# Patient Record
Sex: FEMALE | Race: WHITE | HISPANIC OR LATINO | Employment: OTHER | URBAN - METROPOLITAN AREA
[De-identification: names, ages, dates, MRNs, and addresses within clinical notes are randomized per-mention and may not be internally consistent; named-entity substitution may affect disease eponyms.]

---

## 2017-01-02 ENCOUNTER — GENERIC CONVERSION - ENCOUNTER (OUTPATIENT)
Dept: OTHER | Facility: OTHER | Age: 57
End: 2017-01-02

## 2017-01-18 ENCOUNTER — LAB CONVERSION - ENCOUNTER (OUTPATIENT)
Dept: OTHER | Facility: OTHER | Age: 57
End: 2017-01-18

## 2017-01-18 LAB
CREATININE, RANDOM URINE (HISTORICAL): 65 MG/DL (ref 20–320)
HBA1C MFR BLD HPLC: 7.6 % OF TOTAL HGB
MAGNESIUM, UR (HISTORICAL): 0.2 MG/DL
MICROALBUMIN/CREATININE RATIO (HISTORICAL): 3 MCG/MG CREAT

## 2017-01-20 ENCOUNTER — ALLSCRIPTS OFFICE VISIT (OUTPATIENT)
Dept: OTHER | Facility: OTHER | Age: 57
End: 2017-01-20

## 2017-01-20 DIAGNOSIS — M19.019 PRIMARY OSTEOARTHRITIS OF SHOULDER: ICD-10-CM

## 2017-01-20 DIAGNOSIS — K52.9 NONINFECTIVE GASTROENTERITIS AND COLITIS: ICD-10-CM

## 2017-01-20 DIAGNOSIS — E11.9 TYPE 2 DIABETES MELLITUS WITHOUT COMPLICATIONS (HCC): ICD-10-CM

## 2017-01-20 DIAGNOSIS — M25.552 PAIN IN LEFT HIP: ICD-10-CM

## 2017-01-20 DIAGNOSIS — K21.9 GASTRO-ESOPHAGEAL REFLUX DISEASE WITHOUT ESOPHAGITIS: ICD-10-CM

## 2017-01-20 DIAGNOSIS — E78.5 HYPERLIPIDEMIA: ICD-10-CM

## 2017-04-25 ENCOUNTER — LAB CONVERSION - ENCOUNTER (OUTPATIENT)
Dept: OTHER | Facility: OTHER | Age: 57
End: 2017-04-25

## 2017-04-25 LAB
BUN SERPL-MCNC: 11 MG/DL (ref 7–25)
BUN/CREA RATIO (HISTORICAL): ABNORMAL (CALC) (ref 6–22)
CALCIUM SERPL-MCNC: 9 MG/DL (ref 8.6–10.4)
CHLORIDE SERPL-SCNC: 105 MMOL/L (ref 98–110)
CHOLEST SERPL-MCNC: 151 MG/DL (ref 125–200)
CHOLEST/HDLC SERPL: 3 (CALC)
CO2 SERPL-SCNC: 30 MMOL/L (ref 20–31)
CREAT SERPL-MCNC: 0.7 MG/DL (ref 0.5–1.05)
CREATININE, RANDOM URINE (HISTORICAL): 217 MG/DL (ref 20–320)
EGFR AFRICAN AMERICAN (HISTORICAL): 112 ML/MIN/1.73M2
EGFR-AMERICAN CALC (HISTORICAL): 97 ML/MIN/1.73M2
GLUCOSE (HISTORICAL): 107 MG/DL (ref 65–99)
HBA1C MFR BLD HPLC: 7.2 % OF TOTAL HGB
HDLC SERPL-MCNC: 51 MG/DL
LDL CHOLESTEROL (HISTORICAL): 63 MG/DL (CALC)
MAGNESIUM, UR (HISTORICAL): 0.9 MG/DL
MICROALBUMIN/CREATININE RATIO (HISTORICAL): 4 MCG/MG CREAT
NON-HDL-CHOL (CHOL-HDL) (HISTORICAL): 100 MG/DL (CALC)
POTASSIUM SERPL-SCNC: 4.2 MMOL/L (ref 3.5–5.3)
SODIUM SERPL-SCNC: 143 MMOL/L (ref 135–146)
TRIGL SERPL-MCNC: 185 MG/DL

## 2017-04-26 ENCOUNTER — GENERIC CONVERSION - ENCOUNTER (OUTPATIENT)
Dept: OTHER | Facility: OTHER | Age: 57
End: 2017-04-26

## 2017-06-13 ENCOUNTER — ALLSCRIPTS OFFICE VISIT (OUTPATIENT)
Dept: OTHER | Facility: OTHER | Age: 57
End: 2017-06-13

## 2017-06-13 DIAGNOSIS — E78.5 HYPERLIPIDEMIA: ICD-10-CM

## 2017-06-13 DIAGNOSIS — M25.552 PAIN IN LEFT HIP: ICD-10-CM

## 2017-06-13 DIAGNOSIS — M19.019 PRIMARY OSTEOARTHRITIS OF SHOULDER: ICD-10-CM

## 2017-06-13 DIAGNOSIS — R23.8 OTHER SKIN CHANGES: ICD-10-CM

## 2017-06-13 DIAGNOSIS — K21.9 GASTRO-ESOPHAGEAL REFLUX DISEASE WITHOUT ESOPHAGITIS: ICD-10-CM

## 2017-06-13 DIAGNOSIS — E11.9 TYPE 2 DIABETES MELLITUS WITHOUT COMPLICATIONS (HCC): ICD-10-CM

## 2017-06-26 ENCOUNTER — ALLSCRIPTS OFFICE VISIT (OUTPATIENT)
Dept: OTHER | Facility: OTHER | Age: 57
End: 2017-06-26

## 2017-06-26 PROCEDURE — 88305 TISSUE EXAM BY PATHOLOGIST: CPT | Performed by: FAMILY MEDICINE

## 2017-06-27 ENCOUNTER — LAB REQUISITION (OUTPATIENT)
Dept: LAB | Facility: HOSPITAL | Age: 57
End: 2017-06-27
Payer: COMMERCIAL

## 2017-06-27 DIAGNOSIS — R23.8 OTHER SKIN CHANGES: ICD-10-CM

## 2017-06-29 ENCOUNTER — ALLSCRIPTS OFFICE VISIT (OUTPATIENT)
Dept: OTHER | Facility: OTHER | Age: 57
End: 2017-06-29

## 2017-06-30 ENCOUNTER — ALLSCRIPTS OFFICE VISIT (OUTPATIENT)
Dept: OTHER | Facility: OTHER | Age: 57
End: 2017-06-30

## 2017-07-03 ENCOUNTER — ALLSCRIPTS OFFICE VISIT (OUTPATIENT)
Dept: OTHER | Facility: OTHER | Age: 57
End: 2017-07-03

## 2017-08-09 ENCOUNTER — ALLSCRIPTS OFFICE VISIT (OUTPATIENT)
Dept: OTHER | Facility: OTHER | Age: 57
End: 2017-08-09

## 2017-08-09 DIAGNOSIS — M70.62 TROCHANTERIC BURSITIS OF LEFT HIP: ICD-10-CM

## 2017-08-09 DIAGNOSIS — M25.511 PAIN IN RIGHT SHOULDER: ICD-10-CM

## 2017-08-09 DIAGNOSIS — M16.12 PRIMARY OSTEOARTHRITIS OF LEFT HIP: ICD-10-CM

## 2017-08-09 DIAGNOSIS — S43.439A SUPERIOR GLENOID LABRUM LESION OF SHOULDER: ICD-10-CM

## 2017-08-09 DIAGNOSIS — M75.101 RIGHT ROTATOR CUFF TEAR: ICD-10-CM

## 2017-08-10 ENCOUNTER — GENERIC CONVERSION - ENCOUNTER (OUTPATIENT)
Dept: OTHER | Facility: OTHER | Age: 57
End: 2017-08-10

## 2017-08-16 ENCOUNTER — ALLSCRIPTS OFFICE VISIT (OUTPATIENT)
Dept: OTHER | Facility: OTHER | Age: 57
End: 2017-08-16

## 2017-08-16 ENCOUNTER — HOSPITAL ENCOUNTER (OUTPATIENT)
Dept: RADIOLOGY | Facility: HOSPITAL | Age: 57
Discharge: HOME | End: 2017-08-16
Attending: ORTHOPAEDIC SURGERY
Payer: COMMERCIAL

## 2017-08-16 DIAGNOSIS — S43.439A SUPERIOR GLENOID LABRUM LESION OF SHOULDER: ICD-10-CM

## 2017-08-16 PROCEDURE — 73030 X-RAY EXAM OF SHOULDER: CPT

## 2017-08-17 ENCOUNTER — GENERIC CONVERSION - ENCOUNTER (OUTPATIENT)
Dept: OTHER | Facility: OTHER | Age: 57
End: 2017-08-17

## 2017-08-21 ENCOUNTER — GENERIC CONVERSION - ENCOUNTER (OUTPATIENT)
Dept: OTHER | Facility: OTHER | Age: 57
End: 2017-08-21

## 2017-09-12 ENCOUNTER — GENERIC CONVERSION - ENCOUNTER (OUTPATIENT)
Dept: OTHER | Facility: OTHER | Age: 57
End: 2017-09-12

## 2017-10-11 ENCOUNTER — ALLSCRIPTS OFFICE VISIT (OUTPATIENT)
Dept: OTHER | Facility: OTHER | Age: 57
End: 2017-10-11

## 2017-10-11 DIAGNOSIS — M70.62 TROCHANTERIC BURSITIS OF LEFT HIP: ICD-10-CM

## 2017-10-11 DIAGNOSIS — J30.9 ALLERGIC RHINITIS: ICD-10-CM

## 2017-10-11 DIAGNOSIS — J20.8 ACUTE BRONCHITIS DUE TO OTHER SPECIFIED ORGANISMS: ICD-10-CM

## 2017-10-11 DIAGNOSIS — R05.9 COUGH: ICD-10-CM

## 2017-10-18 ENCOUNTER — GENERIC CONVERSION - ENCOUNTER (OUTPATIENT)
Dept: OTHER | Facility: OTHER | Age: 57
End: 2017-10-18

## 2017-10-27 ENCOUNTER — ALLSCRIPTS OFFICE VISIT (OUTPATIENT)
Dept: OTHER | Facility: OTHER | Age: 57
End: 2017-10-27

## 2017-10-27 LAB — GLUCOSE SERPL-MCNC: 191 MG/DL

## 2017-10-28 NOTE — PROGRESS NOTES
Assessment  1  Flu-like symptoms (780 99) (W94 30)    Plan  Diabetes mellitus type 2, controlled    · Blood Glucose- POC; Status:Complete;   Done: 00QHT0259 11:08AM  Fasting (Y/N) : Yes - Y  Flu-like symptoms    · Bromfed DM 30-2-10 MG/5ML Oral Syrup; TAKE 1 TSP Every 8 hours PRN  cough/congestion   · Follow Up if Not Better Evaluation and Treatment  Follow-up  Status: Complete  Done:  27Oct2017   · Call [phone] if: The cough is getting worse ; Status:Complete;   Done: 18KHA4195   · Call [phone] if: The cough is not gone in 10 days ; Status:Complete;   Done:  93FOX0398   · Seek Immediate Medical Attention if: You are feeling short of breath ; Status:Complete;    Done: 27Oct2017    Discussion/Summary    Mild dehydration, otherwise nontoxiclikely has fluhas been a week since symptoms startedwith patient supportive treatmentsymptomatically with Bromfed DM take as directed  well push fluids  Start a liquid soft diet and advance as tolerated  me if any worsening symptoms or if no improvement despite recommendations into 4 more days  The patient was counseled regarding instructions for management,-- risk factor reductions,-- impressions  total time of encounter was Twenty minutes  Possible side effects of new medications were reviewed with the patient/guardian today  The treatment plan was reviewed with the patient/guardian  The patient/guardian understands and agrees with the treatment plan      Chief Complaint  Patient complain of flu symptoms for 1 week complain of having no appetite, fever, cough , body aches, dizziness, and congestion complain of nose bleed when blowing her nose sometimes level 5/10       History of Present Illness  HPI: for 1 week she has body aches, nausea with dry cough, dizziness  Lack of appetite, congestion  Subjective fevers  has been taking mucinex and tylenol  sick contactadmits not drinking that much water         Review of Systems    Constitutional: No fever, no chills, feels well, no tiredness, no recent weight gain or loss  Cardiovascular: no complaints of slow or fast heart rate, no chest pain, no palpitations, no leg claudication or lower extremity edema  Respiratory: no complaints of shortness of breath, no wheezing, no dyspnea on exertion, no orthopnea or PND  Gastrointestinal: no complaints of abdominal pain, no constipation, no nausea or diarrhea, no vomiting, no bloody stools  Genitourinary: no complaints of dysuria, no incontinence, no pelvic pain, no dysmenorrhea, no vaginal discharge or abnormal vaginal bleeding  Integumentary: no complaints of skin rash or lesion, no itching or dry skin, no skin wounds  Active Problems  1  Allergic rhinitis (477 9) (J30 9)   2  Cellulitis (682 9) (L03 90)   3  Dermatofibroma (216 9) (D23 9)   4  Diabetes mellitus type 2, controlled (250 00) (E11 9)   5  Eczema (692 9) (L30 9)   6  Encounter for screening colonoscopy (V76 51) (Z12 11)   7  Encounter for screening mammogram for malignant neoplasm of breast (V76 12)   (Z12 31)   8  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)   9  GERD (gastroesophageal reflux disease) (530 81) (K21 9)   10  Glenohumeral arthritis (715 91) (M19 019)   11  Greater trochanteric bursitis of left hip (726 5) (M70 62)   12  Hyperlipidemia (272 4) (E78 5)   13  Ingrown nail (703 0) (L60 0)   14  Labral tear of shoulder (840 8) (S43 439A)   15  Left hip pain (719 45) (M25 552)   16  History of Left knee pain (719 46) (M25 562)   17  Osteoarthritis of left hip (715 95) (M16 12)   18  Personal history of colonic polyps (V12 72) (Z86 010)   19  Plantar warts (078 12) (B07 0)   20  Postprocedural wound infection (998 59) (T81 4XXA)   21  Right shoulder pain (719 41) (M25 511)   22  Rotator cuff injury (959 2) (S46 009A)   23  Rotator cuff syndrome, right (726 10) (M75 101)   24  Subacromial bursitis (726 19) (M75 50)   25  Tinea corporis (110 5) (B35 4)    Past Medical History  1   History of gastroenteritis (V12 79) (Z87 19)   2  History of Left knee pain (719 46) (M25 562)   3  Tinea corporis (110 5) (B35 4)  Active Problems And Past Medical History Reviewed: The active problems and past medical history were reviewed and updated today  Family History  Mother    1  Family history of Hypertension (V17 49)  Father    2  Family history of Hypertension (V17 49)    Social History   · Never A Smoker   · Never Drank Alcohol   · Occupation:   · [employer]  The social history was reviewed and is unchanged  Surgical History  1  History of Cholecystectomy   2  History of Hernia Repair   3  History of Ovarian Surgery    Current Meds   1  Atorvastatin Calcium 20 MG Oral Tablet; take one tablet at bedtime; Therapy: 86ZOP3423 to (Evaluate:01Ylb6360)  Requested for: 99EYR8727; Last   Rx:29Jun2017 Ordered   2  MetFORMIN HCl - 1000 MG Oral Tablet; TAKE 1 TABLET TWICE DAILY; Therapy: 05QFU5106 to (Radha White)  Requested for: 03BLH9122; Last   Rx:06Oct2017 Ordered   3  Pantoprazole Sodium 40 MG Oral Tablet Delayed Release; TAKE 1 TABLET AT   BEDTIME; Therapy: 65NRK1873 to (Evaluate:07Jan2018)  Requested for: 09Oct2017; Last   Rx:09Oct2017 Ordered   4  Triamcinolone Acetonide 0 5 % External Ointment; APPLY SPARINGLY TO AFFECTED   AREA(S) TWICE DAILY; Therapy: 96WJU0605 to (Last Rx:05Oct2017)  Requested for: 05Oct2017 Ordered    The medication list was reviewed and updated today  Allergies  1  Aspirin TBCR    Vitals   Recorded: 90QLB3987 10:49AM   Temperature 98 7 F   Heart Rate 118   Respiration 18   Systolic 729   Diastolic 80   Height 5 ft 1 81 in   Weight 187 lb    BMI Calculated 34 41   BSA Calculated 1 85   O2 Saturation 96   Pain Scale 5     Physical Exam    Constitutional   General appearance: No acute distress, well appearing and well nourished  Eyes   Conjunctiva and lids: No swelling, erythema or discharge  Pupils and irises: Equal, round and reactive to light      Ears, Nose, Mouth, and Throat External inspection of ears and nose: Normal     Otoscopic examination: Tympanic membranes translucent with normal light reflex  Canals patent without erythema  Nasal mucosa, septum, and turbinates: Abnormal   There was clear rhinorrhea from both nares  The bilateral nasal mucosa was boggy  Oropharynx: Normal with no erythema, edema, exudate or lesions  Pulmonary   Respiratory effort: No increased work of breathing or signs of respiratory distress  Auscultation of lungs: Clear to auscultation  Cardiovascular   Auscultation of heart: Normal rate and rhythm, normal S1 and S2, without murmurs  -- Sinus tachy  Examination of extremities for edema and/or varicosities: Normal     Skin   Skin and subcutaneous tissue: Normal without rashes or lesions  Psychiatric   Mood and affect: Normal          Results/Data  Blood Glucose- POC 27Oct2017 11:08AM Dakota Don     Test Name Result Flag Reference   Glucose Finger Stick 191         Future Appointments    Date/Time Provider Specialty Site   12/14/2017 01:00 PM SHERRI Bingham 476   01/16/2018 11:00 AM SHERRI Petty   Orthopedic Surgery [facility]     Signatures   Electronically signed by : SHERRI Worrell ; Oct 27 2017  1:31PM EST                       (Author)

## 2017-11-06 ENCOUNTER — GENERIC CONVERSION - ENCOUNTER (OUTPATIENT)
Dept: OTHER | Facility: OTHER | Age: 57
End: 2017-11-06

## 2017-11-09 ENCOUNTER — HOSPITAL ENCOUNTER (OUTPATIENT)
Dept: RADIOLOGY | Facility: HOSPITAL | Age: 57
Discharge: HOME | End: 2017-11-09
Payer: COMMERCIAL

## 2017-11-09 ENCOUNTER — GENERIC CONVERSION - ENCOUNTER (OUTPATIENT)
Dept: OTHER | Facility: OTHER | Age: 57
End: 2017-11-09

## 2017-11-09 ENCOUNTER — TRANSCRIBE ORDERS (OUTPATIENT)
Dept: RADIOLOGY | Facility: HOSPITAL | Age: 57
End: 2017-11-09

## 2017-11-09 DIAGNOSIS — R05.9 COUGH: ICD-10-CM

## 2017-11-09 DIAGNOSIS — J30.9 ALLERGIC RHINITIS: ICD-10-CM

## 2017-11-09 DIAGNOSIS — J20.8 ACUTE BRONCHITIS DUE TO OTHER SPECIFIED ORGANISMS: ICD-10-CM

## 2017-11-09 PROCEDURE — 71020 HB CHEST X-RAY 2VW FRONTAL&LATL: CPT

## 2017-11-13 ENCOUNTER — GENERIC CONVERSION - ENCOUNTER (OUTPATIENT)
Dept: OTHER | Facility: OTHER | Age: 57
End: 2017-11-13

## 2017-12-05 ENCOUNTER — LAB CONVERSION - ENCOUNTER (OUTPATIENT)
Dept: OTHER | Facility: OTHER | Age: 57
End: 2017-12-05

## 2017-12-05 LAB
BUN SERPL-MCNC: 10 MG/DL (ref 7–25)
BUN/CREA RATIO (HISTORICAL): ABNORMAL (CALC) (ref 6–22)
CALCIUM SERPL-MCNC: 9.2 MG/DL (ref 8.6–10.4)
CHLORIDE SERPL-SCNC: 103 MMOL/L (ref 98–110)
CO2 SERPL-SCNC: 28 MMOL/L (ref 20–31)
CREAT SERPL-MCNC: 0.76 MG/DL (ref 0.5–1.05)
EGFR AFRICAN AMERICAN (HISTORICAL): 102 ML/MIN/1.73M2
EGFR-AMERICAN CALC (HISTORICAL): 88 ML/MIN/1.73M2
GLUCOSE (HISTORICAL): 239 MG/DL (ref 65–99)
HBA1C MFR BLD HPLC: 9 % OF TOTAL HGB
POTASSIUM SERPL-SCNC: 3.9 MMOL/L (ref 3.5–5.3)
SODIUM SERPL-SCNC: 140 MMOL/L (ref 135–146)

## 2017-12-14 ENCOUNTER — ALLSCRIPTS OFFICE VISIT (OUTPATIENT)
Dept: OTHER | Facility: OTHER | Age: 57
End: 2017-12-14

## 2017-12-14 DIAGNOSIS — Z12.31 ENCOUNTER FOR SCREENING MAMMOGRAM FOR MALIGNANT NEOPLASM OF BREAST: ICD-10-CM

## 2017-12-14 DIAGNOSIS — E78.5 HYPERLIPIDEMIA: ICD-10-CM

## 2017-12-14 DIAGNOSIS — E11.9 TYPE 2 DIABETES MELLITUS WITHOUT COMPLICATIONS (HCC): ICD-10-CM

## 2017-12-14 DIAGNOSIS — K21.9 GASTRO-ESOPHAGEAL REFLUX DISEASE WITHOUT ESOPHAGITIS: ICD-10-CM

## 2017-12-14 DIAGNOSIS — I10 ESSENTIAL (PRIMARY) HYPERTENSION: ICD-10-CM

## 2017-12-15 NOTE — PROGRESS NOTES
Assessment  1  Diabetes mellitus type 2, controlled (250 00) (E11 9)   2  GERD (gastroesophageal reflux disease) (530 81) (K21 9)   3  Hyperlipidemia (272 4) (E78 5)   4  Hypertension, benign (401 1) (I10)    Plan  Diabetes mellitus type 2, controlled    · GlipiZIDE 5 MG Oral Tablet; TAKE 1 TABLET TWICE DAILY  Diabetes mellitus type 2, controlled, GERD (gastroesophageal reflux disease),Hyperlipidemia, Hypertension, benign    · (1) BASIC METABOLIC PROFILE; Status:Active; Requested for:12Iew9538;    · (1) HEMOGLOBIN A1C; Status:Active; Requested for:57Yae3067;    · (1) MICROALBUMIN CREATININE RATIO, RANDOM URINE; Status:Active; Requestedfor:10Ixj8600;   Diabetes mellitus type 2, controlled, Hypertension, benign    · Lisinopril 10 MG Oral Tablet; TAKE 1 TABLET DAILY  Encounter for screening mammogram for malignant neoplasm of breast    · * MAMMO SCREENING BILATERAL W CAD; Status:Hold For - Scheduling; Requestedfor:63Lzt7907;     Discussion/Summary    Discussed with patient proper compliance with medicines, currently her diabetes is not controlled and her number has worsened  She is going to take metformin twice a day as directed, will add glipizide to her regimen  Discussed proper use of medicine and common side effects  She is also going to start lisinopril once a day  Call me if any issues with new changes  Follow-up 3 months with labs  The patient was counseled regarding diagnostic results,-- instructions for management,-- risk factor reductions,-- prognosis,-- impressions,-- risks and benefits of treatment options,-- importance of compliance with treatment  total time of encounter was 30 minutes  Possible side effects of new medications were reviewed with the patient/guardian today  The treatment plan was reviewed with the patient/guardian   The patient/guardian understands and agrees with the treatment plan      Chief Complaint  Patient here for 6 month follow up with labsPatient need papPatient need Foot Exam History of Present Illness  Patient presents for chronic condition follow-up including diabetes with labs  Diabetes is uncontrolled her A1c increased from 7 2 now 9  She admits not taking her morning dose of metformin because she forgets  She is on metformin 1000 mg twice a day  Weight unchanged from previous visit  She has tried to modify her diet but not exercising  She is on a statin but not on an Ace or an Arb  Today her blood pressure was borderline at 150 over 90  She denies any chest pain, shortness of breath, dyspnea on exertion, or palpitations  Review of Systems   Constitutional: No fever, no chills, feels well, no tiredness, no recent weight gain or weight loss  Cardiovascular: No complaints of slow heart rate, no fast heart rate, no chest pain, no palpitations, no leg claudication, no lower extremity edema  Respiratory: No complaints of shortness of breath, no wheezing, no cough, no SOB on exertion, no orthopnea, no PND  Gastrointestinal: No complaints of abdominal pain, no constipation, no nausea or vomiting, no diarrhea, no bloody stools  Genitourinary: No complaints of dysuria, no incontinence, no pelvic pain, no dysmenorrhea, no vaginal discharge or bleeding  Integumentary: No complaints of skin rash or lesions, no itching, no skin wounds, no breast pain or lump  Neurological: No complaints of headache, no confusion, no convulsions, no numbness, no dizziness or fainting, no tingling, no limb weakness, no difficulty walking  Active Problems  1  Allergic rhinitis (477 9) (J30 9)   2  Dermatofibroma (216 9) (D23 9)   3  Diabetes mellitus type 2, controlled (250 00) (E11 9)   4  Eczema (692 9) (L30 9)   5  Encounter for screening colonoscopy (V76 51) (Z12 11)   6  Encounter for screening mammogram for malignant neoplasm of breast (V76 12) (Z12 31)   7  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)   8  GERD (gastroesophageal reflux disease) (530 81) (K21 9)   9   Glenohumeral arthritis (715 91) (M19 019)   10  Greater trochanteric bursitis of left hip (726 5) (M70 62)   11  Hyperlipidemia (272 4) (E78 5)   12  Influenza vaccine needed (V04 81) (Z23)   13  Ingrown nail (703 0) (L60 0)   14  Labral tear of shoulder (840 8) (S43 439A)   15  Left hip pain (719 45) (M25 552)   16  History of Left knee pain (719 46) (M25 562)   17  Osteoarthritis of left hip (715 95) (M16 12)   18  Personal history of colonic polyps (V12 72) (Z86 010)   19  Plantar warts (078 12) (B07 0)   20  Right shoulder pain (719 41) (M25 511)   21  Rotator cuff syndrome, right (726 10) (M75 101)   22  Subacromial bursitis (726 19) (M75 50)   23  Tinea corporis (110 5) (B35 4)   24  Viral bronchitis (466 0) (J20 8)    Past Medical History  1  History of gastroenteritis (V12 79) (Z87 19)   2  History of Left knee pain (719 46) (M25 562)   3  Tinea corporis (110 5) (B35 4)    The active problems and past medical history were reviewed and updated today  Surgical History  1  History of Cholecystectomy   2  History of Hernia Repair   3  History of Ovarian Surgery    Family History  Mother    1  Family history of Hypertension (V17 49)  Father    2  Family history of Hypertension (V17 49)    Social History   · Never A Smoker   · Never Drank Alcohol   · Occupation:  The social history was reviewed and updated today  Current Meds   1  Atorvastatin Calcium 20 MG Oral Tablet; take one tablet at bedtime; Therapy: 76TYZ4812 to (Solange Mathur)  Requested for: 21STW3601; Last Rx:09Nov2017 Ordered   2  MetFORMIN HCl - 1000 MG Oral Tablet; TAKE 1 TABLET TWICE DAILY; Therapy: 90WKY3063 to (Isabel Lovell)  Requested for: 16DUJ5886; Last Rx:06Oct2017 Ordered   3  Pantoprazole Sodium 40 MG Oral Tablet Delayed Release; TAKE 1 TABLET AT BEDTIME; Therapy: 71MTD9587 to (Evaluate:07Jan2018)  Requested for: 09Oct2017; Last Rx:09Oct2017 Ordered   4   Triamcinolone Acetonide 0 5 % External Ointment; APPLY SPARINGLY TO AFFECTED AREA(S) TWICE DAILY; Therapy: 43RCW5000 to (Last Rx:88Dys9928)  Requested for: 54UZE9572 Ordered   5  Ventolin  (90 Base) MCG/ACT Inhalation Aerosol Solution; INHALE 2 PUFFS EVERY 4-6 HOURS AS NEEDED; Therapy: 82FZK3034 to (Evaluate:62Mzu4518)  Requested for: 86GBA1992; Last ZF:37OYR3111 Ordered    The medication list was reviewed and updated today  Allergies  1  Aspirin TBCR    Vitals  Vital Signs    Recorded: 14Dec2017 01:06PM   Temperature 98 5 F   Heart Rate 110   Respiration 18   Systolic 407   Diastolic 90   Height 5 ft 1 42 in   Weight 188 lb    BMI Calculated 35 04   BSA Calculated 1 85   O2 Saturation 96   Pain Scale 0     Physical Exam   Constitutional  General appearance: No acute distress, well appearing and well nourished  Pulmonary  Respiratory effort: No increased work of breathing or signs of respiratory distress  Auscultation of lungs: Clear to auscultation  Cardiovascular  Auscultation of heart: Normal rate and rhythm, normal S1 and S2, without murmurs  Examination of extremities for edema and/or varicosities: Normal    Musculoskeletal  Gait and station: Normal    Psychiatric  Mood and affect: Normal          Results/Data  (1) BASIC METABOLIC PROFILE 86GTL9531 10:22AM [facility]     Test Name Result Flag Reference   GLUCOSE 239 mg/dL H 65-99   Fasting reference interval   For someone without known diabetes, a glucose value >125 mg/dL indicates that they may have diabetes and this should be confirmed with a follow-up test    UREA NITROGEN (BUN) 10 mg/dL  7-25   CREATININE 0 76 mg/dL  0 50-1 05   For patients >52years of age, the reference limit for Creatinine is approximately 13% higher for people identified as -American  eGFR NON-AFR   AMERICAN 88 mL/min/1 73m2  > OR = 60   eGFR AFRICAN AMERICAN 102 mL/min/1 73m2  > OR = 60   BUN/CREATININE RATIO   6-33   NOT APPLICABLE (calc)   SODIUM 140 mmol/L  135-146   POTASSIUM 3 9 mmol/L  3 5-5 3   CHLORIDE 103 mmol/L     CARBON DIOXIDE 28 mmol/L  20-31   CALCIUM 9 2 mg/dL  8 6-10 4     (Q) HEMOGLOBIN A1c 83FPK3191 10:22AM Radha Ashley   REPORT COMMENT: FASTING:YES     Test Name Result Flag Reference   HEMOGLOBIN A1c 9 0 % of total Hgb H <5 7   For someone without known diabetes, a hemoglobin A1c value of 6 5% or greater indicates that they may have  diabetes and this should be confirmed with a follow-up  test    For someone with known diabetes, a value <7% indicates  that their diabetes is well controlled and a value  greater than or equal to 7% indicates suboptimal  control  A1c targets should be individualized based on  duration of diabetes, age, comorbid conditions, and  other considerations  Currently, no consensus exists regarding use of hemoglobin A1c for diagnosis of diabetes for children  Health Management  Encounter for screening colonoscopy   COLONOSCOPY (GI, SURG); every 10 years; Last 78Wah6091; Next Due: 91Kqn6009; Active  GERD (gastroesophageal reflux disease)   COLONOSCOPY (GI, SURG); every 10 years; Last 94Kmw7016; Next Due: 71Sik3006; Active    Future Appointments    Date/Time Provider Specialty Site   03/15/2018 01:00 PM SHERRI Quintana  [place] [id number]   01/16/2018 11:00 AM SHERRI Markham   Orthopedic Surgery [facility]     Signatures   Electronically signed by : SHERRI Martinez ; Dec 14 2017  2:42PM EST                       (Author)

## 2017-12-21 ENCOUNTER — GENERIC CONVERSION - ENCOUNTER (OUTPATIENT)
Dept: OTHER | Facility: OTHER | Age: 57
End: 2017-12-21

## 2017-12-21 ENCOUNTER — HOSPITAL ENCOUNTER (EMERGENCY)
Facility: HOSPITAL | Age: 57
Discharge: HOME | End: 2017-12-21
Attending: EMERGENCY MEDICINE | Admitting: EMERGENCY MEDICINE
Payer: COMMERCIAL

## 2017-12-21 VITALS
BODY MASS INDEX: 35.85 KG/M2 | TEMPERATURE: 98.2 F | RESPIRATION RATE: 18 BRPM | WEIGHT: 196 LBS | DIASTOLIC BLOOD PRESSURE: 78 MMHG | HEART RATE: 92 BPM | SYSTOLIC BLOOD PRESSURE: 126 MMHG | OXYGEN SATURATION: 95 %

## 2017-12-21 DIAGNOSIS — R11.2 NAUSEA AND VOMITING: ICD-10-CM

## 2017-12-21 DIAGNOSIS — K29.70 GASTRITIS: Primary | ICD-10-CM

## 2017-12-21 LAB
ALBUMIN SERPL BCP-MCNC: 3.7 G/DL (ref 3.5–5)
ALP SERPL-CCNC: 125 U/L (ref 46–116)
ALT SERPL W P-5'-P-CCNC: 34 U/L (ref 12–78)
ANION GAP SERPL CALCULATED.3IONS-SCNC: 7 MMOL/L (ref 4–13)
AST SERPL W P-5'-P-CCNC: 26 U/L (ref 5–45)
BACTERIA UR QL AUTO: ABNORMAL /HPF
BASOPHILS # BLD AUTO: 0.02 THOUSANDS/ΜL (ref 0–0.1)
BASOPHILS NFR BLD AUTO: 0 % (ref 0–1)
BILIRUB SERPL-MCNC: 0.35 MG/DL (ref 0.2–1)
BILIRUB UR QL STRIP: ABNORMAL
BUN SERPL-MCNC: 11 MG/DL (ref 5–25)
CALCIUM SERPL-MCNC: 8.9 MG/DL (ref 8.3–10.1)
CHLORIDE SERPL-SCNC: 105 MMOL/L (ref 100–108)
CLARITY UR: CLEAR
CO2 SERPL-SCNC: 28 MMOL/L (ref 21–32)
COLOR UR: YELLOW
COLOR, POC: YELLOW
CREAT SERPL-MCNC: 0.78 MG/DL (ref 0.6–1.3)
EOSINOPHIL # BLD AUTO: 0.05 THOUSAND/ΜL (ref 0–0.61)
EOSINOPHIL NFR BLD AUTO: 1 % (ref 0–6)
ERYTHROCYTE [DISTWIDTH] IN BLOOD BY AUTOMATED COUNT: 13.9 % (ref 11.6–15.1)
GFR SERPL CREATININE-BSD FRML MDRD: 85 ML/MIN/1.73SQ M
GLUCOSE SERPL-MCNC: 241 MG/DL (ref 65–140)
GLUCOSE UR STRIP-MCNC: ABNORMAL MG/DL
HCT VFR BLD AUTO: 40.5 % (ref 34.8–46.1)
HGB BLD-MCNC: 13.4 G/DL (ref 11.5–15.4)
HGB UR QL STRIP.AUTO: NEGATIVE
KETONES UR STRIP-MCNC: ABNORMAL MG/DL
LEUKOCYTE ESTERASE UR QL STRIP: NEGATIVE
LIPASE SERPL-CCNC: 101 U/L (ref 73–393)
LYMPHOCYTES # BLD AUTO: 1.32 THOUSANDS/ΜL (ref 0.6–4.47)
LYMPHOCYTES NFR BLD AUTO: 13 % (ref 14–44)
MCH RBC QN AUTO: 29.8 PG (ref 26.8–34.3)
MCHC RBC AUTO-ENTMCNC: 33.1 G/DL (ref 31.4–37.4)
MCV RBC AUTO: 90 FL (ref 82–98)
MONOCYTES # BLD AUTO: 0.5 THOUSAND/ΜL (ref 0.17–1.22)
MONOCYTES NFR BLD AUTO: 5 % (ref 4–12)
NEUTROPHILS # BLD AUTO: 8.18 THOUSANDS/ΜL (ref 1.85–7.62)
NEUTS SEG NFR BLD AUTO: 81 % (ref 43–75)
NITRITE UR QL STRIP: NEGATIVE
NON-SQ EPI CELLS URNS QL MICRO: ABNORMAL /HPF
NRBC BLD AUTO-RTO: 0 /100 WBCS
PH UR STRIP.AUTO: 5.5 [PH] (ref 4.5–8)
PLATELET # BLD AUTO: 233 THOUSANDS/UL (ref 149–390)
PMV BLD AUTO: 10.2 FL (ref 8.9–12.7)
POTASSIUM SERPL-SCNC: 3.8 MMOL/L (ref 3.5–5.3)
PROT SERPL-MCNC: 7.9 G/DL (ref 6.4–8.2)
PROT UR STRIP-MCNC: ABNORMAL MG/DL
RBC # BLD AUTO: 4.5 MILLION/UL (ref 3.81–5.12)
RBC #/AREA URNS AUTO: ABNORMAL /HPF
SODIUM SERPL-SCNC: 140 MMOL/L (ref 136–145)
SP GR UR STRIP.AUTO: >=1.03 (ref 1–1.03)
UROBILINOGEN UR QL STRIP.AUTO: 0.2 E.U./DL
WBC # BLD AUTO: 10.09 THOUSAND/UL (ref 4.31–10.16)
WBC #/AREA URNS AUTO: ABNORMAL /HPF

## 2017-12-21 PROCEDURE — 83690 ASSAY OF LIPASE: CPT | Performed by: EMERGENCY MEDICINE

## 2017-12-21 PROCEDURE — 81001 URINALYSIS AUTO W/SCOPE: CPT

## 2017-12-21 PROCEDURE — 80053 COMPREHEN METABOLIC PANEL: CPT | Performed by: EMERGENCY MEDICINE

## 2017-12-21 PROCEDURE — 96375 TX/PRO/DX INJ NEW DRUG ADDON: CPT

## 2017-12-21 PROCEDURE — 96374 THER/PROPH/DIAG INJ IV PUSH: CPT

## 2017-12-21 PROCEDURE — 85025 COMPLETE CBC W/AUTO DIFF WBC: CPT | Performed by: EMERGENCY MEDICINE

## 2017-12-21 PROCEDURE — 36415 COLL VENOUS BLD VENIPUNCTURE: CPT

## 2017-12-21 PROCEDURE — 96361 HYDRATE IV INFUSION ADD-ON: CPT

## 2017-12-21 PROCEDURE — 81002 URINALYSIS NONAUTO W/O SCOPE: CPT | Performed by: EMERGENCY MEDICINE

## 2017-12-21 PROCEDURE — C9113 INJ PANTOPRAZOLE SODIUM, VIA: HCPCS | Performed by: EMERGENCY MEDICINE

## 2017-12-21 PROCEDURE — 99284 EMERGENCY DEPT VISIT MOD MDM: CPT

## 2017-12-21 RX ORDER — ONDANSETRON 2 MG/ML
4 INJECTION INTRAMUSCULAR; INTRAVENOUS ONCE
Status: COMPLETED | OUTPATIENT
Start: 2017-12-21 | End: 2017-12-21

## 2017-12-21 RX ORDER — GLIPIZIDE 5 MG/1
5 TABLET ORAL
COMMUNITY
End: 2018-03-15 | Stop reason: SDUPTHER

## 2017-12-21 RX ORDER — MAGNESIUM HYDROXIDE/ALUMINUM HYDROXICE/SIMETHICONE 120; 1200; 1200 MG/30ML; MG/30ML; MG/30ML
30 SUSPENSION ORAL ONCE
Status: COMPLETED | OUTPATIENT
Start: 2017-12-21 | End: 2017-12-21

## 2017-12-21 RX ORDER — FAMOTIDINE 20 MG/1
20 TABLET, FILM COATED ORAL 2 TIMES DAILY
Qty: 28 TABLET | Refills: 0 | Status: SHIPPED | OUTPATIENT
Start: 2017-12-21 | End: 2018-03-15 | Stop reason: ALTCHOICE

## 2017-12-21 RX ORDER — ACETAMINOPHEN 325 MG/1
650 TABLET ORAL ONCE
Status: COMPLETED | OUTPATIENT
Start: 2017-12-21 | End: 2017-12-21

## 2017-12-21 RX ORDER — OXYCODONE HYDROCHLORIDE AND ACETAMINOPHEN 5; 325 MG/1; MG/1
1 TABLET ORAL ONCE
Status: COMPLETED | OUTPATIENT
Start: 2017-12-21 | End: 2017-12-21

## 2017-12-21 RX ORDER — PANTOPRAZOLE SODIUM 40 MG/1
40 INJECTION, POWDER, FOR SOLUTION INTRAVENOUS ONCE
Status: COMPLETED | OUTPATIENT
Start: 2017-12-21 | End: 2017-12-21

## 2017-12-21 RX ORDER — LISINOPRIL 10 MG/1
10 TABLET ORAL DAILY
COMMUNITY
End: 2018-03-15

## 2017-12-21 RX ADMIN — SODIUM CHLORIDE 1000 ML: 0.9 INJECTION, SOLUTION INTRAVENOUS at 09:25

## 2017-12-21 RX ADMIN — PANTOPRAZOLE SODIUM 40 MG: 40 INJECTION, POWDER, FOR SOLUTION INTRAVENOUS at 10:19

## 2017-12-21 RX ADMIN — ALUMINUM HYDROXIDE, MAGNESIUM HYDROXIDE, AND SIMETHICONE 30 ML: 200; 200; 20 SUSPENSION ORAL at 09:24

## 2017-12-21 RX ADMIN — ACETAMINOPHEN 650 MG: 325 TABLET, FILM COATED ORAL at 11:42

## 2017-12-21 RX ADMIN — ONDANSETRON 4 MG: 2 INJECTION INTRAMUSCULAR; INTRAVENOUS at 09:24

## 2017-12-21 RX ADMIN — LIDOCAINE HYDROCHLORIDE 15 ML: 20 SOLUTION ORAL; TOPICAL at 09:24

## 2017-12-21 RX ADMIN — OXYCODONE HYDROCHLORIDE AND ACETAMINOPHEN 1 TABLET: 5; 325 TABLET ORAL at 11:42

## 2017-12-21 NOTE — DISCHARGE INSTRUCTIONS
Náusea y vómito severo, cuidados ambulatorios   INFORMACIÓN GENERAL:   La náusea y vómito severo  empieza de repente, empeora rápidamente y dura un corto periodo de [place]  La náusea y el vómito pueden ser causados por el embarazo, el alcohol, patti infección o medicamentos  Síntomas relacionados comunes incluyen los siguientes:   · Fiebre    · Inflamación abdominal    · Dolor, sensibilidad o un bulto en el abdomen    · Sonidos salpicantes que se escuchan en garcia estómago cuando usted se mueve  Busque cuidados inmediatos para los siguientes síntomas:   · Adi en garcia vómito o heces    · Dolor repentino y severo en el pecho y parte superior del abdomen después de magnus vomitado muy tamiko    · Mareos, sequedad en la boca y sed    · Muy poca orina o ausencia completa de la misma    · Debilidad muscular, calambres musculares y dificultad para respirar    · Latidos cardíacos más rápidos de lo normal    · Vómito por más de 50 horas  El tratamiento para la náusea y el vómito severo  puede incluir medicamentos para calmar garcia estómago y parar el vómito  Es probable que usted necesite de líquidos intravenosos si está deshidratado  Maneje garcia náusea y vómito:   · [place] líquidos según indicaciones, para prevenir la deshidratación  Pregunte cuánto líquido celina [employer] raeann y cuáles líquidos son mejores para usted  Es probable que usted necesite celina un líquido de rehidratación oral  Arabella líquido contiene agua, sales y azúcares que son todos necesarios para reemplazar los líquidos que el cuerpo ha perdido  Pregunte qué tipo de líquidos de rehidratación oral celina, cuánto celina y dónde conseguirlos  · Consuma comidas pequeñas con más frecuencia  Consuma cantidades pequeñas de alimentos cada 2 o 3 horas aunque no sienta hambre  Los alimentos en garcia estómago pueden llegar a [employer]  · Evite el estrés  Busque formas de relajarse y [employer] garcia estrés   Los rubi de jack debidos al estrés pueden incluso causar náusea y vómito  Descanse y [employer]  Programe patti ignacio con garcia proveedor de taylor según indicaciones:  Anote carmen preguntas para que las recuerde delbert carmen citas de seguimiento  ACUERDOS SOBRE GARCIA CUIDADO:   Usted tiene el derecho de participar en la planificación de garcia cuidado  Aprenda todo lo que pueda sobre garcia condición y nia darle tratamiento  Discuta con carmen médicos carmen opciones de tratamiento para juntos decidir el cuidado que usted quiere recibir  Usted siempre tiene el derecho a rechazar garcia tratamiento  Esta información es sólo para uso en educación  Garcia intención no es darle un consejo médico sobre enfermedades o tratamientos  Colsulte con garcia Jose Hans farmacéutico antes de seguir cualquier régimen médico para saber si es seguro y efectivo para usted  © 2014 [address] is for End User's use only and may not be sold, redistributed or otherwise used for commercial purposes  All illustrations and images included in CareNotes® are the copyrighted property of A D A M , Inc  or Jorge Godoy  Enfermedad por reflujo gastroesofágico   LO QUE NECESITA SABER:   La enfermedad por reflujo gastroesofágico ([place]) ocurre cuando el ácido y los alimentos en el estómago regresan al esófago  La enfermedad por reflujo gastroesofágico es el reflujo que se produce más de [address] a la semana delbert varias semanas  Generalmente causa acidez y otros síntomas  La ERGE puede causar otros problemas de taylor con el tiempo si no es tratada  INSTRUCCIONES SOBRE EL DUNG HOSPITALARIA:   Regrese a la martinez de emergencias si:   · Usted siente [place] y no puede eructar o vomitar  · Usted siente dolor tamiko en el pecho y dificultad repentina para respirar  · Carmen evacuaciones intestinales son de color ramona, con [place], o de apariencia alquitranada  · Garcia vómito parece nia café molido o contiene eugene    Pregúntele a garcia Nasrin Paddy vitaminas y minerales son adecuados para usted    · Vomita grandes cantidades, o vomita con frecuencia  · Tiene dificultad para respirar después de vomitar  · Tiene dificultad para tragar o dolor al tragar  · Usted pierde peso sin proponérselo  · Jeff síntomas empeoran o no mejoran con el tratamiento  · Usted tiene preguntas o inquietudes acerca de garcia condición o cuidado  Medicamentos:   · Medicamentos,  se utilizan para disminuir el ácido [place] medicamentos también podrían usarse para ayudar a que garcia esfínter esofágico inferior y garcia estómago se contraigan (estrechen) más  · Leonidas jeff medicamentos nia se le haya indicado  Consulte con garcia médico si usted yee que garcia medicamento no le está ayudando o si presenta efectos secundarios  Infórmele si es alérgico a algún medicamento  Mantenga patti lista actualizada de los [place], las vitaminas y los productos herbales que royal  Incluya los siguientes datos de los medicamentos: cantidad, frecuencia y motivo de administración  Traiga con usted la lista o los envases de la píldoras a jeff citas de seguimiento  Lleve la lista de los medicamentos con usted en nel de patti emergencia  Control de la ERGE:   · No consuma alimentos o bebidas que puedan aumentar la [place]  Estos incluyen chocolate, menta, comidas fritas o grasosas, bebidas que contienen cafeína o bebidas gaseosas  Otros alimentos incluyen comidas picantes, cebollas, tomates y alimentos a base de tomate  No consuma alimentos y bebidas que puedan irritar garcia esófago, nia las frutas cítricas, los jugos y las bebidas alcohólicas  · No ingiera comidas abundantes  Cuando usted come [employer] a la vez, garcia estómago necesita más ácido para digerirla  Consuma 6 comidas pequeñas al día en vez de 3 comidas grandes y coma lentamente  No consuma alimentos entre 2 y 3 horas antes de [place]ED[place]UP  · Eleve la cabecera de garcia cama  Coloque bloques de 6 pulgadas debajo de la cabecera de la estructura de garcia cama   También podría usar más patti almohada para apoyar troy jack y hombros mientras duerme  · Mantenga un peso saludable  Si usted tiene sobrepeso, la pérdida de peso podría ayudar a aliviar los síntomas de la [place]  · No fume  Fumar debilita el esfínter esofágico inferior y [place] el riesgo de [place]  Pida información a troy médico si usted actualmente fuma y necesita ayuda para dejar de fumar  Los cigarrillos electrónicos o tabaco sin humo todavía contienen nicotina  Consulte con troy médico antes de [employer]  · No use ropa que [place] alrededor de la cintura  La ropa apretada puede ejercer presión [employer] y causar o empeorar los síntomas de la Igibqgreq-csèv-Jlszdh  Acuda a jeff consultas de control con troy médico según le indicaron  Anote jeff preguntas para que se acuerde de hacerlas delbert jeff visitas  © 2017 [address] Information is for End User's use only and may not be sold, redistributed or otherwise used for commercial purposes  All illustrations and images included in CareNotes® are the copyrighted property of A D A M , Inc  or Jorge Godoy  Esta información es sólo para uso en educación  Troy intención no es darle un consejo médico sobre enfermedades o tratamientos  Colsulte con troy [employer] farmacéutico antes de seguir cualquier régimen médico para saber si es seguro y efectivo para usted

## 2017-12-21 NOTE — ED PROVIDER NOTES
History  Chief Complaint   Patient presents with    Abdominal Pain     vomiting since 0300 and abdominal pain  52-year-old with history of diabetes, acid reflux presents for evaluation of right upper abdominal pain  Patient reports having pain since last night  Pain waxes and wanes, is worse with food  Reports that she has had multiple episodes of vomiting as well  Vomitus is nonbloody, nonbilious  Denies having any fevers or chills  Denies any diarrhea  She has had this pain before  History of cholecystectomy  Denies have any chest pain or shortness of breath  Prior to Admission Medications   Prescriptions Last Dose Informant Patient Reported? Taking? ATORVASTATIN CALCIUM PO   Yes Yes   Sig: Take 20 mg by mouth daily at bedtime   glipiZIDE (GLUCOTROL) 5 mg tablet   Yes Yes   Sig: Take 5 mg by mouth 2 (two) times a day before meals   lisinopril (ZESTRIL) 10 mg tablet   Yes Yes   Sig: Take 10 mg by mouth daily   metFORMIN (GLUCOPHAGE) 500 mg tablet   Yes Yes   Sig: Take 1,000 mg by mouth daily with breakfast        Facility-Administered Medications: None       Past Medical History:   Diagnosis Date    Diabetes mellitus ([facility] [address] )     GERD (gastroesophageal reflux disease)     Hyperlipidemia        Past Surgical History:   Procedure Laterality Date    CHOLECYSTECTOMY      HERNIA REPAIR      OVARY SURGERY      NH COLONOSCOPY FLX DX W/COLLJ SPEC WHEN PFRMD N/A 12/22/2016    Procedure: EGD AND COLONOSCOPY;  Surgeon: Vee Barahona MD;  Location: [facility] GI [facility]; Service: Gastroenterology       History reviewed  No pertinent family history  I have reviewed and agree with the history as documented  Social History   Substance Use Topics    Smoking status: Never Smoker    Smokeless tobacco: Not on file    Alcohol use No        Review of Systems   Constitutional: Negative for chills and fever  HENT: Negative for congestion and sore throat  Eyes: Negative for pain and redness  Respiratory: Negative for shortness of breath and wheezing  Cardiovascular: Negative for chest pain and palpitations  Gastrointestinal: Positive for abdominal pain, nausea and vomiting  Negative for diarrhea  Endocrine: Negative for polydipsia and polyphagia  Genitourinary: Negative for dysuria and flank pain  Musculoskeletal: Negative for arthralgias and back pain  Skin: Negative for rash and wound  Neurological: Negative for seizures and headaches  Psychiatric/Behavioral: Negative for agitation and behavioral problems  All other systems reviewed and are negative  Physical Exam  ED Triage Vitals   Temperature Pulse Respirations Blood Pressure SpO2   12/21/17 0856 12/21/17 0856 12/21/17 0856 12/21/17 0856 12/21/17 0856   98 2 °F (36 8 °C) (!) 110 20 155/87 96 %      Temp Source Heart Rate Source Patient Position - Orthostatic VS BP Location FiO2 (%)   12/21/17 0856 12/21/17 1033 12/21/17 1033 12/21/17 0856 --   Oral Monitor Lying Left arm       Pain Score       12/21/17 0856       5           Orthostatic Vital Signs  Vitals:    12/21/17 0856 12/21/17 0916 12/21/17 1033   BP: 155/87  126/78   Pulse: (!) 110 99 92   Patient Position - Orthostatic VS:   Lying       Physical Exam   Constitutional: She is oriented to person, place, and time  She appears well-developed and well-nourished  HENT:   Head: Normocephalic and atraumatic  Right Ear: External ear normal    Left Ear: External ear normal    Mouth/Throat: Oropharynx is clear and moist    Eyes: EOM are normal  Pupils are equal, round, and reactive to light  Neck: Normal range of motion  Cardiovascular: Normal rate, regular rhythm and normal heart sounds  Exam reveals no friction rub  No murmur heard  Pulmonary/Chest: Effort normal  No respiratory distress  She has no wheezes  Abdominal: Soft  Bowel sounds are normal  She exhibits no distension  There is no tenderness  There is no rebound and no guarding     Musculoskeletal: Normal range of motion  She exhibits no edema  Neurological: She is alert and oriented to person, place, and time  No cranial nerve deficit  Coordination normal    Skin: Skin is warm  No erythema  Psychiatric: She has a normal mood and affect  Her behavior is normal    Nursing note and vitals reviewed        ED Medications  Medications   sodium chloride 0 9 % bolus 1,000 mL (not administered)   sodium chloride 0 9 % bolus 1,000 mL (1,000 mL Intravenous New Bag 12/21/17 0925)   ondansetron (ZOFRAN) injection 4 mg (4 mg Intravenous Given 12/21/17 0924)   aluminum-magnesium hydroxide-simethicone (MYLANTA) 200-200-20 mg/5 mL oral suspension 30 mL (30 mL Oral Given 12/21/17 0924)   lidocaine viscous (XYLOCAINE) 2 % mucosal solution 15 mL (15 mL Swish & Swallow Given 12/21/17 0924)   pantoprazole (PROTONIX) injection 40 mg (40 mg Intravenous Given 12/21/17 1019)       Diagnostic Studies  Results Reviewed     Procedure Component Value Units Date/Time    Urine Microscopic [[id number]]  (Abnormal) Collected:  12/21/17 1031    Lab Status:  Final result Specimen:  Urine from Urine, Clean Catch Updated:  12/21/17 1117     RBC, UA 2-4 (A) /hpf      WBC, UA None Seen /hpf      Epithelial Cells Occasional /hpf      Bacteria, UA Occasional /hpf     POCT urinalysis dipstick [[id number]]  (Normal) Resulted:  12/21/17 1031    Lab Status:  Final result Specimen:  Urine from Urine, Other Updated:  12/21/17 1037     Color, UA yellow    ED Urine Macroscopic [[id number]]  (Abnormal) Collected:  12/21/17 1031    Lab Status:  Final result Specimen:  Urine Updated:  12/21/17 1031     Color, UA Yellow     Clarity, UA Clear     pH, UA 5 5     Leukocytes, UA Negative     Nitrite, UA Negative     Protein,  (2+) (A) mg/dl      Glucose,  (1/2%) (A) mg/dl      Ketones, UA 40 (2+) (A) mg/dl      Urobilinogen, UA 0 2 E U /dl      Bilirubin, UA Interference- unable to analyze (A)     Blood, UA Negative     Specific Gravity, UA >=1 030 Narrative:       CLINITEK RESULT    Comprehensive metabolic panel [[id number]]  (Abnormal) Collected:  12/21/17 0904    Lab Status:  Final result Specimen:  Blood from Arm, Left Updated:  12/21/17 0945     Sodium 140 mmol/L      Potassium 3 8 mmol/L      Chloride 105 mmol/L      CO2 28 mmol/L      Anion Gap 7 mmol/L      BUN 11 mg/dL      Creatinine 0 78 mg/dL      Glucose 241 (H) mg/dL      Calcium 8 9 mg/dL      AST 26 U/L      ALT 34 U/L      Alkaline Phosphatase 125 (H) U/L      Total Protein 7 9 g/dL      Albumin 3 7 g/dL      Total Bilirubin 0 35 mg/dL      eGFR 85 ml/min/1 73sq m     Narrative:         National Kidney Disease Education Program recommendations are as follows:  GFR calculation is accurate only with a steady state creatinine  Chronic Kidney disease less than 60 ml/min/1 73 sq  meters  Kidney failure less than 15 ml/min/1 73 sq  meters      Lipase [[id number]]  (Normal) Collected:  12/21/17 0904    Lab Status:  Final result Specimen:  Blood from Arm, Left Updated:  12/21/17 0945     Lipase 101 u/L     CBC and differential [[id number]]  (Abnormal) Collected:  12/21/17 0904    Lab Status:  Final result Specimen:  Blood from Arm, Left Updated:  12/21/17 0912     WBC 10 09 Thousand/uL      RBC 4 50 Million/uL      Hemoglobin 13 4 g/dL      Hematocrit 40 5 %      MCV 90 fL      MCH 29 8 pg      MCHC 33 1 g/dL      RDW 13 9 %      MPV 10 2 fL      Platelets 423 Thousands/uL      nRBC 0 /100 WBCs      Neutrophils Relative 81 (H) %      Lymphocytes Relative 13 (L) %      Monocytes Relative 5 %      Eosinophils Relative 1 %      Basophils Relative 0 %      Neutrophils Absolute 8 18 (H) Thousands/µL      Lymphocytes Absolute 1 32 Thousands/µL      Monocytes Absolute 0 50 Thousand/µL      Eosinophils Absolute 0 05 Thousand/µL      Basophils Absolute 0 02 Thousands/µL                  No orders to display         Procedures  Procedures      Phone Consults  ED Phone Contact    ED Course  ED Course as of Dec 21 1117 Thu Dec 21, 2017   0945 Patient reports that her pain is improved after the GI cocktail  MDM  Number of Diagnoses or Management Options  Diagnosis management comments: Impression:  Well-appearing patient presenting with recurrence right upper quadrant pain with history of gastritis  Likely gastritis/acid reflux  Plan:  abdominal labs, treat symptoms, reassess    CritCare Time    Disposition  Final diagnoses:   Gastritis   Nausea and vomiting     Time reflects when diagnosis was documented in both MDM as applicable and the Disposition within this note     Time User Action Codes Description Comment    12/21/2017 10:42 AM Katty Brittni Add [K29 70] Gastritis     12/21/2017 10:43 AM Katty Brittni Add [R11 2] Nausea and vomiting       ED Disposition     ED Disposition Condition Comment    Discharge  Cornel Reason discharge to home/self care  Condition at discharge: Good        Follow-up Information     Follow up With Specialties Details Why Contact Info Additional Information    Liborio De La Garza MD Family Medicine Schedule an appointment as soon as possible for a visit  [facility]  [address] [place] [address]  [phone]       [address] [place] [address] Emergency Department Emergency Medicine  As needed, If symptoms worsen [address]  [phone] [address] [place] [address] ED, [address] [place] [address], [phone]        Patient's Medications   Discharge Prescriptions    FAMOTIDINE (PEPCID) 20 MG TABLET    Take 1 tablet by mouth 2 (two) times a day for 14 days       Start Date: 12/21/2017End Date: 1/4/2018       Order Dose: 20 mg       Quantity: 28 tablet    Refills: 0     No discharge procedures on file  ED Provider  Attending physically available and evaluated Cornel Reason  I managed the patient along with the ED Attending      Electronically Signed by         Ragini Del Valle MD  Resident  12/21/17 3183

## 2017-12-21 NOTE — ED ATTENDING ATTESTATION
Hyacinth Rodriguez MD, saw and evaluated the patient  I have discussed the patient with the resident/non-physician practitioner and agree with the resident's/non-physician practitioner's findings, Plan of Care, and MDM as documented in the resident's/non-physician practitioner's note, except where noted  All available labs and Radiology studies were reviewed  At this point I agree with the current assessment done in the Emergency Department  I have conducted an independent evaluation of this patient a history and physical is as follows: This 16-year-old female presents today with epigastric abdominal pain and vomiting  Patient states the pain has been there for couple of days, vomiting just started acutely during the night  Patient is status post cholecystectomy and ovarian cystectomy  On exam patient is nontender soft abdomen, appears mildly uncomfortable  Patient states pain is waxing and waning, improved after receiving GI cocktail  Plan:  Patient will be discharged home, blood work done here was reassuring  Patient will need follow up with GI      Critical Care Time  CritCare Time    Procedures

## 2018-01-09 NOTE — RESULT NOTES
Verified Results  (1) TISSUE EXAM [id number] 08:20AM Porfirio Awan     Test Name Result Flag Reference   LAB AP CASE REPORT (Report)     Surgical Pathology Report             Case: [id number]                   Authorizing Provider: Karissa Anderson MD      Collected:      12/22/2016 0820        Ordering Location:   [address] [place] [address]    Received:      12/23/2016 [address]                            Pathologist:      Andrea Lee MD                              Specimens:  A) - Stomach, Antral bx ,gastritis                                   B) - Polyp, Colorectal, Sigmoid polyp   LAB AP FINAL DIAGNOSIS (Report)     A  Stomach, antrum, biopsy:        - Antral mucosa with chronic inactive gastritis  - No Helicobacter pylori organisms are identified on the   Warthin-Starry special stain, performed with an appropriate control         - No intestinal metaplasia, dysplasia or malignancy is   identified  B  Colon, sigmoid, polypectomy:        - Hyperplastic polyp         - No dysplasia or malignancy is identified  Interpretation performed at , [address]   Electronically signed by Andrea Lee MD on 12/28/2016 at 8:41 AM   LAB AP SURGICAL ADDITIONAL INFORMATION (Report)     These tests were developed and their performance characteristics   determined by [facility] or [email or web address]  They may not be cleared or approved by the [place]  Food and   Drug Administration  The FDA has determined that such clearance or   approval is not necessary  These tests are used for clinical purposes  They should not be regarded as investigational or for research  This   laboratory has been approved by CLIA 88, designated as a high-complexity   laboratory and is qualified to perform these tests  LAB AP GROSS DESCRIPTION (Report)     A   The specimen is received in formalin, labeled with the patient's name and hospital number, and is designated antral biopsy gastritis  The   specimen consists of 2 rubbery, tan-brown tissue fragments measuring from   0 1 up to 0 4 cm in greatest dimension  Entirely submitted  One cassette  B  The specimen is received in formalin, labeled with the patient's name   and hospital number, and is designated sigmoid polyp  The specimen   consists of a single, rubbery, tan-brown tissue fragment measuring up to   0 2 cm in greatest dimension  Entirely submitted  One cassette  Note: The estimated total formalin fixation time based upon information   provided by the submitting clinician and the standard processing schedule   is 29 0 hours      [facility] [place]

## 2018-01-11 NOTE — RESULT NOTES
Verified Results  (1) BASIC METABOLIC PROFILE [id number] 01:01PM Smith Apgar     Test Name Result Flag Reference   GLUCOSE 107 mg/dL H 65-99   Fasting reference interval     For someone without known diabetes, a glucose value  between 100 and 125 mg/dL is consistent with  prediabetes and should be confirmed with a  follow-up test    UREA NITROGEN (BUN) 11 mg/dL  7-25   CREATININE 0 70 mg/dL  0 50-1 05   For patients >52years of age, the reference limit  for Creatinine is approximately 13% higher for people  identified as -American  eGFR NON-AFR  AMERICAN 97 mL/min/1 73m2  > OR = 60   eGFR AFRICAN AMERICAN 112 mL/min/1 73m2  > OR = 60   BUN/CREATININE RATIO   3-57   NOT APPLICABLE (calc)   SODIUM 143 mmol/L  135-146   POTASSIUM 4 2 mmol/L  3 5-5 3   CHLORIDE 105 mmol/L     CARBON DIOXIDE 30 mmol/L  20-31   CALCIUM 9 0 mg/dL  8 6-10 4     (Q) LIPID PANEL WITH REFLEX TO DIRECT LDL 2017 01:01PM Radha Ashley     Test Name Result Flag Reference   CHOLESTEROL, TOTAL 151 mg/dL  125-200   HDL CHOLESTEROL 51 mg/dL  > OR = 46   TRIGLICERIDES 178 mg/dL H <150   LDL-CHOLESTEROL 63 mg/dL (calc)  <130   Desirable range <100 mg/dL for patients with CHD or  diabetes and <70 mg/dL for diabetic patients with  known heart disease  CHOL/HDLC RATIO 3 0 (calc)  < OR = 5 0   NON HDL CHOLESTEROL 100 mg/dL (calc)     Target for non-HDL cholesterol is 30 mg/dL higher than   LDL cholesterol target       (Q) MICROALBUMIN, RANDOM URINE (W/CREATININE) 2017 01:01PM Smith Apgar     Test Name Result Flag Reference   CREATININE, RANDOM URINE 217 mg/dL     MICROALBUMIN 0 9 mg/dL     Reference Range  Not established   MICROALBUMIN/CREATININE$RATIO, RANDOM URINE 4 mcg/mg creat  <30   The ADA defines abnormalities in albumin  excretion as follows:     Category         Result (mcg/mg creatinine)     Normal                    <30  Microalbuminuria            Clinical albuminuria   > OR = 300 The ADA recommends that at least two of three  specimens collected within a 3-6 month period be  abnormal before considering a patient to be  within a diagnostic category  (Q) HEMOGLOBIN A1c [id number] 01:01PM Radha Ashley   REPORT COMMENT:  FASTING:YES     Test Name Result Flag Reference   HEMOGLOBIN A1c 7 2 % of total Hgb H <5 7   For someone without known diabetes, a hemoglobin A1c  value of 6 5% or greater indicates that they may have   diabetes and this should be confirmed with a follow-up   test      For someone with known diabetes, a value <7% indicates   that their diabetes is well controlled and a value   greater than or equal to 7% indicates suboptimal   control  A1c targets should be individualized based on   duration of diabetes, age, comorbid conditions, and   other considerations  Currently, no consensus exists regarding use of  hemoglobin A1c for diagnosis of diabetes for children         Signatures   Electronically signed by : Lawson Cogan, M D ; Apr 26 2017 12:29PM EST                       (Author)

## 2018-01-12 VITALS
SYSTOLIC BLOOD PRESSURE: 138 MMHG | DIASTOLIC BLOOD PRESSURE: 85 MMHG | BODY MASS INDEX: 36.82 KG/M2 | HEART RATE: 94 BPM | WEIGHT: 195 LBS | HEIGHT: 61 IN

## 2018-01-12 NOTE — RESULT NOTES
Verified Results  * XR SHOULDER 2+ VIEW RIGHT [id number] 02:41PM Lynne Henry Order Number: [id number]   Performing Comments: room12     Test Name Result Flag Reference   XR SHOULDER 2+ VW RIGHT (Report)     RIGHT SHOULDER     INDICATION: Right shoulder pain  Labral lesion     COMPARISON: None     VIEWS: 3     IMAGES: 3     FINDINGS:     There is no acute fracture or dislocation  No degenerative changes  No lytic or blastic lesions are seen  Soft tissues are unremarkable  IMPRESSION:     No acute osseous abnormality         Workstation performed: [id number]     Signed by:   Gallo Dinero MD   8/19/17                               Plan  Right shoulder pain    · Kenalog 40 MG/ML Injection Suspension (Triamcinolone Acetonide)

## 2018-01-12 NOTE — RESULT NOTES
Verified Results  * XR CHEST PA & LATERAL [id number] 12:01PM Yoel Killian Order Number: [id number]     Test Name Result Flag Reference   XR CHEST PA & LATERAL (Report)     CHEST      INDICATION: Chest pressure     COMPARISON: Chest radiograph 4/20/2011     VIEWS: Frontal and lateral projections     IMAGES: 2     FINDINGS:        Cardiomediastinal silhouette appears unremarkable  The lungs are clear  No pneumothorax or pleural effusion  Visualized osseous structures appear within normal limits for the patient's age  IMPRESSION:     No active pulmonary disease         Workstation performed: [id number]     Signed by:   Usama Croft MD   11/12/17       Signatures   Electronically signed by : SHERRI Armas ; Nov 13 2017 10:35AM EST                       (Author)

## 2018-01-13 VITALS
TEMPERATURE: 99.2 F | WEIGHT: 195 LBS | SYSTOLIC BLOOD PRESSURE: 122 MMHG | OXYGEN SATURATION: 98 % | DIASTOLIC BLOOD PRESSURE: 80 MMHG | HEIGHT: 62 IN | BODY MASS INDEX: 35.88 KG/M2 | HEART RATE: 84 BPM

## 2018-01-13 VITALS
TEMPERATURE: 98.9 F | WEIGHT: 191.25 LBS | SYSTOLIC BLOOD PRESSURE: 122 MMHG | HEART RATE: 94 BPM | HEIGHT: 62 IN | DIASTOLIC BLOOD PRESSURE: 82 MMHG | OXYGEN SATURATION: 98 % | BODY MASS INDEX: 35.19 KG/M2

## 2018-01-13 VITALS
RESPIRATION RATE: 16 BRPM | BODY MASS INDEX: 35.88 KG/M2 | HEIGHT: 62 IN | SYSTOLIC BLOOD PRESSURE: 140 MMHG | DIASTOLIC BLOOD PRESSURE: 84 MMHG | OXYGEN SATURATION: 98 % | HEART RATE: 92 BPM | WEIGHT: 195 LBS | TEMPERATURE: 98.4 F

## 2018-01-13 VITALS
HEIGHT: 62 IN | SYSTOLIC BLOOD PRESSURE: 120 MMHG | WEIGHT: 187 LBS | RESPIRATION RATE: 18 BRPM | BODY MASS INDEX: 34.41 KG/M2 | DIASTOLIC BLOOD PRESSURE: 80 MMHG | HEART RATE: 118 BPM | OXYGEN SATURATION: 96 % | TEMPERATURE: 98.7 F

## 2018-01-13 VITALS
BODY MASS INDEX: 35.88 KG/M2 | OXYGEN SATURATION: 98 % | HEART RATE: 90 BPM | RESPIRATION RATE: 16 BRPM | SYSTOLIC BLOOD PRESSURE: 130 MMHG | TEMPERATURE: 98.4 F | HEIGHT: 62 IN | DIASTOLIC BLOOD PRESSURE: 84 MMHG | WEIGHT: 195 LBS

## 2018-01-14 VITALS
OXYGEN SATURATION: 97 % | SYSTOLIC BLOOD PRESSURE: 124 MMHG | DIASTOLIC BLOOD PRESSURE: 78 MMHG | HEIGHT: 62 IN | BODY MASS INDEX: 35.15 KG/M2 | WEIGHT: 191 LBS | HEART RATE: 91 BPM | TEMPERATURE: 98.3 F

## 2018-01-14 VITALS
DIASTOLIC BLOOD PRESSURE: 82 MMHG | HEART RATE: 99 BPM | SYSTOLIC BLOOD PRESSURE: 144 MMHG | HEIGHT: 61 IN | BODY MASS INDEX: 36.44 KG/M2 | WEIGHT: 193 LBS

## 2018-01-14 VITALS
SYSTOLIC BLOOD PRESSURE: 114 MMHG | HEIGHT: 62 IN | BODY MASS INDEX: 35.51 KG/M2 | DIASTOLIC BLOOD PRESSURE: 84 MMHG | HEART RATE: 82 BPM | TEMPERATURE: 98.6 F | OXYGEN SATURATION: 97 % | WEIGHT: 193 LBS

## 2018-01-14 VITALS
BODY MASS INDEX: 36.44 KG/M2 | WEIGHT: 193 LBS | HEIGHT: 61 IN | DIASTOLIC BLOOD PRESSURE: 92 MMHG | HEART RATE: 100 BPM | SYSTOLIC BLOOD PRESSURE: 158 MMHG

## 2018-01-16 ENCOUNTER — GENERIC CONVERSION - ENCOUNTER (OUTPATIENT)
Dept: OTHER | Facility: OTHER | Age: 58
End: 2018-01-16

## 2018-01-16 ENCOUNTER — ALLSCRIPTS OFFICE VISIT (OUTPATIENT)
Dept: OTHER | Facility: OTHER | Age: 58
End: 2018-01-16

## 2018-01-16 DIAGNOSIS — M70.62 TROCHANTERIC BURSITIS OF LEFT HIP: ICD-10-CM

## 2018-01-17 NOTE — PROGRESS NOTES
Assessment   1  Greater trochanteric bursitis of left hip (726 5) (M70 62)   2  Rotator cuff syndrome, right (726 10) (M83 369)    Plan   Greater trochanteric bursitis of left hip    · *1 - [facility]  *  Status: Active  Requested for: [id number]  Care Summary provided  : Yes    Chief Complaint   1  Shoulder Pain  Follow-up right shoulder pain      Discussion/Summary   Patient discussion: discussed with the patient, 25 minute visit, greater than half of the time was spent on counseling  Explained my current clinical findings to mild today  Her right shoulder pain, strength and range of motion has significantly improved following physical therapy rehabilitation  I've advised her to continue with home-based exercises and this regard  He also explained the treatment options for her left hip trochanteric bursitis  At this time, she would like to have a trial of physical therapy since this is helpful in the past  She may also do local ice application as needed  I will see her back in about 2 months time if she does continue with significant lateral hip pain which can consider doing a local cortisone injection  History of Present Illness   Lissette Cardozo is here today for a follow-up of her right shoulder pain  She has undergone a course of physical therapy and at this time does not complain of any shoulder pain or stiffness  She is able to perform all activities with both upper extremities without any difficulty  However, she does mention a recurrence of her left lateral hip pain  She does have a history of left hip trochanteric bursitis in the past which had improved with physical therapy but has now recurred over the last few weeks  Denies any associated significant lower back pain or lower extremity neurological symptoms like tingling or numbness  No history of any trauma to this area recently        Review of Systems        Constitutional: No fever, no chills, feels well, no tiredness, no recent weight gain or loss  Eyes: No complaints of eyesight problems, no red eyes  ENT: no loss of hearing, no nosebleeds, no sore throat  Cardiovascular: No complaints of chest pain, no palpitations, no leg claudication or lower extremity edema  Respiratory: no compliants of shortness of breath, no wheezing, no cough  Gastrointestinal: no complaints of abdominal pain, no constipation, no nausea or diarrhea, no vomiting, no bloody stools  Genitourinary: no complaints of dysuria, no incontinence  Musculoskeletal: as noted in HPI  Integumentary: no complaints of skin rash or lesion, no itching or dry skin, no skin wounds  Neurological: no complaints of headache, no confusion, no numbness or tingling, no dizziness  Endocrine: No complaints of muscle weakness, no feelings of weakness, no frequent urination, no excessive thirst       Psychiatric: No suicidal thoughts, no anxiety, no feelings of depression  Active Problems   1  Allergic rhinitis (477 9) (J30 9)   2  Dermatofibroma (216 9) (D23 9)   3  Diabetes mellitus type 2, controlled (250 00) (E11 9)   4  Eczema (692 9) (L30 9)   5  Encounter for screening colonoscopy (V76 51) (Z12 11)   6  Encounter for screening mammogram for malignant neoplasm of breast (V76 12)     (Z12 31)   7  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)   8  Gastritis, acute (535 00) (K29 00)   9  GERD (gastroesophageal reflux disease) (530 81) (K21 9)   10  Glenohumeral arthritis (716 91) (M19 019)   11  Greater trochanteric bursitis of left hip (726 5) (M70 62)   12  Hyperlipidemia (272 4) (E78 5)   13  Hypertension, benign (401 1) (I10)   14  Influenza vaccine needed (V04 81) (Z23)   15  Ingrown nail (703 0) (L60 0)   16  Labral tear of shoulder (840 8) (S43 439A)   17  Left hip pain (719 45) (M25 552)   18  History of Left knee pain (719 46) (M25 562)   19  Osteoarthritis of left hip (715 95) (M16 12)   20   Personal history of colonic polyps (V12 72) (Z86 010)   21  Plantar warts (078 12) (B07 0)   22  Right shoulder pain (719 41) (M25 511)   23  Rotator cuff syndrome, right (726 10) (M75 101)   24  Subacromial bursitis (726 19) (M75 50)   25  Tinea corporis (110 5) (B35 4)   26  Viral bronchitis (466 0) (J20 8)    Past Medical History    · History of gastroenteritis (V12 79) (Z87 19)   · History of Left knee pain (719 46) (M25 562)   · Tinea corporis (110 5) (B35 4)     The active problems and past medical history were reviewed and updated today  Surgical History    · History of Cholecystectomy   · History of Hernia Repair   · History of Ovarian Surgery     The surgical history was reviewed and updated today  Family History   Mother    · Family history of Hypertension (V17 49)  Father    · Family history of Hypertension (V17 49)     The family history was reviewed and updated today  Social History    · Never A Smoker   · Never Drank Alcohol   · Occupation:   · [employer]  The social history was reviewed and updated today  The social history was reviewed and is unchanged  Current Meds    1  Atorvastatin Calcium 20 MG Oral Tablet; take one tablet at bedtime; Therapy: [id number] to (Mattie Caraballo)  Requested for: 16BDD2179; Last     Rx:09Nov2017 Ordered   2  Famotidine 20 MG Oral Tablet; Therapy: 59Url9326 to Recorded   3  Famotidine 40 MG Oral Tablet; 1 po BID as needed; Therapy: 79Eyw0948 to Recorded   4  GlipiZIDE 5 MG Oral Tablet; TAKE 1 TABLET TWICE DAILY; Therapy: 01HJJ3243 to (Evaluate:14Mar2018)  Requested for: 12OCM8254; Last     Rx:80Yyo5491 Ordered   5  Lisinopril 10 MG Oral Tablet; TAKE 1 TABLET DAILY; Therapy: 44QRZ0840 to (Evaluate:14Mar2018)  Requested for: 20PWM1056; Last     Rx:14Dec2017 Ordered   6  MetFORMIN HCl - 1000 MG Oral Tablet; TAKE 1 TABLET TWICE DAILY; Therapy: 64FKD4784 to 459 9584)  Requested for: 13NEC2388; Last     Rx:11Jan2018 Ordered   7   Pantoprazole Sodium 40 MG Oral Tablet Delayed Release; TAKE 1 TABLET AT     BEDTIME; Therapy: 45DCC2911 to (Evaluate:21Mar2018)  Requested for: 51Cvy7114; Last     Rx:43Mer2641 Ordered   8  Triamcinolone Acetonide 0 5 % External Ointment; APPLY SPARINGLY TO AFFECTED     AREA(S) TWICE DAILY; Therapy: 04UIG0874 to (Last Rx:05Oct2017)  Requested for: 78NOK0415 Ordered   9  Ventolin  (90 Base) MCG/ACT Inhalation Aerosol Solution; INHALE 2 PUFFS     EVERY 4-6 HOURS AS NEEDED; Therapy: 23CUA6565 to (Evaluate:06Dec2017)  Requested for: 69XSV5869; Last     OI:42AQB7841 Ordered     The medication list was reviewed and updated today  Allergies   1  Aspirin TBCR    Vitals    Recorded: 71PUY7284 11:20AM   Heart Rate 98   Systolic 458   Diastolic 82   Height 5 ft 1 in   Weight 185 lb    BMI Calculated 34 96   BSA Calculated 1 83     Physical Exam      Right Shoulder: no deformity, erythema, ecchymosis, edema, or tenderness,-- ROM within normal limits in all planes-- and-- strength within normal limits in all planes  Left Shoulder: no deformity, erythema, ecchymosis, edema, or tenderness,-- ROM within normal limits in all planes,-- strength within normal limits in all planes-- and-- no evidence of glenohumeral instability  Left Hip: Appearance: Normal  Tenderness: greater trochanter and bursa  Abduction was 4/5  Special Tests: negative sacroiliac joint compression test-- and-- negative Straight Leg Raise        Constitutional - General appearance: Normal       Musculoskeletal - Gait and station: Normal       Cardiovascular - Pulses: Normal       Neurologic - Cranial nerves: Normal -- Sensation: Normal -- Lower extremity peripheral neuro exam: Normal       Psychiatric - Orientation to person, place, and time: Normal -- Mood and affect: Normal       Eyes      Conjunctiva and lids: Normal        Pupils and irises: Normal        Future Appointments      Date/Time Provider Specialty Site   03/15/2018 01:00 PM Gabi SHERRI Goddard  Family Medicine [address]     Signatures    Electronically signed by :  SHERRI Bernal ; Jan 16 2018 12:31PM EST                       (Author)

## 2018-01-22 VITALS
DIASTOLIC BLOOD PRESSURE: 82 MMHG | WEIGHT: 188.38 LBS | TEMPERATURE: 99 F | OXYGEN SATURATION: 98 % | HEIGHT: 61 IN | RESPIRATION RATE: 16 BRPM | SYSTOLIC BLOOD PRESSURE: 130 MMHG | HEART RATE: 90 BPM | BODY MASS INDEX: 35.57 KG/M2

## 2018-01-22 VITALS
RESPIRATION RATE: 20 BRPM | DIASTOLIC BLOOD PRESSURE: 86 MMHG | WEIGHT: 187 LBS | HEART RATE: 72 BPM | SYSTOLIC BLOOD PRESSURE: 150 MMHG | TEMPERATURE: 98.6 F | BODY MASS INDEX: 35.3 KG/M2 | OXYGEN SATURATION: 98 % | HEIGHT: 61 IN

## 2018-01-22 VITALS
SYSTOLIC BLOOD PRESSURE: 127 MMHG | WEIGHT: 193.5 LBS | DIASTOLIC BLOOD PRESSURE: 80 MMHG | HEART RATE: 94 BPM | BODY MASS INDEX: 36.56 KG/M2

## 2018-01-23 VITALS
BODY MASS INDEX: 34.93 KG/M2 | SYSTOLIC BLOOD PRESSURE: 119 MMHG | DIASTOLIC BLOOD PRESSURE: 82 MMHG | HEART RATE: 98 BPM | HEIGHT: 61 IN | WEIGHT: 185 LBS

## 2018-01-23 VITALS
DIASTOLIC BLOOD PRESSURE: 90 MMHG | RESPIRATION RATE: 18 BRPM | WEIGHT: 188 LBS | SYSTOLIC BLOOD PRESSURE: 150 MMHG | TEMPERATURE: 98.5 F | HEIGHT: 61 IN | HEART RATE: 110 BPM | BODY MASS INDEX: 35.5 KG/M2 | OXYGEN SATURATION: 96 %

## 2018-01-24 VITALS
TEMPERATURE: 98.1 F | HEART RATE: 96 BPM | WEIGHT: 187 LBS | RESPIRATION RATE: 16 BRPM | HEIGHT: 61 IN | SYSTOLIC BLOOD PRESSURE: 120 MMHG | OXYGEN SATURATION: 95 % | BODY MASS INDEX: 35.3 KG/M2 | DIASTOLIC BLOOD PRESSURE: 82 MMHG

## 2018-03-09 LAB
ALBUMIN/CREAT UR: 3 MCG/MG CREAT
BUN SERPL-MCNC: 12 MG/DL (ref 7–25)
BUN/CREAT SERPL: ABNORMAL (CALC) (ref 6–22)
CALCIUM SERPL-MCNC: 9.3 MG/DL (ref 8.6–10.4)
CHLORIDE SERPL-SCNC: 106 MMOL/L (ref 98–110)
CO2 SERPL-SCNC: 30 MMOL/L (ref 20–31)
CREAT SERPL-MCNC: 0.73 MG/DL (ref 0.5–1.05)
CREAT UR-MCNC: 74 MG/DL (ref 20–320)
GLUCOSE SERPL-MCNC: 123 MG/DL (ref 65–99)
HBA1C MFR BLD: 7.3 % OF TOTAL HGB
MICROALBUMIN UR-MCNC: 0.2 MG/DL
POTASSIUM SERPL-SCNC: 3.9 MMOL/L (ref 3.5–5.3)
SL AMB EGFR AFRICAN AMERICAN: 107 ML/MIN/1.73M2
SL AMB EGFR NON AFRICAN AMERICAN: 92 ML/MIN/1.73M2
SODIUM SERPL-SCNC: 144 MMOL/L (ref 135–146)

## 2018-03-14 NOTE — PROGRESS NOTES
Assessment/Plan:    No problem-specific Assessment & Plan notes found for this encounter  Diagnoses and all orders for this visit:    Ingrown nail  -     Ambulatory referral to Podiatry; Future  -     Lipid panel; Future  -     HEMOGLOBIN A1C W/ EAG ESTIMATION; Future  -     Basic metabolic panel; Future    Acquired hammer toe of right foot  -     Ambulatory referral to Podiatry; Future  -     Lipid panel; Future  -     HEMOGLOBIN A1C W/ EAG ESTIMATION; Future  -     Basic metabolic panel; Future    Controlled diabetes mellitus type 2 with complications, unspecified long term insulin use status (HCC)  -     valsartan (DIOVAN) 40 mg tablet; Take 1 tablet (40 mg total) by mouth daily  -     metFORMIN (GLUCOPHAGE) 1000 MG tablet; Take 1 tablet (1,000 mg total) by mouth 2 (two) times a day with meals  -     glipiZIDE (GLUCOTROL) 5 mg tablet; Take 1 tablet (5 mg total) by mouth 2 (two) times a day before meals  -     Lipid panel; Future  -     HEMOGLOBIN A1C W/ EAG ESTIMATION; Future  -     Basic metabolic panel; Future    Gastroesophageal reflux disease, esophagitis presence not specified  -     Lipid panel; Future  -     HEMOGLOBIN A1C W/ EAG ESTIMATION; Future  -     Basic metabolic panel; Future    Hypertension, benign  -     Lipid panel; Future  -     HEMOGLOBIN A1C W/ EAG ESTIMATION; Future  -     Basic metabolic panel; Future    Other orders  -     Discontinue: metFORMIN (GLUCOPHAGE) 1000 MG tablet; Follow-up 4 months with labs      Subjective:   Pt here for 3 month follow up visit  Labs done  Pt complaining of right foot bone sticking out, pain on/off  Pt continues to complain of ingrown nail        Patient ID: Ayaka Rutherford is a 65 y o  female  Diabetes   She presents for her follow-up diabetic visit  She has type 2 diabetes mellitus  Her disease course has been improving  There are no hypoglycemic associated symptoms  There are no diabetic associated symptoms   There are no hypoglycemic complications  Risk factors for coronary artery disease include diabetes mellitus and obesity  Current diabetic treatment includes oral agent (dual therapy) and diet  She is compliant with treatment most of the time  She is following a generally unhealthy diet  She never participates in exercise  An ACE inhibitor/angiotensin II receptor blocker is being taken  She sees a podiatrist      Lisinopril gives her cough          The following portions of the patient's history were reviewed and updated as appropriate: allergies, current medications, past family history, past medical history, past social history, past surgical history and problem list     Review of Systems   Constitutional: Negative for activity change and appetite change  Respiratory: Negative  Cardiovascular: Negative  Gastrointestinal: Negative  Genitourinary: Negative  Musculoskeletal: Positive for arthralgias  Skin: Negative for rash  Left ingrown nail   Psychiatric/Behavioral: Negative            Objective:      /76   Pulse (!) 110   Temp 98 3 °F (36 8 °C)   Ht 5' 1 5" (1 562 m)   Wt 83 9 kg (185 lb)   SpO2 98%   BMI 34 39 kg/m²          Physical Exam      Recent Results (from the past 672 hour(s))   Basic metabolic panel    Collection Time: 03/08/18  9:49 AM   Result Value Ref Range    [facility] GLUCOSE 123 (H) 65 - 99 mg/dL    BUN 12 7 - 25 mg/dL    Creatinine, Serum 0 73 0 50 - 1 05 mg/dL    eGFR Non  92 > OR = 60 mL/min/1 73m2    [facility] EGFR  107 > OR = 60 mL/min/1 73m2    [facility] BUN/CREATININE RATIO NOT APPLICABLE 6 - 22 (calc)    [facility] AM[facility] SODIUM 144 135 - 146 mmol/L    [facility] POTASSIUM 3 9 3 5 - 5 3 mmol/L    [facility] CHLORIDE 106 98 - 110 mmol/L    [facility] CARBON DIOXIDE 30 20 - 31 mmol/L    [facility] AM[facility] CALCIUM 9 3 8 6 - 10 4 mg/dL    Always Message     Microalbumin, Random Urine (W/Creatinine)    Collection Time: 03/08/18  9:49 AM   Result Value Ref Range    Creatinine, Urine 74 20 - 320 mg/dL    Microalbum  ,U,Random 0 2 See Note: mg/dL    Microalb/Creat Ratio 3 <30 mcg/mg creat   Hemoglobin A1c    Collection Time: 03/08/18  9:49 AM   Result Value Ref Range    Hemoglobin A1C 7 3 (H) <5 7 % of total Hgb

## 2018-03-15 ENCOUNTER — OFFICE VISIT (OUTPATIENT)
Dept: FAMILY MEDICINE CLINIC | Facility: CLINIC | Age: 58
End: 2018-03-15
Payer: COMMERCIAL

## 2018-03-15 VITALS
HEIGHT: 62 IN | DIASTOLIC BLOOD PRESSURE: 76 MMHG | OXYGEN SATURATION: 98 % | SYSTOLIC BLOOD PRESSURE: 118 MMHG | HEART RATE: 110 BPM | BODY MASS INDEX: 34.04 KG/M2 | TEMPERATURE: 98.3 F | WEIGHT: 185 LBS

## 2018-03-15 DIAGNOSIS — L60.0 INGROWN NAIL: Primary | ICD-10-CM

## 2018-03-15 DIAGNOSIS — M20.41 ACQUIRED HAMMER TOE OF RIGHT FOOT: ICD-10-CM

## 2018-03-15 DIAGNOSIS — E11.8 CONTROLLED DIABETES MELLITUS TYPE 2 WITH COMPLICATIONS, UNSPECIFIED LONG TERM INSULIN USE STATUS: ICD-10-CM

## 2018-03-15 DIAGNOSIS — I10 HYPERTENSION, BENIGN: ICD-10-CM

## 2018-03-15 DIAGNOSIS — K21.9 GASTROESOPHAGEAL REFLUX DISEASE, ESOPHAGITIS PRESENCE NOT SPECIFIED: ICD-10-CM

## 2018-03-15 PROBLEM — D23.9 DERMATOFIBROMA: Status: ACTIVE | Noted: 2017-06-26

## 2018-03-15 PROBLEM — M70.62 GREATER TROCHANTERIC BURSITIS OF LEFT HIP: Status: ACTIVE | Noted: 2017-08-09

## 2018-03-15 PROBLEM — M25.511 RIGHT SHOULDER PAIN: Status: ACTIVE | Noted: 2017-08-16

## 2018-03-15 PROBLEM — M75.101 ROTATOR CUFF SYNDROME, RIGHT: Status: ACTIVE | Noted: 2017-08-16

## 2018-03-15 PROBLEM — M16.12 OSTEOARTHRITIS OF LEFT HIP: Status: ACTIVE | Noted: 2017-08-09

## 2018-03-15 PROCEDURE — 4010F ACE/ARB THERAPY RXD/TAKEN: CPT | Performed by: FAMILY MEDICINE

## 2018-03-15 PROCEDURE — 99214 OFFICE O/P EST MOD 30 MIN: CPT | Performed by: FAMILY MEDICINE

## 2018-03-15 RX ORDER — VALSARTAN 40 MG/1
40 TABLET ORAL DAILY
Qty: 90 TABLET | Refills: 1 | Status: SHIPPED | OUTPATIENT
Start: 2018-03-15 | End: 2018-03-21

## 2018-03-15 RX ORDER — ALBUTEROL SULFATE 90 UG/1
2 AEROSOL, METERED RESPIRATORY (INHALATION) EVERY 6 HOURS PRN
COMMUNITY
Start: 2017-11-06 | End: 2019-08-28

## 2018-03-15 RX ORDER — BENZONATATE 200 MG/1
1 CAPSULE ORAL 3 TIMES DAILY PRN
COMMUNITY
Start: 2017-11-09 | End: 2018-03-15

## 2018-03-15 RX ORDER — TRIAMCINOLONE ACETONIDE 5 MG/G
OINTMENT TOPICAL 2 TIMES DAILY
COMMUNITY
Start: 2015-03-31 | End: 2018-11-14 | Stop reason: SDUPTHER

## 2018-03-15 RX ORDER — PANTOPRAZOLE SODIUM 40 MG/1
1 TABLET, DELAYED RELEASE ORAL
COMMUNITY
Start: 2016-11-21 | End: 2018-03-30 | Stop reason: SDUPTHER

## 2018-03-15 RX ORDER — GLIPIZIDE 5 MG/1
5 TABLET ORAL
Qty: 180 TABLET | Refills: 1 | Status: SHIPPED | OUTPATIENT
Start: 2018-03-15 | End: 2018-08-14 | Stop reason: SDUPTHER

## 2018-03-16 ENCOUNTER — OFFICE VISIT (OUTPATIENT)
Dept: OBGYN CLINIC | Facility: OTHER | Age: 58
End: 2018-03-16
Payer: COMMERCIAL

## 2018-03-16 VITALS
BODY MASS INDEX: 33.86 KG/M2 | SYSTOLIC BLOOD PRESSURE: 127 MMHG | DIASTOLIC BLOOD PRESSURE: 85 MMHG | HEIGHT: 62 IN | HEART RATE: 75 BPM | WEIGHT: 184 LBS

## 2018-03-16 DIAGNOSIS — M70.62 TROCHANTERIC BURSITIS OF LEFT HIP: Primary | ICD-10-CM

## 2018-03-16 PROCEDURE — 20610 DRAIN/INJ JOINT/BURSA W/O US: CPT | Performed by: ORTHOPAEDIC SURGERY

## 2018-03-16 PROCEDURE — 99213 OFFICE O/P EST LOW 20 MIN: CPT | Performed by: ORTHOPAEDIC SURGERY

## 2018-03-16 RX ORDER — LIDOCAINE HYDROCHLORIDE 10 MG/ML
3 INJECTION, SOLUTION INFILTRATION; PERINEURAL
Status: COMPLETED | OUTPATIENT
Start: 2018-03-16 | End: 2018-03-16

## 2018-03-16 RX ORDER — TRIAMCINOLONE ACETONIDE 40 MG/ML
40 INJECTION, SUSPENSION INTRA-ARTICULAR; INTRAMUSCULAR
Status: COMPLETED | OUTPATIENT
Start: 2018-03-16 | End: 2018-03-16

## 2018-03-16 RX ADMIN — LIDOCAINE HYDROCHLORIDE 3 ML: 10 INJECTION, SOLUTION INFILTRATION; PERINEURAL at 11:46

## 2018-03-16 RX ADMIN — TRIAMCINOLONE ACETONIDE 40 MG: 40 INJECTION, SUSPENSION INTRA-ARTICULAR; INTRAMUSCULAR at 11:46

## 2018-03-16 NOTE — PROGRESS NOTES
Assessment/Plan:  Assessment/Plan   Diagnoses and all orders for this visit:    Trochanteric bursitis of left hip  -     Large joint arthrocentesis    Other orders  -     albuterol (VENTOLIN HFA) 90 mcg/act inhaler; Inhale 2 puffs  -     triamcinolone (KENALOG) 0 5 % ointment; Apply topically 2 (two) times a day  -     pantoprazole (PROTONIX) 40 mg tablet; Take 1 tablet by mouth  -     Discontinue: benzonatate (TESSALON) 200 MG capsule; Take 1 capsule by mouth 3 (three) times a day as needed  -     Cancel: Large joint arthrocentesis      We explained our findings still being consistent with trochanteric bursitis  Due to continued symptomss, an injection was provided, which she tolerated well  She noticed some improvement prior to leaving the office following injection  She understands the importance of continued work with physical therapy as this will provide the greatest change of achieving longer relief from the injection  Follow up in 2 months  Subjective:     Luis Lopez is a 65 y o  [place]am[place]ian speaking female presenting today with her son who translates for her regarding her hip pain  She was seen last on 1/16/2018 where she was sent for physical therapy as this had helped greatly in the past with a cout of trochanteric bursitis  Unfortunately, she feels the pain is unchanged  It is still located over the left greater trochanter with minimal radiation  It is an intermittent, sharp pain - especially with direct pressure over the area such as if she sleeps on that side  It is worse with movement and better with rest   At the last visit, we had discussed injection for this pain, but it was deferred with the plan to do this today if the pain had not subsided enough            The following portions of the patient's history were reviewed and updated as appropriate: allergies, current medications, past family history, past medical history, past social history, past surgical history and problem list     Review of Systems   Constitutional: Negative for chills, fever and unexpected weight change  HENT: Negative for hearing loss, nosebleeds and sore throat  Eyes: Positive for visual disturbance  Negative for pain and redness  Respiratory: Positive for shortness of breath and wheezing  Negative for cough  Cardiovascular: Positive for chest pain, palpitations and leg swelling  Gastrointestinal: Negative for abdominal distention, nausea and vomiting  Endocrine: Positive for polyuria  Negative for polydipsia  Genitourinary: Negative for dysuria and hematuria  Musculoskeletal:        As noted in HPI   Skin: Positive for rash  Negative for wound  Neurological: Positive for headaches  Negative for dizziness and numbness  Psychiatric/Behavioral: Positive for decreased concentration  No depression, positive anxiety       Objective:  Right Hip Exam     Tenderness   The patient is experiencing tenderness in the greater trochanter  Muscle Strength   Abduction: 5/5   Adduction: 5/5   Flexion: 5/5     Tests   MACARIO: positive  Jose: negative    Other   Sensation: normal  Pulse: present            Physical Exam   Constitutional: She is oriented to person, place, and time  She appears well-developed and well-nourished  No distress  HENT:   Head: Normocephalic and atraumatic  Eyes: Conjunctivae and EOM are normal  Pupils are equal, round, and reactive to light  Right eye exhibits no discharge  Left eye exhibits no discharge  No scleral icterus  Cardiovascular: Normal rate, regular rhythm and intact distal pulses  Pulmonary/Chest: Effort normal  No respiratory distress  Neurological: She is alert and oriented to person, place, and time  Skin: Skin is warm and dry  She is not diaphoretic  Psychiatric: She has a normal mood and affect  Her behavior is normal  Judgment and thought content normal    Vitals reviewed  I have personally reviewed pertinent films in PACS      Large joint arthrocentesis  Date/Time: 3/16/2018 11:46 AM  Consent given by: patient (risks and benefits discussed with patient prior to injection including the risks of bleeding, infection, and raised blood sugars (recent HbA1c of 7 3% 8 days ago))  Site marked: site marked  Timeout: Immediately prior to procedure a time out was called to verify the correct patient, procedure, equipment, support staff and site/side marked as required   Supporting Documentation  Indications: pain   Procedure Details  Location: hip - L greater trochanteric bursa  Needle size: 22 G  Ultrasound guidance: no  Approach: lateral  Medications administered: 3 mL lidocaine 1 %; 40 mg triamcinolone acetonide 40 mg/mL    Patient tolerance: patient tolerated the procedure well with no immediate complications  Sterile dressing applied: bandage applied

## 2018-03-20 DIAGNOSIS — E11.8 CONTROLLED DIABETES MELLITUS TYPE 2 WITH COMPLICATIONS, UNSPECIFIED LONG TERM INSULIN USE STATUS: ICD-10-CM

## 2018-03-20 PROCEDURE — 4010F ACE/ARB THERAPY RXD/TAKEN: CPT | Performed by: FAMILY MEDICINE

## 2018-03-20 RX ORDER — LISINOPRIL 10 MG/1
TABLET ORAL
Qty: 30 TABLET | Refills: 2 | Status: SHIPPED | OUTPATIENT
Start: 2018-03-20 | End: 2018-03-21

## 2018-03-21 RX ORDER — LOSARTAN POTASSIUM 50 MG/1
50 TABLET ORAL DAILY
Qty: 90 TABLET | Refills: 0 | Status: SHIPPED | OUTPATIENT
Start: 2018-03-21 | End: 2018-06-13 | Stop reason: SDUPTHER

## 2018-03-30 DIAGNOSIS — K21.9 GASTROESOPHAGEAL REFLUX DISEASE, ESOPHAGITIS PRESENCE NOT SPECIFIED: Primary | ICD-10-CM

## 2018-03-30 RX ORDER — PANTOPRAZOLE SODIUM 40 MG/1
TABLET, DELAYED RELEASE ORAL
Qty: 30 TABLET | Refills: 2 | Status: SHIPPED | OUTPATIENT
Start: 2018-03-30 | End: 2018-06-23 | Stop reason: SDUPTHER

## 2018-05-01 ENCOUNTER — OFFICE VISIT (OUTPATIENT)
Dept: FAMILY MEDICINE CLINIC | Facility: CLINIC | Age: 58
End: 2018-05-01
Payer: COMMERCIAL

## 2018-05-01 VITALS
TEMPERATURE: 99.1 F | BODY MASS INDEX: 34.41 KG/M2 | DIASTOLIC BLOOD PRESSURE: 84 MMHG | WEIGHT: 187 LBS | HEIGHT: 62 IN | OXYGEN SATURATION: 98 % | HEART RATE: 110 BPM | SYSTOLIC BLOOD PRESSURE: 122 MMHG | RESPIRATION RATE: 16 BRPM

## 2018-05-01 DIAGNOSIS — F40.243 FEAR OF FLYING: ICD-10-CM

## 2018-05-01 DIAGNOSIS — J06.9 VIRAL UPPER RESPIRATORY TRACT INFECTION: Primary | ICD-10-CM

## 2018-05-01 PROCEDURE — 99213 OFFICE O/P EST LOW 20 MIN: CPT | Performed by: FAMILY MEDICINE

## 2018-05-01 RX ORDER — BROMPHENIRAMINE MALEATE, PSEUDOEPHEDRINE HYDROCHLORIDE, AND DEXTROMETHORPHAN HYDROBROMIDE 2; 30; 10 MG/5ML; MG/5ML; MG/5ML
5 SYRUP ORAL 3 TIMES DAILY PRN
Qty: 120 ML | Refills: 0 | Status: SHIPPED | OUTPATIENT
Start: 2018-05-01 | End: 2018-08-14 | Stop reason: ALTCHOICE

## 2018-05-01 RX ORDER — LORAZEPAM 0.5 MG/1
0.5 TABLET ORAL ONCE
Qty: 5 TABLET | Refills: 0 | Status: SHIPPED | OUTPATIENT
Start: 2018-05-01 | End: 2018-07-02 | Stop reason: SDUPTHER

## 2018-05-01 NOTE — PROGRESS NOTES
Assessment/Plan:    No problem-specific Assessment & Plan notes found for this encounter  Diagnoses and all orders for this visit:    Viral upper respiratory tract infection  -     brompheniramine-pseudoephedrine-DM 30-2-10 MG/5ML syrup; Take 5 mL by mouth 3 (three) times a day as needed for allergies    Fear of flying  -     LORazepam (ATIVAN) 0 5 mg tablet; Take 1 tablet (0 5 mg total) by mouth once for 1 dose Take 1 tab 30 min before flying  Hydrate well  Rest    Take med for symptomatic relieve  Call me if worsening sxs or no improvement in 3-4 days    Subjective:   Pt here for an acute visit  complaining since yesterday of cough, fever, nasal congestion, sore throat and lower back pain     Patient ID: Vazquez Walker is a 63 y o  female  HPI   Since 2 days ago she started with sore throat, runny nose, congestion, dry cough  No SOB, no wheezing  She tried afrin, and mucinex  Fever subjective  No sick contact  The following portions of the patient's history were reviewed and updated as appropriate: allergies, current medications, past family history, past medical history, past social history, past surgical history and problem list     Review of Systems   Constitutional: Negative for activity change and appetite change  HENT: Positive for congestion, rhinorrhea and sore throat  Respiratory: Positive for cough  Negative for shortness of breath and wheezing  Cardiovascular: Negative  Gastrointestinal: Negative  Genitourinary: Negative  Musculoskeletal: Negative for arthralgias  Skin: Negative for rash  Psychiatric/Behavioral: Negative  Objective:      /84   Pulse (!) 110   Temp 99 1 °F (37 3 °C)   Resp 16   Ht 5' 1 5" (1 562 m)   Wt 84 8 kg (187 lb)   SpO2 98%   BMI 34 76 kg/m²          Physical Exam   Constitutional: She is oriented to person, place, and time  She appears well-developed and well-nourished  HENT:   Head: Normocephalic  Eyes: Conjunctivae are normal    Cardiovascular: Normal rate, regular rhythm and normal heart sounds  Pulmonary/Chest: Effort normal and breath sounds normal  No respiratory distress  She has no wheezes  She has no rales  Neurological: She is alert and oriented to person, place, and time  Psychiatric: She has a normal mood and affect  Her behavior is normal    Vitals reviewed

## 2018-05-07 DIAGNOSIS — E78.5 HYPERLIPIDEMIA: ICD-10-CM

## 2018-05-07 DIAGNOSIS — E11.9 DIABETES MELLITUS TYPE 2, CONTROLLED (HCC): ICD-10-CM

## 2018-05-07 RX ORDER — ATORVASTATIN CALCIUM 20 MG/1
TABLET, FILM COATED ORAL
Qty: 30 TABLET | Refills: 5 | Status: SHIPPED | OUTPATIENT
Start: 2018-05-07 | End: 2018-10-23 | Stop reason: SDUPTHER

## 2018-05-18 LAB
LEFT EYE DIABETIC RETINOPATHY: NORMAL
RIGHT EYE DIABETIC RETINOPATHY: NORMAL

## 2018-06-06 ENCOUNTER — OFFICE VISIT (OUTPATIENT)
Dept: OBGYN CLINIC | Facility: HOSPITAL | Age: 58
End: 2018-06-06
Payer: COMMERCIAL

## 2018-06-06 VITALS
BODY MASS INDEX: 35.3 KG/M2 | WEIGHT: 187 LBS | HEIGHT: 61 IN | SYSTOLIC BLOOD PRESSURE: 132 MMHG | DIASTOLIC BLOOD PRESSURE: 85 MMHG | HEART RATE: 70 BPM

## 2018-06-06 DIAGNOSIS — M70.62 GREATER TROCHANTERIC BURSITIS OF LEFT HIP: ICD-10-CM

## 2018-06-06 DIAGNOSIS — M53.3 SI (SACROILIAC) JOINT DYSFUNCTION: Primary | ICD-10-CM

## 2018-06-06 PROCEDURE — 99214 OFFICE O/P EST MOD 30 MIN: CPT | Performed by: ORTHOPAEDIC SURGERY

## 2018-06-06 RX ORDER — LIDOCAINE 50 MG/G
1 PATCH TOPICAL DAILY
Qty: 30 PATCH | Refills: 1 | Status: SHIPPED | OUTPATIENT
Start: 2018-06-06 | End: 2018-12-18

## 2018-06-06 NOTE — PROGRESS NOTES
Assessment:       1  SI (sacroiliac) joint dysfunction    2  Greater trochanteric bursitis of left hip          Plan:        Explain my current clinical findings and radiological findings to Yesenia Bah today  I have advised her a course of physical therapy rehabilitation with regards to her left lower back pain and hip pain  She may also use topical Lidoderm patches since these do seem to help her  Additionally, she may try doing local TENS therapy  We will see her back in about 3 months time for clinical reassessment in this regard  Time spent was 25 minutes of which more than half was spent in counseling  Subjective:     Patient ID: Heidi Kingsley is a 63 y o  female  Chief Complaint:    HPI  Yesenia Bah is here today for a follow-up of her left hip pain  She was seen in this regard nearly 3 months ago and received a left contact bursa cortisone injection  Unfortunately, she has not had any significant improvement of her left lateral hip pain  Currently, she describes that most of her pain is in the left posterior gluteal area as well as some pain in the lateral hip area on the left side  She denies any radiation of the pain down to her lower extremities  Denies any associated lower extremity tingling numbness or weakness  Pain is made worse with twisting as well as sleeping on to the left side  Currently it is of moderate intensity  She does report having used some Lidoderm patches in the past which have helped her with her symptoms  She is also interested in trying a course of physical therapy in this regard  Social History     Occupational History          nursing home     Social History Main Topics    Smoking status: Never Smoker    Smokeless tobacco: Never Used    Alcohol use No    Drug use: No    Sexual activity: Not on file      Review of Systems   Constitutional: Negative  HENT: Negative  Eyes: Negative  Respiratory: Negative  Cardiovascular: Negative      Gastrointestinal: Negative  Endocrine: Negative  Genitourinary: Negative  Skin: Negative  Allergic/Immunologic: Negative  Neurological: Negative  Hematological: Negative  Psychiatric/Behavioral: Negative  Objective:     Ortho ExamPhysical Exam   Constitutional: She is oriented to person, place, and time  She appears well-developed and well-nourished  HENT:   Head: Normocephalic and atraumatic  Eyes: Conjunctivae are normal  Pupils are equal, round, and reactive to light  Cardiovascular: Normal rate and regular rhythm  Pulmonary/Chest: Effort normal  No respiratory distress  Neurological: She is alert and oriented to person, place, and time  No cranial nerve deficit  Skin: Skin is warm and dry  No erythema  Psychiatric: She has a normal mood and affect  Her behavior is normal  Judgment and thought content normal        Left hip examination:  Some tenderness to palpation over the trochanteric bursa  MACARIO positive with more pain in the posterior gluteal area and the lateral hip  Hip abduction weakness grade 4/5  Lumbar spine examination:  No midline tenderness  Some tenderness to palpation over the left SI joint  No muscle spasm  SLR negative  Normal lower extremity myotomal strength and normal bilateral lower extremity dermatomal sensation to light touch  I have personally reviewed pertinent films in PACS and my interpretation is Plain radiograph of the left hip reveals moderate degenerative changes

## 2018-06-13 DIAGNOSIS — E11.8 CONTROLLED DIABETES MELLITUS TYPE 2 WITH COMPLICATIONS (HCC): ICD-10-CM

## 2018-06-13 RX ORDER — LOSARTAN POTASSIUM 50 MG/1
50 TABLET ORAL DAILY
Qty: 90 TABLET | Refills: 0 | Status: SHIPPED | OUTPATIENT
Start: 2018-06-13 | End: 2018-09-07 | Stop reason: SDUPTHER

## 2018-06-23 DIAGNOSIS — K21.9 GASTROESOPHAGEAL REFLUX DISEASE, ESOPHAGITIS PRESENCE NOT SPECIFIED: ICD-10-CM

## 2018-06-25 RX ORDER — PANTOPRAZOLE SODIUM 40 MG/1
TABLET, DELAYED RELEASE ORAL
Qty: 30 TABLET | Refills: 2 | Status: SHIPPED | OUTPATIENT
Start: 2018-06-25 | End: 2018-09-24 | Stop reason: SDUPTHER

## 2018-07-02 DIAGNOSIS — F40.243 FEAR OF FLYING: ICD-10-CM

## 2018-07-03 RX ORDER — LORAZEPAM 0.5 MG/1
0.5 TABLET ORAL ONCE
Qty: 5 TABLET | Refills: 0 | Status: SHIPPED | OUTPATIENT
Start: 2018-07-03 | End: 2018-11-14 | Stop reason: SDUPTHER

## 2018-08-07 LAB
BUN SERPL-MCNC: 14 MG/DL (ref 7–25)
BUN/CREAT SERPL: ABNORMAL (CALC) (ref 6–22)
CALCIUM SERPL-MCNC: 9.3 MG/DL (ref 8.6–10.4)
CHLORIDE SERPL-SCNC: 104 MMOL/L (ref 98–110)
CHOLEST SERPL-MCNC: 213 MG/DL
CHOLEST/HDLC SERPL: 4.1 (CALC)
CO2 SERPL-SCNC: 30 MMOL/L (ref 20–32)
CREAT SERPL-MCNC: 0.8 MG/DL (ref 0.5–1.05)
EST. AVERAGE GLUCOSE BLD GHB EST-MCNC: 186 (CALC)
EST. AVERAGE GLUCOSE BLD GHB EST-SCNC: 10.3 (CALC)
GLUCOSE SERPL-MCNC: 145 MG/DL (ref 65–99)
HBA1C MFR BLD: 8.1 % OF TOTAL HGB
HDLC SERPL-MCNC: 52 MG/DL
LDLC SERPL CALC-MCNC: 127 MG/DL (CALC)
NONHDLC SERPL-MCNC: 161 MG/DL (CALC)
POTASSIUM SERPL-SCNC: 4.2 MMOL/L (ref 3.5–5.3)
SL AMB EGFR AFRICAN AMERICAN: 95 ML/MIN/1.73M2
SL AMB EGFR NON AFRICAN AMERICAN: 82 ML/MIN/1.73M2
SODIUM SERPL-SCNC: 141 MMOL/L (ref 135–146)
TRIGL SERPL-MCNC: 202 MG/DL

## 2018-08-13 NOTE — PROGRESS NOTES
Assessment/Plan:    No problem-specific Assessment & Plan notes found for this encounter  Diagnoses and all orders for this visit:    Screening for malignant neoplasm of breast  -     Mammo screening bilateral w cad; Future  -     HEMOGLOBIN A1C W/ EAG ESTIMATION; Future  -     Basic metabolic panel; Future  -     Microalbumin / creatinine urine ratio    Controlled diabetes mellitus type 2 with complications, unspecified whether long term insulin use (HCC)  -     glipiZIDE (GLUCOTROL) 5 mg tablet; Take 1 tablet (5 mg total) by mouth daily  -     HEMOGLOBIN A1C W/ EAG ESTIMATION; Future  -     Basic metabolic panel; Future  -     Microalbumin / creatinine urine ratio    Hypertension, benign  -     HEMOGLOBIN A1C W/ EAG ESTIMATION; Future  -     Basic metabolic panel; Future  -     Microalbumin / creatinine urine ratio    Gastroesophageal reflux disease, esophagitis presence not specified  -     HEMOGLOBIN A1C W/ EAG ESTIMATION; Future  -     Basic metabolic panel; Future  -     Microalbumin / creatinine urine ratio        a1c increasing but she has not been taking glipizide  Start glipizide once daily  She recently started nutritionist for DM, had 2 visit  Has lost 3 lbs in 1 month  Cont with nutritionist recommendations  Recheck 3 mo + labs      Subjective:   Pt here for 4 month follow up visit  Labs done 8/6/18  Pt has eye appt for September  Foot exam was done by her podiatrist   Patient ID: Cornel Dinero is a 63 y o  female  HPI     Patient presents for follow-up of diabetes with labs  Diabetes-her A1c has increased from 7 3 now 8 1  She is to take metformin 1000 mg twice a day and glipizide 5 mg BID, she admits only taking metformin 1,000mg and not glipizide  Has started seeing nutritionist, 2 visits so far  She is up-to-date with her foot exam by podiatrist     States complaint with other meds          The following portions of the patient's history were reviewed and updated as appropriate: allergies, current medications, past family history, past medical history, past social history, past surgical history and problem list     Review of Systems   Constitutional: Negative for activity change and appetite change  Respiratory: Negative  Cardiovascular: Negative  Gastrointestinal: Negative  Genitourinary: Negative  Musculoskeletal: Negative for arthralgias  Skin: Negative for rash  Psychiatric/Behavioral: Negative  Objective:      /80   Pulse 88   Temp 98 4 °F (36 9 °C)   Ht 5' 1 81" (1 57 m)   Wt 85 3 kg (188 lb)   SpO2 98%   BMI 34 60 kg/m²          Physical Exam   Constitutional: She is oriented to person, place, and time  She appears well-developed and well-nourished  HENT:   Head: Normocephalic  Eyes: Conjunctivae are normal    Cardiovascular: Normal rate, regular rhythm and normal heart sounds  Pulmonary/Chest: Effort normal and breath sounds normal  No respiratory distress  She has no wheezes  She has no rales  Musculoskeletal: She exhibits no edema  Neurological: She is alert and oriented to person, place, and time  No cranial nerve deficit  Psychiatric: She has a normal mood and affect   Her behavior is normal

## 2018-08-14 ENCOUNTER — OFFICE VISIT (OUTPATIENT)
Dept: FAMILY MEDICINE CLINIC | Facility: CLINIC | Age: 58
End: 2018-08-14
Payer: MEDICARE

## 2018-08-14 VITALS
WEIGHT: 188 LBS | OXYGEN SATURATION: 98 % | DIASTOLIC BLOOD PRESSURE: 80 MMHG | HEART RATE: 88 BPM | BODY MASS INDEX: 34.6 KG/M2 | TEMPERATURE: 98.4 F | SYSTOLIC BLOOD PRESSURE: 132 MMHG | HEIGHT: 62 IN

## 2018-08-14 DIAGNOSIS — K21.9 GASTROESOPHAGEAL REFLUX DISEASE, ESOPHAGITIS PRESENCE NOT SPECIFIED: ICD-10-CM

## 2018-08-14 DIAGNOSIS — I10 HYPERTENSION, BENIGN: ICD-10-CM

## 2018-08-14 DIAGNOSIS — E11.8 CONTROLLED DIABETES MELLITUS TYPE 2 WITH COMPLICATIONS, UNSPECIFIED WHETHER LONG TERM INSULIN USE (HCC): ICD-10-CM

## 2018-08-14 DIAGNOSIS — Z12.39 SCREENING FOR MALIGNANT NEOPLASM OF BREAST: Primary | ICD-10-CM

## 2018-08-14 PROCEDURE — 3075F SYST BP GE 130 - 139MM HG: CPT | Performed by: FAMILY MEDICINE

## 2018-08-14 PROCEDURE — 99214 OFFICE O/P EST MOD 30 MIN: CPT | Performed by: FAMILY MEDICINE

## 2018-08-14 PROCEDURE — 3008F BODY MASS INDEX DOCD: CPT | Performed by: FAMILY MEDICINE

## 2018-08-14 PROCEDURE — 3079F DIAST BP 80-89 MM HG: CPT | Performed by: FAMILY MEDICINE

## 2018-08-14 RX ORDER — GLIPIZIDE 5 MG/1
5 TABLET ORAL DAILY
Qty: 90 TABLET | Refills: 1 | Status: SHIPPED | OUTPATIENT
Start: 2018-08-14 | End: 2018-11-14 | Stop reason: SDUPTHER

## 2018-09-07 DIAGNOSIS — E11.8 CONTROLLED DIABETES MELLITUS TYPE 2 WITH COMPLICATIONS (HCC): ICD-10-CM

## 2018-09-07 PROCEDURE — 4010F ACE/ARB THERAPY RXD/TAKEN: CPT | Performed by: FAMILY MEDICINE

## 2018-09-07 RX ORDER — LOSARTAN POTASSIUM 50 MG/1
50 TABLET ORAL DAILY
Qty: 90 TABLET | Refills: 1 | Status: SHIPPED | OUTPATIENT
Start: 2018-09-07 | End: 2019-03-07 | Stop reason: SDUPTHER

## 2018-09-12 ENCOUNTER — OFFICE VISIT (OUTPATIENT)
Dept: OBGYN CLINIC | Facility: HOSPITAL | Age: 58
End: 2018-09-12
Payer: MEDICARE

## 2018-09-12 VITALS
SYSTOLIC BLOOD PRESSURE: 142 MMHG | HEART RATE: 94 BPM | BODY MASS INDEX: 34.82 KG/M2 | HEIGHT: 62 IN | DIASTOLIC BLOOD PRESSURE: 76 MMHG | WEIGHT: 189.2 LBS

## 2018-09-12 DIAGNOSIS — M70.62 GREATER TROCHANTERIC BURSITIS OF LEFT HIP: Primary | ICD-10-CM

## 2018-09-12 PROCEDURE — 20610 DRAIN/INJ JOINT/BURSA W/O US: CPT | Performed by: ORTHOPAEDIC SURGERY

## 2018-09-12 PROCEDURE — 99214 OFFICE O/P EST MOD 30 MIN: CPT | Performed by: ORTHOPAEDIC SURGERY

## 2018-09-12 RX ORDER — TRIAMCINOLONE ACETONIDE 40 MG/ML
40 INJECTION, SUSPENSION INTRA-ARTICULAR; INTRAMUSCULAR
Status: COMPLETED | OUTPATIENT
Start: 2018-09-12 | End: 2018-09-12

## 2018-09-12 RX ORDER — LIDOCAINE HYDROCHLORIDE 10 MG/ML
4 INJECTION, SOLUTION INFILTRATION; PERINEURAL
Status: COMPLETED | OUTPATIENT
Start: 2018-09-12 | End: 2018-09-12

## 2018-09-12 RX ADMIN — LIDOCAINE HYDROCHLORIDE 4 ML: 10 INJECTION, SOLUTION INFILTRATION; PERINEURAL at 13:29

## 2018-09-12 RX ADMIN — TRIAMCINOLONE ACETONIDE 40 MG: 40 INJECTION, SUSPENSION INTRA-ARTICULAR; INTRAMUSCULAR at 13:29

## 2018-09-12 NOTE — PROGRESS NOTES
Assessment:       1  Greater trochanteric bursitis of left hip          Plan:        Had a detailed discussion with the Ayala Casas with regards to her persistent back and lateral hip pain  This appears to be multifactorial with current symptoms more significant for a persistent greater trochanteric bursitis  She was interested in a repeat cortisone injection in the area which was performed today without any immediate complications  She is advised to continue with her home exercises at this time and I will see her back in about 3 months time  If she does persist with any significant significant back or lower extremity discomfort we will consider a lumbar spine MRI for further assessment  Subjective:     Patient ID: Rita Figueroa is a 63 y o  female  Chief Complaint:    ROMAINE Casas is here today for follow-up of her left-sided lower back pain and lateral hip pain  She has had a prolonged course of treatment in this regard including a greater trochanteric bursa injection over 6 months ago as well as a course of physical therapy rehabilitation  Unfortunately, she did not have any significant benefit of her pain  Currently her most concerning symptom is a lateral hip pain which is aching and sometimes sharp in nature and worse when sleeping on the affected side as well as prolonged ambulation  Additionally, she complains of left-sided lower back pain which occasionally radiates to the lateral aspect and sometimes to the anterior aspect of her left hip  In the last few weeks she has also noticed a mild tingling sensation on the posterior aspect of her left leg  She denies any focal weakness of her lower extremities any new onset focal neurological deficits  Currently her pain severity is moderate         Social History     Occupational History          nursing home     Social History Main Topics    Smoking status: Never Smoker    Smokeless tobacco: Never Used    Alcohol use No    Drug use: No    Sexual activity: Not on file      Review of Systems   Constitutional: Negative  HENT: Negative  Eyes: Negative  Respiratory: Negative  Cardiovascular: Negative  Gastrointestinal: Negative  Endocrine: Positive for polydipsia and polyuria  Genitourinary: Negative  Skin: Negative  Allergic/Immunologic: Negative  Neurological: Negative  Hematological: Negative  Psychiatric/Behavioral: The patient is nervous/anxious  Objective:     Ortho ExamPhysical Exam    Lumbar spine/hip examination:   Mild L4-L5 tenderness  Bilateral sacroiliac joint mild tenderness slightly worse on the left side  SLR negative on the right and equivocal on the left  MACARIO positive on the left causing lateral and posterior hip pain but no significant groin pain  Discomfort with Jose's test   Significantly tender to palpation over the greater trochanteric bursa  Clinically intact sensation to light touch over all dermatomes of bilateral lower extremities as well as grade 5/5 strength of all myotomes of bilateral lower extremities and normal bilateral lower extremity deep tendon reflexes in all areas    Large joint arthrocentesis  Date/Time: 9/12/2018 1:29 PM  Consent given by: patient  Site marked: site marked  Timeout: Immediately prior to procedure a time out was called to verify the correct patient, procedure, equipment, support staff and site/side marked as required   Supporting Documentation  Indications: pain and diagnostic evaluation   Procedure Details  Location: hip - L greater trochanteric bursa  Needle size: 22 G  Ultrasound guidance: no  Approach: lateral  Medications administered: 4 mL lidocaine 1 %; 40 mg triamcinolone acetonide 40 mg/mL    Patient tolerance: patient tolerated the procedure well with no immediate complications  Dressing:  Sterile dressing applied      I have personally reviewed pertinent films in PACS

## 2018-09-23 DIAGNOSIS — E11.8 CONTROLLED DIABETES MELLITUS TYPE 2 WITH COMPLICATIONS (HCC): ICD-10-CM

## 2018-09-24 DIAGNOSIS — K21.9 GASTROESOPHAGEAL REFLUX DISEASE, ESOPHAGITIS PRESENCE NOT SPECIFIED: ICD-10-CM

## 2018-09-24 RX ORDER — GLIPIZIDE 5 MG/1
5 TABLET ORAL
Qty: 180 TABLET | Refills: 1 | Status: SHIPPED | OUTPATIENT
Start: 2018-09-24 | End: 2019-09-09 | Stop reason: SDUPTHER

## 2018-09-24 RX ORDER — PANTOPRAZOLE SODIUM 40 MG/1
TABLET, DELAYED RELEASE ORAL
Qty: 30 TABLET | Refills: 2 | Status: SHIPPED | OUTPATIENT
Start: 2018-09-24 | End: 2018-11-02 | Stop reason: SDUPTHER

## 2018-10-23 DIAGNOSIS — E11.9 DIABETES MELLITUS TYPE 2, CONTROLLED (HCC): ICD-10-CM

## 2018-10-23 DIAGNOSIS — E78.5 HYPERLIPIDEMIA: ICD-10-CM

## 2018-10-23 RX ORDER — ATORVASTATIN CALCIUM 20 MG/1
TABLET, FILM COATED ORAL
Qty: 30 TABLET | Refills: 5 | Status: SHIPPED | OUTPATIENT
Start: 2018-10-23 | End: 2019-03-26 | Stop reason: SDUPTHER

## 2018-11-02 DIAGNOSIS — K21.9 GASTROESOPHAGEAL REFLUX DISEASE, ESOPHAGITIS PRESENCE NOT SPECIFIED: ICD-10-CM

## 2018-11-02 RX ORDER — PANTOPRAZOLE SODIUM 40 MG/1
40 TABLET, DELAYED RELEASE ORAL
Qty: 90 TABLET | Refills: 0 | Status: SHIPPED | OUTPATIENT
Start: 2018-11-02 | End: 2018-11-14 | Stop reason: SDUPTHER

## 2018-11-02 NOTE — TELEPHONE ENCOUNTER
pantoprazole (PROTONIX) 40 mg tablet    Sig: TAKE 1 TABLET AT BEDTIME    [id number] [facility] @[address]

## 2018-11-04 LAB
BUN SERPL-MCNC: 16 MG/DL (ref 7–25)
BUN/CREAT SERPL: ABNORMAL (CALC) (ref 6–22)
CALCIUM SERPL-MCNC: 9.1 MG/DL (ref 8.6–10.4)
CHLORIDE SERPL-SCNC: 105 MMOL/L (ref 98–110)
CO2 SERPL-SCNC: 27 MMOL/L (ref 20–32)
CREAT SERPL-MCNC: 0.83 MG/DL (ref 0.5–1.05)
EST. AVERAGE GLUCOSE BLD GHB EST-MCNC: 189 (CALC)
EST. AVERAGE GLUCOSE BLD GHB EST-SCNC: 10.4 (CALC)
GLUCOSE SERPL-MCNC: 151 MG/DL (ref 65–99)
HBA1C MFR BLD: 8.2 % OF TOTAL HGB
POTASSIUM SERPL-SCNC: 4.2 MMOL/L (ref 3.5–5.3)
SL AMB EGFR AFRICAN AMERICAN: 91 ML/MIN/1.73M2
SL AMB EGFR NON AFRICAN AMERICAN: 78 ML/MIN/1.73M2
SODIUM SERPL-SCNC: 140 MMOL/L (ref 135–146)

## 2018-11-13 NOTE — PROGRESS NOTES
Assessment/Plan:    No problem-specific Assessment & Plan notes found for this encounter  Diagnoses and all orders for this visit:    Controlled diabetes mellitus type 2 with complications, unspecified whether long term insulin use ([facility] )  -     HEMOGLOBIN A1C W/ EAG ESTIMATION; Future  -     Basic metabolic panel; Future  -     Lipid Panel with Direct LDL reflex; Future    Gastroesophageal reflux disease, esophagitis presence not specified  -     pantoprazole (PROTONIX) 40 mg tablet; Take 1 tablet (40 mg total) by mouth daily at bedtime  -     HEMOGLOBIN A1C W/ EAG ESTIMATION; Future  -     Basic metabolic panel; Future  -     Lipid Panel with Direct LDL reflex; Future    Hypertension, benign  -     HEMOGLOBIN A1C W/ EAG ESTIMATION; Future  -     Basic metabolic panel; Future  -     Lipid Panel with Direct LDL reflex; Future    hyperlipidemia  -     HEMOGLOBIN A1C W/ EAG ESTIMATION; Future  -     Basic metabolic panel; Future  -     Lipid Panel with Direct LDL reflex; Future    Eczema, unspecified type  -     triamcinolone (KENALOG) 0 5 % ointment; Apply topically 2 (two) times a day    Fear of flying  -     LORazepam (ATIVAN) 0 5 mg tablet; Take 1 tablet (0 5 mg total) by mouth once for 1 dose Take 1 tab 30 min before flying or studies  Needs flu shot  -     influenza vaccine, [id number], quadrivalent, recombinant, PF, 0 5 mL, for patients 18 yr+ (FLUBLOK)      Fu 3-4 mo + labs    Subjective:   Pt here for 3 month follow up visit  Pt states that she is now deemed disabled by the Ma[facility] Marie 19 done 11/3/18  Pt will receive flu vaccine today  Mammogram scheduled for tomorrow  Pt does see Dr Francois Search for foot exam       Patient ID: Lisa Schultz is a 63 y o  female  HPI     Pt presents for chronic conditions follow up + labs  DM2 - current regimen metformin 1,000mg bid, glipizide 5mg bid   She admits to non compliance with the glipizide as she sometimes get cough and thought it might be the glipizide  She usually takes it only qhs  She is going to nutritionist for 3 months and has modified her diet, She has lost wt  She plans to start the gym and exercise  Her a1c is not at goal at 8 2  She follow with podiatrist    Renny Xiomy is controlled  Only on rescue inhaler and has not used it lately  Pt requesting refill of Debborah [place] as she has mammogram schedule shauna  And gets anxiety prior to procedures  The following portions of the patient's history were reviewed and updated as appropriate: allergies, current medications, past family history, past medical history, past social history, past surgical history and problem list     Review of Systems   Constitutional: Negative for activity change and appetite change  Respiratory: Negative  Cardiovascular: Negative  Gastrointestinal: Negative  Genitourinary: Negative  Musculoskeletal: Positive for arthralgias (left hip pain- following with ortho)  Skin: Negative for rash  Psychiatric/Behavioral: The patient is nervous/anxious (with flying and tests like mammograms)  Objective:      /76   Pulse 92   Temp 99 7 °F (37 6 °C)   Ht 5' 4 5" (1 638 m)   Wt 83 5 kg (184 lb)   SpO2 98%   BMI 31 10 kg/m²          Physical Exam   Constitutional: She is oriented to person, place, and time  She appears well-developed and well-nourished  No distress  HENT:   Head: Normocephalic  Eyes: Conjunctivae are normal    Cardiovascular: Normal rate, regular rhythm and normal heart sounds  Pulmonary/Chest: Effort normal and breath sounds normal  No respiratory distress  She has no wheezes  She has no rales  Musculoskeletal: She exhibits no edema  Neurological: She is alert and oriented to person, place, and time  Psychiatric: She has a normal mood and affect   Her behavior is normal

## 2018-11-14 ENCOUNTER — OFFICE VISIT (OUTPATIENT)
Dept: FAMILY MEDICINE CLINIC | Facility: CLINIC | Age: 58
End: 2018-11-14
Payer: MEDICARE

## 2018-11-14 VITALS
TEMPERATURE: 99.7 F | WEIGHT: 184 LBS | HEIGHT: 65 IN | BODY MASS INDEX: 30.66 KG/M2 | HEART RATE: 92 BPM | DIASTOLIC BLOOD PRESSURE: 76 MMHG | OXYGEN SATURATION: 98 % | SYSTOLIC BLOOD PRESSURE: 126 MMHG

## 2018-11-14 DIAGNOSIS — F40.243 FEAR OF FLYING: ICD-10-CM

## 2018-11-14 DIAGNOSIS — L30.9 ECZEMA, UNSPECIFIED TYPE: ICD-10-CM

## 2018-11-14 DIAGNOSIS — E11.8 CONTROLLED DIABETES MELLITUS TYPE 2 WITH COMPLICATIONS, UNSPECIFIED WHETHER LONG TERM INSULIN USE (HCC): Primary | ICD-10-CM

## 2018-11-14 DIAGNOSIS — E78.49 OTHER HYPERLIPIDEMIA: ICD-10-CM

## 2018-11-14 DIAGNOSIS — I10 HYPERTENSION, BENIGN: ICD-10-CM

## 2018-11-14 DIAGNOSIS — K21.9 GASTROESOPHAGEAL REFLUX DISEASE, ESOPHAGITIS PRESENCE NOT SPECIFIED: ICD-10-CM

## 2018-11-14 DIAGNOSIS — Z23 NEEDS FLU SHOT: ICD-10-CM

## 2018-11-14 PROCEDURE — 90471 IMMUNIZATION ADMIN: CPT

## 2018-11-14 PROCEDURE — 3078F DIAST BP <80 MM HG: CPT | Performed by: FAMILY MEDICINE

## 2018-11-14 PROCEDURE — 99214 OFFICE O/P EST MOD 30 MIN: CPT | Performed by: FAMILY MEDICINE

## 2018-11-14 PROCEDURE — 3074F SYST BP LT 130 MM HG: CPT | Performed by: FAMILY MEDICINE

## 2018-11-14 PROCEDURE — 90682 RIV4 VACC RECOMBINANT DNA IM: CPT

## 2018-11-14 RX ORDER — LORAZEPAM 0.5 MG/1
0.5 TABLET ORAL ONCE
Qty: 5 TABLET | Refills: 0 | Status: CANCELLED | OUTPATIENT
Start: 2018-11-14 | End: 2018-11-14

## 2018-11-14 RX ORDER — PANTOPRAZOLE SODIUM 40 MG/1
40 TABLET, DELAYED RELEASE ORAL
Qty: 90 TABLET | Refills: 0 | Status: SHIPPED | OUTPATIENT
Start: 2018-11-14 | End: 2019-02-14

## 2018-11-14 RX ORDER — LORAZEPAM 0.5 MG/1
0.5 TABLET ORAL ONCE
Qty: 5 TABLET | Refills: 0 | Status: SHIPPED | OUTPATIENT
Start: 2018-11-14 | End: 2018-12-12

## 2018-11-14 RX ORDER — TRIAMCINOLONE ACETONIDE 5 MG/G
OINTMENT TOPICAL 2 TIMES DAILY
Qty: 30 G | Refills: 1 | Status: SHIPPED | OUTPATIENT
Start: 2018-11-14 | End: 2019-02-14 | Stop reason: SDUPTHER

## 2018-11-15 ENCOUNTER — HOSPITAL ENCOUNTER (OUTPATIENT)
Dept: MAMMOGRAPHY | Facility: CLINIC | Age: 58
Discharge: HOME | End: 2018-11-15
Payer: MEDICARE

## 2018-11-15 VITALS — WEIGHT: 182 LBS | HEIGHT: 60 IN | BODY MASS INDEX: 35.73 KG/M2

## 2018-11-15 DIAGNOSIS — Z12.39 SCREENING FOR MALIGNANT NEOPLASM OF BREAST: ICD-10-CM

## 2018-11-15 PROCEDURE — 77067 SCR MAMMO BI INCL CAD: CPT

## 2018-11-26 ENCOUNTER — HOSPITAL ENCOUNTER (OUTPATIENT)
Dept: RADIOLOGY | Facility: HOSPITAL | Age: 58
Discharge: HOME | End: 2018-11-26
Attending: PODIATRIST
Payer: MEDICARE

## 2018-11-26 ENCOUNTER — APPOINTMENT (OUTPATIENT)
Dept: LAB | Facility: HOSPITAL | Age: 58
End: 2018-11-26
Attending: PODIATRIST
Payer: MEDICARE

## 2018-11-26 ENCOUNTER — TRANSCRIBE ORDERS (OUTPATIENT)
Dept: LAB | Facility: HOSPITAL | Age: 58
End: 2018-11-26
Payer: MEDICARE

## 2018-11-26 DIAGNOSIS — M21.611 BUNION OF GREAT TOE OF RIGHT FOOT: ICD-10-CM

## 2018-11-26 DIAGNOSIS — M21.611 BUNION OF GREAT TOE OF RIGHT FOOT: Primary | ICD-10-CM

## 2018-11-26 LAB
ANION GAP SERPL CALCULATED.3IONS-SCNC: 3 MMOL/L (ref 4–13)
ATRIAL RATE: 69 BPM
BASOPHILS # BLD AUTO: 0.03 THOUSANDS/ΜL (ref 0–0.1)
BASOPHILS NFR BLD AUTO: 1 % (ref 0–1)
BUN SERPL-MCNC: 10 MG/DL (ref 5–25)
CALCIUM SERPL-MCNC: 9.7 MG/DL (ref 8.3–10.1)
CHLORIDE SERPL-SCNC: 106 MMOL/L (ref 100–108)
CO2 SERPL-SCNC: 30 MMOL/L (ref 21–32)
CREAT SERPL-MCNC: 0.69 MG/DL (ref 0.6–1.3)
EOSINOPHIL # BLD AUTO: 0.04 THOUSAND/ΜL (ref 0–0.61)
EOSINOPHIL NFR BLD AUTO: 1 % (ref 0–6)
ERYTHROCYTE [DISTWIDTH] IN BLOOD BY AUTOMATED COUNT: 14.5 % (ref 11.6–15.1)
EST. AVERAGE GLUCOSE BLD GHB EST-MCNC: 186 MG/DL
GFR SERPL CREATININE-BSD FRML MDRD: 97 ML/MIN/1.73SQ M
GLUCOSE P FAST SERPL-MCNC: 102 MG/DL (ref 65–99)
HBA1C MFR BLD: 8.1 % (ref 4.2–6.3)
HCT VFR BLD AUTO: 39.2 % (ref 34.8–46.1)
HGB BLD-MCNC: 12.4 G/DL (ref 11.5–15.4)
IMM GRANULOCYTES # BLD AUTO: 0.01 THOUSAND/UL (ref 0–0.2)
IMM GRANULOCYTES NFR BLD AUTO: 0 % (ref 0–2)
LYMPHOCYTES # BLD AUTO: 1.34 THOUSANDS/ΜL (ref 0.6–4.47)
LYMPHOCYTES NFR BLD AUTO: 22 % (ref 14–44)
MCH RBC QN AUTO: 30 PG (ref 26.8–34.3)
MCHC RBC AUTO-ENTMCNC: 31.6 G/DL (ref 31.4–37.4)
MCV RBC AUTO: 95 FL (ref 82–98)
MONOCYTES # BLD AUTO: 0.43 THOUSAND/ΜL (ref 0.17–1.22)
MONOCYTES NFR BLD AUTO: 7 % (ref 4–12)
NEUTROPHILS # BLD AUTO: 4.19 THOUSANDS/ΜL (ref 1.85–7.62)
NEUTS SEG NFR BLD AUTO: 69 % (ref 43–75)
NRBC BLD AUTO-RTO: 0 /100 WBCS
P AXIS: 37 DEGREES
PLATELET # BLD AUTO: 246 THOUSANDS/UL (ref 149–390)
PMV BLD AUTO: 10.5 FL (ref 8.9–12.7)
POTASSIUM SERPL-SCNC: 4 MMOL/L (ref 3.5–5.3)
PR INTERVAL: 156 MS
QRS AXIS: -19 DEGREES
QRSD INTERVAL: 72 MS
QT INTERVAL: 376 MS
QTC INTERVAL: 402 MS
RBC # BLD AUTO: 4.14 MILLION/UL (ref 3.81–5.12)
SODIUM SERPL-SCNC: 139 MMOL/L (ref 136–145)
T WAVE AXIS: 33 DEGREES
VENTRICULAR RATE: 69 BPM
WBC # BLD AUTO: 6.04 THOUSAND/UL (ref 4.31–10.16)

## 2018-11-26 PROCEDURE — 85025 COMPLETE CBC W/AUTO DIFF WBC: CPT

## 2018-11-26 PROCEDURE — 71046 X-RAY EXAM CHEST 2 VIEWS: CPT

## 2018-11-26 PROCEDURE — 93005 ELECTROCARDIOGRAM TRACING: CPT

## 2018-11-26 PROCEDURE — 80048 BASIC METABOLIC PNL TOTAL CA: CPT

## 2018-11-26 PROCEDURE — 93010 ELECTROCARDIOGRAM REPORT: CPT

## 2018-11-26 PROCEDURE — 36415 COLL VENOUS BLD VENIPUNCTURE: CPT

## 2018-11-26 PROCEDURE — 83036 HEMOGLOBIN GLYCOSYLATED A1C: CPT

## 2018-11-27 ENCOUNTER — OFFICE VISIT (OUTPATIENT)
Dept: FAMILY MEDICINE CLINIC | Facility: CLINIC | Age: 58
End: 2018-11-27
Payer: MEDICARE

## 2018-11-27 VITALS
WEIGHT: 183 LBS | SYSTOLIC BLOOD PRESSURE: 160 MMHG | DIASTOLIC BLOOD PRESSURE: 86 MMHG | BODY MASS INDEX: 34.55 KG/M2 | HEART RATE: 94 BPM | OXYGEN SATURATION: 98 % | TEMPERATURE: 98.7 F | HEIGHT: 61 IN

## 2018-11-27 DIAGNOSIS — Z01.818 PREOP EXAMINATION: Primary | ICD-10-CM

## 2018-11-27 PROCEDURE — 99212 OFFICE O/P EST SF 10 MIN: CPT | Performed by: FAMILY MEDICINE

## 2018-11-27 NOTE — PROGRESS NOTES
Subjective:     Briseida Hutchinson is a 63 y o  female who presents to the office today for a preoperative consultation at the request of surgeon Dr Good Varela who plans on performing right foot surgery on December 28  This consultation is requested for the specific conditions prompting preoperative evaluation (i e  because of potential affect on operative risk): HTN  Planned anesthesia: IV sedation/MAC  The patient has the following known anesthesia issues: past regional anesthesia with complications (none)  Patients bleeding risk: no recent abnormal bleeding and no remote history of abnormal bleeding  Denies any CP, SOB, palpitation, fevers, cough, edema, abnormal bleeding  The following portions of the patient's history were reviewed and updated as appropriate: allergies, current medications, past family history, past medical history, past social history, past surgical history and problem list     Review of Systems  Pertinent items are noted in HPI       Objective:     /86   Pulse 94   Temp 98 7 °F (37 1 °C)   Ht 5' 1 42" (1 56 m)   Wt 83 kg (183 lb)   SpO2 98%   BMI 34 11 kg/m²     General Appearance:    Alert, cooperative, no distress, appears stated age   Head:    Normocephalic, without obvious abnormality, atraumatic   Eyes:    PERRL, conjunctiva/corneas clear, EOM's intact, both eyes   Ears:    Normal TM's and external ear canals, both ears   Nose:   Nares normal, septum midline, mucosa normal, no drainage    or sinus tenderness   Throat:   Lips, mucosa, and tongue normal; teeth and gums normal   Neck:   Supple, symmetrical, trachea midline, no adenopathy;       Back:     Symmetric, no curvature, ROM normal   Lungs:     Clear to auscultation bilaterally, respirations unlabored   Chest Wall:    No tenderness or deformity    Heart:    Regular rate and rhythm, S1 and S2 normal, no murmur, rub   or gallop       Abdomen:     Soft, non-tender, bowel sounds active all four quadrants,     no masses, no organomegaly           Extremities:   Extremities normal, atraumatic, no cyanosis or edema   Pulses:   2+ and symmetric all extremities   Skin:   Skin color, texture, turgor normal, no rashes or lesions   Lymph nodes:   Cervical nodes normal   Neurologic:   CNII-XII intact     Cardiographics  ECG:Normal sinus rhythm with sinus arrhythmia  Moderate voltage criteria for LVH, may be normal variant  Poor R wave progression  Abnormal ECG  When compared with ECG of 05-NOV-2009 12:52,  No significant change was found      Lab Review   Transcribe Orders on 11/26/2018   Component Date Value    WBC 11/26/2018 6 04     RBC 11/26/2018 4 14     Hemoglobin 11/26/2018 12 4     Hematocrit 11/26/2018 39 2     MCV 11/26/2018 95     MCH 11/26/2018 30 0     MCHC 11/26/2018 31 6     RDW 11/26/2018 14 5     MPV 11/26/2018 10 5     Platelets 04/57/5712 246     nRBC 11/26/2018 0     Neutrophils Relative 11/26/2018 69     Immat GRANS % 11/26/2018 0     Lymphocytes Relative 11/26/2018 22     Monocytes Relative 11/26/2018 7     Eosinophils Relative 11/26/2018 1     Basophils Relative 11/26/2018 1     Neutrophils Absolute 11/26/2018 4 19     Immature Grans Absolute 11/26/2018 0 01     Lymphocytes Absolute 11/26/2018 1 34     Monocytes Absolute 11/26/2018 0 43     Eosinophils Absolute 11/26/2018 0 04     Basophils Absolute 11/26/2018 0 03     Sodium 11/26/2018 139     Potassium 11/26/2018 4 0     Chloride 11/26/2018 106     CO2 11/26/2018 30     ANION GAP 11/26/2018 3*    BUN 11/26/2018 10     Creatinine 11/26/2018 0 69     Glucose, Fasting 11/26/2018 102*    Calcium 11/26/2018 9 7     eGFR 11/26/2018 97     Hemoglobin A1C 11/26/2018 8 1*    EAG 11/26/2018 186     Ventricular Rate 11/26/2018 69     Atrial Rate 11/26/2018 69     LA Interval 11/26/2018 156     QRSD Interval 11/26/2018 72     QT Interval 11/26/2018 376     QTC Interval 11/26/2018 402     P Axis 11/26/2018 37     QRS Axis 11/26/2018 -19     T Wave Axis 11/26/2018 33    Orders Only on 11/03/2018   Component Date Value    Glucose, Random 11/03/2018 151*    BUN 11/03/2018 16     Creatinine 11/03/2018 0 83     eGFR Non African American 11/03/2018 78     SL AMB EGFR  AMER* 11/03/2018 91     SL AMB BUN/CREATININE RA* 52/71/9835 NOT APPLICABLE     Sodium 63/36/0043 140     Potassium 11/03/2018 4 2     Chloride 11/03/2018 105     CO2 11/03/2018 27     SL AMB CALCIUM 11/03/2018 9 1     Hemoglobin A1C 11/03/2018 8 2*    Estimated Average Glucose 11/03/2018 189     Estimated Average Glucos* 11/03/2018 10 4         Assessment:     63 y o  female with planned surgery as above  Known risk factors for perioperative complications: None        Cardiac Risk Estimation: Class 1 low risk for planned surgery

## 2018-12-06 ENCOUNTER — ANESTHESIA EVENT (OUTPATIENT)
Dept: PERIOP | Facility: HOSPITAL | Age: 58
End: 2018-12-06

## 2018-12-12 ENCOUNTER — OFFICE VISIT (OUTPATIENT)
Dept: OBGYN CLINIC | Facility: HOSPITAL | Age: 58
End: 2018-12-12
Payer: MEDICARE

## 2018-12-12 ENCOUNTER — HOSPITAL ENCOUNTER (OUTPATIENT)
Dept: RADIOLOGY | Facility: HOSPITAL | Age: 58
Discharge: HOME | End: 2018-12-12
Attending: ORTHOPAEDIC SURGERY
Payer: MEDICARE

## 2018-12-12 VITALS
HEIGHT: 62 IN | HEART RATE: 91 BPM | DIASTOLIC BLOOD PRESSURE: 80 MMHG | BODY MASS INDEX: 34.41 KG/M2 | SYSTOLIC BLOOD PRESSURE: 140 MMHG | WEIGHT: 187 LBS

## 2018-12-12 DIAGNOSIS — F41.9 ANXIETY: ICD-10-CM

## 2018-12-12 DIAGNOSIS — M53.3 SI (SACROILIAC) JOINT DYSFUNCTION: ICD-10-CM

## 2018-12-12 DIAGNOSIS — M54.42 CHRONIC LEFT-SIDED LOW BACK PAIN WITH LEFT-SIDED SCIATICA: ICD-10-CM

## 2018-12-12 DIAGNOSIS — G89.29 CHRONIC LEFT-SIDED LOW BACK PAIN WITH LEFT-SIDED SCIATICA: ICD-10-CM

## 2018-12-12 DIAGNOSIS — M54.50 NONSPECIFIC PAIN IN THE LUMBAR REGION: ICD-10-CM

## 2018-12-12 DIAGNOSIS — M70.62 TROCHANTERIC BURSITIS OF LEFT HIP: ICD-10-CM

## 2018-12-12 DIAGNOSIS — M54.50 NONSPECIFIC PAIN IN THE LUMBAR REGION: Primary | ICD-10-CM

## 2018-12-12 PROCEDURE — 72100 X-RAY EXAM L-S SPINE 2/3 VWS: CPT

## 2018-12-12 PROCEDURE — 99214 OFFICE O/P EST MOD 30 MIN: CPT | Performed by: ORTHOPAEDIC SURGERY

## 2018-12-12 RX ORDER — LORAZEPAM 0.5 MG/1
0.5 TABLET ORAL
Qty: 2 TABLET | Refills: 0 | Status: SHIPPED | OUTPATIENT
Start: 2018-12-12 | End: 2019-06-11 | Stop reason: SDUPTHER

## 2018-12-12 NOTE — PROGRESS NOTES
Assessment:       1  Nonspecific pain in the lumbar region    2  SI (sacroiliac) joint dysfunction    3  Chronic left-sided low back pain with left-sided sciatica    4  Trochanteric bursitis of left hip          Plan:        I explained my current clinical findings and reviewed radiological findings with Chantelle Person today  The differential diagnosis of her current symptoms include left-sided lumbar radiculopathy, left SI dysfunction verses left trochanteric bursitis/insertional gluteus medius tendinopathy or a combination of the above  So far, she has not had much improvement with cortisone injections of the left hip trochanteric bursa as well as physical therapy for her low back pain and SI dysfunction  He hence, due to prolonged course of her symptoms I will request further radiological imaging in the form of a lumbar spine MRI  I will see her back in the office following her lumbar spine MRI  Subjective:     Patient ID: Bertram Cowden is a 63 y o  female  Chief Complaint:  Follow-up left lateral hip, gluteal and back pain    HPI  Chantelle Person is here today for a follow-up of her left sided lower back, gluteal and hip pain  She has now had these symptoms for a very prolonged period of over 1 year and has been treated conservatively  She has had 2 previous left hip trochanteric bursa injection as well as a course of physical therapy for hip and back pain  Unfortunately, none of these measures have given her any significant relief  She has also tried local TENS therapy and topical Lidoderm patches without much relief  Continues to experience pain which is primarily localized to the left buttock area and radiates proximally to her lower back and distally down her posterior thigh up to the knee  Denies any new onset or worsening tingling, numbness or weakness of the lower extremities  No recent injuries in this regard  Symptoms are made worse with climbing stairs as well as lying on the affected side  Social History     Occupational History          nursing home     Social History Main Topics    Smoking status: Never Smoker    Smokeless tobacco: Never Used    Alcohol use No    Drug use: No    Sexual activity: Not on file      Review of Systems   Constitutional: Negative  HENT: Negative  Eyes: Negative  Respiratory: Negative  Cardiovascular: Negative  Gastrointestinal: Negative  Endocrine: Negative  Genitourinary: Negative  Skin: Negative  Allergic/Immunologic: Negative  Neurological: Negative  Hematological: Negative  Psychiatric/Behavioral: Negative  Objective:     Ortho ExamPhysical Exam   Constitutional: She is oriented to person, place, and time  She appears well-developed and well-nourished  HENT:   Head: Normocephalic and atraumatic  Eyes: Pupils are equal, round, and reactive to light  Conjunctivae are normal    Cardiovascular: Normal rate and regular rhythm  Pulmonary/Chest: Effort normal  No respiratory distress  Neurological: She is alert and oriented to person, place, and time  No cranial nerve deficit  Skin: Skin is warm and dry  No erythema  Psychiatric: She has a normal mood and affect  Her behavior is normal  Judgment and thought content normal        Lumbar spine exam:  No midline lumbar spine tenderness noted  There is some left-sided sacroiliac joint tenderness  Forward flexion is up to the level of mid leg  SLR negative on the right and equivocal on the left at about 70° with mild increased discomfort on sciatic nerve stretch  MACARIO positive on the left causing lateral hip and gluteal pain  Normal sensation to light touch in bilateral lower extremity in all dermatomes  Grossly normal strength of bilateral lower extremity all myotomes  There is tenderness to palpation over the left trochanteric bursa as well      I have personally reviewed pertinent films in PACS and my interpretation is Plain radiograph of the lumbar spine reveals some anterior osteophytic change at T11-T12 level as well as decreased disc space at L5-S1 level with grade 1 anterolisthesis of L5 over S1 and decreased neural foraminal size

## 2018-12-18 ENCOUNTER — OFFICE VISIT (OUTPATIENT)
Dept: URGENT CARE | Facility: CLINIC | Age: 58
End: 2018-12-18
Payer: MEDICARE

## 2018-12-18 VITALS
HEART RATE: 88 BPM | TEMPERATURE: 99.6 F | DIASTOLIC BLOOD PRESSURE: 80 MMHG | RESPIRATION RATE: 18 BRPM | WEIGHT: 182 LBS | OXYGEN SATURATION: 98 % | BODY MASS INDEX: 33.29 KG/M2 | SYSTOLIC BLOOD PRESSURE: 124 MMHG

## 2018-12-18 DIAGNOSIS — M54.9 OTHER ACUTE BACK PAIN: Primary | ICD-10-CM

## 2018-12-18 PROCEDURE — 99213 OFFICE O/P EST LOW 20 MIN: CPT | Performed by: NURSE PRACTITIONER

## 2018-12-18 PROCEDURE — G0463 HOSPITAL OUTPT CLINIC VISIT: HCPCS | Performed by: NURSE PRACTITIONER

## 2018-12-18 RX ORDER — TRAMADOL HYDROCHLORIDE 50 MG/1
50 TABLET ORAL EVERY 6 HOURS PRN
Qty: 10 TABLET | Refills: 0 | Status: SHIPPED | OUTPATIENT
Start: 2018-12-18 | End: 2018-12-18

## 2018-12-18 NOTE — PATIENT INSTRUCTIONS
Dolor boby de espalda inferior   LO QUE NECESITA SABER:   El dolor boby de la región lumbar de la espalda es patti molestia repentina en la parte inferior de garcia espalda que dura hasta por 6 semanas  La molestia hace que sea dificil que usted tolere la [place]  INSTRUCCIONES SOBRE [facility]:   Regrese a la martinez de emergencias si:   · Usted tiene dolor intenso  · Usted repentinamente tiene rigidez o siente pesadez en ambos glúteos hacia abajo de ambas piernas  · Usted tiene entumecimiento o debilidad en patti pierna o dolor en ambas piernas  · Usted tiene entumecimiento en el área genital o en la región lumbar  · Usted no puede controlar garcia orina ni jeff deposiciones intestinales  Pregúntele a garcia [place] vitaminas y minerales son adecuados para usted  · Usted tiene fiebre  · Usted tiene un dolor por la noche o cuando descansa  · Garcia dolor no mejora con el tratamiento  · Usted tiene dolor que empeora cuando tose o estornuda  · Usted siente un estallido o chasquido repentino en garcia espalda  · Usted tiene preguntas o inquietudes acerca de garcia condición o cuidado  Medicamentos:  Los siguientes medicamentos pueden  ser recetados por garcia médico:  · El acetaminofén  gregg el dolor  Está disponible sin receta médica  Pregunte la cantidad y la frecuencia con que debe tomarlos  [address]  El acetaminofén puede causar daño en el hígado cuando no se royal de forma correcta  · AINEs (Analgésicos antiinflamatorios no esteroides)  ayudan a disminuir la inflamación y el dolor  Guicho medicamento esta disponible con o sin patti receta médica  Los AINEs pueden causar sangrado estomacal o problemas renales en ciertas personas  Si usted royal un medicamento anticoagulante, siempre pregúntele a garcia médico si los CRISTIAN son seguros para usted  Siempre luis m la etiqueta de guicho medicamento y [employer] instrucciones  · Un medicamento con receta para el dolor  podrían ser Vicky Cortes  Pregunte al médico cómo debe celina guicho medicamento de forma mccain  · Relajantes musculares  disminuyen el dolor y [employer] músculos de la parte inferior de la columna  · [place] jeff medicamentos nia se le haya indicado  Consulte con garcia médico si usted yee que garcia medicamento no le está ayudando o si presenta efectos secundarios  Infórmele si es alérgico a algún medicamento  Mantenga patti lista actualizada de los [employer], las vitaminas y los productos herbales que royal  Incluya los siguientes datos de los medicamentos: cantidad, frecuencia y motivo de administración  Traiga con usted la lista o los envases de la píldoras a jeff citas de seguimiento  Lleve la lista de los medicamentos con usted en nel de patti emergencia  Cuidados personales:   · Manténgase activo  lo más que pueda sin causar más dolor  El reposo en cama puede empeorar garcia dolor de espalda  Comience con ejercicios ligeros nia caminar  Evite levantar objetos hasta que ya no tenga dolor  Solicite más información acerca de las actividades físicas o plan de ejercicios que son los adecuados para usted  · El hielo  ayuda a disminuir la inflamación, el dolor y los espasmos musculares  Ponga hielo lianne en patti bolsa plástica  Cúbrala con patti toalla  Aplíquela en garcia katherine lumbar por 20 a 30 minutos cada 2 horas  Shayy esto por 2 a 3 días después que el dolor empiece, o según lo indicado  · El calor  ayuda a disminuir dolor y espasmos musculares  Empiece a utilizar calor después de magnus terminado el tratamiento con el hielo  Utilice patti toalla pequeña empapada con [place], patti almohada térmica o tome un baño de bruce con agua tibia  Aplíquese calor en el área lesionada delbert 20 a 30 minutos cada 2 horas delbert la cantidad de [employer] indiquen  [place] entre el calor y el hielo  Prevenir el dolor boby de la parte inferior de la espalda:   · Use la mecánica corporal adecuada        ¨ Flexione la cadera y las rodillas cuando [employer] a levantar un objeto  No doble la cintura  Utilice los [employer] de las piernas mientras levanta troy carga  No use troy espalda  Mantenga el objeto cerca de troy pecho mientras lo levanta  No se tuerza, ni levante cualquier cosa por encima de troy cintura  ¨ Cambie troy posición frecuentemente cuando pase mucho tiempo de pie  Descanse un pie sobre patti Margart Dov o un reposapiés e intercambie con el otro pie frecuentemente  ¨ No permanezca sentado por lapsos de tiempo prolongados  Cuando sea necesario hacerlo, siéntese en patti silla de respaldo recto con los pies apoyados en el suelo  Nunca alcance, jale ni empuje mientras se encuentra sentando  · Shayy ejercicios que fortalezcan jeff músculos de la espalda  Entre en calor antes de hacer ejercicio  Consulte con troy médico sobre [employer] plan de ejercicios para usted  · Mantenga un peso saludable  Consulte con troy médico cuánto debería pesar  Pida que le ayude a crear un plan para bajar de peso si usted tiene sobrepeso  Acuda a jeff consultas de control con troy médico según le indicaron  Regrese a patti ignacio de seguimiento si usted aun tiene [employer] de 1 a 3 semanas de [place]  Puede que usted necesite acudir con un ortopedista si troy dolor de espalda dura más de 12 semanas  Anote jeff preguntas para que se acuerde de hacerlas delbert jeff visitas  © 2017 [address] Information is for End User's use only and may not be sold, redistributed or otherwise used for commercial purposes  All illustrations and images included in CareNotes® are the copyrighted property of A D A M , Inc  or Jorge Godoy  Esta información es sólo para uso en educación  Troy intención no es darle un consejo médico sobre enfermedades o tratamientos  Colsulte con troy Iman Charlton farmacéutico antes de seguir cualquier régimen médico para saber si es seguro y efectivo para usted

## 2018-12-19 ENCOUNTER — TELEPHONE (OUTPATIENT)
Dept: OBGYN CLINIC | Facility: HOSPITAL | Age: 58
End: 2018-12-19

## 2018-12-19 DIAGNOSIS — G89.29 CHRONIC LEFT-SIDED LOW BACK PAIN WITH LEFT-SIDED SCIATICA: Primary | ICD-10-CM

## 2018-12-19 DIAGNOSIS — M54.42 CHRONIC LEFT-SIDED LOW BACK PAIN WITH LEFT-SIDED SCIATICA: Primary | ICD-10-CM

## 2018-12-19 NOTE — TELEPHONE ENCOUNTER
Caller:patient  #:[phone]  Dr Lopes        Patient called in requesting med for the pain  Patient has been in severe right hip pain since her last visit  Patient Is allergic to aspirin  Please send to pharmacy on file, thank you

## 2018-12-19 NOTE — TELEPHONE ENCOUNTER
Trish Morales, patients daughter in law  Ph- [phone]  Patients daughter in law is calling again for the medication status  She says the patient is in bad pain and she is expecting this medication to be prescribed no later than today

## 2018-12-19 NOTE — PROGRESS NOTES
Assessment/Plan    Other acute back pain [M54 9]  1  Other acute back pain  DISCONTINUED: traMADol (ULTRAM) 50 mg tablet     - patient offered tramadol, but she refused requesting Tylenol/oxycodone  - requested the night and she is advised to follow up with PCP or orthopedic if she needs such pain medications  Otherwise she can go to pain management  - advised to get her MRI done as ordered by Orthopedics specialist  - her records show allergy to NSAIDs hence no naproxen or ibuprofen prescribed  - she is advised to use Tylenol p r n  Until she consults with her PCP or orthopedic specialist   - advised his symptoms was not go to the emergency department        Subjective:  Presents to the clinic with back pain     Patient ID: Yokasta Casiano is a 63 y o  female  Reason For Visit / Chief Complaint  Chief Complaint   Patient presents with    Back Pain     Actively being tx'd by PCP for back pain; have not scheduled MRI at present; states back pain now worse on right (vs left) since yesterday am         HPI  She reports she has been having mid back pain for about 3 days  1st noticed back pain when she woke up from sleep 3 days ago  Did not take any medication except Tylenol OTC  States the pain has been persistent  Rates as moderate to severe  Worse with certain movements  No radiation of the pain  Denies trauma to the affected area  Records show ongoing low back and hip pain  She states she goes to Orthopedic for the low back and hip pain  Has been ordered to get MRI but she has not done so  No specific reasons      Past Medical History:   Diagnosis Date    Arthritis     Asthma     "one time with a cold, and used an inhaler"    Balance problems     due to hip and foot pain    Chronic pain disorder     hip pain left, and back    Diabetes mellitus (HCC)     Eczema     GERD (gastroesophageal reflux disease)     History of panic attacks     Hyperlipidemia     Hypertension     Right foot pain  Risk for falls     Uses Tristanian as primary spoken language     Wears glasses        Past Surgical History:   Procedure Laterality Date    CHOLECYSTECTOMY      "had a panic attack"    HERNIA REPAIR      OVARY SURGERY      NV COLONOSCOPY FLX DX W/COLLJ SPEC WHEN PFRMD N/A 12/22/2016    Procedure: EGD AND COLONOSCOPY;  Surgeon: Estefanía Wellington MD;  [facility]; Service: Gastroenterology       Family History   Problem Relation Age of Onset    Hypertension Mother     Hypertension Father     Bone cancer Maternal Grandfather     Breast cancer Maternal Aunt     Stomach cancer Brother        Review of Systems   Respiratory: Negative for cough, chest tightness and wheezing  Cardiovascular: Negative for chest pain and palpitations  Musculoskeletal: Positive for arthralgias ( hips) and back pain (Mid back, as above)  Negative for gait problem, neck pain and neck stiffness  Objective:    /80   Pulse 88   Temp 99 6 °F (37 6 °C)   Resp 18   Wt 82 6 kg (182 lb)   SpO2 98%   BMI 33 29 kg/m²     Physical Exam   Cardiovascular: Normal rate, regular rhythm and normal heart sounds  Pulmonary/Chest: Effort normal and breath sounds normal  No respiratory distress  She has no wheezes  Musculoskeletal:   Palpation of the right side of the base of the thoracic Back elicits moderate pain  No bruising or swelling  No redness  Limited range of motion with flexion secondary to back pain

## 2018-12-20 DIAGNOSIS — M54.42 CHRONIC LEFT-SIDED LOW BACK PAIN WITH LEFT-SIDED SCIATICA: Primary | ICD-10-CM

## 2018-12-20 DIAGNOSIS — G89.29 CHRONIC LEFT-SIDED LOW BACK PAIN WITH LEFT-SIDED SCIATICA: Primary | ICD-10-CM

## 2018-12-20 RX ORDER — OXYCODONE AND ACETAMINOPHEN 2.5; 325 MG/1; MG/1
1 TABLET ORAL EVERY 8 HOURS PRN
Qty: 15 TABLET | Refills: 0 | Status: SHIPPED | OUTPATIENT
Start: 2018-12-20 | End: 2019-03-01 | Stop reason: HOSPADM

## 2018-12-20 RX ORDER — OXYCODONE AND ACETAMINOPHEN 2.5; 325 MG/1; MG/1
1 TABLET ORAL EVERY 8 HOURS PRN
Qty: 15 TABLET | Refills: 0 | Status: SHIPPED | OUTPATIENT
Start: 2018-12-20 | End: 2018-12-20 | Stop reason: CLARIF

## 2018-12-20 NOTE — TELEPHONE ENCOUNTER
I had a detailed discussion with the patient with her son as the  over the phone today at 11:40 a m  the patient reported that she has previously taken ibuprofen with no side effects and her known allergy is to aspirin  However, patient does have a history of GERD  Hence the [address] [place] [address] site was reviewed and found to be appropriate  A 1 time course of oral Percocet spur scribed for her current severe symptoms  Patient was also advised follow-up after her lumbar spine MRI

## 2018-12-27 RX ORDER — CEFAZOLIN SODIUM 2 G/50ML
2000 SOLUTION INTRAVENOUS
Status: CANCELLED | OUTPATIENT
Start: 2018-12-27

## 2018-12-27 RX ORDER — SODIUM CHLORIDE 9 MG/ML
125 INJECTION, SOLUTION INTRAVENOUS CONTINUOUS
Status: CANCELLED | OUTPATIENT
Start: 2018-12-27

## 2018-12-28 ENCOUNTER — ANESTHESIA (OUTPATIENT)
Dept: PERIOP | Facility: HOSPITAL | Age: 58
End: 2018-12-28

## 2019-02-04 ENCOUNTER — OFFICE VISIT (OUTPATIENT)
Dept: FAMILY MEDICINE CLINIC | Facility: CLINIC | Age: 59
End: 2019-02-04
Payer: MEDICARE

## 2019-02-04 VITALS
DIASTOLIC BLOOD PRESSURE: 90 MMHG | WEIGHT: 182 LBS | OXYGEN SATURATION: 96 % | HEART RATE: 94 BPM | RESPIRATION RATE: 16 BRPM | TEMPERATURE: 98.6 F | SYSTOLIC BLOOD PRESSURE: 140 MMHG | HEIGHT: 62 IN | BODY MASS INDEX: 33.49 KG/M2

## 2019-02-04 DIAGNOSIS — M21.611 BUNION, RIGHT: ICD-10-CM

## 2019-02-04 DIAGNOSIS — E11.9 TYPE 2 DIABETES MELLITUS WITHOUT COMPLICATION, WITHOUT LONG-TERM CURRENT USE OF INSULIN (HCC): ICD-10-CM

## 2019-02-04 DIAGNOSIS — Z01.818 PRE-OP EXAM: Primary | ICD-10-CM

## 2019-02-04 LAB — SL AMB POCT HEMOGLOBIN AIC: 7.6 (ref ?–6.5)

## 2019-02-04 PROCEDURE — 99213 OFFICE O/P EST LOW 20 MIN: CPT | Performed by: NURSE PRACTITIONER

## 2019-02-04 PROCEDURE — 83036 HEMOGLOBIN GLYCOSYLATED A1C: CPT | Performed by: NURSE PRACTITIONER

## 2019-02-04 NOTE — PROGRESS NOTES
Subjective:     Karlos Box is a 63 y o  female who presents to the office today for a preoperative consultation at the request of surgeon Dr Kaylin Martin who plans on performing RT bunionectomy  on February 28  This consultation is requested for the specific conditions prompting preoperative evaluation (i e  because of potential affect on operative risk): DM-2/BMI/HTN Planned anesthesia: local and IV sedation  The patient has the following known anesthesia issues: none  Patients bleeding risk: no recent abnormal bleeding  Patient does not have objections to receiving blood products if needed  The following portions of the patient's history were reviewed and updated as appropriate: allergies, past social history, past surgical history and problem list     Review of Systems  Pertinent items are noted in HPI       Objective:     /90 (BP Location: Left arm, Patient Position: Sitting, Cuff Size: Adult)   Pulse 94   Temp 98 6 °F (37 °C) (Oral)   Resp 16   Ht 5' 2" (1 575 m)   Wt 82 6 kg (182 lb)   SpO2 96%   BMI 33 29 kg/m²     General Appearance:    Alert, cooperative, no distress, appears stated age   Head:    Normocephalic, without obvious abnormality, atraumatic   Eyes:    PERRL, conjunctiva/corneas clear, EOM's intact, fundi     benign, both eyes   Ears:    Normal TM's and external ear canals, both ears   Nose:   Nares normal, septum midline, mucosa normal, no drainage    or sinus tenderness   Throat:   Lips, mucosa, and tongue normal; teeth and gums normal   Neck:   Supple, symmetrical, trachea midline, no adenopathy;     thyroid:  no enlargement/tenderness/nodules; no carotid    bruit or JVD   Back:     Symmetric, no curvature, ROM normal, no CVA tenderness   Lungs:     Clear to auscultation bilaterally, respirations unlabored   Chest Wall:    No tenderness or deformity    Heart:    Regular rate and rhythm, S1 and S2 normal, no murmur, rub   or gallop   Breast Exam:    No tenderness, masses, or nipple abnormality   Abdomen:     Soft, non-tender, bowel sounds active all four quadrants,     no masses, no organomegaly           Extremities:   Extremities normal, atraumatic, no cyanosis or edema   Pulses:   2+ and symmetric all extremities   Skin:   Skin color, texture, turgor normal, no rashes or lesions   Lymph nodes:   Cervical, supraclavicular, and axillary nodes normal   Neurologic:   CNII-XII intact, normal strength, sensation and reflexes     throughout       Predictors of intubation difficulty:   Morbid obesity? no   Anatomically abnormal facies? no   Prominent incisors? no   Receding mandible? no   Short, thick neck? no   Neck range of motion: normal   Mallampati score: II (hard and soft palate, upper portion of tonsils anduvula visible)   Thyromental distance: defer   Mouth opening: defer   Dentition: No chipped, loose, or missing teeth  Cardiographics  ECG: normal sinus rhythm, no blocks or conduction defects, no ischemic changes  Echocardiogram: not done    Imaging  Chest x-ray:not done    Lab Review   POC AIC 7 6 - which is better  No visits with results within 2 Month(s) from this visit     Latest known visit with results is:   Transcribe Orders on 11/26/2018   Component Date Value    WBC 11/26/2018 6 04     RBC 11/26/2018 4 14     Hemoglobin 11/26/2018 12 4     Hematocrit 11/26/2018 39 2     MCV 11/26/2018 95     MCH 11/26/2018 30 0     MCHC 11/26/2018 31 6     RDW 11/26/2018 14 5     MPV 11/26/2018 10 5     Platelets 17/90/6856 246     nRBC 11/26/2018 0     Neutrophils Relative 11/26/2018 69     Immat GRANS % 11/26/2018 0     Lymphocytes Relative 11/26/2018 22     Monocytes Relative 11/26/2018 7     Eosinophils Relative 11/26/2018 1     Basophils Relative 11/26/2018 1     Neutrophils Absolute 11/26/2018 4 19     Immature Grans Absolute 11/26/2018 0 01     Lymphocytes Absolute 11/26/2018 1 34     Monocytes Absolute 11/26/2018 0 43     Eosinophils Absolute 11/26/2018 0 04     Basophils Absolute 11/26/2018 0 03     Sodium 11/26/2018 139     Potassium 11/26/2018 4 0     Chloride 11/26/2018 106     CO2 11/26/2018 30     ANION GAP 11/26/2018 3*    BUN 11/26/2018 10     Creatinine 11/26/2018 0 69     Glucose, Fasting 11/26/2018 102*    Calcium 11/26/2018 9 7     eGFR 11/26/2018 97     Hemoglobin A1C 11/26/2018 8 1*    EAG 11/26/2018 186     Ventricular Rate 11/26/2018 69     Atrial Rate 11/26/2018 69     NJ Interval 11/26/2018 156     QRSD Interval 11/26/2018 72     QT Interval 11/26/2018 376     QTC Interval 11/26/2018 402     P Axis 11/26/2018 37     QRS [place] 11/26/2018 -23     T Wave Axis 11/26/2018 33         Assessment:     63 y o  female with planned surgery as above  Known risk factors for perioperative complications: Diabetes mellitus    Difficulty with intubation is not anticipated  Cardiac Risk Estimation: low risk for low risk surg  Current medications which may produce withdrawal symptoms if withheld perioperatively: hold DM meds day of surg  Plan:     1  Preoperative workup as follows none  2  Change in medication regimen before surgery: hold metformin/glipizide morning of surgery  3  Prophylaxis for cardiac events with perioperative beta-blockers: should be considered, specific regimen per anesthesia    4  Invasive hemodynamic monitoring perioperatively: at the discretion of anesthesiologist   5  Deep vein thrombosis prophylaxis postoperatively:regimen to be chosen by surgical team   6  Surveillance for postoperative MI with ECG immediately postoperatively and on postoperative days 1 and 2 AND troponin levels 24 hours postoperatively and on day 4 or hospital discharge (whichever comes first): at the discretion of anesthesiologist   7  Other measures: none

## 2019-02-11 ENCOUNTER — TRANSCRIBE ORDERS (OUTPATIENT)
Dept: RADIOLOGY | Facility: HOSPITAL | Age: 59
End: 2019-02-11

## 2019-02-11 ENCOUNTER — APPOINTMENT (OUTPATIENT)
Dept: LAB | Facility: HOSPITAL | Age: 59
End: 2019-02-11
Attending: PODIATRIST
Payer: MEDICARE

## 2019-02-11 ENCOUNTER — HOSPITAL ENCOUNTER (OUTPATIENT)
Dept: RADIOLOGY | Facility: HOSPITAL | Age: 59
Discharge: HOME | End: 2019-02-11
Attending: PODIATRIST
Payer: MEDICARE

## 2019-02-11 ENCOUNTER — TRANSCRIBE ORDERS (OUTPATIENT)
Dept: LAB | Facility: HOSPITAL | Age: 59
End: 2019-02-11

## 2019-02-11 ENCOUNTER — OFFICE VISIT (OUTPATIENT)
Dept: LAB | Facility: HOSPITAL | Age: 59
End: 2019-02-11
Attending: PODIATRIST
Payer: MEDICARE

## 2019-02-11 DIAGNOSIS — M21.611 BUNION OF GREAT TOE OF RIGHT FOOT: ICD-10-CM

## 2019-02-11 DIAGNOSIS — M21.611 BUNION OF GREAT TOE OF RIGHT FOOT: Primary | ICD-10-CM

## 2019-02-11 LAB
ANION GAP SERPL CALCULATED.3IONS-SCNC: 6 MMOL/L (ref 4–13)
ATRIAL RATE: 67 BPM
BASOPHILS # BLD AUTO: 0.02 THOUSANDS/ΜL (ref 0–0.1)
BASOPHILS NFR BLD AUTO: 0 % (ref 0–1)
BUN SERPL-MCNC: 7 MG/DL (ref 5–25)
CALCIUM SERPL-MCNC: 8.6 MG/DL (ref 8.3–10.1)
CHLORIDE SERPL-SCNC: 105 MMOL/L (ref 100–108)
CO2 SERPL-SCNC: 29 MMOL/L (ref 21–32)
CREAT SERPL-MCNC: 0.66 MG/DL (ref 0.6–1.3)
EOSINOPHIL # BLD AUTO: 0.08 THOUSAND/ΜL (ref 0–0.61)
EOSINOPHIL NFR BLD AUTO: 2 % (ref 0–6)
ERYTHROCYTE [DISTWIDTH] IN BLOOD BY AUTOMATED COUNT: 13.1 % (ref 11.6–15.1)
GFR SERPL CREATININE-BSD FRML MDRD: 98 ML/MIN/1.73SQ M
GLUCOSE P FAST SERPL-MCNC: 105 MG/DL (ref 65–99)
HCT VFR BLD AUTO: 36.5 % (ref 34.8–46.1)
HGB BLD-MCNC: 11.2 G/DL (ref 11.5–15.4)
IMM GRANULOCYTES # BLD AUTO: 0.01 THOUSAND/UL (ref 0–0.2)
IMM GRANULOCYTES NFR BLD AUTO: 0 % (ref 0–2)
LYMPHOCYTES # BLD AUTO: 1.23 THOUSANDS/ΜL (ref 0.6–4.47)
LYMPHOCYTES NFR BLD AUTO: 26 % (ref 14–44)
MCH RBC QN AUTO: 28.8 PG (ref 26.8–34.3)
MCHC RBC AUTO-ENTMCNC: 30.7 G/DL (ref 31.4–37.4)
MCV RBC AUTO: 94 FL (ref 82–98)
MONOCYTES # BLD AUTO: 0.44 THOUSAND/ΜL (ref 0.17–1.22)
MONOCYTES NFR BLD AUTO: 9 % (ref 4–12)
NEUTROPHILS # BLD AUTO: 2.97 THOUSANDS/ΜL (ref 1.85–7.62)
NEUTS SEG NFR BLD AUTO: 63 % (ref 43–75)
NRBC BLD AUTO-RTO: 0 /100 WBCS
P AXIS: 45 DEGREES
PLATELET # BLD AUTO: 216 THOUSANDS/UL (ref 149–390)
PMV BLD AUTO: 10.5 FL (ref 8.9–12.7)
POTASSIUM SERPL-SCNC: 3.8 MMOL/L (ref 3.5–5.3)
PR INTERVAL: 164 MS
QRS AXIS: -11 DEGREES
QRSD INTERVAL: 64 MS
QT INTERVAL: 380 MS
QTC INTERVAL: 401 MS
RBC # BLD AUTO: 3.89 MILLION/UL (ref 3.81–5.12)
SODIUM SERPL-SCNC: 140 MMOL/L (ref 136–145)
T WAVE AXIS: 36 DEGREES
VENTRICULAR RATE: 67 BPM
WBC # BLD AUTO: 4.75 THOUSAND/UL (ref 4.31–10.16)

## 2019-02-11 PROCEDURE — 80048 BASIC METABOLIC PNL TOTAL CA: CPT

## 2019-02-11 PROCEDURE — 93010 ELECTROCARDIOGRAM REPORT: CPT | Performed by: INTERNAL MEDICINE

## 2019-02-11 PROCEDURE — 93005 ELECTROCARDIOGRAM TRACING: CPT

## 2019-02-11 PROCEDURE — 85025 COMPLETE CBC W/AUTO DIFF WBC: CPT

## 2019-02-11 PROCEDURE — 71046 X-RAY EXAM CHEST 2 VIEWS: CPT

## 2019-02-11 PROCEDURE — 36415 COLL VENOUS BLD VENIPUNCTURE: CPT

## 2019-02-13 NOTE — H&P (VIEW-ONLY)
Assessment/Plan:  Chronic disease stable  Asked patient to decrease pantoprazole to every other day and till script runs out - she is using for reflux symptoms/patient reports normal EGD/colonoscopy  in the past   Discussed low acid diet/discussed side effects of this medication if used chronically on a daily basis  For seasonal allergies asked patient to discontinue Flonase intake Claritin daily and to add humidifier to her bedroom  Return to office after routine labs in 6 mos  No problem-specific Assessment & Plan notes found for this encounter  Diagnoses and all orders for this visit:    Controlled diabetes mellitus type 2 with complications, unspecified whether long term insulin use ([facility] 75 )  -     Comprehensive metabolic panel; Future  -     Lipid panel; Future  -     Hemoglobin A1C; Future    Gastroesophageal reflux disease, esophagitis presence not specified  -     pantoprazole (PROTONIX) 40 mg tablet; Take 1 tablet (40 mg total) by mouth daily at bedtime Take every other day    Allergic rhinitis, unspecified seasonality, unspecified trigger  -     loratadine (CLARITIN) 10 mg tablet; Take 1 tablet (10 mg total) by mouth daily    Hypertension, benign  -     Comprehensive metabolic panel; Future  -     Lipid panel; Future  -     Hemoglobin A1C; Future    Need for pneumococcal vaccination  -     PNEUMOCOCCAL POLYSACCHARIDE VACCINE 23-VALENT =>3YO SQ IM    Eczema, unspecified type  -     triamcinolone (KENALOG) 0 5 % ointment; Apply topically 2 (two) times a day    Other hyperlipidemia  -     Comprehensive metabolic panel; Future  -     Lipid panel; Future  -     Hemoglobin A1C; Future    Encounter for hepatitis C screening test for low risk patient  -     Hepatitis C antibody; Future          Subjective: 3 month follow up with bloodwork     Also stuffy nose x 2 months       Health Maintenance Due   Topic Date Due    Hepatitis C Screening  1960   Dominga Sinclair Medicare Annual Wellness Visit (AWV)  1960  DM Eye Exam  03/30/1971    BMI: Followup Plan  03/30/1979    DTaP,Tdap,and Td Vaccines (1 - Tdap) 03/30/1982    PAP SMEAR  03/30/1982        Patient ID: Ana Luisa Segovia is a 63 y o  female  HPI  Chronic disease follow-up  Lab review done-A1c 7 6, CBC within normal limits; BMP normal kidney function with fasting blood sugar 105  No recent lipid profile  Only complaint is continued congestion not responding to Flonase - states have been using it 2-3 weeks  Reoccurring eczema patches on legs - responds well to as needed triamcinolone    The following portions of the patient's history were reviewed and updated as appropriate: allergies, past social history, past surgical history and problem list     Review of Systems   Constitutional: Negative for activity change and appetite change  HENT: Positive for congestion, postnasal drip and rhinorrhea  Negative for sore throat  Eyes: Negative  Respiratory: Negative  Cardiovascular: Negative  Negative for chest pain  Gastrointestinal: Negative  Endocrine: Negative  Negative for cold intolerance  Genitourinary: Negative  Musculoskeletal: Negative  Skin: Negative  Allergic/Immunologic: Negative  Neurological: Negative  Hematological: Negative  Psychiatric/Behavioral: Negative  All other systems reviewed and are negative  Objective:      /90 (BP Location: Left arm, Patient Position: Sitting, Cuff Size: Adult)   Pulse 88   Temp 98 6 °F (37 °C) (Tympanic)   Resp 16   Ht 5' 1" (1 549 m)   Wt 83 6 kg (184 lb 3 2 oz)   SpO2 98%   BMI 34 80 kg/m²          Physical Exam   Constitutional: She is oriented to person, place, and time  She appears well-developed and well-nourished  No distress  HENT:   Head: Normocephalic  Right Ear: Tympanic membrane and external ear normal  No decreased hearing is noted  Left Ear: Tympanic membrane and external ear normal  No decreased hearing is noted     Nose: Mucosal edema and rhinorrhea present  Right sinus exhibits no maxillary sinus tenderness and no frontal sinus tenderness  Left sinus exhibits no maxillary sinus tenderness and no frontal sinus tenderness  Mouth/Throat: Oropharynx is clear and moist    Eyes: Pupils are equal, round, and reactive to light  Conjunctivae are normal    Neck: Normal range of motion  Neck supple  No thyromegaly present  Cardiovascular: Normal rate, regular rhythm, normal heart sounds and intact distal pulses  No murmur heard  Pulmonary/Chest: Effort normal and breath sounds normal  No respiratory distress  Abdominal: Soft  Bowel sounds are normal    Musculoskeletal: Normal range of motion  Lymphadenopathy:     She has no cervical adenopathy  Neurological: She is alert and oriented to person, place, and time  She has normal reflexes  No cranial nerve deficit  Coordination normal    Skin: Skin is warm and dry  Psychiatric: She has a normal mood and affect  Her behavior is normal  Judgment and thought content normal      BMI Counseling: Body mass index is 34 8 kg/m²  Discussed the patient's BMI with her  The BMI is above average  No BMI follow-up plan is appropriate  Patient is in an urgent or emergent medical situation   foot pain

## 2019-02-14 ENCOUNTER — OFFICE VISIT (OUTPATIENT)
Dept: FAMILY MEDICINE CLINIC | Facility: CLINIC | Age: 59
End: 2019-02-14
Payer: MEDICARE

## 2019-02-14 VITALS
HEIGHT: 61 IN | HEART RATE: 88 BPM | TEMPERATURE: 98.6 F | BODY MASS INDEX: 34.78 KG/M2 | SYSTOLIC BLOOD PRESSURE: 140 MMHG | DIASTOLIC BLOOD PRESSURE: 90 MMHG | OXYGEN SATURATION: 98 % | WEIGHT: 184.2 LBS | RESPIRATION RATE: 16 BRPM

## 2019-02-14 DIAGNOSIS — K21.9 GASTROESOPHAGEAL REFLUX DISEASE, ESOPHAGITIS PRESENCE NOT SPECIFIED: ICD-10-CM

## 2019-02-14 DIAGNOSIS — Z11.59 ENCOUNTER FOR HEPATITIS C SCREENING TEST FOR LOW RISK PATIENT: ICD-10-CM

## 2019-02-14 DIAGNOSIS — E11.8 CONTROLLED DIABETES MELLITUS TYPE 2 WITH COMPLICATIONS, UNSPECIFIED WHETHER LONG TERM INSULIN USE (HCC): Primary | ICD-10-CM

## 2019-02-14 DIAGNOSIS — L30.9 ECZEMA, UNSPECIFIED TYPE: ICD-10-CM

## 2019-02-14 DIAGNOSIS — Z23 NEED FOR PNEUMOCOCCAL VACCINATION: ICD-10-CM

## 2019-02-14 DIAGNOSIS — I10 HYPERTENSION, BENIGN: ICD-10-CM

## 2019-02-14 DIAGNOSIS — E78.49 OTHER HYPERLIPIDEMIA: ICD-10-CM

## 2019-02-14 DIAGNOSIS — J30.9 ALLERGIC RHINITIS, UNSPECIFIED SEASONALITY, UNSPECIFIED TRIGGER: ICD-10-CM

## 2019-02-14 PROBLEM — M53.3 SI (SACROILIAC) JOINT DYSFUNCTION: Status: RESOLVED | Noted: 2018-06-06 | Resolved: 2019-02-14

## 2019-02-14 PROBLEM — M70.62 TROCHANTERIC BURSITIS OF LEFT HIP: Status: RESOLVED | Noted: 2018-03-16 | Resolved: 2019-02-14

## 2019-02-14 PROBLEM — M70.62 GREATER TROCHANTERIC BURSITIS OF LEFT HIP: Status: RESOLVED | Noted: 2017-08-09 | Resolved: 2019-02-14

## 2019-02-14 PROCEDURE — G0009 ADMIN PNEUMOCOCCAL VACCINE: HCPCS | Performed by: NURSE PRACTITIONER

## 2019-02-14 PROCEDURE — 99214 OFFICE O/P EST MOD 30 MIN: CPT | Performed by: NURSE PRACTITIONER

## 2019-02-14 PROCEDURE — 90732 PPSV23 VACC 2 YRS+ SUBQ/IM: CPT | Performed by: NURSE PRACTITIONER

## 2019-02-14 RX ORDER — TRIAMCINOLONE ACETONIDE 5 MG/G
OINTMENT TOPICAL 2 TIMES DAILY
Qty: 30 G | Refills: 1 | Status: SHIPPED | OUTPATIENT
Start: 2019-02-14 | End: 2020-01-15

## 2019-02-14 RX ORDER — LORATADINE 10 MG/1
10 TABLET ORAL DAILY
Qty: 90 TABLET | Refills: 0 | Status: SHIPPED | OUTPATIENT
Start: 2019-02-14 | End: 2020-07-14

## 2019-02-14 RX ORDER — PANTOPRAZOLE SODIUM 40 MG/1
40 TABLET, DELAYED RELEASE ORAL
Qty: 90 TABLET | Refills: 0
Start: 2019-02-14 | End: 2019-06-11 | Stop reason: SDUPTHER

## 2019-02-14 NOTE — PATIENT INSTRUCTIONS
Obesity   AMBULATORY CARE:   Obesity  is when your body mass index (BMI) is greater than 30  Your healthcare provider will use your height and weight to measure your BMI  The risks of obesity include  many health problems, such as injuries or physical disability  You may need tests to check for the following:  · Diabetes     · High blood pressure or high cholesterol     · Heart disease     · Gallbladder or liver disease     · Cancer of the colon, breast, prostate, liver, or kidney     · Sleep apnea     · Arthritis or gout  Seek care immediately if:   · You have a severe headache, confusion, or difficulty speaking  · You have weakness on one side of your body  · You have chest pain, sweating, or shortness of breath  Contact your healthcare provider if:   · You have symptoms of gallbladder or liver disease, such as pain in your upper abdomen  · You have knee or hip pain and discomfort while walking  · You have symptoms of diabetes, such as intense hunger and thirst, and frequent urination  · You have symptoms of sleep apnea, such as snoring or daytime sleepiness  · You have questions or concerns about your condition or care  Treatment for obesity  focuses on helping you lose weight to improve your health  Even a small decrease in BMI can reduce the risk for many health problems  Your healthcare provider will help you set a weight-loss goal   · Lifestyle changes  are the first step in treating obesity  These include making healthy food choices and getting regular physical activity  Your healthcare provider may suggest a weight-loss program that involves coaching, education, and therapy  · Medicine  may help you lose weight when it is used with a healthy diet and physical activity  · Surgery  can help you lose weight if you are very obese and have other health problems  There are several types of weight-loss surgery  Ask your healthcare provider for more information    Be successful losing weight:   · Set small, realistic goals  An example of a small goal is to walk for 20 minutes 5 days a week  Anther goal is to lose 5% of your body weight  · Tell friends, family members, and coworkers about your goals  and ask for their support  Ask a friend to lose weight with you, or join a weight-loss support group  · Identify foods or triggers that may cause you to overeat , and find ways to avoid them  Remove tempting high-calorie foods from your home and workplace  Place a bowl of fresh fruit on your kitchen counter  If stress causes you to eat, then find other ways to cope with stress  · Keep a diary to track what you eat and drink  Also write down how many minutes of physical activity you do each day  Weigh yourself once a week and record it in your diary  Eating changes: You will need to eat 500 to 1,000 fewer calories each day than you currently eat to lose 1 to 2 pounds a week  The following changes will help you cut calories:  · Eat smaller portions  Use small plates, no larger than 9 inches in diameter  Fill your plate half full of fruits and vegetables  Measure your food using measuring cups until you know what a serving size looks like  · Eat 3 meals and 1 or 2 snacks each day  Plan your meals in advance  Christy Valentin and eat at home most of the time  Eat slowly  · Eat fruits and vegetables at every meal   They are low in calories and high in fiber, which makes you feel full  Do not add butter, margarine, or cream sauce to vegetables  Use herbs to season steamed vegetables  · Eat less fat and fewer fried foods  Eat more baked or grilled chicken and fish  These protein sources are lower in calories and fat than red meat  Limit fast food  Dress your salads with olive oil and vinegar instead of bottled dressing  · Limit the amount of sugar you eat  Do not drink sugary beverages  Limit alcohol  Activity changes:  Physical activity is good for your body in many ways   It helps you burn calories and build strong muscles  It decreases stress and depression, and improves your mood  It can also help you sleep better  Talk to your healthcare provider before you begin an exercise program   · Exercise for at least 30 minutes 5 days a week  Start slowly  Set aside time each day for physical activity that you enjoy and that is convenient for you  It is best to do both weight training and an activity that increases your heart rate, such as walking, bicycling, or swimming  · Find ways to be more active  Do yard work and housecleaning  Walk up the stairs instead of using elevators  Spend your leisure time going to events that require walking, such as outdoor festivals or fairs  This extra physical activity can help you lose weight and keep it off  Follow up with your healthcare provider as directed: You may need to meet with a dietitian  Write down your questions so you remember to ask them during your visits  © 2017 [employer] Information is for End User's use only and may not be sold, redistributed or otherwise used for commercial purposes  All illustrations and images included in CareNotes® are the copyrighted property of [employer]  or Jorge Godoy  The above information is an  only  It is not intended as medical advice for individual conditions or treatments  Talk to your doctor, nurse or pharmacist before following any medical regimen to see if it is safe and effective for you  Weight Management   AMBULATORY CARE:   Why it is important to manage your weight:  Being overweight increases your risk of health conditions such as heart disease, high blood pressure, type 2 diabetes, and certain types of cancer  It can also increase your risk for osteoarthritis, sleep apnea, and other respiratory problems  Aim for a slow, steady weight loss  Even a small amount of weight loss can lower your risk of health problems    How to lose weight safely:  A safe and healthy way to lose weight is to eat fewer calories and get regular exercise  You can lose up about 1 pound a week by decreasing the number of calories you eat by 500 calories each day  You can decrease calories by eating smaller portion sizes or by cutting out high-calorie foods  Read labels to find out how many calories are in the foods you eat  You can also burn calories with exercise such as walking, swimming, or biking  You will be more likely to keep weight off if you make these changes part of your lifestyle  Healthy meal plan for weight management:  A healthy meal plan includes a variety of foods, contains fewer calories, and helps you stay healthy  A healthy meal plan includes the following:  · Eat whole-grain foods more often  A healthy meal plan should contain fiber  Fiber is the part of grains, fruits, and vegetables that is not broken down by your body  Whole-grain foods are healthy and provide extra fiber in your diet  Some examples of whole-grain foods are whole-wheat breads and pastas, oatmeal, brown rice, and bulgur  · Eat a variety of vegetables every day  Include dark, leafy greens such as spinach, kale, ellie greens, and mustard greens  Eat yellow and orange vegetables such as carrots, sweet potatoes, and winter squash  · Eat a variety of fruits every day  Choose fresh or canned fruit (canned in its own juice or light syrup) instead of juice  Fruit juice has very little or no fiber  · Eat low-fat dairy foods  Drink fat-free (skim) milk or 1% milk  Eat fat-free yogurt and low-fat cottage cheese  Try low-fat cheeses such as mozzarella and other reduced-fat cheeses  · Choose meat and other protein foods that are low in fat  Choose beans or other legumes such as split peas or lentils  Choose fish, skinless poultry (chicken or turkey), or lean cuts of red meat (beef or pork)  Before you cook meat or poultry, cut off any visible fat  · Use less fat and oil  Try baking foods instead of frying them  Add less fat, such as margarine, sour cream, regular salad dressing and mayonnaise to foods  Eat fewer high-fat foods  Some examples of high-fat foods include french fries, doughnuts, ice cream, and cakes  · Eat fewer sweets  Limit foods and drinks that are high in sugar  This includes candy, cookies, regular soda, and sweetened drinks  Ways to decrease calories:   · Eat smaller portions  ¨ Use a small plate with smaller servings  ¨ Do not eat second helpings  ¨ When you eat at a restaurant, ask for a box and place half of your meal in the box before you eat  ¨ Share an entrée with someone else  · Replace high-calorie snacks with healthy, low-calorie snacks  ¨ Choose fresh fruit, vegetables, fat-free rice cakes, or air-popped popcorn instead of potato chips, nuts, or chocolate  ¨ Choose water or calorie-free drinks instead of soda or sweetened drinks  · Eat regular meals  Skipping meals can lead to overeating later in the day  Eat a healthy snack in place of a meal if you do not have time to eat a regular meal      · Do not shop for groceries when you are hungry  You may be more likely to make unhealthy food choices  Take a grocery list of healthy foods and shop after you have eaten  Exercise:  Exercise at least 30 minutes per day on most days of the week  Some examples of exercise include walking, biking, dancing, and swimming  You can also fit in more physical activity by taking the stairs instead of the elevator or parking farther away from stores  Ask your healthcare provider about the best exercise plan for you  Other things to consider as you try to lose weight:   · Be aware of situations that may give you the urge to overeat, such as eating while watching television  Find ways to avoid these situations  For example, read a book, go for a walk, or do crafts      · Meet with a weight loss support group or friends who are also trying to lose weight  This may help you stay motivated to continue working on your weight loss goals  © 2017 [employer] Information is for End User's use only and may not be sold, redistributed or otherwise used for commercial purposes  All illustrations and images included in CareNotes® are the copyrighted property of A D [employer] M [employer]  or Jorge Godoy  The above information is an  only  It is not intended as medical advice for individual conditions or treatments  Talk to your doctor, nurse or pharmacist before following any medical regimen to see if it is safe and effective for you  Heart Healthy Diet   AMBULATORY CARE:   A heart healthy diet  is an eating plan low in total fat, unhealthy fats, and sodium (salt)  A heart healthy diet helps decrease your risk for heart disease and stroke  Limit the amount of fat you eat to 25% to 35% of your total daily calories  Limit sodium to less than 2,300 mg each day  Healthy fats:  Healthy fats can help improve cholesterol levels  The risk for heart disease is decreased when cholesterol levels are normal  Choose healthy fats, such as the following:  · Unsaturated fat  is found in foods such as soybean, canola, olive, corn, and safflower oils  It is also found in soft tub margarine that is made with liquid vegetable oil  · Omega-3 fat  is found in certain fish, such as salmon, tuna, and trout, and in walnuts and flaxseed  Unhealthy fats:  Unhealthy fats can cause unhealthy cholesterol levels in your blood and increase your risk of heart disease  Limit unhealthy fats, such as the following:  · Cholesterol  is found in animal foods, such as eggs and lobster, and in dairy products made from whole milk  Limit cholesterol to less than 300 milligrams (mg) each day  You may need to limit cholesterol to 200 mg each day if you have heart disease  · Saturated fat  is found in meats, such as harvey and hamburger   It is also found in chicken or turkey skin, whole milk, and butter  Limit saturated fat to less than 7% of your total daily calories  Limit saturated fat to less than 6% if you have heart disease or are at increased risk for it  · Trans fat  is found in packaged foods, such as potato chips and cookies  It is also in hard margarine, some fried foods, and shortening  Avoid trans fats as much as possible    Heart healthy foods and drinks to include:  Ask your dietitian or healthcare provider how many servings to have from each of the following food groups:  · Grains:      ¨ Whole-wheat breads, cereals, and pastas, and brown rice    ¨ Low-fat, low-sodium crackers and chips    · Vegetables:      ¨ Broccoli, green beans, green peas, and spinach    ¨ Collards, kale, and lima beans    ¨ Carrots, sweet potatoes, tomatoes, and peppers    ¨ Canned vegetables with no salt added    · Fruits:      ¨ Bananas, peaches, pears, and pineapple    ¨ Grapes, raisins, and dates    ¨ Oranges, tangerines, grapefruit, orange juice, and grapefruit juice    ¨ Apricots, mangoes, melons, and papaya    ¨ Raspberries and strawberries    ¨ Canned fruit with no added sugar    · Low-fat dairy products:      ¨ Nonfat (skim) milk, 1% milk, and low-fat almond, cashew, or soy milks fortified with calcium    ¨ Low-fat cheese, regular or frozen yogurt, and cottage cheese    · Meats and proteins , such as lean cuts of beef and pork (loin, leg, round), skinless chicken and turkey, legumes, soy products, egg whites, and nuts  Foods and drinks to limit or avoid:  Ask your dietitian or healthcare provider about these and other foods that are high in unhealthy fat, sodium, and sugar:  · Snack or packaged foods , such as frozen dinners, cookies, macaroni and cheese, and cereals with more than 300 mg of sodium per serving    · Canned or dry mixes  for cakes, soups, sauces, or gravies    · Vegetables with added sodium , such as instant potatoes, vegetables with added sauces, or regular canned vegetables    · Other foods high in sodium , such as ketchup, barbecue sauce, salad dressing, pickles, olives, soy sauce, and miso    · High-fat dairy foods  such as whole or 2% milk, cream cheese, or sour cream, and cheeses     · High-fat protein foods  such as high-fat cuts of beef (T-bone steaks, ribs), chicken or turkey with skin, and organ meats, such as liver    · Cured or smoked meats , such as hot dogs, harvey, and sausage    · Unhealthy fats and oils , such as butter, stick margarine, shortening, and cooking oils such as coconut or palm oil    · Food and drinks high in sugar , such as soft drinks (soda), sports drinks, sweetened tea, candy, cake, cookies, pies, and doughnuts  Other diet guidelines to follow:   · Eat more foods containing omega-3 fats  Eat fish high in omega-3 fats at least 2 times a week  · Limit alcohol  Too much alcohol can damage your heart and raise your blood pressure  Women should limit alcohol to 1 drink a day  Men should limit alcohol to 2 drinks a day  A drink of alcohol is 12 ounces of beer, 5 ounces of wine, or 1½ ounces of liquor  · Choose low-sodium foods  High-sodium foods can lead to high blood pressure  Add little or no salt to food you prepare  Use herbs and spices in place of salt  · Eat more fiber  to help lower cholesterol levels  Eat at least 5 servings of fruits and vegetables each day  Eat 3 ounces of whole-grain foods each day  Legumes (beans) are also a good source of fiber  Lifestyle guidelines:   · Do not smoke  Nicotine and other chemicals in cigarettes and cigars can cause lung and heart damage  Ask your healthcare provider for information if you currently smoke and need help to quit  E-cigarettes or smokeless tobacco still contain nicotine  Talk to your healthcare provider before you use these products  · Exercise regularly  to help you maintain a healthy weight and improve your blood pressure and cholesterol levels   Ask your healthcare provider about the best exercise plan for you  Do not start an exercise program without asking your healthcare provider  Follow up with your healthcare provider as directed:  Write down your questions so you remember to ask them during your visits  © 2017 [employer] Information is for End User's use only and may not be sold, redistributed or otherwise used for commercial purposes  All illustrations and images included in CareNotes® are the copyrighted property of A D A M [employer]  or Jorge Godoy  The above information is an  only  It is not intended as medical advice for individual conditions or treatments  Talk to your doctor, nurse or pharmacist before following any medical regimen to see if it is safe and effective for you

## 2019-02-18 ENCOUNTER — ANESTHESIA EVENT (OUTPATIENT)
Dept: PERIOP | Facility: AMBULARY SURGERY CENTER | Age: 59
End: 2019-02-18
Payer: MEDICARE

## 2019-02-21 ENCOUNTER — TELEPHONE (OUTPATIENT)
Dept: FAMILY MEDICINE CLINIC | Facility: CLINIC | Age: 59
End: 2019-02-21

## 2019-02-21 NOTE — PRE-PROCEDURE INSTRUCTIONS
Pre-Surgery Instructions:   Medication Instructions    Acetaminophen (TYLENOL ARTHRITIS PAIN PO) Instructed patient per Anesthesia Guidelines   albuterol (VENTOLIN HFA) 90 mcg/act inhaler Instructed patient per Anesthesia Guidelines   atorvastatin (LIPITOR) 20 mg tablet Instructed patient per Anesthesia Guidelines   glipiZIDE (GLUCOTROL) 5 mg tablet Instructed patient per Anesthesia Guidelines   LORazepam (ATIVAN) 0 5 mg tablet Instructed patient per Anesthesia Guidelines   losartan (COZAAR) 50 mg tablet Instructed patient per Anesthesia Guidelines   metFORMIN (GLUCOPHAGE) 500 mg tablet Instructed patient per Anesthesia Guidelines   oxyCODONE-acetaminophen (PERCOCET) 2 5-325 MG per tablet Instructed patient per Anesthesia Guidelines   pantoprazole (PROTONIX) 40 mg tablet Instructed patient per Anesthesia Guidelines   triamcinolone (KENALOG) 0 5 % ointment Instructed patient per Anesthesia Guidelines  Pre-op and bathing instructions given through interpretor  Patient advised to purchase hibiclens

## 2019-02-21 NOTE — TELEPHONE ENCOUNTER
Patient received a call from podiatrist Surgeon and asked her to have her A1c repeated before surgery on 03/01 and we have to write the order for it    The one in EPIc is dated for August   Can  You write one out

## 2019-02-21 NOTE — TELEPHONE ENCOUNTER
She had an AIC this month - level was 7 6  which is acceptable level for DM management- a repeat aic  will not be helpful as there will be no change in the number ( it is glycocated HGB - it takes 3-4 mos to change)  FBS from current labs are stable 102- 105  If the surgeon wants to speak directly to me - have him call me on Monday

## 2019-02-28 ENCOUNTER — ANESTHESIA (OUTPATIENT)
Dept: PERIOP | Facility: AMBULARY SURGERY CENTER | Age: 59
End: 2019-02-28
Payer: MEDICARE

## 2019-03-01 ENCOUNTER — APPOINTMENT (OUTPATIENT)
Dept: RADIOLOGY | Facility: HOSPITAL | Age: 59
End: 2019-03-01
Payer: MEDICARE

## 2019-03-01 ENCOUNTER — HOSPITAL ENCOUNTER (OUTPATIENT)
Facility: HOSPITAL | Age: 59
Setting detail: OUTPATIENT SURGERY
Discharge: HOME | End: 2019-03-01
Attending: PODIATRIST | Admitting: PODIATRIST
Payer: MEDICARE

## 2019-03-01 VITALS
TEMPERATURE: 97.5 F | DIASTOLIC BLOOD PRESSURE: 67 MMHG | HEART RATE: 81 BPM | RESPIRATION RATE: 16 BRPM | HEIGHT: 61 IN | OXYGEN SATURATION: 99 % | WEIGHT: 182 LBS | BODY MASS INDEX: 34.36 KG/M2 | SYSTOLIC BLOOD PRESSURE: 135 MMHG

## 2019-03-01 DIAGNOSIS — Z98.890 S/P FOOT SURGERY, RIGHT: Primary | ICD-10-CM

## 2019-03-01 DIAGNOSIS — M21.611 BUNION OF GREAT TOE OF RIGHT FOOT: ICD-10-CM

## 2019-03-01 LAB
GLUCOSE SERPL-MCNC: 124 MG/DL (ref 65–140)
GLUCOSE SERPL-MCNC: 145 MG/DL (ref 65–140)

## 2019-03-01 PROCEDURE — C1713 ANCHOR/SCREW BN/BN,TIS/BN: HCPCS | Performed by: PODIATRIST

## 2019-03-01 PROCEDURE — 73630 X-RAY EXAM OF FOOT: CPT

## 2019-03-01 PROCEDURE — 82948 REAGENT STRIP/BLOOD GLUCOSE: CPT

## 2019-03-01 DEVICE — IMPLANTABLE DEVICE: Type: IMPLANTABLE DEVICE | Site: TOE | Status: FUNCTIONAL

## 2019-03-01 DEVICE — SCREW CORT 2.7 X 16MM LOPRRL: Type: IMPLANTABLE DEVICE | Site: TOE | Status: FUNCTIONAL

## 2019-03-01 RX ORDER — ONDANSETRON 2 MG/ML
4 INJECTION INTRAMUSCULAR; INTRAVENOUS ONCE AS NEEDED
Status: DISCONTINUED | OUTPATIENT
Start: 2019-03-01 | End: 2019-03-01 | Stop reason: HOSPADM

## 2019-03-01 RX ORDER — PROPOFOL 10 MG/ML
INJECTION, EMULSION INTRAVENOUS CONTINUOUS PRN
Status: DISCONTINUED | OUTPATIENT
Start: 2019-03-01 | End: 2019-03-01 | Stop reason: SURG

## 2019-03-01 RX ORDER — BUPIVACAINE HYDROCHLORIDE 5 MG/ML
INJECTION, SOLUTION PERINEURAL AS NEEDED
Status: DISCONTINUED | OUTPATIENT
Start: 2019-03-01 | End: 2019-03-01 | Stop reason: HOSPADM

## 2019-03-01 RX ORDER — MIDAZOLAM HYDROCHLORIDE 1 MG/ML
INJECTION INTRAMUSCULAR; INTRAVENOUS AS NEEDED
Status: DISCONTINUED | OUTPATIENT
Start: 2019-03-01 | End: 2019-03-01 | Stop reason: SURG

## 2019-03-01 RX ORDER — OXYCODONE HYDROCHLORIDE AND ACETAMINOPHEN 5; 325 MG/1; MG/1
1 TABLET ORAL EVERY 4 HOURS PRN
Qty: 40 TABLET | Refills: 0 | Status: SHIPPED | OUTPATIENT
Start: 2019-03-01 | End: 2019-03-01 | Stop reason: HOSPADM

## 2019-03-01 RX ORDER — FENTANYL CITRATE/PF 50 MCG/ML
25 SYRINGE (ML) INJECTION
Status: DISCONTINUED | OUTPATIENT
Start: 2019-03-01 | End: 2019-03-01 | Stop reason: HOSPADM

## 2019-03-01 RX ORDER — SODIUM CHLORIDE 9 MG/ML
125 INJECTION, SOLUTION INTRAVENOUS CONTINUOUS
Status: DISCONTINUED | OUTPATIENT
Start: 2019-03-01 | End: 2019-03-01 | Stop reason: HOSPADM

## 2019-03-01 RX ORDER — MAGNESIUM HYDROXIDE 1200 MG/15ML
LIQUID ORAL AS NEEDED
Status: DISCONTINUED | OUTPATIENT
Start: 2019-03-01 | End: 2019-03-01 | Stop reason: HOSPADM

## 2019-03-01 RX ORDER — FENTANYL CITRATE 50 UG/ML
INJECTION, SOLUTION INTRAMUSCULAR; INTRAVENOUS AS NEEDED
Status: DISCONTINUED | OUTPATIENT
Start: 2019-03-01 | End: 2019-03-01 | Stop reason: SURG

## 2019-03-01 RX ORDER — PROPOFOL 10 MG/ML
INJECTION, EMULSION INTRAVENOUS AS NEEDED
Status: DISCONTINUED | OUTPATIENT
Start: 2019-03-01 | End: 2019-03-01 | Stop reason: SURG

## 2019-03-01 RX ORDER — ONDANSETRON 2 MG/ML
INJECTION INTRAMUSCULAR; INTRAVENOUS AS NEEDED
Status: DISCONTINUED | OUTPATIENT
Start: 2019-03-01 | End: 2019-03-01 | Stop reason: SURG

## 2019-03-01 RX ORDER — OXYCODONE HYDROCHLORIDE AND ACETAMINOPHEN 5; 325 MG/1; MG/1
1 TABLET ORAL EVERY 4 HOURS PRN
Qty: 25 TABLET | Refills: 0 | Status: SHIPPED | OUTPATIENT
Start: 2019-03-01 | End: 2019-03-11

## 2019-03-01 RX ADMIN — SODIUM CHLORIDE 125 ML/HR: 0.9 INJECTION, SOLUTION INTRAVENOUS at 06:26

## 2019-03-01 RX ADMIN — PROPOFOL 100 MCG/KG/MIN: 10 INJECTION, EMULSION INTRAVENOUS at 07:29

## 2019-03-01 RX ADMIN — Medication 2000 MG: at 07:24

## 2019-03-01 RX ADMIN — ONDANSETRON 4 MG: 2 INJECTION INTRAMUSCULAR; INTRAVENOUS at 08:35

## 2019-03-01 RX ADMIN — MIDAZOLAM HYDROCHLORIDE 2 MG: 1 INJECTION, SOLUTION INTRAMUSCULAR; INTRAVENOUS at 07:24

## 2019-03-01 RX ADMIN — PROPOFOL 100 MCG/KG/MIN: 10 INJECTION, EMULSION INTRAVENOUS at 07:30

## 2019-03-01 RX ADMIN — SODIUM CHLORIDE: 0.9 INJECTION, SOLUTION INTRAVENOUS at 08:23

## 2019-03-01 RX ADMIN — FENTANYL CITRATE 100 MCG: 50 INJECTION, SOLUTION INTRAMUSCULAR; INTRAVENOUS at 07:28

## 2019-03-01 RX ADMIN — PROPOFOL 70 MG: 10 INJECTION, EMULSION INTRAVENOUS at 07:28

## 2019-03-01 NOTE — DISCHARGE INSTRUCTIONS
[facility]  Dr Pauline Ritchie  Post-Operative Instructions    1  Take your prescribed medication as directed  2  Upon arrival at home, lie down and elevate your surgical foot on 2 pillows  3  Remain quiet, off your feet as much as possible, for the first 24-48 hours  This is when your feet first swell and may become painful  After 48 hours you may begin limited walking following these restrictions:   Weightbear as tolerated to surgical foot with surgical shoe   4  Drink large quantities of water and citrus fruit juice  Consume no alcohol  Continue a well-balanced diet  5  Report any unusual discomfort or fever to this office  6  A limited amount of discomfort and swelling is to be expected  In some cases the skin may take on a bruised appearance  The surgical solution that was applied to your foot prior to the operation is dark in color and the operation site may appear to be oozing when it actually is not  7  A slight amount of blood is to be expected, and is no cause for alarm  Do not remove the dressings  If there is active bleeding and if the bleeding persists, add additional gauze to the bandage, apply direct pressure, elevate your feet and call this office  8  Do not get the dressings wet  As regular bathing may be inconvenient, sponge baths are recommended  9  When anesthesia wears off and if any discomfort should be present, apply an ice pack directly over the operated area for 15 minute intervals for several hours or until the pain leaves  (USE IN EXCESS OF 15 MINUTES COULD CAUSE FROSTBITE)  Do not use hot water bags or electric pads  A convenient icepack can be made by placing ice cubes in a plastic bag and covering this with a towel  10  If necessary, take a mild laxative before retiring  11  Wear your special open shoes anytime you put weight on your foot, even if it is just to walk to the bathroom and back   It will probably be 2 or 3 weeks before you will be permitted to try regular shoes  12  Having performed the operation, we are interested in a prompt recovery  Please cooperate by following the above instructions  13  Please call to confirm your post-op appointment or call with any other questions

## 2019-03-01 NOTE — ANESTHESIA PREPROCEDURE EVALUATION
Review of Systems/Medical History  Patient summary reviewed  Chart reviewed  No history of anesthetic complications     Cardiovascular  Hyperlipidemia, Hypertension controlled,    Pulmonary  Asthma , well controlled/ stable Last rescue: < 1 month ago ,        GI/Hepatic    GERD well controlled,        Negative  ROS        Endo/Other  Diabetes well controlled type 2 Oral agent,      GYN  Negative gynecology ROS          Hematology  Negative hematology ROS      Musculoskeletal    Arthritis     Neurology  Negative neurology ROS      Psychology   Anxiety,              Physical Exam    Airway    Mallampati score: II  TM Distance: >3 FB  Neck ROM: full     Dental   No notable dental hx     Cardiovascular  Rhythm: regular, Rate: normal, Cardiovascular exam normal    Pulmonary  Pulmonary exam normal Breath sounds clear to auscultation,     Other Findings        Anesthesia Plan  ASA Score- 2     Anesthesia Type- IV sedation with anesthesia and general with ASA Monitors  Additional Monitors:   Airway Plan:         Plan Factors-Patient not instructed to abstain from smoking on day of procedure  Patient did not smoke on day of surgery  Induction- intravenous  Postoperative Plan- Plan for postoperative opioid use  Informed Consent- Anesthetic plan and risks discussed with patient and spouse

## 2019-03-01 NOTE — ANESTHESIA POSTPROCEDURE EVALUATION
Post-Op Assessment Note    CV Status:  Stable  Pain Score: 2    Pain management: adequate     Mental Status:  Alert and awake   Hydration Status:  Euvolemic and stable   PONV Controlled:  Controlled   Airway Patency:  Patent   Post Op Vitals Reviewed: Yes      Staff: Anesthesiologist           BP      Temp      Pulse     Resp      SpO2

## 2019-03-01 NOTE — OP NOTE
OPERATIVE REPORT  PATIENT NAME: Douglas Almazan    :  1960  MRN: [id number]  Pt Location: [facility] OR ROOM 02    SURGERY DATE: 3/1/2019    Surgeon(s) and Role:     * [place] DPM - Primary     * Ricardo Leyden, DPM - Assisting    Preop Diagnosis:  Bunion of great toe of right foot [M21 611]    Post-Op Diagnosis Codes:     * Bunion of great toe of right foot [M21 611]    Procedure(s) (LRB):  DOUBLE OSTEOTOMY, ZINA/LOW BUNIONECTOMY (Right)    Specimen(s):  * No specimens in log *    Estimated Blood Loss:   Minimal    Drains:  * No LDAs found *    Anesthesia Type:   Choice, 20cc of 1:1 mix of 1% lidocaine and 0 5% marcaine plain     Hemostasis:   PAT at 250mmHg for 60 minutes     Injectables:   10cc of 0 5% marcaine     Materials:   Arthrex screws 2 7 x16mm x2   Arthrex staple 11x10   3-0 vicryl, 4-0 vicryl, 4-0 nylon     Operative Indications:  Bunion of great toe of right foot [M21 611]    Operative Findings:  As expected with diagnosis  Complications:   None    Procedure and Technique:  Under mild sedation the patient was brought into the operating room and placed on the operating room table in the supine positition  A PNAT was then placed around the patients  right ankle with ample webril padding  A time out was performed to confirm the correct patient, procedure and site with all parties in agreement  Following IV sedation a mattson block was performed consisting of 20 ml of 1% Lidocaine and 0 5% marcaine in a 1:1 mixture  The foot was then scrubbed, prepped and draped in the usual aseptic manner  An esmarch bandage was utilized to exsangunate the patients foot and the pneumatic ankle tourniquet was then inflated  The esmarch bandage was removed and the foot was placed on the Operating room table  Attention was then directed to the dorsal aspect of the first metatarsal where an approximately 8 cm linear incision was made   The incision was deepened through the subcutaneous tissues using sharp and blunt dissection  Care was taken to identify and retract all vital neural and vascular structures  All bleeders were cauterized and ligated as necessary  A capsulotomy was performed over the dorsal aspect of the MPJ  The periosteal and capsular structures were then carefully dissected free of their osseous attachments and reflected medially and laterally, thus exposing the head of the first metatarsal at the operative site  Attention was then directed to the 1st interspace via the original skin incision where the dissection was continued deep using sharp dissection down to the level of the fibular sesamoid which was free from its soft tissue attachments proximally, laterally and distally  The conjoined tendon of the adductor halluces was then identified and transected at its attachment  At this time the lateral contraction presents on the hallux was noted to be reduced and the sesamoid apparatus was noted to float into a more corrected medial position  Attention was then directed to the first met head where the medial prominence was resected by the sagittal bone saw  A k-wire was used as a guidewire at the medial aspect of the 1st met head  A through and through V type osteotomy was made with longer dorsal arm  This cut was created proximal to the metataphyseal region of the bone utilizing a sagittal bone saw and the apices of this osteotomy pointing proximal plantarly and proximal dorsally  Upon completion of this osteotomy, the capital fragment was distracted and shifted laterally into a more corrected position and impacted onto the shaft of the first met  K wires were used as temp fixation across the osteotomy site  With proper AO technique arthrex screws (see implant section for detail) served as fixation across the osteotomy site  Attention was directed to the remaining medial bone shelf proximal to the osteotomy site which was resected using a sagittal saw and passed from operative field  There is still a noted deformity in the hallux so at this time attention was directed to the hallux  The skin incision was extended distally onto the proximal phalanx  Incision was carried down through the subcutaneous tissue taking care to retract all vital neurovascular structures  Upon reaching the capsule a linear capsulotomy was performed and the capsule was reflected away from the proximal phalanx  At this time a guidewire was inserted into the lateral cortex of the proximal phalanx and an Akin osteotomy was performed  The lateral hinge remained intact and utilizing proper technique an Arthrex staple (see implants for detail) was placed across the osteotomy site  Good compression and stability noted  The wound was then flushed with copious amounts of sterile saline  The periosteal and capsular structures were reapproximated using 3-0 Vicryl  The subQ tissues were reapproximated using 4-0 Vicryl and the skin was reapproximated using 4-0 Nylon in a horizontal mattress suture technique  Post operative injection was performed with 10cc of 0 5% marcaine plain  The incision was then dressed with adaptic, 4x4 gauze, delfino and ACE  The pneumatic ankle tourniquet was then deflated and a prompt hyperemic response was noted to all digits of the foot  The patient tolerated the procedure and anesthesia well and was transferred to the PACU with vital signs stable       Patient Disposition:  PACU  and hemodynamically stable    SIGNATURE: Alexandra Dove DPM  DATE: March 1, 2019  TIME: 9:01 AM

## 2019-03-07 DIAGNOSIS — E11.8 CONTROLLED DIABETES MELLITUS TYPE 2 WITH COMPLICATIONS (HCC): ICD-10-CM

## 2019-03-07 RX ORDER — LOSARTAN POTASSIUM 50 MG/1
50 TABLET ORAL DAILY
Qty: 90 TABLET | Refills: 0 | Status: SHIPPED | OUTPATIENT
Start: 2019-03-07 | End: 2019-05-29 | Stop reason: SDUPTHER

## 2019-03-25 DIAGNOSIS — E11.9 DIABETES MELLITUS TYPE 2, CONTROLLED (HCC): ICD-10-CM

## 2019-03-25 DIAGNOSIS — E78.5 HYPERLIPIDEMIA: ICD-10-CM

## 2019-03-27 RX ORDER — ATORVASTATIN CALCIUM 20 MG/1
20 TABLET, FILM COATED ORAL
Qty: 90 TABLET | Refills: 1 | Status: SHIPPED | OUTPATIENT
Start: 2019-03-27 | End: 2019-09-09 | Stop reason: SDUPTHER

## 2019-05-02 ENCOUNTER — OFFICE VISIT (OUTPATIENT)
Dept: OBGYN CLINIC | Facility: OTHER | Age: 59
End: 2019-05-02
Payer: MEDICARE

## 2019-05-02 VITALS — DIASTOLIC BLOOD PRESSURE: 83 MMHG | SYSTOLIC BLOOD PRESSURE: 128 MMHG | HEART RATE: 87 BPM

## 2019-05-02 DIAGNOSIS — M16.12 PRIMARY OSTEOARTHRITIS OF LEFT HIP: Primary | ICD-10-CM

## 2019-05-02 DIAGNOSIS — M54.42 CHRONIC LEFT-SIDED LOW BACK PAIN WITH LEFT-SIDED SCIATICA: ICD-10-CM

## 2019-05-02 DIAGNOSIS — G89.29 CHRONIC LEFT-SIDED LOW BACK PAIN WITH LEFT-SIDED SCIATICA: ICD-10-CM

## 2019-05-02 PROCEDURE — 99213 OFFICE O/P EST LOW 20 MIN: CPT | Performed by: ORTHOPAEDIC SURGERY

## 2019-05-10 ENCOUNTER — OFFICE VISIT (OUTPATIENT)
Dept: OBGYN CLINIC | Facility: OTHER | Age: 59
End: 2019-05-10
Payer: MEDICARE

## 2019-05-10 VITALS — HEART RATE: 90 BPM | SYSTOLIC BLOOD PRESSURE: 122 MMHG | DIASTOLIC BLOOD PRESSURE: 82 MMHG

## 2019-05-10 DIAGNOSIS — M16.12 PRIMARY OSTEOARTHRITIS OF LEFT HIP: Primary | ICD-10-CM

## 2019-05-10 PROCEDURE — 20611 DRAIN/INJ JOINT/BURSA W/US: CPT | Performed by: ORTHOPAEDIC SURGERY

## 2019-05-10 RX ORDER — METHYLPREDNISOLONE ACETATE 40 MG/ML
2 INJECTION, SUSPENSION INTRA-ARTICULAR; INTRALESIONAL; INTRAMUSCULAR; SOFT TISSUE
Status: COMPLETED | OUTPATIENT
Start: 2019-05-10 | End: 2019-05-10

## 2019-05-10 RX ORDER — LIDOCAINE HYDROCHLORIDE 10 MG/ML
10 INJECTION, SOLUTION EPIDURAL; INFILTRATION; INTRACAUDAL; PERINEURAL
Status: COMPLETED | OUTPATIENT
Start: 2019-05-10 | End: 2019-05-10

## 2019-05-10 RX ADMIN — METHYLPREDNISOLONE ACETATE 2 ML: 40 INJECTION, SUSPENSION INTRA-ARTICULAR; INTRALESIONAL; INTRAMUSCULAR; SOFT TISSUE at 12:27

## 2019-05-10 RX ADMIN — LIDOCAINE HYDROCHLORIDE 10 ML: 10 INJECTION, SOLUTION EPIDURAL; INFILTRATION; INTRACAUDAL; PERINEURAL at 12:27

## 2019-05-29 DIAGNOSIS — E11.8 CONTROLLED DIABETES MELLITUS TYPE 2 WITH COMPLICATIONS (HCC): ICD-10-CM

## 2019-05-29 RX ORDER — LOSARTAN POTASSIUM 50 MG/1
TABLET ORAL
Qty: 30 TABLET | Refills: 0 | Status: SHIPPED | OUTPATIENT
Start: 2019-05-29 | End: 2019-09-09 | Stop reason: SDUPTHER

## 2019-06-05 ENCOUNTER — OFFICE VISIT (OUTPATIENT)
Dept: PODIATRY | Facility: CLINIC | Age: 59
End: 2019-06-05
Payer: MEDICARE

## 2019-06-05 VITALS
DIASTOLIC BLOOD PRESSURE: 80 MMHG | SYSTOLIC BLOOD PRESSURE: 125 MMHG | HEIGHT: 61 IN | WEIGHT: 180 LBS | BODY MASS INDEX: 33.99 KG/M2

## 2019-06-05 DIAGNOSIS — M20.11 HALLUX VALGUS OF RIGHT FOOT: Primary | ICD-10-CM

## 2019-06-05 PROCEDURE — 99212 OFFICE O/P EST SF 10 MIN: CPT | Performed by: PODIATRIST

## 2019-06-11 ENCOUNTER — ANNUAL EXAM (OUTPATIENT)
Dept: FAMILY MEDICINE CLINIC | Facility: CLINIC | Age: 59
End: 2019-06-11
Payer: MEDICARE

## 2019-06-11 ENCOUNTER — TELEPHONE (OUTPATIENT)
Dept: FAMILY MEDICINE CLINIC | Facility: CLINIC | Age: 59
End: 2019-06-11

## 2019-06-11 VITALS
RESPIRATION RATE: 17 BRPM | HEIGHT: 61 IN | BODY MASS INDEX: 33.04 KG/M2 | DIASTOLIC BLOOD PRESSURE: 64 MMHG | SYSTOLIC BLOOD PRESSURE: 110 MMHG | OXYGEN SATURATION: 99 % | WEIGHT: 175 LBS | HEART RATE: 89 BPM | TEMPERATURE: 98.6 F

## 2019-06-11 DIAGNOSIS — K21.9 GASTROESOPHAGEAL REFLUX DISEASE, ESOPHAGITIS PRESENCE NOT SPECIFIED: ICD-10-CM

## 2019-06-11 DIAGNOSIS — F41.9 ANXIETY: ICD-10-CM

## 2019-06-11 DIAGNOSIS — G89.29 CHRONIC LEFT-SIDED LOW BACK PAIN WITH LEFT-SIDED SCIATICA: ICD-10-CM

## 2019-06-11 DIAGNOSIS — M54.42 CHRONIC LEFT-SIDED LOW BACK PAIN WITH LEFT-SIDED SCIATICA: ICD-10-CM

## 2019-06-11 DIAGNOSIS — Z01.419 VISIT FOR GYNECOLOGIC EXAMINATION: Primary | ICD-10-CM

## 2019-06-11 PROCEDURE — 87624 HPV HI-RISK TYP POOLED RSLT: CPT | Performed by: NURSE PRACTITIONER

## 2019-06-11 PROCEDURE — 88175 CYTOPATH C/V AUTO FLUID REDO: CPT | Performed by: NURSE PRACTITIONER

## 2019-06-11 PROCEDURE — G0402 INITIAL PREVENTIVE EXAM: HCPCS | Performed by: NURSE PRACTITIONER

## 2019-06-11 RX ORDER — PANTOPRAZOLE SODIUM 40 MG/1
40 TABLET, DELAYED RELEASE ORAL
Qty: 90 TABLET | Refills: 0 | Status: SHIPPED | OUTPATIENT
Start: 2019-06-11 | End: 2019-09-09

## 2019-06-11 RX ORDER — LORAZEPAM 0.5 MG/1
TABLET ORAL
Qty: 5 TABLET | Refills: 0 | Status: SHIPPED | OUTPATIENT
Start: 2019-06-11 | End: 2019-08-28

## 2019-06-13 LAB
HPV HR 12 DNA CVX QL NAA+PROBE: NEGATIVE
HPV16 DNA CVX QL NAA+PROBE: NEGATIVE
HPV18 DNA CVX QL NAA+PROBE: NEGATIVE

## 2019-06-18 LAB
LAB AP GYN PRIMARY INTERPRETATION: NORMAL
Lab: NORMAL

## 2019-06-21 ENCOUNTER — TELEPHONE (OUTPATIENT)
Dept: OBGYN CLINIC | Facility: HOSPITAL | Age: 59
End: 2019-06-21

## 2019-07-10 ENCOUNTER — OFFICE VISIT (OUTPATIENT)
Dept: OBGYN CLINIC | Facility: HOSPITAL | Age: 59
End: 2019-07-10
Payer: MEDICARE

## 2019-07-10 VITALS
SYSTOLIC BLOOD PRESSURE: 136 MMHG | BODY MASS INDEX: 32.76 KG/M2 | HEART RATE: 83 BPM | DIASTOLIC BLOOD PRESSURE: 86 MMHG | HEIGHT: 62 IN | WEIGHT: 178 LBS

## 2019-07-10 DIAGNOSIS — M16.12 PRIMARY OSTEOARTHRITIS OF LEFT HIP: ICD-10-CM

## 2019-07-10 DIAGNOSIS — G89.29 CHRONIC LEFT-SIDED LOW BACK PAIN WITH LEFT-SIDED SCIATICA: Primary | ICD-10-CM

## 2019-07-10 DIAGNOSIS — M54.42 CHRONIC LEFT-SIDED LOW BACK PAIN WITH LEFT-SIDED SCIATICA: Primary | ICD-10-CM

## 2019-07-10 PROCEDURE — 99214 OFFICE O/P EST MOD 30 MIN: CPT | Performed by: ORTHOPAEDIC SURGERY

## 2019-07-10 RX ORDER — GABAPENTIN 300 MG/1
300 CAPSULE ORAL
Qty: 30 CAPSULE | Refills: 1 | Status: SHIPPED | OUTPATIENT
Start: 2019-07-10 | End: 2020-07-14

## 2019-07-10 NOTE — PROGRESS NOTES
Assessment:       1  Chronic left-sided low back pain with left-sided sciatica    2  Primary osteoarthritis of left hip          Plan:        I explained my current clinical findings and reviewed radiological findings with Ayala Casas today  She has not had any significant improvement of her left lower extremity/hip/back pain with intra-articular cortisone injection  I do suspect that her current symptoms are multifactorial and would suggest lumbar epidural injection for further diagnostic/therapeutic management  In this regard, I will make a referral to pain management  In the interim, I have prescribed her a nighttime dose of gabapentin after checking PMED which was found to be appropriate  Subjective:     Patient ID: Rita Figueroa is a 63 y o  female  Chief Complaint:  Left hip pain/low back pain    HPI    Ayala Casas is here today for a follow-up of her left hip/low back pain  She has a history of chronic pain disorder with a long-term history of left hip/low back pain  Most recently, she received a diagnostic/therapeutic left hip intra-articular ultrasound-guided cortisone injection on 5/10/2019  However, this did not provide her any significant pain relief  She continues to experience pain of her left lower back that radiates to her left hip both anteriorly as well as to the lateral and posterior lateral aspect  She denies any new onset or worsening weakness but has a sense of generalized weakness of the left lower extremity and attributes this secondary to her pain  There is no history of any bowel or bladder control problems  She has had a lumbar spine MRI done in December 2018 which revealed some lumbar spondylitic changes of L3-L4 and L5-S1 level and image review raises suspicion for a left-sided L5-S1 foraminal stenosis  Additionally, her left hip radiograph in the past is also revealed some moderate degenerative changes        Social History     Occupational History     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 Tobacco Use    Smoking status: Never Smoker    Smokeless tobacco: Never Used   Substance and Sexual Activity    Alcohol use: Never     Frequency: Never    Drug use: Never    Sexual activity: Not Currently      Review of Systems   Constitutional: Negative  HENT: Negative  Eyes: Negative  Respiratory: Negative  Cardiovascular: Negative  Gastrointestinal: Negative  Endocrine: Negative  Genitourinary: Negative  Skin: Negative  Allergic/Immunologic: Negative  Neurological: Negative  Hematological: Negative  Psychiatric/Behavioral: Negative  Objective:     Ortho ExamPhysical Exam   Constitutional: She is oriented to person, place, and time  She appears well-developed and well-nourished  HENT:   Head: Normocephalic and atraumatic  Eyes: Pupils are equal, round, and reactive to light  Conjunctivae are normal    Cardiovascular: Normal rate and regular rhythm  Pulmonary/Chest: Effort normal  No respiratory distress  Neurological: She is alert and oriented to person, place, and time  No cranial nerve deficit  Skin: Skin is warm and dry  No erythema  Psychiatric: She has a normal mood and affect  Her behavior is normal  Judgment and thought content normal    Nursing note and vitals reviewed  Lumbar spine exam:  Tender to palpation over the L5-S1 level as well as the left L5-S1 paraspinal level  SLR positive on the left along with a positive tripod sign  Normal sensation to light touch bilaterally in all dermatomal distribution of lower extremities  Grade 5/5 strength of bilateral lower extremity all myotomes except grade 4/5 left hip flexion strength  Normal bilateral lower extremity deep tendon reflexes  Left hip exam:  This some diffuse anterior and lateral joint tenderness  Patient does have discomfort with left hip flexion, external and internal rotation as well  I have personally reviewed pertinent films in PACS

## 2019-08-01 ENCOUNTER — TELEPHONE (OUTPATIENT)
Dept: PODIATRY | Facility: CLINIC | Age: 59
End: 2019-08-01

## 2019-08-28 ENCOUNTER — HOSPITAL ENCOUNTER (OUTPATIENT)
Dept: RADIOLOGY | Facility: HOSPITAL | Age: 59
Discharge: HOME | End: 2019-08-28
Attending: ANESTHESIOLOGY
Payer: COMMERCIAL

## 2019-08-28 ENCOUNTER — CONSULT (OUTPATIENT)
Dept: PAIN MEDICINE | Facility: CLINIC | Age: 59
End: 2019-08-28
Payer: COMMERCIAL

## 2019-08-28 VITALS
TEMPERATURE: 98.4 F | BODY MASS INDEX: 32.56 KG/M2 | WEIGHT: 178 LBS | DIASTOLIC BLOOD PRESSURE: 85 MMHG | SYSTOLIC BLOOD PRESSURE: 168 MMHG | HEART RATE: 76 BPM

## 2019-08-28 DIAGNOSIS — M46.1 SACROILIITIS (HCC): ICD-10-CM

## 2019-08-28 DIAGNOSIS — Z88.6 HISTORY OF ALLERGY TO NSAID: ICD-10-CM

## 2019-08-28 DIAGNOSIS — M51.17 INTERVERTEBRAL DISC DISORDER WITH RADICULOPATHY OF LUMBOSACRAL REGION: ICD-10-CM

## 2019-08-28 DIAGNOSIS — M16.12 PRIMARY OSTEOARTHRITIS OF LEFT HIP: Primary | ICD-10-CM

## 2019-08-28 DIAGNOSIS — M16.12 PRIMARY OSTEOARTHRITIS OF LEFT HIP: ICD-10-CM

## 2019-08-28 PROCEDURE — 99204 OFFICE O/P NEW MOD 45 MIN: CPT | Performed by: ANESTHESIOLOGY

## 2019-08-28 PROCEDURE — 73502 X-RAY EXAM HIP UNI 2-3 VIEWS: CPT

## 2019-08-28 NOTE — PROGRESS NOTES
Assessment  1  Primary osteoarthritis of left hip    2  Sacroiliitis ([address] )    3  Intervertebral disc disorder with radiculopathy of lumbosacral region    4  History of allergy to NSAID        Plan  The patient's symptoms, history/physical are consistent with pain that is multifactorial in origin but predominantly the result of her underlying left hip osteoarthritis and to a lesser extent her L5-S1 disc protrusion  At this time, I will order updated x-rays of the left hip and pelvis to evaluate the progression of the arthritis  I also discussed repeating the left hip intra-articular steroid injection to be done under fluoroscopic guidance which she was amenable to  She was apprised of the most common risks and would like to proceed  She will be scheduled for an upcoming Tuesday or Thursday under fluoroscopic guidance  In addition she reports having an NSAID allergy so is unable to take NSAIDs but she has only had an allergic reaction to aspirin  I will refer her to Dr Desai [place] the allergist for definitive NSAID testing  Complete risks and benefits including bleeding, infection, tissue reaction, nerve injury and allergic reaction were discussed  The approach was demonstrated using models and literature was provided  Verbal and written consent was obtained  My impressions and treatment recommendations were discussed in detail with the patient who verbalized understanding and had no further questions  Discharge instructions were provided  I personally saw and examined the patient and I agree with the above discussed plan of care  Orders Placed This Encounter   Procedures    XR hip/pelv 2-3 vws left if performed     Standing Status:   Future     Standing Expiration Date:   8/28/2023     Scheduling Instructions:      Bring along any outside films relating to this procedure  Order Specific Question:   Is the patient pregnant?      Answer:   No    FL spine and pain procedure     Standing Status: Future     Standing Expiration Date:   8/28/2023     Order Specific Question:   Reason for Exam:     Answer:   Left Hip Intra-articular Steroid Injection     Order Specific Question:   Is the patient pregnant? Answer:   No     Order Specific Question:   Anticoagulant hold needed? Answer:   No    Ambulatory referral to Allergy     Standing Status:   Future     Standing Expiration Date:   2/28/2020     Referral Priority:   Routine     Referral Type: Allergy, Asthma, and Immunology     Referral Reason:   Specialty Services Required     Referred to Provider:   Bart King MD     Requested Specialty:   Allergy     Number of Visits Requested:   1     Expiration Date:   8/28/2020     No orders of the defined types were placed in this encounter  History of Present Illness    Feng Raymond is a 63 y o  female who presents for consultation in regards to left-sided hip and buttock and back pain  Symptoms have been present for 5 years without any precipitating injury or trauma  Pain is moderate to severe rated 10/10 on numeric rating scale and felt constantly  Symptoms are described to be cramping, shooting, pressure-like, throbbing, sharp, stabbing in the left hip with weakness of the left leg  Symptoms are aggravated with standing, bending, sitting, walking, exercise and decreased with relaxation  There is no change with coughing, sneezing or bowel movements  Treatment history has included physical therapy which has provided moderate relief  She takes Tylenol as needed which provides minimal relief  She also uses icy Hot which provides mild relief  She had a left hip injection which provided no relief  She saw Dr Priscilla Baer for consultation who is referring her here for further treatment including epidural steroid injection      I have personally reviewed and/or updated the patient's past medical history, past surgical history, family history, social history, current medications, allergies, and vital signs today  Review of Systems   Constitutional: Negative for fever and unexpected weight change  HENT: Negative for trouble swallowing  Eyes: Negative for visual disturbance  Respiratory: Negative for shortness of breath and wheezing  Cardiovascular: Negative for chest pain and palpitations  Gastrointestinal: Negative for constipation, diarrhea, nausea and vomiting  Endocrine: Negative for cold intolerance, heat intolerance and polydipsia  Genitourinary: Positive for frequency  Negative for difficulty urinating  Musculoskeletal: Positive for joint swelling  Negative for arthralgias, gait problem and myalgias  Skin: Positive for rash  Neurological: Negative for dizziness, seizures, syncope, weakness and headaches  Hematological: Does not bruise/bleed easily  Psychiatric/Behavioral: Negative for dysphoric mood  The patient is nervous/anxious  All other systems reviewed and are negative        Patient Active Problem List   Diagnosis    Allergic rhinitis    Dermatofibroma    Diabetes mellitus type 2, controlled ([address] )    Eczema    Fear of flying    GERD (gastroesophageal reflux disease)    Glenohumeral arthritis    Hyperlipidemia    Hypertension, benign    Labral tear of shoulder    Osteoarthritis of left hip    Rotator cuff syndrome, right    Acquired hammer toe of right foot    Chronic left-sided low back pain       Past Medical History:   Diagnosis Date    Arthritis     Asthma     "one time with a cold, and used an inhaler"    Chronic pain disorder     hip pain left, and back    Diabetes mellitus ([address] )     Eczema     GERD (gastroesophageal reflux disease)     History of panic attacks     Hyperlipidemia     Hypertension     Right foot pain     bunionectomy today 3/1/2019    Uses Yi as primary spoken language     Wears glasses        Past Surgical History:   Procedure Laterality Date    CHOLECYSTECTOMY      "had a panic attack"    FOOT SURGERY Right  HERNIA REPAIR      OVARY SURGERY      IA COLONOSCOPY FLX DX W/COLLJ SPEC WHEN PFRMD N/A 12/22/2016    Procedure: EGD AND COLONOSCOPY;  Surgeon: Ramírez Gruber MD;  Location: 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   Tobacco Use    Smoking status: Never Smoker    Smokeless tobacco: Never Used   Substance and Sexual Activity    Alcohol use: Never     Frequency: Never    Drug use: Never    Sexual activity: Not Currently       Current Outpatient Medications on File Prior to Visit   Medication Sig    atorvastatin (LIPITOR) 20 mg tablet Take 1 tablet (20 mg total) by mouth daily at bedtime    gabapentin (NEURONTIN) 300 mg capsule Take 1 capsule (300 mg total) by mouth daily at bedtime    glipiZIDE (GLUCOTROL) 5 mg tablet TAKE 1 TABLET (5 MG TOTAL) BY MOUTH 2 (TWO) TIMES A DAY BEFORE MEALS    loratadine (CLARITIN) 10 mg tablet Take 1 tablet (10 mg total) by mouth daily    losartan (COZAAR) 50 mg tablet TAKE 1 TABLET BY MOUTH EVERY DAY    metFORMIN (GLUCOPHAGE) 500 mg tablet Take 500 mg by mouth 2 (two) times a day with meals    pantoprazole (PROTONIX) 40 mg tablet Take 1 tablet (40 mg total) by mouth daily at bedtime Take every other day    triamcinolone (KENALOG) 0 5 % ointment Apply topically 2 (two) times a day    [DISCONTINUED] albuterol (VENTOLIN HFA) 90 mcg/act inhaler Inhale 2 puffs every 6 (six) hours as needed      [DISCONTINUED] LORazepam (ATIVAN) 0 5 mg tablet 15 mins before boarding plane     No current facility-administered medications on file prior to visit  Allergies   Allergen Reactions    Aspirin Shortness Of Breath and Itching     swelling    Nsaids      Patient unsure if allergic to nsaids but it is listedf in her history     Latex Itching and Rash       Physical Exam    /85   Pulse 76   Temp 98 4 °F (36 9 °C) (Oral)   Wt 80 7 kg (178 lb)   BMI 32 56 kg/m²     Constitutional: normal, well developed, well nourished, alert, in no distress and non-toxic and no overt pain behavior   and obese  Eyes: anicteric  HEENT: grossly intact  Neck: supple, symmetric, trachea midline and no masses   Pulmonary:even and unlabored  Cardiovascular:No edema or pitting edema present  Skin:Normal without rashes or lesions and well hydrated  Psychiatric:Mood and affect appropriate  Neurologic:Cranial Nerves II-XII grossly intact  Musculoskeletal:normal     Lumbar Spine Exam  Appearance:  Normal lordosis  Palpation/Tenderness:  left sacroiliac joint tenderness  Sensory:  no sensory deficits noted  Range of Motion:  Flexion:  Minimally limited  with pain  Extension:  Minimally limited  with pain  Lateral Flexion - Left:  No limitation  without pain  Lateral Flexion - Right:  No limitation  without pain  Rotation - Left:  No limitation  without pain  Rotation - Right:  No limitation  without pain  Motor Strength:  Left hip flexion:  5/5  Left hip extension:  5/5  Right hip flexion:  5/5  Right hip extension:  5/5  Left knee flexion:  5/5  Left knee extension:  5/5  Right knee flexion:  5/5  Right knee extension:  5/5  Left foot dorsiflexion:  5/5  Left foot plantar flexion:  5/5  Right foot dorsiflexion:  5/5  Right foot plantar flexion:  5/5  Reflexes:  Left Patellar:  2+   Right Patellar:  2+   Left Achilles:  2+   Right Achilles:  2+   Special Tests:  Left Straight Leg Test:  negative  Right Straight Leg Test:  negative   Left MACARIO: positive  Right MACARIO: positive      Imaging

## 2019-08-28 NOTE — PROGRESS NOTES
Assessment  1  Chronic left-sided low back pain with left-sided sciatica        Plan  ***    {Oral Swab Statement:52066}    {Opioid Statement:00213}    {UDS Statement:27782}    {PDMP Statement:65226}    {Pain Management Procedure Statement:75082}    My impressions and treatment recommendations were discussed in detail with the patient who verbalized understanding and had no further questions  Discharge instructions were provided  I personally saw and examined the patient and I agree with the above discussed plan of care  No orders of the defined types were placed in this encounter  No orders of the defined types were placed in this encounter  History of Present Illness    Helen Navarro is a 63 y o  female ***    I have personally reviewed and/or updated the patient's past medical history, past surgical history, family history, social history, current medications, allergies, and vital signs today  Review of Systems   Constitutional: Negative for fever and unexpected weight change  HENT: Negative for trouble swallowing  Eyes: Negative for visual disturbance  Respiratory: Negative for shortness of breath and wheezing  Cardiovascular: Negative for chest pain and palpitations  Gastrointestinal: Negative for constipation, diarrhea, nausea and vomiting  Endocrine: Negative for cold intolerance, heat intolerance and polydipsia  Genitourinary: Positive for frequency  Negative for difficulty urinating  Musculoskeletal: Positive for joint swelling  Negative for arthralgias, gait problem and myalgias  Skin: Positive for rash  Neurological: Negative for dizziness, seizures, syncope, weakness and headaches  Hematological: Does not bruise/bleed easily  Psychiatric/Behavioral: Negative for dysphoric mood  The patient is nervous/anxious  All other systems reviewed and are negative        Patient Active Problem List   Diagnosis    Allergic rhinitis    Dermatofibroma    [facility]    Eczema    Fear of flying    GERD (gastroesophageal reflux disease)    Glenohumeral arthritis    Hyperlipidemia    Hypertension, benign    Labral tear of shoulder    Osteoarthritis of left hip    Rotator cuff syndrome, right    Acquired hammer toe of right foot    Chronic left-sided low back pain       Past Medical History:   Diagnosis Date    Arthritis     Asthma     "one time with a cold, and used an inhaler"    Chronic pain disorder     hip pain left, and back    Diabetes mellitus [facility] )     Eczema     GERD (gastroesophageal reflux disease)     History of panic attacks     Hyperlipidemia     Hypertension     Right foot pain     bunionectomy today 3/1/2019    Uses Greenlandic as primary spoken language     Wears glasses        Past Surgical History:   Procedure Laterality Date    CHOLECYSTECTOMY      "had a panic attack"    FOOT SURGERY Right     HERNIA REPAIR      OVARY SURGERY      ID COLONOSCOPY FLX DX W/COLLJ SPEC WHEN PFRMD N/A 12/22/2016    Procedure: EGD AND COLONOSCOPY;  Surgeon: Lu Valverde MD;  Location: [facility] GI LAB;   Service: Gastroenterology    ID CORRJ HALLUX VALGUS W/SESMDC W/2 OSTEOT Right 3/1/2019    Procedure: DOUBLE OSTEOTOMY, ZINA/AKIN BUNIONECTOMY;  Surgeon: Kin Harper DPM;  Location: [facility] OR;  Service: Podiatry       Family History   Problem Relation Age of Onset    Hypertension Mother     Hypertension Father     Bone cancer Maternal Grandfather     Breast cancer Maternal Aunt     Stomach cancer Brother        Social History     Occupational History     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   Tobacco Use    Smoking status: Never Smoker    Smokeless tobacco: Never Used   Substance and Sexual Activity    Alcohol use: Never     Frequency: Never    Drug use: Never    Sexual activity: Not Currently       Current Outpatient Medications on File Prior to Visit   Medication Sig    albuterol (VENTOLIN HFA) 90 mcg/act inhaler Inhale 2 puffs every 6 (six) hours as needed      atorvastatin (LIPITOR) 20 mg tablet Take 1 tablet (20 mg total) by mouth daily at bedtime    gabapentin (NEURONTIN) 300 mg capsule Take 1 capsule (300 mg total) by mouth daily at bedtime    glipiZIDE (GLUCOTROL) 5 mg tablet TAKE 1 TABLET (5 MG TOTAL) BY MOUTH 2 (TWO) TIMES A DAY BEFORE MEALS    loratadine (CLARITIN) 10 mg tablet Take 1 tablet (10 mg total) by mouth daily    LORazepam (ATIVAN) 0 5 mg tablet 15 mins before boarding plane    losartan (COZAAR) 50 mg tablet TAKE 1 TABLET BY MOUTH EVERY DAY    metFORMIN (GLUCOPHAGE) 500 mg tablet Take 500 mg by mouth 2 (two) times a day with meals    pantoprazole (PROTONIX) 40 mg tablet Take 1 tablet (40 mg total) by mouth daily at bedtime Take every other day    triamcinolone (KENALOG) 0 5 % ointment Apply topically 2 (two) times a day     No current facility-administered medications on file prior to visit  Allergies   Allergen Reactions    Aspirin Shortness Of Breath and Itching     swelling    Nsaids      Patient unsure if allergic to nsaids but it is listedf in her history     Latex Itching and Rash       Physical Exam    There were no vitals taken for this visit  Constitutional: {General Appearance:14886::"normal, well developed, well nourished, alert, in no distress and non-toxic and no overt pain behavior  "}  Eyes: {Sclera:21743::"anicteric"}  HEENT: {Hearin::"grossly intact"}  Neck: {Neck:06335::"supple, symmetric, trachea midline and no masses "}  Pulmonary:{Respiratory effort:65750::"even and unlabored"}  Cardiovascular:{Examination of Extremities:89724::"No edema or pitting edema present"}  Skin:{Skin and Subcutaneous tissues:88325::"Normal without rashes or lesions and well hydrated"}  Psychiatric:{Mood and Affect:14079::"Mood and affect appropriate"}  Neurologic:{Cranial Nerves:60641::"Cranial Nerves II-XII grossly intact"}  Musculoskeletal:{Gait and Station:99261::"normal"}    Imaging

## 2019-08-29 ENCOUNTER — APPOINTMENT (OUTPATIENT)
Dept: LAB | Facility: HOSPITAL | Age: 59
End: 2019-08-29
Payer: COMMERCIAL

## 2019-08-29 DIAGNOSIS — I10 HYPERTENSION, BENIGN: ICD-10-CM

## 2019-08-29 DIAGNOSIS — E78.49 OTHER HYPERLIPIDEMIA: ICD-10-CM

## 2019-08-29 DIAGNOSIS — E11.8 CONTROLLED DIABETES MELLITUS TYPE 2 WITH COMPLICATIONS, UNSPECIFIED WHETHER LONG TERM INSULIN USE (HCC): ICD-10-CM

## 2019-08-29 DIAGNOSIS — Z11.59 ENCOUNTER FOR HEPATITIS C SCREENING TEST FOR LOW RISK PATIENT: ICD-10-CM

## 2019-08-29 LAB
ALBUMIN SERPL BCP-MCNC: 3.6 G/DL (ref 3.5–5)
ALP SERPL-CCNC: 141 U/L (ref 46–116)
ALT SERPL W P-5'-P-CCNC: 29 U/L (ref 12–78)
ANION GAP SERPL CALCULATED.3IONS-SCNC: 5 MMOL/L (ref 4–13)
AST SERPL W P-5'-P-CCNC: 21 U/L (ref 5–45)
BILIRUB SERPL-MCNC: 0.31 MG/DL (ref 0.2–1)
BUN SERPL-MCNC: 10 MG/DL (ref 5–25)
CALCIUM SERPL-MCNC: 8.8 MG/DL (ref 8.3–10.1)
CHLORIDE SERPL-SCNC: 106 MMOL/L (ref 100–108)
CHOLEST SERPL-MCNC: 168 MG/DL (ref 50–200)
CO2 SERPL-SCNC: 29 MMOL/L (ref 21–32)
CREAT SERPL-MCNC: 0.7 MG/DL (ref 0.6–1.3)
EST. AVERAGE GLUCOSE BLD GHB EST-MCNC: 166 MG/DL
GFR SERPL CREATININE-BSD FRML MDRD: 96 ML/MIN/1.73SQ M
GLUCOSE P FAST SERPL-MCNC: 127 MG/DL (ref 65–99)
HBA1C MFR BLD: 7.4 % (ref 4.2–6.3)
HCV AB SER QL: NORMAL
HDLC SERPL-MCNC: 55 MG/DL (ref 40–60)
LDLC SERPL CALC-MCNC: 71 MG/DL (ref 0–100)
NONHDLC SERPL-MCNC: 113 MG/DL
POTASSIUM SERPL-SCNC: 3.8 MMOL/L (ref 3.5–5.3)
PROT SERPL-MCNC: 7.9 G/DL (ref 6.4–8.2)
SODIUM SERPL-SCNC: 140 MMOL/L (ref 136–145)
TRIGL SERPL-MCNC: 210 MG/DL

## 2019-08-29 PROCEDURE — 83036 HEMOGLOBIN GLYCOSYLATED A1C: CPT

## 2019-08-29 PROCEDURE — 80061 LIPID PANEL: CPT

## 2019-08-29 PROCEDURE — 86803 HEPATITIS C AB TEST: CPT

## 2019-08-29 PROCEDURE — 80053 COMPREHEN METABOLIC PANEL: CPT

## 2019-08-29 PROCEDURE — 36415 COLL VENOUS BLD VENIPUNCTURE: CPT

## 2019-09-09 ENCOUNTER — OFFICE VISIT (OUTPATIENT)
Dept: FAMILY MEDICINE CLINIC | Facility: CLINIC | Age: 59
End: 2019-09-09
Payer: COMMERCIAL

## 2019-09-09 VITALS
BODY MASS INDEX: 32.54 KG/M2 | RESPIRATION RATE: 16 BRPM | WEIGHT: 176.8 LBS | SYSTOLIC BLOOD PRESSURE: 160 MMHG | HEIGHT: 62 IN | OXYGEN SATURATION: 98 % | TEMPERATURE: 98.8 F | HEART RATE: 99 BPM | DIASTOLIC BLOOD PRESSURE: 80 MMHG

## 2019-09-09 DIAGNOSIS — I10 HYPERTENSION, BENIGN: ICD-10-CM

## 2019-09-09 DIAGNOSIS — Z23 NEED FOR INFLUENZA VACCINATION: ICD-10-CM

## 2019-09-09 DIAGNOSIS — E78.5 HYPERLIPIDEMIA: ICD-10-CM

## 2019-09-09 DIAGNOSIS — E11.9 DIABETES MELLITUS TYPE 2, CONTROLLED (HCC): Primary | ICD-10-CM

## 2019-09-09 DIAGNOSIS — K21.9 GASTROESOPHAGEAL REFLUX DISEASE, ESOPHAGITIS PRESENCE NOT SPECIFIED: ICD-10-CM

## 2019-09-09 PROBLEM — M75.101 ROTATOR CUFF SYNDROME, RIGHT: Status: RESOLVED | Noted: 2017-08-16 | Resolved: 2019-09-09

## 2019-09-09 PROBLEM — M20.41 ACQUIRED HAMMER TOE OF RIGHT FOOT: Status: RESOLVED | Noted: 2018-03-15 | Resolved: 2019-09-09

## 2019-09-09 PROBLEM — E11.8 CONTROLLED DIABETES MELLITUS TYPE 2 WITH COMPLICATIONS (HCC): Status: ACTIVE | Noted: 2019-09-09

## 2019-09-09 PROCEDURE — 90471 IMMUNIZATION ADMIN: CPT

## 2019-09-09 PROCEDURE — 90682 RIV4 VACC RECOMBINANT DNA IM: CPT

## 2019-09-09 PROCEDURE — 99214 OFFICE O/P EST MOD 30 MIN: CPT | Performed by: NURSE PRACTITIONER

## 2019-09-09 RX ORDER — GLIPIZIDE 5 MG/1
5 TABLET ORAL
Qty: 180 TABLET | Refills: 1 | Status: SHIPPED | OUTPATIENT
Start: 2019-09-09 | End: 2020-02-28

## 2019-09-09 RX ORDER — RANITIDINE 150 MG/1
150 TABLET ORAL 2 TIMES DAILY PRN
Qty: 180 TABLET | Refills: 0 | Status: SHIPPED | OUTPATIENT
Start: 2019-09-09 | End: 2019-12-03

## 2019-09-09 RX ORDER — ATORVASTATIN CALCIUM 20 MG/1
20 TABLET, FILM COATED ORAL
Qty: 90 TABLET | Refills: 1 | Status: SHIPPED | OUTPATIENT
Start: 2019-09-09 | End: 2020-02-28

## 2019-09-09 RX ORDER — LOSARTAN POTASSIUM 50 MG/1
50 TABLET ORAL DAILY
Qty: 90 TABLET | Refills: 1 | Status: SHIPPED | OUTPATIENT
Start: 2019-09-09 | End: 2020-02-28

## 2019-09-09 NOTE — ASSESSMENT & PLAN NOTE
Lab Results   Component Value Date    HGBA1C 7 4 (H) 08/29/2019       No results for input(s): POCGLU in the last 72 hours  Blood Sugar Average: Last 72 hrs:  stable on current regimen - glipizide 5 mg and metformin 1000 mg twice daily  Needs updated eye - has appointment  Unable to leave sample for urine

## 2019-09-09 NOTE — PROGRESS NOTES
Assessment/Plan:  Needs updated urine alb  rto in 6 mos after labs  Diabetes mellitus type 2, controlled ([place] [address] )  Lab Results   Component Value Date    HGBA1C 7 4 (H) 08/29/2019       No results for input(s): POCGLU in the last 72 hours  Blood Sugar Average: Last 72 hrs:  stable on current regimen - glipizide 5 mg and metformin 1000 mg twice daily  Needs updated eye - has appointment  Unable to leave sample for urine  Hypertension, benign  Controlled on current regimen  Hyperlipidemia  On statin and LDL controlled - continue lifestyle management of do w/ diet and exercise  Diagnoses and all orders for this visit:    Diabetes mellitus type 2, controlled ([place] [address] )  -     influenza vaccine, 9866-8987, quadrivalent, recombinant, PF, 0 5 mL, for patients 50-64 yr (FLUBLOK)  -     glipiZIDE (GLUCOTROL) 5 mg tablet; Take 1 tablet (5 mg total) by mouth 2 (two) times a day before meals  -     metFORMIN (GLUCOPHAGE) 500 mg tablet; Take 1 tablet (500 mg total) by mouth 2 (two) times a day with meals  -     ranitidine (ZANTAC) 150 mg tablet; Take 1 tablet (150 mg total) by mouth 2 (two) times a day as needed for heartburn or indigestion  -     atorvastatin (LIPITOR) 20 mg tablet; Take 1 tablet (20 mg total) by mouth daily at bedtime  -     losartan (COZAAR) 50 mg tablet; Take 1 tablet (50 mg total) by mouth daily  -     Comprehensive metabolic panel; Future  -     Lipid panel; Future  -     Hemoglobin A1C; Future  -     Microalbumin / creatinine urine ratio    Gastroesophageal reflux disease, esophagitis presence not specified  -     ranitidine (ZANTAC) 150 mg tablet; Take 1 tablet (150 mg total) by mouth 2 (two) times a day as needed for heartburn or indigestion  -     Comprehensive metabolic panel; Future  -     Lipid panel; Future  -     Hemoglobin A1C; Future    Hypertension, benign  -     losartan (COZAAR) 50 mg tablet; Take 1 tablet (50 mg total) by mouth daily  -     Comprehensive metabolic panel;  Future  - Lipid panel; Future  -     Hemoglobin A1C; Future    Need for influenza vaccination  -     influenza vaccine, [id number], quadrivalent, recombinant, PF, 0 5 mL, for patients 50-64 yr (FLUBLOK)    Hyperlipidemia  -     atorvastatin (LIPITOR) 20 mg tablet; Take 1 tablet (20 mg total) by mouth daily at bedtime  -     Comprehensive metabolic panel; Future  -     Lipid panel; Future  -     Hemoglobin A1C; Future          Subjective:      Patient ID: Mihir Turcios is a 63 y o  female  HPI   Here unaccompanied  Chronic dz follow up w/ lab review 8/29/19 - improved ( lipid) or stable  Med list verified  No complaints  The following portions of the patient's history were reviewed and updated as appropriate: allergies, past social history, past surgical history and problem list     Review of Systems   Constitutional: Negative for activity change and appetite change  HENT: Negative  Eyes: Negative  Respiratory: Negative  Cardiovascular: Negative  Negative for chest pain  Gastrointestinal: Negative  Endocrine: Negative  Negative for cold intolerance  Genitourinary: Negative  Musculoskeletal: Negative  Skin: Negative  Allergic/Immunologic: Negative  Neurological: Negative  Hematological: Negative  Psychiatric/Behavioral: Negative  All other systems reviewed and are negative  Objective:      /80 (BP Location: Left arm, Patient Position: Sitting, Cuff Size: Large)   Pulse 99   Temp 98 8 °F (37 1 °C) (Oral)   Resp 16   Ht 5' 2" (1 575 m)   Wt 80 2 kg (176 lb 12 8 oz)   SpO2 98%   BMI 32 34 kg/m²          Physical Exam   Constitutional: She is oriented to person, place, and time  She appears well-developed and well-nourished  No distress  HENT:   Head: Normocephalic  Right Ear: Tympanic membrane and external ear normal  No decreased hearing is noted  Left Ear: Tympanic membrane and external ear normal  No decreased hearing is noted     Mouth/Throat: Oropharynx is clear and moist    Eyes: Pupils are equal, round, and reactive to light  Conjunctivae are normal    Neck: Normal range of motion  Neck supple  No thyromegaly present  Cardiovascular: Normal rate, regular rhythm, normal heart sounds and intact distal pulses  No murmur heard  Pulmonary/Chest: Effort normal and breath sounds normal  No respiratory distress  Abdominal: Soft  Bowel sounds are normal    Musculoskeletal: Normal range of motion  Lymphadenopathy:     She has no cervical adenopathy  Neurological: She is alert and oriented to person, place, and time  She has normal reflexes  No cranial nerve deficit  Coordination normal    Skin: Skin is warm and dry  Psychiatric: She has a normal mood and affect   Her behavior is normal  Judgment and thought content normal

## 2019-09-27 ENCOUNTER — HOSPITAL ENCOUNTER (OUTPATIENT)
Dept: RADIOLOGY | Facility: CLINIC | Age: 59
Discharge: HOME | End: 2019-09-27
Attending: ANESTHESIOLOGY | Admitting: ANESTHESIOLOGY
Payer: COMMERCIAL

## 2019-09-27 VITALS
RESPIRATION RATE: 20 BRPM | DIASTOLIC BLOOD PRESSURE: 88 MMHG | TEMPERATURE: 98.3 F | HEART RATE: 83 BPM | SYSTOLIC BLOOD PRESSURE: 146 MMHG | OXYGEN SATURATION: 97 %

## 2019-09-27 DIAGNOSIS — M16.12 PRIMARY OSTEOARTHRITIS OF LEFT HIP: ICD-10-CM

## 2019-09-27 PROCEDURE — 77002 NEEDLE LOCALIZATION BY XRAY: CPT | Performed by: ANESTHESIOLOGY

## 2019-09-27 PROCEDURE — NC001 PR NO CHARGE: Performed by: ANESTHESIOLOGY

## 2019-09-27 PROCEDURE — 77002 NEEDLE LOCALIZATION BY XRAY: CPT

## 2019-09-27 PROCEDURE — 20610 DRAIN/INJ JOINT/BURSA W/O US: CPT | Performed by: ANESTHESIOLOGY

## 2019-09-27 RX ORDER — BUPIVACAINE HCL/PF 2.5 MG/ML
10 VIAL (ML) INJECTION ONCE
Status: COMPLETED | OUTPATIENT
Start: 2019-09-27 | End: 2019-09-27

## 2019-09-27 RX ORDER — METHYLPREDNISOLONE ACETATE 80 MG/ML
80 INJECTION, SUSPENSION INTRA-ARTICULAR; INTRALESIONAL; INTRAMUSCULAR; PARENTERAL; SOFT TISSUE ONCE
Status: COMPLETED | OUTPATIENT
Start: 2019-09-27 | End: 2019-09-27

## 2019-09-27 RX ORDER — 0.9 % SODIUM CHLORIDE 0.9 %
10 VIAL (ML) INJECTION ONCE
Status: COMPLETED | OUTPATIENT
Start: 2019-09-27 | End: 2019-09-27

## 2019-09-27 RX ADMIN — Medication 3 ML: at 10:38

## 2019-09-27 RX ADMIN — METHYLPREDNISOLONE ACETATE 80 MG: 80 INJECTION, SUSPENSION INTRA-ARTICULAR; INTRALESIONAL; INTRAMUSCULAR; PARENTERAL; SOFT TISSUE at 10:40

## 2019-09-27 RX ADMIN — SODIUM CHLORIDE 3 ML: 9 INJECTION, SOLUTION INTRAMUSCULAR; INTRAVENOUS; SUBCUTANEOUS at 10:38

## 2019-09-27 RX ADMIN — BUPIVACAINE HYDROCHLORIDE 4 ML: 2.5 INJECTION, SOLUTION EPIDURAL; INFILTRATION; INTRACAUDAL at 10:40

## 2019-09-27 RX ADMIN — IOHEXOL 1 ML: 300 INJECTION, SOLUTION INTRAVENOUS at 10:40

## 2019-09-27 NOTE — H&P
History of Present Illness: The patient is a 63 y o  female who presents with complaints of left hip pain secondary to osteoarthritis and is here today for left hip intra-articular steroid injection  Patient Active Problem List   Diagnosis    Allergic rhinitis    Dermatofibroma    Diabetes mellitus type [facility], [facility] )    Eczema    Fear of flying    GERD (gastroesophageal reflux disease)    Glenohumeral arthritis    Hyperlipidemia    Hypertension, benign    Osteoarthritis of left hip    Chronic left-sided low back pain       Past Medical History:   Diagnosis Date    Acquired hammer toe of right foot 3/15/2018    S/p surg    Arthritis     Asthma     "one time with a cold, and used an inhaler"    Chronic pain disorder     hip pain left, and back    Diabetes mellitus ([address] )     Eczema     GERD (gastroesophageal reflux disease)     History of panic attacks     Hyperlipidemia     Hypertension     Labral tear of shoulder 9/24/2014    Right foot pain     bunionectomy today 3/1/2019    Rotator cuff syndrome, right 8/16/2017    Uses Serbian as primary spoken language     Wears glasses        Past Surgical History:   Procedure Laterality Date    CHOLECYSTECTOMY      "had a panic attack"    FOOT SURGERY Right     HERNIA REPAIR      OVARY SURGERY      AL COLONOSCOPY FLX DX W/COLLJ SPEC WHEN PFRMD N/A 12/22/2016    Procedure: EGD AND COLONOSCOPY;  Surgeon: Jesús Lewis MD;  Location: [facility] GI LAB;   Service: Gastroenterology    AL CORRJ HALLUX VALGUS W/SESMDC W/2 OSTEOT Right 3/1/2019    Procedure: DOUBLE OSTEOTOMY, ZINA/AKIN BUNIONECTOMY;  Surgeon: Vinny Hernandez DPM;  Location: [facility] OR;  Service: Podiatry         Current Outpatient Medications:     atorvastatin (LIPITOR) 20 mg tablet, Take 1 tablet (20 mg total) by mouth daily at bedtime, Disp: 90 tablet, Rfl: 1    gabapentin (NEURONTIN) 300 mg capsule, Take 1 capsule (300 mg total) by mouth daily at bedtime, Disp: 30 capsule, Rfl: 1    glipiZIDE (GLUCOTROL) 5 mg tablet, Take 1 tablet (5 mg total) by mouth 2 (two) times a day before meals, Disp: 180 tablet, Rfl: 1    loratadine (CLARITIN) 10 mg tablet, Take 1 tablet (10 mg total) by mouth daily, Disp: 90 tablet, Rfl: 0    losartan (COZAAR) 50 mg tablet, Take 1 tablet (50 mg total) by mouth daily, Disp: 90 tablet, Rfl: 1    metFORMIN (GLUCOPHAGE) 500 mg tablet, Take 1 tablet (500 mg total) by mouth 2 (two) times a day with meals, Disp: 180 tablet, Rfl: 1    ranitidine (ZANTAC) 150 mg tablet, Take 1 tablet (150 mg total) by mouth 2 (two) times a day as needed for heartburn or indigestion, Disp: 180 tablet, Rfl: 0    triamcinolone (KENALOG) 0 5 % ointment, Apply topically 2 (two) times a day, Disp: 30 g, Rfl: 1    Current Facility-Administered Medications:     bupivacaine (PF) (MARCAINE) 0 25 % injection 10 mL, 10 mL, Intra-articular, Once, Clark Hannah MD    iohexol (OMNIPAQUE) 300 mg/mL injection 50 mL, 50 mL, Intra-articular, Once, Clark Hannah MD    lidocaine (PF) (XYLOCAINE-MPF) 2 % injection 5 mL, 5 mL, Infiltration, Once, Clark Hannah MD    methylPREDNISolone acetate (DEPO-MEDROL) injection 80 mg, 80 mg, Intra-articular, Once, Clark Hannah MD    sodium chloride (PF) 0 9 % injection 10 mL, 10 mL, Infiltration, Once, Clark Hannah MD    Allergies   Allergen Reactions    Aspirin Shortness Of Breath and Itching     swelling    Nsaids      Patient unsure if allergic to nsaids but it is listedf in her history     Latex Itching and Rash       Physical Exam:   Vitals:    09/27/19 1024   BP: 149/89   Pulse: 84   Resp: 20   Temp: 98 3 °F (36 8 °C)   SpO2: 97%     General: Awake, Alert, Oriented x 3, Mood and affect appropriate  Respiratory: Respirations even and unlabored  Cardiovascular: Peripheral pulses intact; no edema  Musculoskeletal Exam:   Left hip pain    ASA Score: 2    Patient/Chart Verification  Patient ID Verified: Verbal  ID Band Applied: No  Consents Confirmed: Procedural  H&P( within 30 days) Verified: To be obtained in the Pre-Procedure area  Interval H&P(within 24 hr) Complete (required for Outpatients and Surgery Admit only): To be obtained in the Pre-Procedure area  Allergies Reviewed: Yes  Anticoag/NSAID held?: No  Currently on antibiotics?: No    Assessment:   1   Primary osteoarthritis of left hip        Plan: Left Hip Intra-articular Steroid Injection

## 2019-09-27 NOTE — DISCHARGE INSTR - LAB
INSTRUCCIONES PARA EL DUNG POSTERIOR AL PROCEDIMIENTO DE INYECCIÓN EN ELIUD ARTICULACIÓN    1  No aplique calor en ninguna área que esté entumecida  Si siente molestias o dolor en el lugar de la inyección, por hoy puede colocar hielo delbert 20 minutos y retirarlo delbert otros 20 minutos  A partir de [place], podrá utilizar hielo o calor húmedo  No aplique hielo o calor directo sobre la piel  2  Puede experimentar un aumento o cambio en el malestar delbert las 36-48 horas posteriores al tratamiento  Aplique hielo y continúe tomando cualquier analgésico que le hayan recetado  3  No realice ninguna actividad extenuante, por hoy  Puede ducharse, indio no se bañe en tinas ni en jacuzzis por hoy  Puede retomar jeff actividades normales mañana, indio "no se exceda"  Retome jeff actividades normales gradualmente a medida que se sienta mejor  4  Si nota enrojecimiento, secreción o inflamación en el sitio donde lo inyectaron, o si presenta fiebre superior a 100 grados, llame a [facility] al [phone] o acuda a la martinez de Nick  5  Continúe tomando todos los medicamentos de rutina que le haya recetado garcia médico de cabecera, a menos que nuestro personal le indique lo contrario  [employer] a celina la mayoría de los anticoagulantes de acuerdo con la dosis que tiene programada regularmente  Si tiene [employer], llame a [employer]  Si tiene algún problema relacionado específicamente con el procedimiento, llame a nuestro consultorio al [phone]  Si tiene problemas que no estén relacionados con garcia procedimiento, debe comunicarse con garcia médico de cabecera

## 2019-10-04 ENCOUNTER — TELEPHONE (OUTPATIENT)
Dept: PAIN MEDICINE | Facility: CLINIC | Age: 59
End: 2019-10-04

## 2019-10-04 NOTE — TELEPHONE ENCOUNTER
MD Aware  Patient should call back if symtoms worsen    She should start thinking about hip replacement

## 2019-12-03 ENCOUNTER — OFFICE VISIT (OUTPATIENT)
Dept: FAMILY MEDICINE CLINIC | Facility: CLINIC | Age: 59
End: 2019-12-03
Payer: COMMERCIAL

## 2019-12-03 ENCOUNTER — OFFICE VISIT (OUTPATIENT)
Dept: URGENT CARE | Facility: MEDICAL CENTER | Age: 59
End: 2019-12-03
Payer: COMMERCIAL

## 2019-12-03 VITALS
OXYGEN SATURATION: 96 % | WEIGHT: 177 LBS | HEIGHT: 62 IN | DIASTOLIC BLOOD PRESSURE: 105 MMHG | SYSTOLIC BLOOD PRESSURE: 180 MMHG | BODY MASS INDEX: 32.57 KG/M2 | RESPIRATION RATE: 20 BRPM | HEART RATE: 127 BPM | TEMPERATURE: 99.3 F

## 2019-12-03 VITALS
DIASTOLIC BLOOD PRESSURE: 80 MMHG | TEMPERATURE: 98.3 F | WEIGHT: 177 LBS | BODY MASS INDEX: 32.57 KG/M2 | HEART RATE: 82 BPM | RESPIRATION RATE: 17 BRPM | OXYGEN SATURATION: 96 % | HEIGHT: 62 IN | SYSTOLIC BLOOD PRESSURE: 135 MMHG

## 2019-12-03 DIAGNOSIS — E11.8 CONTROLLED DIABETES MELLITUS TYPE 2 WITH COMPLICATIONS, UNSPECIFIED WHETHER LONG TERM INSULIN USE (HCC): ICD-10-CM

## 2019-12-03 DIAGNOSIS — J45.901 ASTHMA EXACERBATION, MILD: Primary | ICD-10-CM

## 2019-12-03 DIAGNOSIS — J06.9 UPPER RESPIRATORY TRACT INFECTION, UNSPECIFIED TYPE: ICD-10-CM

## 2019-12-03 DIAGNOSIS — J06.9 VIRAL UPPER RESPIRATORY TRACT INFECTION: Primary | ICD-10-CM

## 2019-12-03 DIAGNOSIS — K21.9 GASTROESOPHAGEAL REFLUX DISEASE, ESOPHAGITIS PRESENCE NOT SPECIFIED: ICD-10-CM

## 2019-12-03 PROCEDURE — 3008F BODY MASS INDEX DOCD: CPT | Performed by: FAMILY MEDICINE

## 2019-12-03 PROCEDURE — 99213 OFFICE O/P EST LOW 20 MIN: CPT | Performed by: FAMILY MEDICINE

## 2019-12-03 PROCEDURE — 99214 OFFICE O/P EST MOD 30 MIN: CPT | Performed by: FAMILY MEDICINE

## 2019-12-03 PROCEDURE — 82570 ASSAY OF URINE CREATININE: CPT | Performed by: FAMILY MEDICINE

## 2019-12-03 PROCEDURE — 82043 UR ALBUMIN QUANTITATIVE: CPT | Performed by: FAMILY MEDICINE

## 2019-12-03 RX ORDER — ALBUTEROL SULFATE 90 UG/1
2 AEROSOL, METERED RESPIRATORY (INHALATION) EVERY 6 HOURS PRN
Qty: 1 INHALER | Refills: 5 | Status: SHIPPED | OUTPATIENT
Start: 2019-12-03 | End: 2020-12-07 | Stop reason: SDUPTHER

## 2019-12-03 RX ORDER — FAMOTIDINE 20 MG/1
20 TABLET, FILM COATED ORAL
Qty: 30 TABLET | Refills: 2 | Status: SHIPPED | OUTPATIENT
Start: 2019-12-03 | End: 2020-03-02 | Stop reason: SDUPTHER

## 2019-12-03 RX ORDER — PREDNISONE 20 MG/1
40 TABLET ORAL DAILY
Qty: 10 TABLET | Refills: 0 | Status: SHIPPED | OUTPATIENT
Start: 2019-12-03 | End: 2019-12-03 | Stop reason: SDUPTHER

## 2019-12-03 RX ORDER — PREDNISONE 20 MG/1
40 TABLET ORAL DAILY
Qty: 6 TABLET | Refills: 0 | Status: SHIPPED | OUTPATIENT
Start: 2019-12-03 | End: 2019-12-06

## 2019-12-03 NOTE — PATIENT INSTRUCTIONS
Compre salina liquida para naris  Sacar prednisone un ves al parris para 3 alford      Infección respiratoria superior   LO QUE NECESITA SABER:   Ayla infección respiratoria superior también se conoce nia el resfriado común  Arabella es ayla infección que puede afectar garcia nariz, garganta, oídos y los senos frontales  Para personas saludables, el resfriado común generalmente no es grave y no requiere tratamiento especial  Los síntomas del resfriado generalmente son Charlott Starkey graves delbert los primeros 3 a 5 días  [address] en 7 a 14 días  Puede que usted siga tosiendo por 2 a 3 semanas  Los resfriados son provocados por virus y no mejoran con antibióticos  INSTRUCCIONES SOBRE EL DUNG HOSPITALARIA:   Regrese a la martinez de emergencias si:   · Usted tiene dolor torácico y dificultad para respirar  Pregúntele a garcia Margorie Longest vitaminas y minerales son adecuados para usted  · Usted tiene fiebre de más de 102ºF [facility])  · Garcia garganta irritada empeora o usted ve manchas milan o tanmay en garcia garganta  · Carmen síntomas empeoran después de 3 a 5 días o garcia resfriado no mejora en 14 días  · Usted tiene sarpullido en alguna parte de garcia piel  · Usted tiene bultos grandes y sensible en garcia ar    · Usted tiene secreción espesa de color john o amarillo proveniente de garcia nariz  · Usted expectora mucosidad de color amarillo, john o con [place]  · Usted vomita por más de 24 horas y no puede retener líquidos en el estómago  · Usted tiene un grave dolor de oído  · Usted tiene preguntas o inquietudes acerca de garcia condición o cuidado  Medicamentos:  Es posible que usted necesite alguno de los siguientes:  · Los descongestionantes  ayudan a reducir la congestión nasal y Devonda Conquest a respirar más fácilmente  Si royal pastillas descongestionantes, pueden causarle agitación o problemas para dormir  No use aerosol descongestionante por más de unos cuantos días      · Los jarabes para la tos  ayudan a reducir la tos  Pregúntele a garcia médico cuál tipo de medicamento para la tos es mejor para usted  · AINEs (Analgésicos antiinflamatorios no esteroides) nia el ibuprofeno, ayudan a disminuir la inflamación, el dolor y la [place]  Los AINEs pueden causar sangrado estomacal o problemas renales en ciertas personas  Si usted royal un medicamento anticoagulante, siempre pregúntele a garcia médico si los CRISTIAN son seguros para usted  Siempre nona la etiqueta de guicho medicamento y [place] instrucciones  · Acetaminofeno:  gregg el dolor y baja la fiebre  Está disponible sin receta médica  Pregunte la cantidad y la frecuencia con que debe tomarlos  [address]  Nona las etiquetas de todos los demás medicamentos que esté usando para saber si también contienen acetaminofén, o pregunte a garcia médico o farmacéutico  El acetaminofén puede causar daño en el hígado cuando no se royal de forma correcta  No use más de 4 gramos (4000 miligramos) en total de acetaminofeno en un día  · [place] jeff medicamentos nia se le haya indicado  Consulte con garcia médico si usted yee que garcia medicamento no le está ayudando o si presenta efectos secundarios  Infórmele si es alérgico a cualquier medicamento  Mantenga patti lista actualizada de los [employer], las vitaminas y los productos herbales que royal  Incluya los siguientes datos de los medicamentos: cantidad, frecuencia y motivo de administración  Traiga con usted la lista o los envases de la píldoras a jeff citas de seguimiento  Lleve la lista de los medicamentos con usted en nel de patti emergencia  Acuda a jeff consultas de control con garcia médico según le indicaron  Anote jeff preguntas para que se acuerde de hacerlas delbert jeff visitas  Cuidados personales:   · Descanse el mayor tiempo posible  Empiece a hacer un poco más día a día  · [employer] líquidos nia se le indique  Los líquidos le ayudarán a aflojar y disolver la mucosidad para que la pueda expectorar   Los líquidos ayudarán a evitar la deshidratación  Los líquidos que ayudan a prevenir la deshidratación pueden ser Aida Shorten y caldo  No tome líquidos que contienen cafeína  La cafeína puede aumentar el riesgo de deshidratación  Pregúntele a garcia médico cuál es la cantidad de líquido que necesita ingerir a diario  · Alivie el dolor de garganta  Shayy gárgaras de agua tibia con sal  [place] ayuda a aliviar el dolor de garganta  Prepare agua salina disolviendo ¼ de cucharada de sal a 1 taza de agua tibia  Usted puede también chupar dulces duros o pastillas para la garganta  Usted puede usar aerosol para el dolor de garganta  · Use un humidificador o vaporizador  Use un humidificador de nickolas frío o un vaporizador para elevar la humedad en garcia casa  [place] podría ayudarle a respirar más fácilmente y al mismo tiempo disminuir la tos  · Use gotas nasales de solución salina nia se le haya indicado  Estas ayudan a [place]  · Aplique vaselina en la parte externa alrededor de las fosas nasales  Esta puede disminuir la irritación de sonarse la nariz  · No fume  La nicotina y otros químicos en los cigarrillos y cigarros pueden empeorar jeff síntomas  También pueden causar infecciones nia la bronquitis o la neumonía  Pida información a garcia médico si usted actualmente fuma y necesita ayuda para dejar de fumar  Los cigarrillos electrónicos o tabaco sin humo todavía contienen nicotina  Consulte con garcia médico antes de [employer]  Evite transmitir garcia resfriado a los demás:   · Trate de mantenerse alejado de otras personas delbert los primeros 2 a 3 días de garcia resfriado cuando éste es más fácil de transmitir  · No comparta alimentos o bebidas  · No comparta toallas de mano con otros miembros de maxwell en el hogar  · [employer] frecuentemente, especialmente después de sonarse la nariz   Siddhartha la espalda a otras personas y [place] la boca y la Johnnie Borer con un pañuelo desechable cuando estornuda o tose  © 2017 [address] Information is for End User's use only and may not be sold, redistributed or otherwise used for commercial purposes  All illustrations and images included in CareNotes® are the copyrighted property of A D A M , Inc  or Jorge Godoy  Esta información es sólo para uso en educación  Troy intención no es darle un consejo médico sobre enfermedades o tratamientos  Colsulte con troy Joane Gal farmacéutico antes de seguir cualquier régimen médico para saber si es seguro y efectivo para usted

## 2019-12-03 NOTE — PROGRESS NOTES
Assessment/Plan:    No problem-specific Assessment & Plan notes found for this encounter  Diagnoses and all orders for this visit:    Asthma exacerbation, mild  Comments:  Viral illness exacerbating mild intermittent asthma  Take prednisone for 3 days  Use albuterol as needed  Orders:  -     Discontinue: predniSONE 20 mg tablet; Take 2 tablets (40 mg total) by mouth daily for 3 days  -     albuterol (PROVENTIL HFA,VENTOLIN HFA) 90 mcg/act inhaler; Inhale 2 puffs every 6 (six) hours as needed for wheezing or shortness of breath  -     predniSONE 20 mg tablet; Take 2 tablets (40 mg total) by mouth daily for 3 days    Controlled diabetes mellitus type 2 with complications, unspecified whether long term insulin use (HCC)  Comments:  Controlled  Has an appt in March  Taking metformin, glipizide, statin and ACE I  Microalb collected today  Diabetic foot exam completed today and was normal    Orders:  -     Microalbumin / creatinine urine ratio    Upper respiratory tract infection, unspecified type  Comments:  Continue mucinex and tylenol prn  Use a humidifier, tea with honey and stay hydrated  Gastroesophageal reflux disease, esophagitis presence not specified  -     famotidine (PEPCID) 20 mg tablet; Take 1 tablet (20 mg total) by mouth daily at bedtime as needed for heartburn          Subjective:      Patient ID: Ellie Mcgowan is a 63 y o  female  Here for a sick visit  Symptoms started about 9 days ago  The patient reports SOB, non productive cough, sinus pain, congestion, body aches, wheezing at night  In the past she used an inhaler which alleviated wheezing but she never was diagnosed with asthma  She had a fever for a couple days but that has since resolved  She did have the flu shot this year  She has been taking mucinex and tylenol which has helped  Has been taking other medication as prescribed but wants to switch from ranitidine to something else because of the pharmacy recall  Translations phone services were used during this visit  [id number]      Cough   Associated symptoms include myalgias, shortness of breath and wheezing  Pertinent negatives include no chest pain, ear pain, fever, headaches, rash or sore throat  The following portions of the patient's history were reviewed and updated as appropriate:   She   Patient Active Problem List    Diagnosis Date Noted    Chronic left-sided low back pain 12/12/2018    Hypertension, benign 12/14/2017    Osteoarthritis of left hip 08/09/2017    Dermatofibroma 06/26/2017    Diabetes mellitus type 2, controlled ([facility] [address] ) 09/20/2016    Hyperlipidemia 09/20/2016    Fear of flying 04/30/2015    Allergic rhinitis 03/31/2015    Eczema 03/31/2015    Glenohumeral arthritis 09/24/2014    GERD (gastroesophageal reflux disease) 05/18/2012     She  has a past surgical history that includes Hernia repair; Ovary surgery; pr colonoscopy flx dx w/collj spec when pfrmd (N/A, 12/22/2016); Cholecystectomy; pr corrj hallux valgus w/sesmdc w/2 osteot (Right, 3/1/2019); and Foot surgery (Right)  Her family history includes Bone cancer in her maternal grandfather; Breast cancer in her maternal aunt; Hypertension in her father and mother; Stomach cancer in her brother  She  reports that she has never smoked  She has never used smokeless tobacco  She reports that she does not drink alcohol or use drugs    Current Outpatient Medications   Medication Sig Dispense Refill    atorvastatin (LIPITOR) 20 mg tablet Take 1 tablet (20 mg total) by mouth daily at bedtime 90 tablet 1    gabapentin (NEURONTIN) 300 mg capsule Take 1 capsule (300 mg total) by mouth daily at bedtime 30 capsule 1    glipiZIDE (GLUCOTROL) 5 mg tablet Take 1 tablet (5 mg total) by mouth 2 (two) times a day before meals 180 tablet 1    loratadine (CLARITIN) 10 mg tablet Take 1 tablet (10 mg total) by mouth daily 90 tablet 0    losartan (COZAAR) 50 mg tablet Take 1 tablet (50 mg total) by mouth daily 90 tablet 1    metFORMIN (GLUCOPHAGE) 500 mg tablet Take 1 tablet (500 mg total) by mouth 2 (two) times a day with meals 180 tablet 1    triamcinolone (KENALOG) 0 5 % ointment Apply topically 2 (two) times a day 30 g 1    albuterol (PROVENTIL HFA,VENTOLIN HFA) 90 mcg/act inhaler Inhale 2 puffs every 6 (six) hours as needed for wheezing or shortness of breath 1 Inhaler 5    famotidine (PEPCID) 20 mg tablet Take 1 tablet (20 mg total) by mouth daily at bedtime as needed for heartburn 30 tablet 2    predniSONE 20 mg tablet Take 2 tablets (40 mg total) by mouth daily for 3 days 6 tablet 0     No current facility-administered medications for this visit       Review of Systems   Constitutional: Negative for fever and unexpected weight change  HENT: Positive for congestion and sinus pain  Negative for ear pain, sore throat and trouble swallowing  Eyes: Negative for pain and visual disturbance  Respiratory: Positive for cough, shortness of breath and wheezing  Negative for chest tightness  Cardiovascular: Negative for chest pain  Gastrointestinal: Negative for abdominal distention, abdominal pain, blood in stool, constipation, diarrhea, nausea and vomiting  Endocrine: Negative for polydipsia and polyuria  Genitourinary: Negative for dysuria and hematuria  Musculoskeletal: Positive for myalgias  Negative for back pain  Skin: Negative for rash  Neurological: Negative for syncope and headaches  Psychiatric/Behavioral: Negative for suicidal ideas  Objective:      /80   Pulse 82   Temp 98 3 °F (36 8 °C) (Oral)   Resp 17   Ht 5' 2" (1 575 m)   Wt 80 3 kg (177 lb)   SpO2 96%   BMI 32 37 kg/m²          Physical Exam   Constitutional: She is oriented to person, place, and time  She appears well-developed and well-nourished  HENT:   Head: Normocephalic and atraumatic     Right Ear: External ear normal    Left Ear: External ear normal    Mouth/Throat: No oropharyngeal exudate  Eyes: Pupils are equal, round, and reactive to light  Conjunctivae and EOM are normal  No scleral icterus  Neck: Normal range of motion  Neck supple  Cardiovascular: Normal rate, regular rhythm and intact distal pulses  Exam reveals no gallop and no friction rub  Pulses are no weak pulses  No murmur heard  Pulses:       Dorsalis pedis pulses are 2+ on the right side, and 2+ on the left side  Posterior tibial pulses are 2+ on the right side, and 2+ on the left side  Pulmonary/Chest: Effort normal  No respiratory distress  She has wheezes  She has no rales  Abdominal: Soft  Bowel sounds are normal  She exhibits no distension and no mass  There is no tenderness  There is no rebound  Musculoskeletal: Normal range of motion  She exhibits no edema  Feet:   Right Foot:   Skin Integrity: Negative for ulcer, skin breakdown, erythema, warmth, callus or dry skin  Left Foot:   Skin Integrity: Negative for ulcer, skin breakdown, erythema, warmth, callus or dry skin  Neurological: She is alert and oriented to person, place, and time  Skin: Skin is warm and dry  Psychiatric: She has a normal mood and affect  Patient's shoes and socks removed  Right Foot/Ankle   Right Foot Inspection  Skin Exam: skin normal and skin intact no dry skin, no warmth, no callus, no erythema, no maceration, no abnormal color, no pre-ulcer, no ulcer and no callus                          Toe Exam: ROM and strength within normal limits  Sensory   Vibration: intact  Proprioception: intact   Monofilament testing: intact  Vascular  Capillary refills: < 3 seconds  The right DP pulse is 2+  The right PT pulse is 2+       Left Foot/Ankle  Left Foot Inspection  Skin Exam: skin normal and skin intactno dry skin, no warmth, no erythema, no maceration, normal color, no pre-ulcer, no ulcer and no callus                         Toe Exam: ROM and strength within normal limits                   Sensory   Vibration: intact  Proprioception: intact  Monofilament: intact  Vascular  Capillary refills: < 3 seconds  The left DP pulse is 2+  The left PT pulse is 2+  Assign Risk Category:  No deformity present; No loss of protective sensation;  No weak pulses       Risk: 0

## 2019-12-04 LAB
CREAT UR-MCNC: 99.3 MG/DL
MICROALBUMIN UR-MCNC: 8.2 MG/L (ref 0–20)
MICROALBUMIN/CREAT 24H UR: 8 MG/G CREATININE (ref 0–30)

## 2019-12-04 PROCEDURE — 3061F NEG MICROALBUMINURIA REV: CPT | Performed by: FAMILY MEDICINE

## 2019-12-04 NOTE — PATIENT INSTRUCTIONS
I do not see any strong evidence for bacterial infection that would require antibiotics at this point  I would recommend following with the course of action as set forth by the family doctor earlier today and if no improvement in the next 3-5 days, follow up for re-evaluation with the PCP  Follow up with PCP in 3-5 days  Proceed to  ER if symptoms worsen  Upper Respiratory Infection   AMBULATORY CARE:   An upper respiratory infection  is also called a common cold  It can affect your nose, throat, ears, and sinuses  Common signs and symptoms include the following:  Cold symptoms are usually worst for the first 3 to 5 days  You may have any of the following:  · Runny or stuffy nose    · Sneezing and coughing    · Sore throat or hoarseness    · Red, watery, and sore eyes    · Fatigue     · Chills and fever    · Headache, body aches, or sore muscles  Seek care immediately if:   · You have chest pain or trouble breathing  Contact your healthcare provider if:   · You have a fever over 102ºF (39°C)  · Your sore throat gets worse or you see white or yellow spots in your throat  · Your symptoms get worse after 3 to 5 days or your cold is not better in 14 days  · You have a rash anywhere on your skin  · You have large, tender lumps in your neck  · You have thick, green or yellow drainage from your nose  · You cough up thick yellow, green, or bloody mucus  · You have vomiting for more than 24 hours and cannot keep fluids down  · You have a bad earache  · You have questions or concerns about your condition or care  Treatment for a cold: There is no cure for the common cold  Colds are caused by viruses and do not get better with antibiotics  Most people get better in 7 to 14 days  You may continue to cough for 2 to 3 weeks  The following may help decrease your symptoms:  · Decongestants  help reduce nasal congestion and help you breathe more easily   If you take decongestant pills, they may make you feel restless or not able to sleep  Do not use decongestant sprays for more than a few days  · Cough suppressants  help reduce coughing  Ask your healthcare provider which type of cough medicine is best for you  · NSAIDs , such as ibuprofen, help decrease swelling, pain, and fever  NSAIDs can cause stomach bleeding or kidney problems in certain people  If you take blood thinner medicine, always ask your healthcare provider if NSAIDs are safe for you  Always read the medicine label and follow directions  · Acetaminophen  decreases pain and fever  It is available without a doctor's order  Ask how much to take and how often to take it  Follow directions  Read the labels of all other medicines you are using to see if they also contain acetaminophen, or ask your doctor or pharmacist  Acetaminophen can cause liver damage if not taken correctly  Do not use more than 4 grams (4,000 milligrams) total of acetaminophen in one day  Manage your cold:   · Rest as much as possible  Slowly start to do more each day  · Drink more liquids as directed  Liquids will help thin and loosen mucus so you can cough it up  Liquids will also help prevent dehydration  Liquids that help prevent dehydration include water, fruit juice, and broth  Do not drink liquids that contain caffeine  Caffeine can increase your risk for dehydration  Ask your healthcare provider how much liquid to drink each day  · Soothe a sore throat  Gargle with warm salt water  This helps your sore throat feel better  Make salt water by dissolving ¼ teaspoon salt in 1 cup warm water  You may also suck on hard candy or throat lozenges  You may use a sore throat spray  · Use a humidifier or vaporizer  Use a cool mist humidifier or a vaporizer to increase air moisture in your home  This may make it easier for you to breathe and help decrease your cough  · Use saline nasal drops as directed  These help relieve congestion       · Apply petroleum-based jelly around the outside of your nostrils  This can decrease irritation from blowing your nose  · Do not smoke  Nicotine and other chemicals in cigarettes and cigars can make your symptoms worse  They can also cause infections such as bronchitis or pneumonia  Ask your healthcare provider for information if you currently smoke and need help to quit  E-cigarettes or smokeless tobacco still contain nicotine  Talk to your healthcare provider before you use these products  Prevent spreading your cold to others:   · Try to stay away from other people during the first 2 to 3 days of your cold when it is more easily spread  · Do not share food or drinks  · Do not share hand towels with household members  · Wash your hands often, especially after you blow your nose  Turn away from other people and cover your mouth and nose with a tissue when you sneeze or cough  Follow up with your healthcare provider as directed:  Write down your questions so you remember to ask them during your visits  © 2017 [employer]  Information is for End User's use only and may not be sold, redistributed or otherwise used for commercial purposes  All illustrations and images included in CareNotes® are the copyrighted property of [employer] , [employer]  or [employer]  The above information is an  only  It is not intended as medical advice for individual conditions or treatments  Talk to your doctor, nurse or pharmacist before following any medical regimen to see if it is safe and effective for you

## 2019-12-04 NOTE — PROGRESS NOTES
Patient's daughter very argumentative and insisting that her mother be prescribed an antibiotic  She stood in the doorway of the room and argued with the physician    Patient's daughter escorted her out with out her discharge instructions

## 2019-12-04 NOTE — PROGRESS NOTES
[facility]        NAME: Lu Guerra is a 63 y o  female  : 1960    MRN: [id number]  DATE: December 3, 2019  TIME: 8:03 PM    Assessment and Plan   Viral upper respiratory tract infection [J06 9]  1  Viral upper respiratory tract infection           Patient Instructions     I do not see any strong evidence for bacterial infection that would require antibiotics at this point  I would recommend following with the course of action as set forth by the family doctor earlier today and if no improvement in the next 3-5 days, follow up for re-evaluation with the PCP  Follow up with PCP in 3-5 days  Proceed to  ER if symptoms worsen  Chief Complaint     Chief Complaint   Patient presents with    Sinusitis     Daughter states she has had sinus pressure, fever, and cough for a week  Patient did see her PCP today and thye prescribed prednisne, albuterol, and pepcid  Daughter sttste they didn't make her better         History of Present Illness       Sinusitis (Daughter states she has had sinus pressure, fever, and cough for a week  Patient did see her PCP today and thye prescribed prednisne, albuterol, and pepcid  Daughter sttste they didn't make her better)  The patient is Citizen of [place]-speaking, and the entire dialogue is given by the English-speaking daughter  The daughter brought her mother here because she wants her mother to have an antibiotic  She was seen by her family doctor today and diagnosed with a viral URI and the daughter insists that the mother will not get better unless she has an antibiotic tonight  Her mother did not start any the medications prescribed earlier today by the PCP  She tried Mucinex for 2 days with minimal relief  I explained to her that most sinus infections and upper respiratory infections are due to viruses  Antibiotics do not help viruses    The prednisone that was prescribed by the family doctor earlier today would help decrease some of the swelling in the nasal passages in the sinuses and allow for better drainage of any mucus  She needs to give the prednisone a couple days to decrease inflammation and assist with drainage of the sinuses  Sinusitis   This is a new problem  The current episode started in the past 7 days  The problem is unchanged  There has been no fever  The pain is mild  Associated symptoms include chills, congestion, coughing and sinus pressure  Review of Systems   Review of Systems   Constitutional: Positive for chills  HENT: Positive for congestion and sinus pressure  Respiratory: Positive for cough  Cardiovascular: Negative            Current Medications       Current Outpatient Medications:     albuterol (PROVENTIL HFA,VENTOLIN HFA) 90 mcg/act inhaler, Inhale 2 puffs every 6 (six) hours as needed for wheezing or shortness of breath, Disp: 1 Inhaler, Rfl: 5    atorvastatin (LIPITOR) 20 mg tablet, Take 1 tablet (20 mg total) by mouth daily at bedtime, Disp: 90 tablet, Rfl: 1    famotidine (PEPCID) 20 mg tablet, Take 1 tablet (20 mg total) by mouth daily at bedtime as needed for heartburn, Disp: 30 tablet, Rfl: 2    glipiZIDE (GLUCOTROL) 5 mg tablet, Take 1 tablet (5 mg total) by mouth 2 (two) times a day before meals, Disp: 180 tablet, Rfl: 1    losartan (COZAAR) 50 mg tablet, Take 1 tablet (50 mg total) by mouth daily, Disp: 90 tablet, Rfl: 1    metFORMIN (GLUCOPHAGE) 500 mg tablet, Take 1 tablet (500 mg total) by mouth 2 (two) times a day with meals, Disp: 180 tablet, Rfl: 1    predniSONE 20 mg tablet, Take 2 tablets (40 mg total) by mouth daily for 3 days, Disp: 6 tablet, Rfl: 0    gabapentin (NEURONTIN) 300 mg capsule, Take 1 capsule (300 mg total) by mouth daily at bedtime (Patient not taking: Reported on 12/3/2019), Disp: 30 capsule, Rfl: 1    loratadine (CLARITIN) 10 mg tablet, Take 1 tablet (10 mg total) by mouth daily (Patient not taking: Reported on 12/3/2019), Disp: 90 tablet, Rfl: 0    triamcinolone (KENALOG) 0 5 % ointment, Apply topically 2 (two) times a day (Patient not taking: Reported on 12/3/2019), Disp: 30 g, Rfl: 1    Current Allergies     Allergies as of 12/03/2019 - Reviewed 12/03/2019   Allergen Reaction Noted    Aspirin Shortness Of Breath and Itching 12/20/2016    Nsaids  12/18/2018    Latex Itching and Rash 12/18/2018            The following portions of the patient's history were reviewed and updated as appropriate: allergies, current medications, past family history, past medical history, past social history, past surgical history and problem list      Past Medical History:   Diagnosis Date    Acquired hammer toe of right foot 3/15/2018    S/p surg    Arthritis     Asthma     "one time with a cold, and used an inhaler"    Chronic pain disorder     hip pain left, and back    Diabetes mellitus ([address] )     Eczema     GERD (gastroesophageal reflux disease)     History of panic attacks     Hyperlipidemia     Hypertension     Labral tear of shoulder 9/24/2014    Right foot pain     bunionectomy today 3/1/2019    Rotator cuff syndrome, right 8/16/2017    Uses Slovak as primary spoken language     Wears glasses        Past Surgical History:   Procedure Laterality Date    CHOLECYSTECTOMY      "had a panic attack"    FOOT SURGERY Right     HERNIA REPAIR      OVARY SURGERY      TX COLONOSCOPY FLX DX W/COLLJ SPEC WHEN PFRMD N/A 12/22/2016    Procedure: EGD AND COLONOSCOPY;  Surgeon: Smiley Akers MD;  Location: [facility] GI LAB;   Service: Gastroenterology    TX CORRJ HALLUX VALGUS W/SESMDC W/2 OSTEOT Right 3/1/2019    Procedure: DOUBLE OSTEOTOMY, ZINA/AKIN BUNIONECTOMY;  Surgeon: Deneen Palacios DPM;  Location[facility] OR;  Service: Podiatry       Family History   Problem Relation Age of Onset    Hypertension Mother     Hypertension Father     Bone cancer Maternal Grandfather     Breast cancer Maternal Aunt     Stomach cancer Brother          Medications have been verified  Objective   BP (!) 180/105   Pulse (!) 127   Temp 99 3 °F (37 4 °C)   Resp 20   Ht 5' 2" (1 575 m)   Wt 80 3 kg (177 lb)   SpO2 96%   BMI 32 37 kg/m²        Physical Exam     Physical Exam   Constitutional: She is oriented to person, place, and time  She appears well-developed and well-nourished  HENT:   Right Ear: External ear normal    Left Ear: External ear normal    Nose: Nose normal  Right sinus exhibits no maxillary sinus tenderness and no frontal sinus tenderness  Left sinus exhibits no maxillary sinus tenderness and no frontal sinus tenderness  Mouth/Throat: Oropharynx is clear and moist  No oropharyngeal exudate  Eyes: Conjunctivae are normal    Neck: Normal range of motion  Neck supple  Cardiovascular: Normal rate, regular rhythm and normal heart sounds  No murmur heard  Pulmonary/Chest: Effort normal and breath sounds normal  No respiratory distress  She has no wheezes  She has no rales  She exhibits no tenderness  Abdominal: Soft  Musculoskeletal: Normal range of motion  Lymphadenopathy:     She has no cervical adenopathy  Neurological: She is alert and oriented to person, place, and time  Skin: Skin is warm  No rash noted  No erythema

## 2019-12-07 ENCOUNTER — APPOINTMENT (EMERGENCY)
Dept: RADIOLOGY | Facility: HOSPITAL | Age: 59
End: 2019-12-07
Payer: COMMERCIAL

## 2019-12-07 ENCOUNTER — HOSPITAL ENCOUNTER (EMERGENCY)
Facility: HOSPITAL | Age: 59
Discharge: HOME | End: 2019-12-07
Attending: EMERGENCY MEDICINE | Admitting: EMERGENCY MEDICINE
Payer: COMMERCIAL

## 2019-12-07 VITALS
DIASTOLIC BLOOD PRESSURE: 78 MMHG | SYSTOLIC BLOOD PRESSURE: 136 MMHG | OXYGEN SATURATION: 99 % | HEART RATE: 82 BPM | WEIGHT: 170 LBS | RESPIRATION RATE: 18 BRPM | TEMPERATURE: 99 F | BODY MASS INDEX: 31.09 KG/M2

## 2019-12-07 DIAGNOSIS — R05.9 COUGH: ICD-10-CM

## 2019-12-07 DIAGNOSIS — J32.9 SINUSITIS: Primary | ICD-10-CM

## 2019-12-07 DIAGNOSIS — R50.9 FEVER: ICD-10-CM

## 2019-12-07 PROCEDURE — 99283 EMERGENCY DEPT VISIT LOW MDM: CPT

## 2019-12-07 PROCEDURE — 71046 X-RAY EXAM CHEST 2 VIEWS: CPT

## 2019-12-07 PROCEDURE — 99284 EMERGENCY DEPT VISIT MOD MDM: CPT | Performed by: EMERGENCY MEDICINE

## 2019-12-07 RX ORDER — AMOXICILLIN AND CLAVULANATE POTASSIUM 875; 125 MG/1; MG/1
1 TABLET, FILM COATED ORAL ONCE
Status: COMPLETED | OUTPATIENT
Start: 2019-12-07 | End: 2019-12-07

## 2019-12-07 RX ORDER — AMOXICILLIN AND CLAVULANATE POTASSIUM 875; 125 MG/1; MG/1
1 TABLET, FILM COATED ORAL EVERY 12 HOURS
Qty: 14 TABLET | Refills: 0 | Status: SHIPPED | OUTPATIENT
Start: 2019-12-07 | End: 2019-12-14

## 2019-12-07 RX ORDER — OXYMETAZOLINE HYDROCHLORIDE 0.05 G/100ML
2 SPRAY NASAL ONCE
Status: COMPLETED | OUTPATIENT
Start: 2019-12-07 | End: 2019-12-07

## 2019-12-07 RX ADMIN — OXYMETAZOLINE HYDROCHLORIDE 2 SPRAY: 0.05 SPRAY NASAL at 20:53

## 2019-12-07 RX ADMIN — AMOXICILLIN AND CLAVULANATE POTASSIUM 1 TABLET: 875; 125 TABLET, FILM COATED ORAL at 21:34

## 2019-12-08 NOTE — ED PROVIDER NOTES
History  Chief Complaint   Patient presents with    Fever - 9 weeks to 74 years     Fever, HA, body aches, for the past 2 weeks  Reports that she has been seen and was given medications and it does not seem to be working  HPI    Prior to Admission Medications   Prescriptions Last Dose Informant Patient Reported? Taking?    albuterol (PROVENTIL HFA,VENTOLIN HFA) 90 mcg/act inhaler   No No   Sig: Inhale 2 puffs every 6 (six) hours as needed for wheezing or shortness of breath   atorvastatin (LIPITOR) 20 mg tablet   No No   Sig: Take 1 tablet (20 mg total) by mouth daily at bedtime   famotidine (PEPCID) 20 mg tablet   No No   Sig: Take 1 tablet (20 mg total) by mouth daily at bedtime as needed for heartburn   gabapentin (NEURONTIN) 300 mg capsule   No No   Sig: Take 1 capsule (300 mg total) by mouth daily at bedtime   Patient not taking: Reported on 12/3/2019   glipiZIDE (GLUCOTROL) 5 mg tablet   No No   Sig: Take 1 tablet (5 mg total) by mouth 2 (two) times a day before meals   loratadine (CLARITIN) 10 mg tablet  Self No No   Sig: Take 1 tablet (10 mg total) by mouth daily   Patient not taking: Reported on 12/3/2019   losartan (COZAAR) 50 mg tablet   No No   Sig: Take 1 tablet (50 mg total) by mouth daily   metFORMIN (GLUCOPHAGE) 500 mg tablet   No No   Sig: Take 1 tablet (500 mg total) by mouth 2 (two) times a day with meals   predniSONE 20 mg tablet   No No   Sig: Take 2 tablets (40 mg total) by mouth daily for 3 days   triamcinolone (KENALOG) 0 5 % ointment  Self No No   Sig: Apply topically 2 (two) times a day   Patient not taking: Reported on 12/3/2019      Facility-Administered Medications: None       Past Medical History:   Diagnosis Date    Acquired hammer toe of right foot 3/15/2018    S/p surg    Arthritis     Asthma     "one time with a cold, and used an inhaler"    Chronic pain disorder     hip pain left, and back    Diabetes mellitus (HCC)     Eczema     GERD (gastroesophageal reflux disease)  History of panic attacks     Hyperlipidemia     Hypertension     Labral tear of shoulder 9/24/2014    Right foot pain     bunionectomy today 3/1/2019    Rotator cuff syndrome, right 8/16/2017    Uses Belarusian as primary spoken language     Wears glasses        Past Surgical History:   Procedure Laterality Date    CHOLECYSTECTOMY      "had a panic attack"    FOOT SURGERY Right     HERNIA REPAIR      OVARY SURGERY      [place] COLONOSCOPY FLX DX W/COLLJ SPEC WHEN PFRMD N/A 12/22/2016    Procedure: EGD AND COLONOSCOPY;  Surgeon: Kay Lennox, MD;  Location: [facility] GI LAB; Service: Gastroenterology    ND CORRJ HALLUX VALGUS W/SESMDC W/2 OSTEOT Right 3/1/2019    Procedure: DOUBLE OSTEOTOMY, ZINA/AKIN BUNIONECTOMY;  Surgeon: Rama Jane DPM;  Location: [facility] OR;  Service: Podiatry       Family History   Problem Relation Age of Onset    Hypertension Mother     Hypertension Father     Bone cancer Maternal Grandfather     Breast cancer Maternal Aunt     Stomach cancer Brother      I have reviewed and agree with the history as documented      Social History     Tobacco Use    Smoking status: Never Smoker    Smokeless tobacco: Never Used   Substance Use Topics    Alcohol use: Never     Frequency: Never    Drug use: Never        Review of Systems    Physical Exam  Physical Exam    Vital Signs  ED Triage Vitals [12/07/19 1852]   Temperature Pulse Respirations Blood Pressure SpO2   99 °F (37 2 °C) 105 18 165/94 98 %      Temp Source Heart Rate Source Patient Position - Orthostatic VS BP Location FiO2 (%)   Oral Monitor Sitting Right arm --      Pain Score       7           Vitals:    12/07/19 1852 12/07/19 2124   BP: 165/94 136/78   Pulse: 105 82   Patient Position - Orthostatic VS: Sitting Sitting         Visual Acuity      ED Medications  Medications   oxymetazoline (AFRIN) 0 05 % nasal spray 2 spray (2 sprays Each Nare Given 12/7/19 2053)   amoxicillin-clavulanate (AUGMENTIN) 875-125 mg per tablet 1 tablet (1 tablet Oral Given 12/7/19 2134)       Diagnostic Studies  Results Reviewed     None                 XR chest 2 views   ED Interpretation by Greta Zhou MD (12/07 2103)   No acute cardiopulmonary process  Procedures  Procedures         ED Course                         Wells' Criteria for PE      Most Recent Value   Wells' Criteria for PE   Clinical signs and symptoms of DVT  0 Filed at: 12/07/2019 2016   PE is primary diagnosis or equally likely  0 Filed at: 12/07/2019 2016   HR >100  1 5 Filed at: 12/07/2019 2016   Immobilization at least 3 days or Surgery in the previous 4 weeks  0 Filed at: 12/07/2019 2016   Previous, objectively diagnosed PE or DVT  0 Filed at: 12/07/2019 2016   Hemoptysis  0 Filed at: 12/07/2019 2016   Malignancy with treatment within 6 months or palliative  0 Filed at: 12/07/2019 2016   Wells' Criteria Total  1 5 Filed at: 12/07/2019 2016            MDM      Disposition  Final diagnoses:   Sinusitis   Fever   Cough     Time reflects when diagnosis was documented in both MDM as applicable and the Disposition within this note     Time User Action Codes Description Comment    12/7/2019  9:18 PM Bridget Mejia [J32 9] Sinusitis     12/7/2019  9:18 PM Adolfo Terrazas Add [R50 9] Fever     12/7/2019  9:18 PM Adolfo Terrazas Add [R05] Cough       ED Disposition     ED Disposition Condition Date/Time Comment    Discharge Stable Sat Dec 7, 2019 [facility] discharge to home/self care              Follow-up Information     Follow up With Specialties Details Why Contact Info Additional Information    Angel Pickering MD Family Medicine Schedule an appointment as soon as possible for a visit   [facility] [address]  [phone]       [address] [place] [address] Emergency Department Emergency Medicine Go to  If symptoms worsen [address] [facility] ED, [address] [facility], [place], [place], [address]    [address] ENT Otolaryngology Schedule an appointment as soon as possible for a visit   [address] [facility] [address] [phone] [facility] ENT, [address] [place], [place], [id number] [phone]          Patient's Medications   Discharge Prescriptions    AMOXICILLIN-CLAVULANATE (AUGMENTIN) 875-125 MG PER TABLET    Take 1 tablet by mouth every 12 (twelve) hours for 7 days       Start Date: 12/7/2019 End Date: 12/14/2019       Order Dose: 1 tablet       Quantity: 14 tablet    Refills: 0     No discharge procedures on file      ED Provider  Electronically Signed by           Baljit Marrero MD  12/08/19 2975

## 2019-12-08 NOTE — DISCHARGE INSTRUCTIONS
Take 2 sprays of Afrin in each nostril twice a day for no more than 3 days  Take full course of antibiotics as prescribed  Follow up as directed

## 2019-12-08 NOTE — ED PROVIDER NOTES
History  Chief Complaint   Patient presents with    Fever - 9 weeks to 74 years     Fever, HA, body aches, for the past 2 weeks  Reports that she has been seen and was given medications and it does not seem to be working  56-year-old female with history of diabetes and hypertension presents to the emergency department complaining of fever, headache and sinus pressure  The patient reports for the past 2 and half weeks she has had fevers and URI symptoms including congestion, runny nose and cough  The patient says she went to her PCP and to urgent care and was provided with treatment for an asthma exacerbation and told to take over-the-counter medication for a viral infection  The patient says she has been taking all the medicine that was prescribed as well as treating herself with Tylenol and Mucinex with only minimal relief of symptoms  The patient denies chest pain, shortness of breath, nausea, vomiting, abdominal pain, diarrhea, recent travel and sick contacts  Prior to Admission Medications   Prescriptions Last Dose Informant Patient Reported? Taking?    albuterol (PROVENTIL HFA,VENTOLIN HFA) 90 mcg/act inhaler   No No   Sig: Inhale 2 puffs every 6 (six) hours as needed for wheezing or shortness of breath   atorvastatin (LIPITOR) 20 mg tablet   No No   Sig: Take 1 tablet (20 mg total) by mouth daily at bedtime   famotidine (PEPCID) 20 mg tablet   No No   Sig: Take 1 tablet (20 mg total) by mouth daily at bedtime as needed for heartburn   gabapentin (NEURONTIN) 300 mg capsule   No No   Sig: Take 1 capsule (300 mg total) by mouth daily at bedtime   Patient not taking: Reported on 12/3/2019   glipiZIDE (GLUCOTROL) 5 mg tablet   No No   Sig: Take 1 tablet (5 mg total) by mouth 2 (two) times a day before meals   loratadine (CLARITIN) 10 mg tablet  Self No No   Sig: Take 1 tablet (10 mg total) by mouth daily   Patient not taking: Reported on 12/3/2019   losartan (COZAAR) 50 mg tablet   No No   Sig: Take 1 tablet (50 mg total) by mouth daily   metFORMIN (GLUCOPHAGE) 500 mg tablet   No No   Sig: Take 1 tablet (500 mg total) by mouth 2 (two) times a day with meals   predniSONE 20 mg tablet   No No   Sig: Take 2 tablets (40 mg total) by mouth daily for 3 days   triamcinolone (KENALOG) 0 5 % ointment  Self No No   Sig: Apply topically 2 (two) times a day   Patient not taking: Reported on 12/3/2019      Facility-Administered Medications: None       Past Medical History:   Diagnosis Date    Acquired hammer toe of right foot 3/15/2018    S/p surg    Arthritis     Asthma     "one time with a cold, and used an inhaler"    Chronic pain disorder     hip pain left, and back    Diabetes mellitus (HCC)     Eczema     GERD (gastroesophageal reflux disease)     History of panic attacks     Hyperlipidemia     Hypertension     Labral tear of shoulder 9/24/2014    Right foot pain     bunionectomy today 3/1/2019    Rotator cuff syndrome, right 8/16/2017    Uses Greenlandic as primary spoken language     Wears glasses        Past Surgical History:   Procedure Laterality Date    CHOLECYSTECTOMY      "had a panic attack"    FOOT SURGERY Right     HERNIA REPAIR      OVARY SURGERY      [place] COLONOSCOPY FLX DX W/COLLJ SPEC WHEN PFRMD N/A 12/22/2016    Procedure: EGD AND COLONOSCOPY;  Surgeon: Raegan So MD;  Location: [facility] GI LAB; Service: Gastroenterology    [place] CORRJ HALLUX VALGUS W/SESMDC W/2 OSTEOT Right 3/1/2019    Procedure: DOUBLE OSTEOTOMY, ZINA/AKIN BUNIONECTOMY;  Surgeon: Ovidio Manjarrez DPM;  Location: [facility] OR;  Service: Podiatry       Family History   Problem Relation Age of Onset    Hypertension Mother     Hypertension Father     Bone cancer Maternal Grandfather     Breast cancer Maternal Aunt     Stomach cancer Brother      I have reviewed and agree with the history as documented      Social History     Tobacco Use    Smoking status: Never Smoker    Smokeless tobacco: Never Used Substance Use Topics    Alcohol use: Never     Frequency: Never    Drug use: Never        Review of Systems   Constitutional: Positive for chills and fever  HENT: Positive for rhinorrhea, sinus pressure and sinus pain  Negative for congestion, sore throat and voice change  Eyes: Negative for photophobia and visual disturbance  Respiratory: Positive for cough  Negative for chest tightness and shortness of breath  Cardiovascular: Negative for chest pain, palpitations and leg swelling  Gastrointestinal: Negative for abdominal pain, constipation, diarrhea, nausea and vomiting  Endocrine: Negative for polydipsia, polyphagia and polyuria  Genitourinary: Negative for difficulty urinating, dysuria, flank pain and urgency  Musculoskeletal: Negative for back pain, gait problem and neck pain  Skin: Negative for pallor and rash  Neurological: Positive for headaches  Negative for dizziness, syncope, weakness, light-headedness and numbness  Psychiatric/Behavioral: Negative for agitation and confusion  All other systems reviewed and are negative  Physical Exam  ED Triage Vitals [12/07/19 1852]   Temperature Pulse Respirations Blood Pressure SpO2   99 °F (37 2 °C) 105 18 165/94 98 %      Temp Source Heart Rate Source Patient Position - Orthostatic VS BP Location FiO2 (%)   Oral Monitor Sitting Right arm --      Pain Score       7             Orthostatic Vital Signs  Vitals:    12/07/19 1852 12/07/19 2124   BP: 165/94 136/78   Pulse: 105 82   Patient Position - Orthostatic VS: Sitting Sitting       Physical Exam   Constitutional: She is oriented to person, place, and time  She appears well-developed and well-nourished  HENT:   Head: Normocephalic and atraumatic  Right Ear: Tympanic membrane normal    Left Ear: Tympanic membrane normal    Nose: No rhinorrhea  Right sinus exhibits maxillary sinus tenderness and frontal sinus tenderness   Left sinus exhibits maxillary sinus tenderness and frontal sinus tenderness  Mouth/Throat: Oropharynx is clear and moist and mucous membranes are normal    Eyes: Pupils are equal, round, and reactive to light  EOM are normal    Neck: Normal range of motion  Neck supple  Cardiovascular: Normal rate, regular rhythm, normal heart sounds and intact distal pulses  Pulmonary/Chest: Effort normal and breath sounds normal    Abdominal: Soft  Bowel sounds are normal  She exhibits no distension  There is no tenderness  There is no rebound and no guarding  Neurological: She is alert and oriented to person, place, and time  Skin: Skin is warm and dry  Capillary refill takes less than 2 seconds  Nursing note and vitals reviewed  ED Medications  Medications   oxymetazoline (AFRIN) 0 05 % nasal spray 2 spray (2 sprays Each Nare Given 12/7/19 2053)   amoxicillin-clavulanate (AUGMENTIN) 875-125 mg per tablet 1 tablet (1 tablet Oral Given 12/7/19 2134)       Diagnostic Studies  Results Reviewed     None                 XR chest 2 views   ED Interpretation by Milton Cortez MD (12/07 2103)   No acute cardiopulmonary process              Procedures  Procedures      ED Course                         Wells' Criteria for PE      Most Recent Value   Wells' Criteria for PE   Clinical signs and symptoms of DVT  0 Filed at: 12/07/2019 2016   PE is primary diagnosis or equally likely  0 Filed at: 12/07/2019 2016   HR >100  1 5 Filed at: 12/07/2019 2016   Immobilization at least 3 days or Surgery in the previous 4 weeks  0 Filed at: 12/07/2019 2016   Previous, objectively diagnosed PE or DVT  0 Filed at: 12/07/2019 2016   Hemoptysis  0 Filed at: 12/07/2019 2016   Malignancy with treatment within 6 months or palliative  0 Filed at: 12/07/2019 2016   Wells' Criteria Total  1 5 Filed at: 12/07/2019 2016            MDM  Number of Diagnoses or Management Options  Cough:   Fever:   Sinusitis:   Diagnosis management comments: 71-year-old female presented to the emergency department for evaluation of fever, headache and sinus pressure  On arrival the patient was alert, awake, oriented and in no acute distress  The patient has been experiencing congestion, fevers and sinus pressure going on 3 weeks now  Symptomatic treatment has been unsuccessful to this point in controlling the patient's symptoms  Antibiotics are appropriate at this time to treat the patient's sinusitis  Chest x-ray showed no acute cardiopulmonary process  The patient was treated in the emergency department with Afrin nasal spray and given a dose of Augmentin  The patient will be discharged at this time with a course of antibiotics and with recommendation to use her Afrin spray every 12 hours for no more than 3 days  She was provided with contact information for ENT with recommendation to follow up for continued symptoms  Patient agrees with the plan for discharge and feels comfortable to go home with proper f/u  Advised to return for worsening or additional problems  Diagnostic tests were reviewed and questions answered  Diagnosis, care plan and treatment options were discussed  The patient understands instructions and will follow up as directed  Disposition  Final diagnoses:   Sinusitis   Fever   Cough     Time reflects when diagnosis was documented in both MDM as applicable and the Disposition within this note     Time User Action Codes Description Comment    12/7/2019  9:18 PM Brooklynn Granado [J32 9] Sinusitis     12/7/2019  9:18 PM Soundra Lax Add [R50 9] Fever     12/7/2019  9:18 PM Soundra Lax Add [R05] Cough       ED Disposition     ED Disposition Condition Date/Time Comment    Discharge Stable Sat Dec 7, 2019  [address] discharge to home/self care              Follow-up Information     Follow up With Specialties Details Why Contact Info Additional Information    Jordan Guidry MD Family Medicine Schedule an appointment as soon as possible for a visit   [facility] [address] [address] Emergency Department Emergency Medicine Go to  If symptoms worsen [address] [address]  [phone] [facility] ED, [address], [address] [place] [address] [address] ENT Otolaryngology Schedule an appointment as soon as possible for a visit   [address] [facility] [address]  [address] ENT, [address] [place] [address], [address] [phone]          Discharge Medication List as of 12/7/2019  9:24 PM      START taking these medications    Details   amoxicillin-clavulanate (AUGMENTIN) 875-125 mg per tablet Take 1 tablet by mouth every 12 (twelve) hours for 7 days, Starting Sat 12/7/2019, Until Sat 12/14/2019, Normal         CONTINUE these medications which have NOT CHANGED    Details   albuterol (PROVENTIL HFA,VENTOLIN HFA) 90 mcg/act inhaler Inhale 2 puffs every 6 (six) hours as needed for wheezing or shortness of breath, Starting Tue 12/3/2019, Normal      atorvastatin (LIPITOR) 20 mg tablet Take 1 tablet (20 mg total) by mouth daily at bedtime, Starting Mon 9/9/2019, Normal      famotidine (PEPCID) 20 mg tablet Take 1 tablet (20 mg total) by mouth daily at bedtime as needed for heartburn, Starting Tue 12/3/2019, Normal      gabapentin (NEURONTIN) 300 mg capsule Take 1 capsule (300 mg total) by mouth daily at bedtime, Starting Wed 7/10/2019, Normal      glipiZIDE (GLUCOTROL) 5 mg tablet Take 1 tablet (5 mg total) by mouth 2 (two) times a day before meals, Starting Mon 9/9/2019, Normal      loratadine (CLARITIN) 10 mg tablet Take 1 tablet (10 mg total) by mouth daily, Starting Thu 2/14/2019, Normal      losartan (COZAAR) 50 mg tablet Take 1 tablet (50 mg total) by mouth daily, Starting Mon 9/9/2019, Normal      metFORMIN (GLUCOPHAGE) 500 mg tablet Take 1 tablet (500 mg total) by mouth 2 (two) times a day with meals, Starting Mon 9/9/2019, Normal      triamcinolone (KENALOG) 0 5 % ointment Apply topically 2 (two) times a day, Starting Thu 2/14/2019, Normal         STOP taking these medications       predniSONE 20 mg tablet Comments:   Reason for Stopping:             No discharge procedures on file  ED Provider  Attending physically available and evaluated Michela Do I managed the patient along with the ED Attending      Electronically Signed by         Reg Prieto MD  12/08/19 1218

## 2019-12-08 NOTE — ED NOTES
Patient transported to [address] [place] [address], [address] [place] [address]  12/07/19 6344

## 2019-12-08 NOTE — ED ATTENDING ATTESTATION
12/7/2019  I, Letitia Sacks, MD, saw and evaluated the patient  I have discussed the patient with the resident/non-physician practitioner and agree with the resident's/non-physician practitioner's findings, Plan of Care, and MDM as documented in the resident's/non-physician practitioner's note, except where noted  All available labs and Radiology studies were reviewed  I was present for key portions of any procedure(s) performed by the resident/non-physician practitioner and I was immediately available to provide assistance  At this point I agree with the current assessment done in the Emergency Department  I have conducted an independent evaluation of this patient a history and physical is as follows:    Patient presents with fever intermittently for past 3 days facial pain congestion and cough  Patient is awake and alert nontoxic no acute distress  Patient does have findings consistent with maxillary sinusitis will be given decongestants antibiotics follow with primary care provider as outpatient  Strict return precautions discussed with patient and family at bedside       ED Course         Critical Care Time  Procedures

## 2020-01-15 DIAGNOSIS — L30.9 ECZEMA, UNSPECIFIED TYPE: ICD-10-CM

## 2020-01-15 RX ORDER — TRIAMCINOLONE ACETONIDE 5 MG/G
OINTMENT TOPICAL
Qty: 30 G | Refills: 0 | Status: SHIPPED | OUTPATIENT
Start: 2020-01-15 | End: 2020-11-05 | Stop reason: SDUPTHER

## 2020-01-16 LAB
LEFT EYE DIABETIC RETINOPATHY: NORMAL
RIGHT EYE DIABETIC RETINOPATHY: NORMAL

## 2020-02-28 DIAGNOSIS — I10 HYPERTENSION, BENIGN: ICD-10-CM

## 2020-02-28 DIAGNOSIS — E11.9 DIABETES MELLITUS TYPE 2, CONTROLLED (HCC): ICD-10-CM

## 2020-02-28 DIAGNOSIS — E78.5 HYPERLIPIDEMIA: ICD-10-CM

## 2020-02-28 RX ORDER — LOSARTAN POTASSIUM 50 MG/1
TABLET ORAL
Qty: 90 TABLET | Refills: 1 | Status: SHIPPED | OUTPATIENT
Start: 2020-02-28 | End: 2020-08-18 | Stop reason: SDUPTHER

## 2020-02-28 RX ORDER — GLIPIZIDE 5 MG/1
5 TABLET ORAL
Qty: 180 TABLET | Refills: 1 | Status: SHIPPED | OUTPATIENT
Start: 2020-02-28 | End: 2020-08-05

## 2020-02-28 RX ORDER — ATORVASTATIN CALCIUM 20 MG/1
TABLET, FILM COATED ORAL
Qty: 90 TABLET | Refills: 1 | Status: SHIPPED | OUTPATIENT
Start: 2020-02-28 | End: 2020-08-18 | Stop reason: SDUPTHER

## 2020-03-02 DIAGNOSIS — K21.9 GASTROESOPHAGEAL REFLUX DISEASE, ESOPHAGITIS PRESENCE NOT SPECIFIED: ICD-10-CM

## 2020-03-02 RX ORDER — FAMOTIDINE 20 MG/1
20 TABLET, FILM COATED ORAL
Qty: 30 TABLET | Refills: 2 | Status: SHIPPED | OUTPATIENT
Start: 2020-03-02 | End: 2020-05-30 | Stop reason: SDUPTHER

## 2020-05-26 ENCOUNTER — TELEPHONE (OUTPATIENT)
Dept: OBGYN CLINIC | Facility: CLINIC | Age: 60
End: 2020-05-26

## 2020-05-27 ENCOUNTER — OFFICE VISIT (OUTPATIENT)
Dept: OBGYN CLINIC | Facility: CLINIC | Age: 60
End: 2020-05-27

## 2020-05-27 VITALS
TEMPERATURE: 97.1 F | HEART RATE: 87 BPM | BODY MASS INDEX: 32.89 KG/M2 | SYSTOLIC BLOOD PRESSURE: 122 MMHG | WEIGHT: 179.8 LBS | DIASTOLIC BLOOD PRESSURE: 81 MMHG

## 2020-05-27 DIAGNOSIS — N89.8 VAGINA ITCHING: ICD-10-CM

## 2020-05-27 DIAGNOSIS — B37.3 VAGINA, CANDIDIASIS: Primary | ICD-10-CM

## 2020-05-27 LAB
BV WHIFF TEST VAG QL: NEGATIVE
CLUE CELLS SPEC QL WET PREP: NEGATIVE
SL AMB POCT WET MOUNT: ABNORMAL
T VAGINALIS VAG QL WET PREP: NEGATIVE
YEAST VAG QL WET PREP: POSITIVE

## 2020-05-27 PROCEDURE — 3074F SYST BP LT 130 MM HG: CPT | Performed by: NURSE PRACTITIONER

## 2020-05-27 PROCEDURE — 3079F DIAST BP 80-89 MM HG: CPT | Performed by: NURSE PRACTITIONER

## 2020-05-27 PROCEDURE — 87210 SMEAR WET MOUNT SALINE/INK: CPT | Performed by: NURSE PRACTITIONER

## 2020-05-27 PROCEDURE — 1036F TOBACCO NON-USER: CPT | Performed by: NURSE PRACTITIONER

## 2020-05-27 PROCEDURE — 2022F DILAT RTA XM EVC RTNOPTHY: CPT | Performed by: NURSE PRACTITIONER

## 2020-05-27 PROCEDURE — 99203 OFFICE O/P NEW LOW 30 MIN: CPT | Performed by: NURSE PRACTITIONER

## 2020-05-27 RX ORDER — FLUCONAZOLE 150 MG/1
150 TABLET ORAL
Qty: 2 TABLET | Refills: 0 | Status: SHIPPED | OUTPATIENT
Start: 2020-05-27 | End: 2020-05-31

## 2020-05-30 DIAGNOSIS — K21.9 GASTROESOPHAGEAL REFLUX DISEASE, ESOPHAGITIS PRESENCE NOT SPECIFIED: ICD-10-CM

## 2020-05-30 RX ORDER — FAMOTIDINE 20 MG/1
20 TABLET, FILM COATED ORAL
Qty: 90 TABLET | Refills: 0 | Status: SHIPPED | OUTPATIENT
Start: 2020-05-30 | End: 2020-08-03

## 2020-07-13 ENCOUNTER — TELEPHONE (OUTPATIENT)
Dept: OBGYN CLINIC | Facility: CLINIC | Age: 60
End: 2020-07-13

## 2020-07-14 ENCOUNTER — ANNUAL EXAM (OUTPATIENT)
Dept: OBGYN CLINIC | Facility: CLINIC | Age: 60
End: 2020-07-14

## 2020-07-14 VITALS
TEMPERATURE: 94.4 F | SYSTOLIC BLOOD PRESSURE: 149 MMHG | DIASTOLIC BLOOD PRESSURE: 84 MMHG | WEIGHT: 180 LBS | HEART RATE: 94 BPM | BODY MASS INDEX: 32.92 KG/M2

## 2020-07-14 DIAGNOSIS — Z01.419 ENCOUNTER FOR GYNECOLOGICAL EXAMINATION WITHOUT ABNORMAL FINDING: Primary | ICD-10-CM

## 2020-07-14 DIAGNOSIS — Z11.3 SCREEN FOR STD (SEXUALLY TRANSMITTED DISEASE): ICD-10-CM

## 2020-07-14 DIAGNOSIS — Z12.39 SCREENING FOR BREAST CANCER: ICD-10-CM

## 2020-07-14 DIAGNOSIS — Z12.4 SCREENING FOR CERVICAL CANCER: ICD-10-CM

## 2020-07-14 PROCEDURE — 87491 CHLMYD TRACH DNA AMP PROBE: CPT | Performed by: NURSE PRACTITIONER

## 2020-07-14 PROCEDURE — 87591 N.GONORRHOEAE DNA AMP PROB: CPT | Performed by: NURSE PRACTITIONER

## 2020-07-14 PROCEDURE — G0101 CA SCREEN;PELVIC/BREAST EXAM: HCPCS | Performed by: NURSE PRACTITIONER

## 2020-07-14 NOTE — PATIENT INSTRUCTIONS
OBESIDAD     Obesidad es cuando garcia índice de masa corporal [facility]) es superior a 30  Garcia Body mass index is 32 92 kg/m²  Fortino Florentino Los riesgos de la obesidad incluyen  muchos problemas de [place], incluidas las lesiones y la discapacidad física  Es posible que deba realizarse exámenes para detectar lo siguiente:  · Diabetes     · Hipertensión o colesterol altoEnfermedades del corazón     · Enfermedad cardíaca     · Enfermedades del hígado o de la vesícula biliar     · Cáncer de colon, de pecho, de próstata, de hígado o de riñón     · El apnea del sueño     · Artritis o gota    Busque atención médica de inmediato si:   · Usted tiene un intenso dolor de jack, confusión o dificultad para hablar  · Usted tiene debilidad en un lado del cuerpo  · Usted tiene [place] [employer], sudoración o falta de aire  Pregúntele a garcia Russel Banker vitaminas y minerales son adecuados para usted  · Usted tiene síntomas de enfermedad de la vesícula biliar o el hígado, nia dolor en la parte superior del abdomen  · Usted tiene dolor e incomodidad de rodillas o caderas al caminar  · Usted presenta síntomas de diabetes, nia exceso de apetito y sed y micción frecuente  · Usted presenta síntomas de apnea de sueño, nia roncar o tener sueño delbert el día  · Usted tiene preguntas o inquietudes acerca de garcia condición o cuidado  El tratamiento para la obesidad  se enfoca en ayudarle a bajar de peso para mejorar garcia taylor  Incluso patti reducción mínima del índice de [employer] corporal puede reducir el riesgo de muchos problemas de [place]  Garcia médico lo ayudará a fijarse patti meta para bajar de [place]  · Cambios en el estilo de paige  son los primeros pasos para tratar la obesidad  Estos cambios incluyen celina decisiones para consumir alimentos saludables y realizar patti actividad física con regularidad  El médico puede recomendarle un programa para bajar de peso que consta de capacitación, educación y [place]       · Medicamento  le pueden ayudar a bajar de [employer] en conjunto con Maninder Jeronimo Díaz y Nikos Tobar  · Cirugía  le puede ayudar a bajar de peso si usted es muy hussein y presenta otros problemas de taylor  Existen varios tipos de [facility] para adelgazar  [place] a garcia médico más información  Cómo perder peso de forma exitosa:   · Propóngase metas accesibles y realistas  Un ejemplo de patti meta accesible es caminar 20 minutos los 5 días de la [place]  Otro objetivo puede ser perder 5% de garcia peso corporal     · Coméntele a jeff amigos, familiares y compañeros de trabajo sobre jeff metas  y solicite que lo apoyen  Convide a un amigo para hacer ejercicio juntos o acuda a un deisy de motivación para bajar de [place]  · Identifique los alimentos o situaciones que le pueden provocar que coma en exceso y busque formas para evitarlos  Deshágase de alimentos altos en calorías en garcia hogar o en el trabajo  En la cocina tenga patti canasta con frutas frescas  Si el estrés es la causa para que usted coma más encuentre formas para sobrellevar el estrés  · Lleve un diario en el que registre lo que usted come y shital  También escriba la cantidad de tiempo que pasa realizando patti actividad física delbert el día  Pésese patti vez a la semana y anótelo en garcia diario  Cambios en la alimentación:  Usted necesitará consumir menos de 500 a 1 000 calorías al día de las que usted actualmente consume para perder entre 1 a 2 libras a la semana  Los siguientes cambios le ayudarán a disminuir la cantidad de las calorías que normalmente consume:  · [facility] porciones  Utilice platos pequeños que no midan más de 9 pulgadas de diámetro  Llene la mitad del plato con frutas y verduras  Utilice las tazas medidoras para racionar los alimentos hasta que usted sepa nia se ve el tamaño de patti porción  · Consuma 3 comidas y 1 o 2 meriendas al día  Planee jeff comidas con anterioridad  Cocine y coma en la casa todo el tiempo que le sea posible  Coma lentamente  · Consuma frutas y verduras con todas las comidas  Sofie Bucy y verduras son bajas en calorías y altas en fibra lo cual lo llena  No adicione mantequilla, ni margarina, ni salsas a base de crema a las verduras  Utilice las hierbas para sazonar las verduras al vapor  · Consuma menos grasas y alimentos fritos  Consuma yosi o pescado al horno o la haylie  Estas proteínas son más bajas en calorías y grasas que la carne [place]  Limite las comidas rápidas  Es mejor que utilice aderezos para la ensalada a base de aceite de [place] y vinagre en vez de aderezos en botella  · Limite la cantidad de azúcar que consume  No consuma bebidas azucaradas  Limite el consumo de alcohol  Cambios de actividad:  La actividad física es buena para garcia cuerpo por muchas razones  Le ayuda a quemar calorías y fortalecer jeff músculos  Lindsey Maid y la depresión y mejora garcia estado de ánimo  Además puede ayudarle a dormir mejor  Consulte con garcia médico antes de empezar un régimen de ejercicios  · Realice patti actividad física por lo menos por 30 minutos 5 días a la semana  Empiece despacio  Aparte tiempo cada día para patti actividad física que usted [facility] y [facility] sea [facility]  Es mejor realizar tanto un programa de pesas nia [place] para aumentar garcia ritmo cardíaco, nia caminar, montar en bicicleta o nadar  · Busque formas para ser [employer]  Realice patti actividad de jardinería y limpiar la casa  [address] escaleras en vez de utilizar el elevador  En garcia tiempo lilian concurra a eventos que le exijan caminar, nia festivales y ferias al [employer]  Adicionar esta actividad física puede ayudarle a bajar y [address]  Acuda a jeff consultas de control con garcia médico según le indicaron  Puede que necesite consultar con un dietista  Anote jeff preguntas para que se acuerde de hacerlas delbert jeff visitas

## 2020-07-14 NOTE — LETTER
2020    To Francie SNYDER: 1960      This letter is to advise you that your recent CULTURE results were reviewed by me and are NORMAL  Please contact the office for an appointment if you have any additional concerns      JOSLYN Black

## 2020-07-14 NOTE — PROGRESS NOTES
Fidelia Adams is a 62 y o  female who presents today for annual GYN exam   Her last pap smear was performed 2019 and result was NILM with negative HPV  She reports no history of abnormal pap smears in her past  Her last mammogram was performed 11/15/2018 and result was WNL  She had colonoscopy done 2016  She reports menses as absent since   Her general medical history has been reviewed and she reports it as follows:    Past Medical History:   Diagnosis Date    Acquired hammer toe of right foot 3/15/2018    S/p surg    Arthritis     Asthma     "one time with a cold, and used an inhaler"    Chronic pain disorder     hip pain left, and back    Diabetes mellitus ([address] )     Eczema     GERD (gastroesophageal reflux disease)     History of panic attacks     Hyperlipidemia     Hypertension     Labral tear of shoulder 2014    Right foot pain     bunionectomy today 3/1/2019    Rotator cuff syndrome, right 2017    Wears glasses      Past Surgical History:   Procedure Laterality Date    CHOLECYSTECTOMY      "had a panic attack"    FOOT SURGERY Right     HERNIA REPAIR      OVARY SURGERY      VA COLONOSCOPY FLX DX W/COLLJ SPEC WHEN PFRMD N/A 2016    Procedure: EGD AND COLONOSCOPY;  Surgeon: Warren Wu MD;  Location: [facility] GI LAB;   Service: Gastroenterology    VA CORRJ HALLUX VALGUS W/SESMDC W/2 OSTEOT Right 3/1/2019    Procedure: DOUBLE OSTEOTOMY, ZINA/AKIN BUNIONECTOMY;  Surgeon: Geno Bell DPM;  Location: [facility] OR;  Service: Podiatry     OB History        2    Para   2    Term   2       0    AB   0    Living   2       SAB   0    TAB   0    Ectopic   0    Multiple   0    Live Births   2               Social History     Tobacco Use    Smoking status: Never Smoker    Smokeless tobacco: Never Used   Substance Use Topics    Alcohol use: Never     Frequency: Never    Drug use: Never     Cancer-related family history includes Bone cancer in her maternal grandfather; Breast cancer in her maternal aunt; Cancer in her brother and maternal grandfather; Stomach cancer in her brother  Current Outpatient Medications:     albuterol (PROVENTIL HFA,VENTOLIN HFA) 90 mcg/act inhaler, Inhale 2 puffs every 6 (six) hours as needed for wheezing or shortness of breath, Disp: 1 Inhaler, Rfl: 5    atorvastatin (LIPITOR) 20 mg tablet, TAKE 1 TABLET BY MOUTH DAILY AT BEDTIME, Disp: 90 tablet, Rfl: 1    famotidine (PEPCID) 20 mg tablet, TAKE 1 TABLET (20 MG TOTAL) BY MOUTH DAILY AT BEDTIME AS NEEDED FOR HEARTBURN, Disp: 90 tablet, Rfl: 0    glipiZIDE (GLUCOTROL) 5 mg tablet, TAKE 1 TABLET (5 MG TOTAL) BY MOUTH 2 (TWO) TIMES A DAY BEFORE MEALS, Disp: 180 tablet, Rfl: 1    losartan (COZAAR) 50 mg tablet, TAKE 1 TABLET BY MOUTH EVERY DAY, Disp: 90 tablet, Rfl: 1    metFORMIN (GLUCOPHAGE) 500 mg tablet, TAKE 1 TABLET BY MOUTH TWICE A DAY WITH MEALS, Disp: 180 tablet, Rfl: 1    triamcinolone (KENALOG) 0 5 % ointment, APPLY TO AFFECTED AREA TWICE A DAY, Disp: 30 g, Rfl: 0    Review of Systems:  Review of Systems   Constitutional: Negative  Gastrointestinal: Negative  Genitourinary: Negative for difficulty urinating, menstrual problem, pelvic pain and vaginal discharge  Skin: Negative  Physical Exam:  /84   Pulse 94   Temp (!) 94 4 °F (34 7 °C)   Wt 81 6 kg (180 lb)   BMI 32 92 kg/m²   Physical Exam   Constitutional: She is oriented to person, place, and time  She appears well-developed and well-nourished  Genitourinary: Vagina normal and uterus normal  There is no lesion on the right labia  There is no lesion on the left labia  Vagina exhibits no lesion and no rugosity  No vaginal discharge found  Right adnexum does not display mass and does not display tenderness  Left adnexum does not display mass and does not display tenderness  Cervix does not exhibit motion tenderness, lesion or pinkness   Uterus is not tender  Neck: Neck supple  No thyromegaly present  Cardiovascular: Normal rate and regular rhythm  Pulmonary/Chest: Effort normal and breath sounds normal  Right breast exhibits no mass, no nipple discharge, no skin change and no tenderness  Left breast exhibits no mass, no nipple discharge, no skin change and no tenderness  Abdominal: Soft  Bowel sounds are normal    Neurological: She is alert and oriented to person, place, and time  Skin: Skin is warm and dry  Assessment/Plan:   1  Normal well-woman GYN exam   2  Cervical cancer screening:  Normal pelvic exam   Pap smear not indicated at this time  3  STD screening:  Orders placed for vaginal GC/CT cultures  Orders placed for serum anti-HIV, anti-HCV, HbsAg, RPR    4  Breast cancer screening:  Normal breast exam   Order placed for bilateral screening mammogram   Reviewed breast self-awareness  5  Colon cancer screening:  Up to date  6  Depression Screening: Patient's depression screening was assessed with a PHQ-2 score of 0  Clinically patient does not have depression  No treatment is required  7  BMI Counseling: Body mass index is 32 92 kg/m²  Discussed the patient's BMI with her  The BMI is above normal  Patient referred to PCP due to patient being obese     8  Return to office in 1 year for annual GYN exam

## 2020-07-14 NOTE — LETTER
8/3/2020    To Israel Galan  : 1960      This letter is to advise you that your recent BLOODWORK results were reviewed by me and are NORMAL  Please contact our office for an appointment if you have any additional concerns      JOSLYN Radford

## 2020-07-15 LAB
C TRACH DNA SPEC QL NAA+PROBE: NEGATIVE
N GONORRHOEA DNA SPEC QL NAA+PROBE: NEGATIVE

## 2020-07-27 ENCOUNTER — APPOINTMENT (OUTPATIENT)
Dept: LAB | Facility: HOSPITAL | Age: 60
End: 2020-07-27
Payer: COMMERCIAL

## 2020-07-27 DIAGNOSIS — K21.9 GASTROESOPHAGEAL REFLUX DISEASE, ESOPHAGITIS PRESENCE NOT SPECIFIED: ICD-10-CM

## 2020-07-27 DIAGNOSIS — Z11.3 SCREEN FOR STD (SEXUALLY TRANSMITTED DISEASE): ICD-10-CM

## 2020-07-27 DIAGNOSIS — I10 HYPERTENSION, BENIGN: ICD-10-CM

## 2020-07-27 DIAGNOSIS — E11.9 DIABETES MELLITUS TYPE 2, CONTROLLED (HCC): ICD-10-CM

## 2020-07-27 DIAGNOSIS — E78.5 HYPERLIPIDEMIA: ICD-10-CM

## 2020-07-27 LAB
ALBUMIN SERPL BCP-MCNC: 3.8 G/DL (ref 3.5–5)
ALP SERPL-CCNC: 150 U/L (ref 46–116)
ALT SERPL W P-5'-P-CCNC: 29 U/L (ref 12–78)
ANION GAP SERPL CALCULATED.3IONS-SCNC: 3 MMOL/L (ref 4–13)
AST SERPL W P-5'-P-CCNC: 22 U/L (ref 5–45)
BILIRUB SERPL-MCNC: 0.36 MG/DL (ref 0.2–1)
BUN SERPL-MCNC: 9 MG/DL (ref 5–25)
CALCIUM SERPL-MCNC: 9.9 MG/DL (ref 8.3–10.1)
CHLORIDE SERPL-SCNC: 106 MMOL/L (ref 100–108)
CHOLEST SERPL-MCNC: 174 MG/DL (ref 50–200)
CO2 SERPL-SCNC: 30 MMOL/L (ref 21–32)
CREAT SERPL-MCNC: 0.68 MG/DL (ref 0.6–1.3)
CREAT UR-MCNC: 160 MG/DL
EST. AVERAGE GLUCOSE BLD GHB EST-MCNC: 212 MG/DL
GFR SERPL CREATININE-BSD FRML MDRD: 111 ML/MIN/1.73SQ M
GLUCOSE P FAST SERPL-MCNC: 167 MG/DL (ref 65–99)
HBA1C MFR BLD: 9 %
HBV SURFACE AG SER QL: NORMAL
HCV AB SER QL: NORMAL
HDLC SERPL-MCNC: 51 MG/DL
LDLC SERPL CALC-MCNC: 85 MG/DL (ref 0–100)
MICROALBUMIN UR-MCNC: 16.8 MG/L (ref 0–20)
MICROALBUMIN/CREAT 24H UR: 11 MG/G CREATININE (ref 0–30)
NONHDLC SERPL-MCNC: 123 MG/DL
POTASSIUM SERPL-SCNC: 4.2 MMOL/L (ref 3.5–5.3)
PROT SERPL-MCNC: 7.7 G/DL (ref 6.4–8.2)
SODIUM SERPL-SCNC: 139 MMOL/L (ref 136–145)
TRIGL SERPL-MCNC: 192 MG/DL

## 2020-07-27 PROCEDURE — 87340 HEPATITIS B SURFACE AG IA: CPT

## 2020-07-27 PROCEDURE — 36415 COLL VENOUS BLD VENIPUNCTURE: CPT

## 2020-07-27 PROCEDURE — 82043 UR ALBUMIN QUANTITATIVE: CPT | Performed by: NURSE PRACTITIONER

## 2020-07-27 PROCEDURE — 3061F NEG MICROALBUMINURIA REV: CPT | Performed by: PHYSICIAN ASSISTANT

## 2020-07-27 PROCEDURE — 86592 SYPHILIS TEST NON-TREP QUAL: CPT

## 2020-07-27 PROCEDURE — 83036 HEMOGLOBIN GLYCOSYLATED A1C: CPT

## 2020-07-27 PROCEDURE — 3052F HG A1C>EQUAL 8.0%<EQUAL 9.0%: CPT | Performed by: PHYSICIAN ASSISTANT

## 2020-07-27 PROCEDURE — 87389 HIV-1 AG W/HIV-1&-2 AB AG IA: CPT

## 2020-07-27 PROCEDURE — 86803 HEPATITIS C AB TEST: CPT

## 2020-07-27 PROCEDURE — 82570 ASSAY OF URINE CREATININE: CPT | Performed by: NURSE PRACTITIONER

## 2020-07-27 PROCEDURE — 80061 LIPID PANEL: CPT

## 2020-07-27 PROCEDURE — 80053 COMPREHEN METABOLIC PANEL: CPT

## 2020-07-28 LAB
HIV 1+2 AB+HIV1 P24 AG SERPL QL IA: NORMAL
RPR SER QL: NORMAL

## 2020-08-02 DIAGNOSIS — K21.9 GASTROESOPHAGEAL REFLUX DISEASE, ESOPHAGITIS PRESENCE NOT SPECIFIED: ICD-10-CM

## 2020-08-03 RX ORDER — FAMOTIDINE 20 MG/1
20 TABLET, FILM COATED ORAL
Qty: 30 TABLET | Refills: 2 | Status: SHIPPED | OUTPATIENT
Start: 2020-08-03 | End: 2020-09-28

## 2020-08-05 ENCOUNTER — OFFICE VISIT (OUTPATIENT)
Dept: FAMILY MEDICINE CLINIC | Facility: CLINIC | Age: 60
End: 2020-08-05

## 2020-08-05 VITALS
TEMPERATURE: 96.4 F | WEIGHT: 180.4 LBS | OXYGEN SATURATION: 97 % | RESPIRATION RATE: 20 BRPM | HEART RATE: 85 BPM | DIASTOLIC BLOOD PRESSURE: 82 MMHG | BODY MASS INDEX: 33.2 KG/M2 | HEIGHT: 62 IN | SYSTOLIC BLOOD PRESSURE: 132 MMHG

## 2020-08-05 DIAGNOSIS — D22.30 CHANGE IN NEVUS OF FACE: ICD-10-CM

## 2020-08-05 DIAGNOSIS — Z00.00 MEDICARE ANNUAL WELLNESS VISIT, SUBSEQUENT: ICD-10-CM

## 2020-08-05 DIAGNOSIS — Z12.31 BREAST CANCER SCREENING BY MAMMOGRAM: ICD-10-CM

## 2020-08-05 DIAGNOSIS — E11.8 CONTROLLED DIABETES MELLITUS TYPE 2 WITH COMPLICATIONS, UNSPECIFIED WHETHER LONG TERM INSULIN USE (HCC): Primary | ICD-10-CM

## 2020-08-05 DIAGNOSIS — E66.09 CLASS 1 OBESITY DUE TO EXCESS CALORIES WITH SERIOUS COMORBIDITY AND BODY MASS INDEX (BMI) OF 33.0 TO 33.9 IN ADULT: ICD-10-CM

## 2020-08-05 DIAGNOSIS — M46.1 SACROILIITIS (HCC): ICD-10-CM

## 2020-08-05 PROBLEM — E66.811 CLASS 1 OBESITY DUE TO EXCESS CALORIES WITH SERIOUS COMORBIDITY AND BODY MASS INDEX (BMI) OF 33.0 TO 33.9 IN ADULT: Status: ACTIVE | Noted: 2020-08-05

## 2020-08-05 PROCEDURE — 2022F DILAT RTA XM EVC RTNOPTHY: CPT | Performed by: PHYSICIAN ASSISTANT

## 2020-08-05 PROCEDURE — 3725F SCREEN DEPRESSION PERFORMED: CPT | Performed by: PHYSICIAN ASSISTANT

## 2020-08-05 PROCEDURE — 3079F DIAST BP 80-89 MM HG: CPT | Performed by: PHYSICIAN ASSISTANT

## 2020-08-05 PROCEDURE — 3008F BODY MASS INDEX DOCD: CPT | Performed by: PHYSICIAN ASSISTANT

## 2020-08-05 PROCEDURE — 3075F SYST BP GE 130 - 139MM HG: CPT | Performed by: PHYSICIAN ASSISTANT

## 2020-08-05 PROCEDURE — 3046F HEMOGLOBIN A1C LEVEL >9.0%: CPT | Performed by: PHYSICIAN ASSISTANT

## 2020-08-05 PROCEDURE — 99214 OFFICE O/P EST MOD 30 MIN: CPT | Performed by: PHYSICIAN ASSISTANT

## 2020-08-05 PROCEDURE — 1036F TOBACCO NON-USER: CPT | Performed by: PHYSICIAN ASSISTANT

## 2020-08-05 PROCEDURE — G0439 PPPS, SUBSEQ VISIT: HCPCS | Performed by: PHYSICIAN ASSISTANT

## 2020-08-05 RX ORDER — EMPAGLIFLOZIN 10 MG/1
10 TABLET, FILM COATED ORAL EVERY MORNING
Qty: 30 TABLET | Refills: 3 | Status: SHIPPED | OUTPATIENT
Start: 2020-08-05 | End: 2021-07-22 | Stop reason: SDUPTHER

## 2020-08-05 RX ORDER — DULOXETIN HYDROCHLORIDE 30 MG/1
30 CAPSULE, DELAYED RELEASE ORAL DAILY
Qty: 90 CAPSULE | Refills: 1 | Status: SHIPPED | OUTPATIENT
Start: 2020-08-05 | End: 2020-11-05

## 2020-08-05 NOTE — PROGRESS NOTES
Assessment/Plan:    Diabetes mellitus type 2, controlled ([facility] )    Lab Results   Component Value Date    HGBA1C 9 0 (H) 07/27/2020   She has been trying to lose weight  I recommend referral to diabetic Education Center  I also recommend we increase metformin to 1000 twice a day  I recommend we stop glipizide and start on Jardiance  We discussed side effects the Jardiance and she will call if experiencing any adverse effects  Recheck blood sugars another 3 months  Class 1 obesity due to excess calories with serious comorbidity and body mass index (BMI) of 33 0 to 33 9 in adult  BMI Counseling: Body mass index is 33 kg/m²  The BMI is above normal  Nutrition recommendations include moderation in carbohydrate intake  Problem List Items Addressed This Visit        Endocrine    Diabetes mellitus type 2, controlled ([place] [address] ) - Primary       Lab Results   Component Value Date    HGBA1C 9 0 (H) 07/27/2020   She has been trying to lose weight  I recommend referral to diabetic Education Center  I also recommend we increase metformin to 1000 twice a day  I recommend we stop glipizide and start on Jardiance  We discussed side effects the Jardiance and she will call if experiencing any adverse effects  Recheck blood sugars another 3 months  Relevant Medications    Empagliflozin (Jardiance) 10 MG TABS    metFORMIN (GLUCOPHAGE) 1000 MG tablet    Other Relevant Orders    Ambulatory referral to Diabetic Education    CBC and differential    Hemoglobin A1c (w/out EAG)    Comprehensive metabolic panel    Lipid panel       Musculoskeletal and Integument    Sacroiliitis (HCC)    Relevant Medications    DULoxetine (CYMBALTA) 30 mg delayed release capsule       Other    Class 1 obesity due to excess calories with serious comorbidity and body mass index (BMI) of 33 0 to 33 9 in adult     BMI Counseling: Body mass index is 33 kg/m²   The BMI is above normal  Nutrition recommendations include moderation in carbohydrate intake  Other Visit Diagnoses     Breast cancer screening by mammogram        Change in nevus of face        Relevant Orders    Ambulatory referral to Dermatology    Medicare annual wellness visit, subsequent                Subjective:      Patient ID: Bishop Amato is a 62 y o  female  HPI  62year old F here for AWV and follow up of Left hip arthritis  She does take tylenol arthritis 3 pills when pain is severe  She is not interested in another injection or also THR at this time  She does use OTC icy hot as well  She has not tried anything else for pain such as TCA or Cymbalta  She was on Cymbalta in the past however for depression and did really help her mood  She did not have the hip pain at that time  She also has multiple moles on her face  She did have a growth of one on her right cheek and one on her nose gets irritated with her glasses  She is also here for follow-up of her diabetes and her weight  She did lose weight initially but then has been stuck at 180 lb for some time  She has never seen a dietitian or diabetic educator  Her recent blood work was done but her gynecologist her hemoglobin A1c did increase to 9  She has been taking metformin 500 and glipizide  The following portions of the patient's history were reviewed and updated as appropriate:   She  has a past medical history of Acquired hammer toe of right foot (3/15/2018), Arthritis, Asthma, Chronic pain disorder, Diabetes mellitus ([address] ), Eczema, GERD (gastroesophageal reflux disease), History of panic attacks, Hyperlipidemia, Hypertension, Labral tear of shoulder (09/24/2014), Right foot pain, Rotator cuff syndrome, right (8/16/2017), and Wears glasses    She   Patient Active Problem List    Diagnosis Date Noted    Class 1 obesity due to excess calories with serious comorbidity and body mass index (BMI) of 33 0 to 33 9 in adult 08/05/2020    Chronic left-sided low back pain 12/12/2018    Sacroiliitis ([facility] ) 06/06/2018    Hypertension, benign 12/14/2017    Osteoarthritis of left hip 08/09/2017    Dermatofibroma 06/26/2017    Diabetes mellitus type 2, controlled ([facility] ) 09/20/2016    Hyperlipidemia 09/20/2016    Fear of flying 04/30/2015    Allergic rhinitis 03/31/2015    Eczema 03/31/2015    Glenohumeral arthritis 09/24/2014    GERD (gastroesophageal reflux disease) 05/18/2012     She  has a past surgical history that includes Hernia repair; Ovary surgery; pr colonoscopy flx dx w/collj spec when pfrmd (N/A, 12/22/2016); Cholecystectomy; pr corrj hallux valgus w/sesmdc w/2 osteot (Right, 3/1/2019); and Foot surgery (Right)  Her family history includes Bone cancer in her maternal grandfather; Breast cancer in her maternal aunt; Cancer in her brother and maternal grandfather; Hypertension in her father and mother; Stomach cancer in her brother  She  reports that she has never smoked  She has never used smokeless tobacco  She reports that she does not drink alcohol or use drugs    Current Outpatient Medications   Medication Sig Dispense Refill    albuterol (PROVENTIL HFA,VENTOLIN HFA) 90 mcg/act inhaler Inhale 2 puffs every 6 (six) hours as needed for wheezing or shortness of breath 1 Inhaler 5    atorvastatin (LIPITOR) 20 mg tablet TAKE 1 TABLET BY MOUTH DAILY AT BEDTIME 90 tablet 1    famotidine (PEPCID) 20 mg tablet TAKE 1 TABLET (20 MG TOTAL) BY MOUTH DAILY AT BEDTIME AS NEEDED FOR HEARTBURN 30 tablet 2    losartan (COZAAR) 50 mg tablet TAKE 1 TABLET BY MOUTH EVERY DAY 90 tablet 1    triamcinolone (KENALOG) 0 5 % ointment APPLY TO AFFECTED AREA TWICE A DAY 30 g 0    DULoxetine (CYMBALTA) 30 mg delayed release capsule Take 1 capsule (30 mg total) by mouth daily 90 capsule 1    Empagliflozin (Jardiance) 10 MG TABS Take 1 tablet (10 mg total) by mouth every morning 30 tablet 3    metFORMIN (GLUCOPHAGE) 1000 MG tablet Take 1 tablet (1,000 mg total) by mouth 2 (two) times a day with meals 180 tablet 1 No current facility-administered medications for this visit  Current Outpatient Medications on File Prior to Visit   Medication Sig    albuterol (PROVENTIL HFA,VENTOLIN HFA) 90 mcg/act inhaler Inhale 2 puffs every 6 (six) hours as needed for wheezing or shortness of breath    atorvastatin (LIPITOR) 20 mg tablet TAKE 1 TABLET BY MOUTH DAILY AT BEDTIME    famotidine (PEPCID) 20 mg tablet TAKE 1 TABLET (20 MG TOTAL) BY MOUTH DAILY AT BEDTIME AS NEEDED FOR HEARTBURN    losartan (COZAAR) 50 mg tablet TAKE 1 TABLET BY MOUTH EVERY DAY    triamcinolone (KENALOG) 0 5 % ointment APPLY TO AFFECTED AREA TWICE A DAY    [DISCONTINUED] glipiZIDE (GLUCOTROL) 5 mg tablet TAKE 1 TABLET (5 MG TOTAL) BY MOUTH 2 (TWO) TIMES A DAY BEFORE MEALS    [DISCONTINUED] metFORMIN (GLUCOPHAGE) 500 mg tablet TAKE 1 TABLET BY MOUTH TWICE A DAY WITH MEALS     No current facility-administered medications on file prior to visit  She is allergic to aspirin; nsaids; and latex       Review of Systems   Constitutional: Negative for activity change, appetite change, chills, fatigue and unexpected weight change  HENT: Negative for dental problem, ear pain, hearing loss and sore throat  Eyes: Negative for visual disturbance  Respiratory: Negative for cough and wheezing  Cardiovascular: Negative for chest pain  Gastrointestinal: Negative for abdominal pain, constipation, diarrhea and vomiting  Genitourinary: Negative for difficulty urinating and dysuria  Musculoskeletal: Positive for back pain and gait problem  Negative for arthralgias and myalgias  Skin: Negative for rash  Neurological: Negative for dizziness and headaches  Psychiatric/Behavioral: Positive for dysphoric mood  Negative for behavioral problems           Objective:      /82 (BP Location: Left arm, Patient Position: Sitting, Cuff Size: Standard)   Pulse 85   Temp (!) 96 4 °F (35 8 °C) (Temporal)   Resp 20   Ht 5' 2"   Wt 81 8 kg (180 lb 6 4 oz) SpO2 97%   BMI 33 00 kg/m²          Physical Exam   Constitutional: She is oriented to person, place, and time  She appears well-developed  No distress  HENT:   Head: Normocephalic and atraumatic  Right Ear: External ear normal    Left Ear: External ear normal    Eyes: Conjunctivae are normal    Neck: Normal range of motion  Neck supple  No thyromegaly present  Cardiovascular: Normal rate, regular rhythm and normal heart sounds  No murmur heard  Pulmonary/Chest: Effort normal and breath sounds normal  No respiratory distress  She has no wheezes  Musculoskeletal:         General: Tenderness (left hip lateral and left si joint) present  No signs of injury  Left lower leg: No edema  Lymphadenopathy:     She has no cervical adenopathy  Neurological: She is alert and oriented to person, place, and time  Psychiatric: Her behavior is normal    Nursing note and vitals reviewed

## 2020-08-05 NOTE — ASSESSMENT & PLAN NOTE
Lab Results   Component Value Date    HGBA1C 9 0 (H) 07/27/2020   She has been trying to lose weight  I recommend referral to diabetic Education Center  I also recommend we increase metformin to 1000 twice a day  I recommend we stop glipizide and start on Jardiance  We discussed side effects the Jardiance and she will call if experiencing any adverse effects  Recheck blood sugars another 3 months

## 2020-08-05 NOTE — PROGRESS NOTES
Assessment and Plan:     Problem List Items Addressed This Visit     None      Visit Diagnoses     Breast cancer screening by mammogram    -  Primary           Preventive health issues were discussed with patient, and age appropriate screening tests were ordered as noted in patient's After Visit Summary  Personalized health advice and appropriate referrals for health education or preventive services given if needed, as noted in patient's After Visit Summary  History of Present Illness:     Patient presents for Medicare Annual Wellness visit    Patient Care Team:  Teofilo Pittman MD as PCP - General (Family Medicine)  MD Jose Luis Sarabia MD as Endoscopist     Problem List:     Patient Active Problem List   Diagnosis    Allergic rhinitis    Dermatofibroma    Diabetes mellitus type 2, controlled ([facility] [address] )    Eczema    Fear of flying    GERD (gastroesophageal reflux disease)    Glenohumeral arthritis    Hyperlipidemia    Hypertension, benign    Osteoarthritis of left hip    Chronic left-sided low back pain      Past Medical and Surgical History:     Past Medical History:   Diagnosis Date    Acquired hammer toe of right foot 3/15/2018    S/p surg    Arthritis     Asthma     "one time with a cold, and used an inhaler"    Chronic pain disorder     hip pain left, and back    Diabetes mellitus ([facility] [address] )     Eczema     GERD (gastroesophageal reflux disease)     History of panic attacks     Hyperlipidemia     Hypertension     Labral tear of shoulder 09/24/2014    Right foot pain     bunionectomy today 3/1/2019    Rotator cuff syndrome, right 8/16/2017    Wears glasses      Past Surgical History:   Procedure Laterality Date    CHOLECYSTECTOMY      "had a panic attack"    FOOT SURGERY Right     HERNIA REPAIR      OVARY SURGERY      CO COLONOSCOPY FLX DX W/COLLJ SPEC WHEN PFRMD N/A 12/22/2016    Procedure: EGD AND COLONOSCOPY;  Surgeon: Jose Luis Dale MD;  Location: [facility];   Service: Gastroenterology    AL CORRJ HALLUX VALGUS W/SESMDC W/2 OSTEOT Right 3/1/2019    Procedure: DOUBLE OSTEOTOMY, ZINA/AKIN BUNIONECTOMY;  Surgeon: Liborio Ashley DPM;  Location: [facility];  Service: Podiatry      Family History:     Family History   Problem Relation Age of Onset    Hypertension Mother     Hypertension Father     Bone cancer Maternal Grandfather     Cancer Maternal Grandfather         bone    Breast cancer Maternal Aunt     Stomach cancer Brother     Cancer Brother         stomach      Social History:        Social History     Socioeconomic History    Marital status: /Civil Union     Spouse name: None    Number of children: None    Years of education: None    Highest education level: None   Occupational History    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   Social Needs    Financial resource strain: Not hard at all   Keegan-Kareem insecurity     Worry: Never true     Inability: Never true    Transportation needs     Medical: No     Non-medical: No   Tobacco Use    Smoking status: Never Smoker    Smokeless tobacco: Never Used   Substance and Sexual Activity    Alcohol use: Never     Frequency: Never    Drug use: Never    Sexual activity: Yes     Partners: Male     Birth control/protection: Post-menopausal   Lifestyle    Physical activity     Days per week: 0 days     Minutes per session: 0 min    Stress:  Only a little   Relationships    Social connections     Talks on phone: Patient refused     Gets together: Patient refused     Attends Worship service: Patient refused     Active member of club or organization: Patient refused     Attends meetings of clubs or organizations: Patient refused     Relationship status: Patient refused    Intimate partner violence     Fear of current or ex partner: Patient refused     Emotionally abused: Patient refused     Physically abused: Patient refused     Forced sexual activity: Patient refused   Other Topics Concern    None Social History Narrative    None      Medications and Allergies:     Current Outpatient Medications   Medication Sig Dispense Refill    albuterol (PROVENTIL HFA,VENTOLIN HFA) 90 mcg/act inhaler Inhale 2 puffs every 6 (six) hours as needed for wheezing or shortness of breath 1 Inhaler 5    atorvastatin (LIPITOR) 20 mg tablet TAKE 1 TABLET BY MOUTH DAILY AT BEDTIME 90 tablet 1    famotidine (PEPCID) 20 mg tablet TAKE 1 TABLET (20 MG TOTAL) BY MOUTH DAILY AT BEDTIME AS NEEDED FOR HEARTBURN 30 tablet 2    glipiZIDE (GLUCOTROL) 5 mg tablet TAKE 1 TABLET (5 MG TOTAL) BY MOUTH 2 (TWO) TIMES A DAY BEFORE MEALS 180 tablet 1    losartan (COZAAR) 50 mg tablet TAKE 1 TABLET BY MOUTH EVERY DAY 90 tablet 1    metFORMIN (GLUCOPHAGE) 500 mg tablet TAKE 1 TABLET BY MOUTH TWICE A DAY WITH MEALS 180 tablet 1    triamcinolone (KENALOG) 0 5 % ointment APPLY TO AFFECTED AREA TWICE A DAY 30 g 0     No current facility-administered medications for this visit        Allergies   Allergen Reactions    Aspirin Shortness Of Breath and Itching     swelling    Nsaids      Patient unsure if allergic to nsaids but it is listedf in her history     Latex Itching and Rash      Immunizations:     Immunization History   Administered Date(s) Administered    Influenza Quadrivalent Preservative Free 3 years and older IM 11/02/2015, 11/06/2017    Influenza TIV (IM) 09/06/2016    Influenza, recombinant, quadrivalent,injectable, preservative free 11/14/2018, 09/09/2019    Pneumococcal Polysaccharide PPV23 02/14/2019      Health Maintenance:         Topic Date Due    MAMMOGRAM  11/15/2019    Cervical Cancer Screening  06/11/2022    Hepatitis C Screening  Completed         Topic Date Due    DTaP,Tdap,and Td Vaccines (1 - Tdap) 03/30/1982    Influenza Vaccine  07/01/2020      Medicare Health Risk Assessment:     /82 (BP Location: Left arm, Patient Position: Sitting, Cuff Size: Standard)   Pulse 85   Temp (!) 96 4 °F (35 8 °C) (Temporal)   Resp 20   Ht 5' 2"   Wt 81 8 kg (180 lb 6 4 oz)   SpO2 97%   BMI 33 00 kg/m²          Health Risk Assessment:   Patient rates overall health as poor  Patient feels that their physical health rating is slightly better  Eyesight was rated as same  Hearing was rated as same  Patient feels that their emotional and mental health rating is same  Pain experienced in the last 7 days has been a lot  Patient's pain rating has been 10/10  Patient states that she has experienced weight loss or gain in last 6 months  Depression Screening:   PHQ-2 Score: 2      Fall Risk Screening: In the past year, patient has experienced: no history of falling in past year      Urinary Incontinence Screening:   Patient has leaked urine accidently in the last six months  Home Safety:  Patient has trouble with stairs inside or outside of their home  Patient has working smoke alarms and has working carbon monoxide detector  Home safety hazards include: none  Nutrition:   Current diet is Regular  Medications:   Patient is currently taking over-the-counter supplements  OTC medications include: tc relief    turmeric curcumin  Patient is able to manage medications  Activities of Daily Living (ADLs)/Instrumental Activities of Daily Living (IADLs):   Walk and transfer into and out of bed and chair?: Yes  Dress and groom yourself?: Yes    Bathe or shower yourself?: Yes    Feed yourself?  Yes  Do your laundry/housekeeping?: Yes  Manage your money, pay your bills and track your expenses?: Yes  Make your own meals?: Yes    Do your own shopping?: Yes    Previous Hospitalizations:   Any hospitalizations or ED visits within the last 12 months?: Yes      Advance Care Planning:   Living will: No    Durable POA for healthcare: No    Advanced directive: No    Advanced directive counseling given: Yes    Five wishes given: Yes    Patient declined ACP directive: No      Cognitive Screening:   Provider or family/friend/caregiver concerned regarding cognition?: No    PREVENTIVE SCREENINGS      Cardiovascular Screening:    General: Screening Not Indicated and History Lipid Disorder      Diabetes Screening:     General: Screening Not Indicated and History Diabetes      Colorectal Cancer Screening:     General: Screening Current      Breast Cancer Screening:     General: Risks and Benefits Discussed    Due for: Mammogram        Cervical Cancer Screening:    General: Screening Current      Abdominal Aortic Aneurysm (AAA) Screening:        General: Screening Not Indicated      Lung Cancer Screening:     General: Screening Not Indicated      Hepatitis C Screening:    General: Screening Current      Kishan Willams PA-C

## 2020-08-05 NOTE — PATIENT INSTRUCTIONS
Visita de bienestar para los adultos   CUIDADO AMBULATORIO:   Patti visita de bienestar  es cuando usted acude con un médico para que le lorraine exámenes de detección de problemas de [place]  También puede obtener asesoramiento sobre cómo mantenerse saludable  Anote jeff preguntas para que se acuerde de hacerlas  Pregunte a garcia médico con qué frecuencia debería realizarse patti visita de bienestar  Lo que sucede en patti visita de bienestar:  Garcia médico le preguntará sobre garcia taylor y garcia historia familiar [id number]  [place] incluye presión arterial noemi, enfermedades del corazón y cáncer  El médico le preguntará si tiene síntomas que le preocupen, si [facility] de ánimo  También se le preguntará acerca del uso de medicamentos, suplementos, alimentos y alcohol  Es posible que le lorraine cualquiera de lo siguiente:  · Garcia peso  será revisado  Es posible que [facility] midan garcia altura para calcular garcia índice de masa corporal [facility]  El [facility] indica si tiene un peso saludable  · Se verificarán garcia presión arterial  y frecuencia cardíaca  También pueden revisar garcia temperatura  · Exámenes de [facility] y [facility]  se podría realizar  Se podrían realizar exámenes de eugene para revisar los niveles de [facility]  Los niveles anormales de colesterol aumentan el riesgo de enfermedad del corazón y accidente cerebrovascular  Puede que también necesite patti prueba de eugene u orina para revisar si tiene diabetes si usted está en mayor riesgo  Las pruebas de orina pueden hacerse para buscar signos de patti infección o patti enfermedad renal      · Un examen físico  incluye la comprobación de jeff latidos del corazón y los pulmones con un estetoscopio  Garcia médico también podría revisarle la piel para buscar daños causados por el sol  · Pruebas de detección  podría recomendarse  Se realiza un examen de detección para detectar enfermedades que pueden no causar síntomas   Los exámenes de detección que necesite dependen de garcia edad, género, antecedentes familiares y hábitos de paige  Por ejemplo, podrían recomendarle la exploración selectiva colorrectal si tiene 48 años o más  Si es [place], necesita los siguientes exámenes de detección:   · El examen de Papanicolaou  se utiliza para detectar cáncer de ar uterino  El examen del Papanicolaou por lo general se realiza entre 3 a 5 años dependiendo de garcia edad  Puede necesitarlo más a menudo si usted ha tenido [employer] de la prueba de Papanicolaou en el pasado  Pregunte a garcia médico con qué frecuencia debería realizarse un examen de Papanicolaou  · Patti mamografía  es patti radiografía de jeff senos para detectar cáncer de mama  Los expertos recomiendan [address] cada 2 años empezando a los 48 años de [place]  Es probable que usted necesite [address] a los 52 años o antes si tiene riesgo alto de cáncer de seno  Hable con garcia médico sobre cuándo debe empezar con jeff mamografías y con cuánta frecuencia las necesita  Vacunas que podría necesitar:   · Debe recibir patti vacuna contra la influenza  todos los [place]  La vacuna contra la influenza protege de la gripe  Varios tipos de virus causan la influenza  Debido a que los virus [place] con el [place], es necesaria la producción de nuevas vacunas cada año  · Debe recibir Kinnie Auberry vacuna de refuerzo contra el tétanos-difteria (Td)  cada 10 años  Esta vacuna protege contra el tétanos y la difteria  El tétanos es patti infección severa que puede causar trismo y espasmos musculares dolorosos  La difteria es patti infección bacterial grave que produce patti cubierta gruesa en la parte de atrás de la boca y garganta  · Debe recibir la vacuna contra el virus del papiloma humano (VPH)  si usted es ton y Wade 19 y 32 años o es hombre y Wade 23 y 24 años y nunca la recibió  Esta vacuna protege contra la infección por VPH   El virus del papiloma humano o VPH es la infección más común que se transmite por contacto sexual  El virus del papiloma humano también podría provocar cáncer vaginal, del pene y del ano  · Debe recibir la vacuna antineumocócica  si tiene más de 72 años  La vacuna antineumocócica es patti inyección que se administra para protegerlo contra patti enfermedad neumocócica  La enfermedad neumocócica es patti infección causada por la bacteria neumocócica  La infección puede causar neumonía, meningitis o patti infección del oído  · Debe recibir la vacuna contra la culebrilla  si tiene [address] [place] [address],[address] [place] [address] o [place], incluso si morales tenido culebrilla antes  La vacuna contra la culebrilla (herpes zóster) es patti inyección usada para proteger contra el virus zóster que causa la varicela  Arab[place] es el mismo virus que causa la varicela  La culebrilla es un sarpullido doloroso que se desarrolla en personas que tuvieron varicela o morales estado expuestas al virus  Cómo comer saludable:  Mi [place] es un modelo para planear comidas sanas  Muestra los tipos y cantidades de alimentos que deberían ir en un plato  Arabella Amaya y verduras representan alrededor de la mitad de garcia plato y los granos y proteínas representan la otra mitad  Se incluye patti porción de productos lácteos al lado del plato  La cantidad de calorías y [address] de las porciones que usted necesita dependen de garcia edad, [place], peso y altura  Los ejemplos de alimentos saludables son:  · Consuma patti variedad de verduras  nia las de color john oscuro, irwin y [facility]  Usted también puede incluir verduras enlatadas bajas en sodio (sal) y verduras congeladas sin mantequilla ni salsas [id number]  · Consuma patti variedad de fruta frescas , las frutas enlatadas en 100% de jugo , fruta [place] y madhu secos  · Incluya granos enteros  Por lo menos la mitad de los granos que usted consume deben ser granos de flako integral  Por ejemplo, panes de grano entero, pasta integral, arroz integral y cereales de grano entero nia la caryn      · Consuma patti variedad de alimentos con proteínas nia mariscos (pescado y crustáceos), carne magra y carne de ave sin piel (pavo y yosi)  Ejemplos de calderon magras incluyen pierna, paleta o lomo de puerco y [place], solomillo o, lomo de res y carne [place] de res extra [place]  Otros alimentos ricos en proteínas son los huevos y sustitutos de [place], frijoles, chícharos, productos de soya, nueces y semillas  · Elija productos lácteos bajos en grasa [employer] o del 1% o yogur, queso y requesón bajos en grasa  · Limite las grasas poco saludables,  nia la [place], la margarina dura y la [place]  Ejercicio:  Realice patti actividad física por lo menos 30 minutos al día, la mayoría de los días de la [place]  Algunos de los ejercicios incluyen caminar, montar en bicicleta, bailar y nadar  Usted también puede realizar más actividad física usando las escaleras en vez de los ascensores o estacionarse más lejos cuando [place] Pore a las tiendas  Incluya ejercicios para fortalecer los músculos 2 días a la semana  El ejercicio regular proporciona muchos beneficios para la taylor  Paulo Harness a controlar garcia peso y [employer] riesgo de diabetes tipo 2, presión arterial noemi, enfermedad del corazón y accidente cerebrovascular  El ejercicio [employer] puede ayudar a mejorar garcia estado de ánimo  Pregunte a garcia médico acerca del mejor plan de ejercicio para usted  Pautas generales de taylor y seguridad:   · No fume  La nicotina y otras sustancias químicas que contienen los cigarrillos y cigarros pueden dañar los pulmones  Pida información a garcia médico si usted actualmente fuma y necesita ayuda para dejar de fumar  Los cigarrillos electrónicos o tabaco sin humo todavía contienen nicotina  Consulte con garcia médico antes de [employer]  · Limite el consumo de alcohol  Un trago equivale a 12 onzas de cerveza, 5 onzas de vino o 1 onza y ½ de licor  · Baje de peso, si es necesario  El sobrepeso aumenta el riesgo de ciertas condiciones de [place]   Estos incluyen enfermedad del corazón, presión arterial noemi, diabetes tipo 2 y ciertos tipos de cáncer  · Proteja garcia piel  No tome el sol ni use shahram de bronceado  Use protector solar con un SPF 13 o mayor  Aplíquese el bloqueador por lo menos 15 minutos antes de que vaya a estar al [employer]  Vuelva a aplicarse la crema solar cada 2 horas  Use ropa protectora, sombrero y lentes para el sol cuando se encuentre afuera  · Conduzca con seguridad  Use siempre garcia cinturón de seguridad  Asegúrese que todos en el rox usan el cinturón de seguridad  Un cinturón de seguridad puede salvar garcia paige en nel de un accidente automovilístico  No use el celular cuando esté manejando  [place] puede hacer que se distraiga y causar un accidente  Es mejor que pare y se orille si necesita hacer patti Raliegh [place] un texto  · Practique el sexo seguro  Use condones de látex si es sexualmente [place] y tiene más de [place]  Garcia médico puede recomendar exámenes de detección de infecciones de transmisión sexual (ITS)  · Use un miracle, un chaleco salvavidas y unos implementos de protección  Siempre use un miracle al [employer] en bicicleta o motocicleta, esquiar o jugar deportes que podrían causar patti lesión en la jack  Use implementos de protección cuando practique deportes  Use un chaleco salvavidas cuando esté en un bote o practicando actividades acuáticas  © 2017 [address] Information is for End User's use only and may not be sold, redistributed or otherwise used for commercial purposes  All illustrations and images included in CareNotes® are the copyrighted property of A D A M , Inc  or Jorge Godoy  Esta información es sólo para uso en educación  Garcia intención no es darle un consejo médico sobre enfermedades o tratamientos  Colsulte con garcia Miranda Steven farmacéutico antes de seguir cualquier régimen médico para saber si es seguro y efectivo para usted      Medicare Preventive Visit Patient Instructions  Thank you for completing your Welcome to Medicare Visit or Medicare Annual Wellness Visit today  Your next wellness visit will be due in one year (8/5/2021)  The screening/preventive services that you may require over the next 5-10 years are detailed below  Some tests may not apply to you based off risk factors and/or age  Screening tests ordered at today's visit but not completed yet may show as past due  Also, please note that scanned in results may not display below  Preventive Screenings:  Service Recommendations Previous Testing/Comments   Colorectal Cancer Screening  * Colonoscopy    * Fecal Occult Blood Test (FOBT)/Fecal Immunochemical Test (FIT)  * Fecal DNA/Cologuard Test  * Flexible Sigmoidoscopy Age: 54-65 years old   Colonoscopy: every 10 years (may be performed more frequently if at higher risk)  OR  FOBT/FIT: every 1 year  OR  Cologuard: every 3 years  OR  Sigmoidoscopy: every 5 years  Screening may be recommended earlier than age 48 if at higher risk for colorectal cancer  Also, an individualized decision between you and your healthcare provider will decide whether screening between the ages of 74-80 would be appropriate  Colonoscopy: 12/22/2016  FOBT/FIT: Not on file  Cologuard: Not on file  Sigmoidoscopy: Not on file    Screening Current     Breast Cancer Screening Age: 36 years old  Frequency: every 1-2 years  Not required if history of left and right mastectomy Mammogram: 11/15/2018    Screening Current   Cervical Cancer Screening Between the ages of 21-29, pap smear recommended once every 3 years  Between the ages of 33-67, can perform pap smear with HPV co-testing every 5 years     Recommendations may differ for women with a history of total hysterectomy, cervical cancer, or abnormal pap smears in past  Pap Smear: 07/14/2020    Screening Current   Hepatitis C Screening Once for adults born between 1945 and 1965  More frequently in patients at high risk for Hepatitis C Hep C Antibody: 07/27/2020    Screening Current   Diabetes Screening 1-2 times per year if you're at risk for diabetes or have pre-diabetes Fasting glucose: 167 mg/dL   A1C: 9 0 %    Screening Not Indicated  History Diabetes   Cholesterol Screening Once every 5 years if you don't have a lipid disorder  May order more often based on risk factors  Lipid panel: 07/27/2020    Screening Not Indicated  History Lipid Disorder     Other Preventive Screenings Covered by Medicare:  1  Abdominal Aortic Aneurysm (AAA) Screening: covered once if your at risk  You're considered to be at risk if you have a family history of AAA  2  Lung Cancer Screening: covers low dose CT scan once per year if you meet all of the following conditions: (1) Age 50-69; (2) No signs or symptoms of lung cancer; (3) Current smoker or have quit smoking within the last 15 years; (4) You have a tobacco smoking history of at least 30 pack years (packs per day multiplied by number of years you smoked); (5) You get a written order from a healthcare provider  3  Glaucoma Screening: covered annually if you're considered high risk: (1) You have diabetes OR (2) Family history of glaucoma OR (3)  aged 48 and older OR (3)  American aged 72 and older  3  Osteoporosis Screening: covered every 2 years if you meet one of the following conditions: (1) You're estrogen deficient and at risk for osteoporosis based off medical history and other findings; (2) Have a vertebral abnormality; (3) On glucocorticoid therapy for more than 3 months; (4) Have primary hyperparathyroidism; (5) On osteoporosis medications and need to assess response to drug therapy  · Last bone density test (DXA Scan): Not on file  5  HIV Screening: covered annually if you're between the age of 12-76  Also covered annually if you are younger than 13 and older than 72 with risk factors for HIV infection   For pregnant patients, it is covered up to 3 times per pregnancy  Immunizations:  Immunization Recommendations   Influenza Vaccine Annual influenza vaccination during flu season is recommended for all persons aged >= 6 months who do not have contraindications   Pneumococcal Vaccine (Prevnar and Pneumovax)  * Prevnar = PCV13  * Pneumovax = PPSV23   Adults 25-60 years old: 1-3 doses may be recommended based on certain risk factors  Adults 72 years old: Prevnar (PCV13) vaccine recommended followed by Pneumovax (PPSV23) vaccine  If already received PPSV23 since turning 65, then PCV13 recommended at least one year after PPSV23 dose  Hepatitis B Vaccine 3 dose series if at intermediate or high risk (ex: diabetes, end stage renal disease, liver disease)   Tetanus (Td) Vaccine - COST NOT COVERED BY MEDICARE PART B Following completion of primary series, a booster dose should be given every 10 years to maintain immunity against tetanus  Td may also be given as tetanus wound prophylaxis  Tdap Vaccine - COST NOT COVERED BY MEDICARE PART B Recommended at least once for all adults  For pregnant patients, recommended with each pregnancy  Shingles Vaccine (Shingrix) - COST NOT COVERED BY MEDICARE PART B  2 shot series recommended in those aged 48 and above     Health Maintenance Due:      Topic Date Due    MAMMOGRAM  11/15/2019    Cervical Cancer Screening  06/11/2022    Hepatitis C Screening  Completed     Immunizations Due:      Topic Date Due    DTaP,Tdap,and Td Vaccines (1 - Tdap) 03/30/1982    Influenza Vaccine  07/01/2020     Advance Directives   What are advance directives? Advance directives are legal documents that state your wishes and plans for medical care  These plans are made ahead of time in case you lose your ability to make decisions for yourself  Advance directives can apply to any medical decision, such as the treatments you want, and if you want to donate organs  What are the types of advance directives?   There are many types of advance directives, and each state has rules about how to use them  You may choose a combination of any of the following:  · Living will: This is a written record of the treatment you want  You can also choose which treatments you do not want, which to limit, and which to stop at a certain time  This includes surgery, medicine, IV fluid, and tube feedings  · Durable power of  for healthcare [place] [facility]): This is a written record that states who you want to make healthcare choices for you when you are unable to make them for yourself  This person, called a proxy, is usually a family member or a friend  You may choose more than 1 proxy  · Do not resuscitate (DNR) order:  A DNR order is used in case your heart stops beating or you stop breathing  It is a request not to have certain forms of treatment, such as CPR  A DNR order may be included in other types of advance directives  · Medical directive: This covers the care that you want if you are in a coma, near death, or unable to make decisions for yourself  You can list the treatments you want for each condition  Treatment may include pain medicine, surgery, blood transfusions, dialysis, IV or tube feedings, and a ventilator (breathing machine)  · Values history: This document has questions about your views, beliefs, and how you feel and think about life  This information can help others choose the care that you would choose  Why are advance directives important? An advance directive helps you control your care  Although spoken wishes may be used, it is better to have your wishes written down  Spoken wishes can be misunderstood, or not followed  Treatments may be given even if you do not want them  An advance directive may make it easier for your family to make difficult choices about your care  Urinary Incontinence   Urinary incontinence (UI)  is when you lose control of your bladder  UI develops because your bladder cannot store or empty urine properly   The 3 most common types of UI are stress incontinence, urge incontinence, or both  Medicines:   · May be given to help strengthen your bladder control  Report any side effects of medication to your healthcare provider  Do pelvic muscle exercises often:  Your pelvic muscles help you stop urinating  Squeeze these muscles tight for 5 seconds, then relax for 5 seconds  Gradually work up to squeezing for 10 seconds  Do 3 sets of 15 repetitions a day, or as directed  This will help strengthen your pelvic muscles and improve bladder control  Train your bladder:  Go to the bathroom at set times, such as every 2 hours, even if you do not feel the urge to go  You can also try to hold your urine when you feel the urge to go  For example, hold your urine for 5 minutes when you feel the urge to go  As that becomes easier, hold your urine for 10 minutes  Self-care:   · Keep a UI record  Write down how often you leak urine and how much you leak  Make a note of what you were doing when you leaked urine  · Drink liquids as directed  You may need to limit the amount of liquid you drink to help control your urine leakage  Do not drink any liquid right before you go to bed  Limit or do not have drinks that contain caffeine or alcohol  · Prevent constipation  Eat a variety of high-fiber foods  Good examples are high-fiber cereals, beans, vegetables, and whole-grain breads  Walking is the best way to trigger your intestines to have a bowel movement  · Exercise regularly and maintain a healthy weight  Weight loss and exercise will decrease pressure on your bladder and help you control your leakage  · Use a catheter as directed  to help empty your bladder  A catheter is a tiny, plastic tube that is put into your bladder to drain your urine  · Go to behavior therapy as directed  Behavior therapy may be used to help you learn to control your urge to urinate      Weight Management   Why it is important to manage your weight: Being overweight increases your risk of health conditions such as heart disease, high blood pressure, type 2 diabetes, and certain types of cancer  It can also increase your risk for osteoarthritis, sleep apnea, and other respiratory problems  Aim for a slow, steady weight loss  Even a small amount of weight loss can lower your risk of health problems  How to lose weight safely:  A safe and healthy way to lose weight is to eat fewer calories and get regular exercise  You can lose up about 1 pound a week by decreasing the number of calories you eat by 500 calories each day  Healthy meal plan for weight management:  A healthy meal plan includes a variety of foods, contains fewer calories, and helps you stay healthy  A healthy meal plan includes the following:  · Eat whole-grain foods more often  A healthy meal plan should contain fiber  Fiber is the part of grains, fruits, and vegetables that is not broken down by your body  Whole-grain foods are healthy and provide extra fiber in your diet  Some examples of whole-grain foods are whole-wheat breads and pastas, oatmeal, brown rice, and bulgur  · Eat a variety of vegetables every day  Include dark, leafy greens such as spinach, kale, ellie greens, and mustard greens  Eat yellow and orange vegetables such as carrots, sweet potatoes, and winter squash  · Eat a variety of fruits every day  Choose fresh or canned fruit (canned in its own juice or light syrup) instead of juice  Fruit juice has very little or no fiber  · Eat low-fat dairy foods  Drink fat-free (skim) milk or 1% milk  Eat fat-free yogurt and low-fat cottage cheese  Try low-fat cheeses such as mozzarella and other reduced-fat cheeses  · Choose meat and other protein foods that are low in fat  Choose beans or other legumes such as split peas or lentils  Choose fish, skinless poultry (chicken or turkey), or lean cuts of red meat (beef or pork)   Before you cook meat or poultry, cut off any visible fat    · Use less fat and oil  Try baking foods instead of frying them  Add less fat, such as margarine, sour cream, regular salad dressing and mayonnaise to foods  Eat fewer high-fat foods  Some examples of high-fat foods include french fries, doughnuts, ice cream, and cakes  · Eat fewer sweets  Limit foods and drinks that are high in sugar  This includes candy, cookies, regular soda, and sweetened drinks  Exercise:  Exercise at least 30 minutes per day on most days of the week  Some examples of exercise include walking, biking, dancing, and swimming  You can also fit in more physical activity by taking the stairs instead of the elevator or parking farther away from stores  Ask your healthcare provider about the best exercise plan for you  © Copyright [employer] 2018 Information is for End User's use only and may not be sold, redistributed or otherwise used for commercial purposes   All illustrations and images included in CareNotes® are the copyrighted property of A KAYLA A M , Inc  or [address] [place] [address]

## 2020-08-05 NOTE — ASSESSMENT & PLAN NOTE
BMI Counseling: Body mass index is 33 kg/m²  The BMI is above normal  Nutrition recommendations include moderation in carbohydrate intake

## 2020-08-06 ENCOUNTER — TELEPHONE (OUTPATIENT)
Dept: FAMILY MEDICINE CLINIC | Facility: CLINIC | Age: 60
End: 2020-08-06

## 2020-08-10 ENCOUNTER — TELEPHONE (OUTPATIENT)
Dept: FAMILY MEDICINE CLINIC | Facility: CLINIC | Age: 60
End: 2020-08-10

## 2020-08-10 DIAGNOSIS — B37.9 YEAST INFECTION: Primary | ICD-10-CM

## 2020-08-10 RX ORDER — FLUCONAZOLE 150 MG/1
150 TABLET ORAL ONCE
Qty: 2 TABLET | Refills: 0 | Status: SHIPPED | OUTPATIENT
Start: 2020-08-10 | End: 2020-08-10

## 2020-08-10 NOTE — TELEPHONE ENCOUNTER
Via nurse line:      As per pt states new medication that PCP started her on gave her a reaction rash and itchiness states PCP told her to call and inform office so she can sent in a cream?

## 2020-08-10 NOTE — TELEPHONE ENCOUNTER
I sent in diflucan   It is oral pill for yeast infection   It usually clears up infection faster with less mess

## 2020-08-11 ENCOUNTER — TELEPHONE (OUTPATIENT)
Dept: FAMILY MEDICINE CLINIC | Facility: CLINIC | Age: 60
End: 2020-08-11

## 2020-08-12 ENCOUNTER — TELEPHONE (OUTPATIENT)
Dept: FAMILY MEDICINE CLINIC | Facility: CLINIC | Age: 60
End: 2020-08-12

## 2020-08-13 ENCOUNTER — TELEPHONE (OUTPATIENT)
Dept: FAMILY MEDICINE CLINIC | Facility: CLINIC | Age: 60
End: 2020-08-13

## 2020-08-13 ENCOUNTER — TELEMEDICINE (OUTPATIENT)
Dept: FAMILY MEDICINE CLINIC | Facility: CLINIC | Age: 60
End: 2020-08-13
Payer: COMMERCIAL

## 2020-08-13 DIAGNOSIS — E11.9 TYPE 2 DIABETES MELLITUS WITHOUT COMPLICATION, WITHOUT LONG-TERM CURRENT USE OF INSULIN (HCC): Primary | ICD-10-CM

## 2020-08-13 DIAGNOSIS — E11.8 CONTROLLED DIABETES MELLITUS TYPE 2 WITH COMPLICATIONS, UNSPECIFIED WHETHER LONG TERM INSULIN USE (HCC): ICD-10-CM

## 2020-08-13 PROCEDURE — G0108 DIAB MANAGE TRN  PER INDIV: HCPCS | Performed by: DIETITIAN, REGISTERED

## 2020-08-13 NOTE — PROGRESS NOTES
Virtual Brief Visit  It was my intent to perform this visit via video technology but the patient was not able to do a video connection so the visit was completed via audio telephone only  Assessment/Plan:  Type 2 Diabetes Class Assessment    HPI: Met with Lala Worrell for DSME Initial visit  Qiana Healy has Type 2 Diabetes  Diabetes Assessment  Visit Type: Initial visit  Present at Session: patient   Special Learning Needs: No  Barriers to Learning: no barriers    How do you feel about making lifestyle changes at this time? Patient states:" I know I need to "   How would you rate your current knowledge of diabetes? fair  How confident are you that will be able to take better control of your diabetes?: good    How long have you had diabetes? 5 yrs  Have you had diabetes education in the past?: Patient states when she was at a [facility] for treatment for Alcohol she received some education, but she feels she needs more information now  Do you have any family members with diabetes?: Yes - her father  Do you monitor your blood sugar? no  Type of blood sugar monitor: n/a - patient states she does not have a meter  How old is your meter?: n/a  How often do you test your blood sugars?:patient doesn't test  Do you keep a written record of your blood sugars?  No   Blood sugar log with patient today and reviewed by educator?: No   Blood Sugar ranges:    Fasting: n/a   Before meals: n/a   2 hours after meals: n/a  Any financial concerns pertaining to your diabetes supplies, medication or care?: she did not answer this  Have you ever experienced hypoglycemia?:  No  Have you ever been hospitalized or gone to the ER due to your blood sugars?: No  How do you treat low blood sugars?: doesn't know  How do you treat high blood sugars?doesn't know  Do you wear a Diabetes I D ?: no, will provide patient with information  Where do you dispose of your sharps (needles,lancetes)?: n/a    Ht Readings from Last 1 Encounters: 08/05/20 5' 2" (1 575 m)     Wt Readings from Last 3 Encounters:   08/05/20 81 8 kg (180 lb 6 4 oz)   07/14/20 81 6 kg (180 lb)   05/27/20 81 6 kg (179 lb 12 8 oz)        There is no height or weight on file to calculate BMI  Lab Results   Component Value Date    HGBA1C 9 0 (H) 07/27/2020    HGBA1C 7 4 (H) 08/29/2019    HGBA1C 7 6 (A) 02/04/2019       Lab Results   Component Value Date    CHOL 151 04/24/2017    CHOL 262 (H) 09/12/2016     Lab Results   Component Value Date    HDL 51 07/27/2020    HDL 55 08/29/2019    HDL 52 08/06/2018     Lab Results   Component Value Date    LDLCALC 85 07/27/2020    LDLCALC 71 08/29/2019    LDLCALC 127 (H) 08/06/2018     Lab Results   Component Value Date    TRIG 192 (H) 07/27/2020    TRIG 210 (H) 08/29/2019    TRIG 202 (H) 08/06/2018     No results found for: CHOLHDL  No results found for: Rebecca Manuel    Weight Change: No    Diet Assessment    Do you follow any special diet presently?: Yes - she avoids sweets, tries to eat more healthy  She does not drink any alcohol  Who cooks at home?:  patient  Who does the grocery shopping?: patient   Per Diet History, patient is eating 3 regular meals/ day  Reminded her of the importance of not skipping meals  She seems to eat fairly healthy, and is trying to cut back on portions of starches  Reviewed benefits of eating high Fiber Carbohydrates, and eating more fresh fruits and vegetables  Patient receptive  She states she does not drink high sugar drinks, mostly drinks water w/ fresh lemon, and sometimes a small glass of fruit juice  She does not cook with salt or a lot of salty condiments  We also discussed "healthy fat" choices in moderation, and she understood the information we reviewed     How frequently do you eat out?: rarely    Activity Assessment    Exercise: no     Lifestyle/Social Assessment    Racial/ethnic group:                                       Primary Language: Frisian  Marital Status: Single  Education Level: Some College (No Degree)  Work status: Disabled - patient states she has chronic pain, not able to work  Type of job and hours: n/a  Who lives in your household?: none  Who is you primary support person(s): relative(s)   Describe your quality of life currently?: good  Any concerns for your safety?: No  Any Buddhist or cultural practices that may affect your diabetes care: No  Do you have a decrease or loss of hearing?: No  Do you have a decrease or loss of vision?: Yes - sometimes  When was the last time you had an ophthalmology exam?: about March 2020  When was the last time you had dental exam?: during the past year  Do you check your feet for cracks, sores, debris?: Yes  When was the last time you had podiatry or foot exam?: over the past several months  Last flu shot?: 9/9/2019  Pneumonia shot?: Yes - 2/14/2019      The patient's history was reviewed and updated as appropriate: allergies, current medications  Intervention    Diabetes Overview :   Lia Osullivan was instructed on basic concepts of diabetes, including identifying role of diabetes self management, basic pathophysiology and types of diabetes, A1c and blood sugar targets  Lia Osullivan has good understanding of material covered  Taking Medications: Instructed patient on action, efficacy, prescribed dosage and appropriate timing and frequency of administration of her diabetes medication  Lia Osullivan has good understanding of material covered  Monitoring Blood Sugars: She states she does not have a monitor  I explained that it would be helpful for her to check her BGs daily  I advised her to call the Clinic and ask her PCP to write a prescription for a monitor, explained that it will depend upon her insurance coverage as to what monitor she could obtain  She verbalized understanding  Goal Blood Sugars:   Premeal , even better <110  2hr after a meal <180, even better <140  A1C <7%, even better <6 5%      Hypoglycemia: Instructed patient on definition/risk of hypoglycemia, treatment, causes/symptoms, when to notify provider of lows, prevention of hypoglycemia and exercise precautions  Comments: Matthews Primrose understanding of hypoglycemia concepts, and also stated that she doesn't think she has ever experienced a low blood sugar reaction  Physical Activity: Discussed benefits of physical activity to optimize blood glucose control, encouraged activity at patient is physically able  Always consult a physician prior to starting an exercise program   Comments: Matthews Primrose understanding of benefits of physical activity, but states that she cannot do any exercise/ physical activity at this time  Diabetes Education Record  Audra Valencia received the following handouts: Introductory packet of Diabetes Education, in [address], along with detailed nutrition education materials  Explained to patient the [employer] program, and will provide her with the schedule of when they are in [facility]  Patient response to instruction    Comprehensionvery good  Motivationvery good  Expected Compliancegood    Thank you for referring your patient to [facility], it was a pleasure working with them today  Please feel free to call with any questions or concerns at       Wayne Hayward   clinic   [facility]  Problem List Items Addressed This Visit        Endocrine    Diabetes mellitus type 2, controlled ([facility] [address] )                Reason for visit is   Chief Complaint   Patient presents with    Virtual Brief Visit        Encounter provider [address]  [facility] [address]    Recent Visits  Date Type Provider Dept   08/12/20 [place] [id number]   08/11/20 Telephone Iesha Johnson,   [address]   08/06/20 Telephone Wayne Hayward [address] recent visits within past 7 days and meeting all other requirements     Today's Visits  Date Type Provider Dept   [facility]   Showing today's visits and meeting all other requirements     Future Appointments  No visits were found meeting these conditions  Showing future appointments within next 150 days and meeting all other requirements        After connecting through telephone and patient was informed that this is not a secure, HIPAA-complaint platform  She agrees to proceed  , the patient was identified by name and date of birth  Nancy Loaiza was informed that this is a telemedicine visit and that the visit is being conducted through telephone and patient was informed that this is not a secure, HIPAA-complaint platform  She agrees to proceed     My office door was closed  No one else was in the room  She acknowledged consent and understanding of privacy and security of the platform  The patient has agreed to participate and understands she can discontinue the visit at any time  Patient is aware this is a billable service  Subjective    Nancy Loaiza is a 62 y o  female with type 2 diabetes    HPI     Past Medical History:   Diagnosis Date    Acquired hammer toe of right foot 3/15/2018    S/p surg    Arthritis     Asthma     "one time with a cold, and used an inhaler"    Chronic pain disorder     hip pain left, and back    Diabetes mellitus ([address] )     Eczema     GERD (gastroesophageal reflux disease)     History of panic attacks     Hyperlipidemia     Hypertension     Labral tear of shoulder 09/24/2014    Right foot pain     bunionectomy today 3/1/2019    Rotator cuff syndrome, right 8/16/2017    Wears glasses        Past Surgical History:   Procedure Laterality Date    CHOLECYSTECTOMY      "had a panic attack"    FOOT SURGERY Right     HERNIA REPAIR      OVARY SURGERY      [place] COLONOSCOPY FLX DX W/COLLJ SPEC WHEN PFRMD N/A 12/22/2016    Procedure: EGD AND COLONOSCOPY;  Surgeon: Martin Garza MD; Location: [facility] GI LAB; Service: Gastroenterology    [place] CORRJ HALLUX VALGUS W/SESMDC W/2 OSTEOT Right 3/1/2019    Procedure: DOUBLE OSTEOTOMY, ZINA/AKIN BUNIONECTOMY;  Surgeon: Winter Alva DPM;  Location: [facility] OR;  Service: Podiatry       Current Outpatient Medications   Medication Sig Dispense Refill    albuterol (PROVENTIL HFA,VENTOLIN HFA) 90 mcg/act inhaler Inhale 2 puffs every 6 (six) hours as needed for wheezing or shortness of breath 1 Inhaler 5    atorvastatin (LIPITOR) 20 mg tablet TAKE 1 TABLET BY MOUTH DAILY AT BEDTIME 90 tablet 1    DULoxetine (CYMBALTA) 30 mg delayed release capsule Take 1 capsule (30 mg total) by mouth daily 90 capsule 1    Empagliflozin (Jardiance) 10 MG TABS Take 1 tablet (10 mg total) by mouth every morning 30 tablet 3    famotidine (PEPCID) 20 mg tablet TAKE 1 TABLET (20 MG TOTAL) BY MOUTH DAILY AT BEDTIME AS NEEDED FOR HEARTBURN 30 tablet 2    losartan (COZAAR) 50 mg tablet TAKE 1 TABLET BY MOUTH EVERY DAY 90 tablet 1    metFORMIN (GLUCOPHAGE) 1000 MG tablet Take 1 tablet (1,000 mg total) by mouth 2 (two) times a day with meals 180 tablet 1    triamcinolone (KENALOG) 0 5 % ointment APPLY TO AFFECTED AREA TWICE A DAY 30 g 0     No current facility-administered medications for this visit  Allergies   Allergen Reactions    Aspirin Shortness Of Breath and Itching     swelling    Nsaids      Patient unsure if allergic to nsaids but it is listedf in her history     Latex Itching and Rash       Review of Systems    There were no vitals filed for this visit  I spent 30 minutes directly with the patient during this visit    VIRTUAL VISIT [address] acknowledges that she has consented to an online visit or consultation   She understands that the online visit is based solely on information provided by her, and that, in the absence of a face-to-face physical evaluation by the physician, the diagnosis she receives is both limited and provisional in terms of accuracy and completeness  This is not intended to replace a full medical face-to-face evaluation by the physician  Lorena Tubbs understands and accepts these terms

## 2020-08-13 NOTE — TELEPHONE ENCOUNTER
[facility] stating they need pts insurance info and a secondary Dx code for a reason for pts incontience   Please advise

## 2020-08-13 NOTE — Clinical Note
Provided Virtual Telephone Visit to patient, [place] [facility] [place]  She verbalized understanding, and I will mail her a packet of diabetes education materials in [facility] [place]  She states she does not have a monitor, so is not checking her Blood sugars  I suggested she call the clinic and ask about receiving a prescription for a monitor

## 2020-08-14 DIAGNOSIS — E11.8 CONTROLLED DIABETES MELLITUS TYPE 2 WITH COMPLICATIONS, UNSPECIFIED WHETHER LONG TERM INSULIN USE (HCC): Primary | ICD-10-CM

## 2020-08-14 RX ORDER — LANCETS 28 GAUGE
EACH MISCELLANEOUS
Qty: 100 EACH | Refills: 5 | Status: SHIPPED | OUTPATIENT
Start: 2020-08-14 | End: 2021-02-15

## 2020-08-14 RX ORDER — BLOOD-GLUCOSE METER
KIT MISCELLANEOUS
Qty: 100 EACH | Refills: 5 | Status: SHIPPED | OUTPATIENT
Start: 2020-08-14 | End: 2021-02-12

## 2020-08-14 RX ORDER — BLOOD-GLUCOSE METER
KIT MISCELLANEOUS 2 TIMES DAILY
Qty: 1 EACH | Refills: 0 | Status: SHIPPED | OUTPATIENT
Start: 2020-08-14 | End: 2021-02-15

## 2020-08-17 NOTE — TELEPHONE ENCOUNTER
I didn't send anything  It was my first time seeing her   I figured it was a continuation from one of the residents but there isnt anythign else on file to go with

## 2020-08-17 NOTE — TELEPHONE ENCOUNTER
No scripts came from this office and she has no documentation/diagnosis to support incontinence items  If she would like her insurance to cover these, we would need to have a visit (in office or televisit) to document why she needs them and add a diagnosis to her chart that would cover them   Please schedule if pt wishes

## 2020-08-18 ENCOUNTER — TELEMEDICINE (OUTPATIENT)
Dept: FAMILY MEDICINE CLINIC | Facility: CLINIC | Age: 60
End: 2020-08-18

## 2020-08-18 DIAGNOSIS — E11.9 DIABETES MELLITUS TYPE 2, CONTROLLED (HCC): ICD-10-CM

## 2020-08-18 DIAGNOSIS — E78.5 HYPERLIPIDEMIA: ICD-10-CM

## 2020-08-18 DIAGNOSIS — I10 HYPERTENSION, BENIGN: ICD-10-CM

## 2020-08-18 PROCEDURE — 1036F TOBACCO NON-USER: CPT | Performed by: PHYSICIAN ASSISTANT

## 2020-08-18 PROCEDURE — 3046F HEMOGLOBIN A1C LEVEL >9.0%: CPT | Performed by: PHYSICIAN ASSISTANT

## 2020-08-18 PROCEDURE — 3075F SYST BP GE 130 - 139MM HG: CPT | Performed by: PHYSICIAN ASSISTANT

## 2020-08-18 PROCEDURE — 99213 OFFICE O/P EST LOW 20 MIN: CPT | Performed by: PHYSICIAN ASSISTANT

## 2020-08-18 PROCEDURE — 2022F DILAT RTA XM EVC RTNOPTHY: CPT | Performed by: PHYSICIAN ASSISTANT

## 2020-08-18 PROCEDURE — 3079F DIAST BP 80-89 MM HG: CPT | Performed by: PHYSICIAN ASSISTANT

## 2020-08-18 PROCEDURE — 4010F ACE/ARB THERAPY RXD/TAKEN: CPT | Performed by: PHYSICIAN ASSISTANT

## 2020-08-18 RX ORDER — ATORVASTATIN CALCIUM 20 MG/1
20 TABLET, FILM COATED ORAL
Qty: 90 TABLET | Refills: 1 | Status: SHIPPED | OUTPATIENT
Start: 2020-08-18 | End: 2020-11-05

## 2020-08-18 RX ORDER — LOSARTAN POTASSIUM 50 MG/1
50 TABLET ORAL DAILY
Qty: 90 TABLET | Refills: 1 | Status: SHIPPED | OUTPATIENT
Start: 2020-08-18 | End: 2021-02-14 | Stop reason: SDUPTHER

## 2020-08-18 NOTE — ASSESSMENT & PLAN NOTE
Lab Results   Component Value Date    HGBA1C 9 0 (H) 07/27/2020   She will go to pharmacy today and  glucometer  Recommend calling back in 48 hours with blood sugar readings

## 2020-08-18 NOTE — PROGRESS NOTES
Virtual Regular Visit      Assessment/Plan:    Problem List Items Addressed This Visit        Endocrine    Diabetes mellitus type 2, controlled ([facility] [address] )       Lab Results   Component Value Date    HGBA1C 9 0 (H) 07/27/2020   She will go to pharmacy today and  glucometer  Recommend calling back in 48 hours with blood sugar readings  Relevant Medications    atorvastatin (LIPITOR) 20 mg tablet    losartan (COZAAR) 50 mg tablet       Cardiovascular and Mediastinum    Hypertension, benign    Relevant Medications    losartan (COZAAR) 50 mg tablet       Other    Hyperlipidemia    Relevant Medications    atorvastatin (LIPITOR) 20 mg tablet               Reason for visit is   Chief Complaint   Patient presents with    Virtual Regular Visit        Encounter provider Kishan Willams PA-C    Provider located at [address] [place] [id number] [phone]      Recent Visits  Date Type Provider Dept   08/13/20 Telephone Jock Mortimer, LPN  [place]   Showing recent visits within past 7 days and meeting all other requirements     Today's Visits  Date Type Provider Dept   08/18/20 Telemedicine Kishan Willams PA-C  [place]   Showing today's visits and meeting all other requirements     Future Appointments  No visits were found meeting these conditions  Showing future appointments within next 150 days and meeting all other requirements        The patient was identified by name and date of birth  Judith Soares was informed that this is a telemedicine visit and that the visit is being conducted through [facility] and patient was informed that this is a secure, HIPAA-compliant platform  She agrees to proceed     My office door was closed  No one else was in the room  She acknowledged consent and understanding of privacy and security of the video platform   The patient has agreed to participate and understands they can discontinue the visit at any time  Patient is aware this is a billable service  Subjective  Philip Ball is a 62 y o  female for diabetes  She feels like her sugar has been high  Glucometer was sent to pharmacy but her car was broke so she did not get it  She has been taking the Jardiance 10 mg  She has been feeling nauseous since she has been taking it  She also did restart taking Cymbalta 30 mg  She is unsure if it was from this but she did take Cymbalta in the past and did not have any side effects  The Cymbalta has been helping her mood and also her back pain  HPI     Past Medical History:   Diagnosis Date    Acquired hammer toe of right foot 3/15/2018    S/p surg    Arthritis     Asthma     "one time with a cold, and used an inhaler"    Chronic pain disorder     hip pain left, and back    Diabetes mellitus ([address] )     Eczema     GERD (gastroesophageal reflux disease)     History of panic attacks     Hyperlipidemia     Hypertension     Labral tear of shoulder 09/24/2014    Right foot pain     bunionectomy today 3/1/2019    Rotator cuff syndrome, right 8/16/2017    Wears glasses        Past Surgical History:   Procedure Laterality Date    CHOLECYSTECTOMY      "had a panic attack"    FOOT SURGERY Right     HERNIA REPAIR      OVARY SURGERY      [place] COLONOSCOPY FLX DX W/COLLJ SPEC WHEN PFRMD N/A 12/22/2016    Procedure: EGD AND COLONOSCOPY;  Surgeon: Jose Luis Dale MD;  Location: [facility];   Service: Gastroenterology    [place] CORRJ HALLUX VALGUS W/SESMDC W/2 OSTEOT Right 3/1/2019    Procedure: DOUBLE OSTEOTOMY, ZINA/AKIN BUNIONECTOMY;  Surgeon: Gab Armijo DPM;  Location: [facility] OR;  Service: Podiatry       Current Outpatient Medications   Medication Sig Dispense Refill    albuterol (PROVENTIL HFA,VENTOLIN HFA) 90 mcg/act inhaler Inhale 2 puffs every 6 (six) hours as needed for wheezing or shortness of breath 1 Inhaler 5    atorvastatin (LIPITOR) 20 mg tablet Take 1 tablet (20 mg total) by mouth daily at bedtime 90 tablet 1    Blood Glucose Monitoring Suppl (FreeStyle Lite) ES by Does not apply route 2 (two) times a day 1 each 0    DULoxetine (CYMBALTA) 30 mg delayed release capsule Take 1 capsule (30 mg total) by mouth daily 90 capsule 1    Empagliflozin (Jardiance) 10 MG TABS Take 1 tablet (10 mg total) by mouth every morning 30 tablet 3    famotidine (PEPCID) 20 mg tablet TAKE 1 TABLET (20 MG TOTAL) BY MOUTH DAILY AT BEDTIME AS NEEDED FOR HEARTBURN 30 tablet 2    glucose blood (FREESTYLE LITE) test strip Test blood sugar twice a day 100 each 5    Lancets (freestyle) lancets Test blood sugar twice a day 100 each 5    losartan (COZAAR) 50 mg tablet Take 1 tablet (50 mg total) by mouth daily 90 tablet 1    metFORMIN (GLUCOPHAGE) 1000 MG tablet Take 1 tablet (1,000 mg total) by mouth 2 (two) times a day with meals 180 tablet 1    triamcinolone (KENALOG) 0 5 % ointment APPLY TO AFFECTED AREA TWICE A DAY 30 g 0     No current facility-administered medications for this visit  Allergies   Allergen Reactions    Aspirin Shortness Of Breath and Itching     swelling    Nsaids      Patient unsure if allergic to nsaids but it is listedf in her history     Latex Itching and Rash       Review of Systems   Constitutional: Negative for activity change, appetite change, chills, fatigue and unexpected weight change  HENT: Negative for ear pain, hearing loss and sore throat  Dry mouth   Eyes: Negative for visual disturbance  Respiratory: Negative for cough and wheezing  Cardiovascular: Negative for chest pain  Gastrointestinal: Positive for nausea  Negative for abdominal pain, constipation, diarrhea and vomiting  Genitourinary: Negative for difficulty urinating and dysuria  Musculoskeletal: Positive for back pain  Negative for arthralgias and myalgias  Skin: Negative for rash  Neurological: Negative for dizziness and headaches     Psychiatric/Behavioral: Negative for behavioral problems  Video Exam    There were no vitals filed for this visit  Physical Exam  Constitutional:       General: She is not in acute distress  Appearance: She is well-developed  She is not diaphoretic  HENT:      Head: Normocephalic and atraumatic  Eyes:      Conjunctiva/sclera: Conjunctivae normal    Pulmonary:      Effort: Pulmonary effort is normal    Neurological:      Mental Status: She is alert and oriented to person, place, and time  Psychiatric:         Behavior: Behavior normal           I spent 10 minutes directly with the patient during this visit      Palma Baires acknowledges that she has consented to an online visit or consultation  She understands that the online visit is based solely on information provided by her, and that, in the absence of a face-to-face physical evaluation by the physician, the diagnosis she receives is both limited and provisional in terms of accuracy and completeness  This is not intended to replace a full medical face-to-face evaluation by the physician  Kayley Gray understands and accepts these terms

## 2020-08-24 ENCOUNTER — HOSPITAL ENCOUNTER (OUTPATIENT)
Dept: MAMMOGRAPHY | Facility: CLINIC | Age: 60
Discharge: HOME | End: 2020-08-24
Payer: COMMERCIAL

## 2020-08-24 VITALS — HEIGHT: 62 IN | WEIGHT: 180 LBS | BODY MASS INDEX: 33.13 KG/M2

## 2020-08-24 DIAGNOSIS — Z12.39 SCREENING FOR BREAST CANCER: ICD-10-CM

## 2020-08-24 PROCEDURE — 77063 BREAST TOMOSYNTHESIS BI: CPT

## 2020-08-24 PROCEDURE — 77067 SCR MAMMO BI INCL CAD: CPT

## 2020-09-28 DIAGNOSIS — K21.9 GASTROESOPHAGEAL REFLUX DISEASE, ESOPHAGITIS PRESENCE NOT SPECIFIED: ICD-10-CM

## 2020-09-28 RX ORDER — FAMOTIDINE 20 MG/1
TABLET, FILM COATED ORAL
Qty: 30 TABLET | Refills: 2 | Status: SHIPPED | OUTPATIENT
Start: 2020-09-28 | End: 2020-12-31

## 2020-10-28 ENCOUNTER — TELEPHONE (OUTPATIENT)
Dept: FAMILY MEDICINE CLINIC | Facility: CLINIC | Age: 60
End: 2020-10-28

## 2020-10-28 ENCOUNTER — LAB (OUTPATIENT)
Dept: LAB | Facility: CLINIC | Age: 60
End: 2020-10-28
Payer: COMMERCIAL

## 2020-10-28 DIAGNOSIS — E11.8 CONTROLLED DIABETES MELLITUS TYPE 2 WITH COMPLICATIONS, UNSPECIFIED WHETHER LONG TERM INSULIN USE (HCC): ICD-10-CM

## 2020-10-28 LAB
ALBUMIN SERPL BCP-MCNC: 4.1 G/DL (ref 3.5–5)
ALP SERPL-CCNC: 106 U/L (ref 46–116)
ALT SERPL W P-5'-P-CCNC: 36 U/L (ref 12–78)
ANION GAP SERPL CALCULATED.3IONS-SCNC: 5 MMOL/L (ref 4–13)
AST SERPL W P-5'-P-CCNC: 26 U/L (ref 5–45)
BASOPHILS # BLD AUTO: 0.03 THOUSANDS/ΜL (ref 0–0.1)
BASOPHILS NFR BLD AUTO: 1 % (ref 0–1)
BILIRUB SERPL-MCNC: 0.38 MG/DL (ref 0.2–1)
BUN SERPL-MCNC: 10 MG/DL (ref 5–25)
CALCIUM SERPL-MCNC: 8.9 MG/DL (ref 8.3–10.1)
CHLORIDE SERPL-SCNC: 104 MMOL/L (ref 100–108)
CHOLEST SERPL-MCNC: 231 MG/DL (ref 50–200)
CO2 SERPL-SCNC: 29 MMOL/L (ref 21–32)
CREAT SERPL-MCNC: 0.78 MG/DL (ref 0.6–1.3)
EOSINOPHIL # BLD AUTO: 0.06 THOUSAND/ΜL (ref 0–0.61)
EOSINOPHIL NFR BLD AUTO: 1 % (ref 0–6)
ERYTHROCYTE [DISTWIDTH] IN BLOOD BY AUTOMATED COUNT: 13.7 % (ref 11.6–15.1)
GFR SERPL CREATININE-BSD FRML MDRD: 96 ML/MIN/1.73SQ M
GLUCOSE P FAST SERPL-MCNC: 78 MG/DL (ref 65–99)
HCT VFR BLD AUTO: 43.7 % (ref 34.8–46.1)
HDLC SERPL-MCNC: 52 MG/DL
HGB BLD-MCNC: 13.7 G/DL (ref 11.5–15.4)
IMM GRANULOCYTES # BLD AUTO: 0.04 THOUSAND/UL (ref 0–0.2)
IMM GRANULOCYTES NFR BLD AUTO: 1 % (ref 0–2)
LDLC SERPL CALC-MCNC: 136 MG/DL (ref 0–100)
LYMPHOCYTES # BLD AUTO: 1.67 THOUSANDS/ΜL (ref 0.6–4.47)
LYMPHOCYTES NFR BLD AUTO: 31 % (ref 14–44)
MCH RBC QN AUTO: 29.7 PG (ref 26.8–34.3)
MCHC RBC AUTO-ENTMCNC: 31.4 G/DL (ref 31.4–37.4)
MCV RBC AUTO: 95 FL (ref 82–98)
MONOCYTES # BLD AUTO: 0.54 THOUSAND/ΜL (ref 0.17–1.22)
MONOCYTES NFR BLD AUTO: 10 % (ref 4–12)
NEUTROPHILS # BLD AUTO: 3.08 THOUSANDS/ΜL (ref 1.85–7.62)
NEUTS SEG NFR BLD AUTO: 56 % (ref 43–75)
NONHDLC SERPL-MCNC: 179 MG/DL
NRBC BLD AUTO-RTO: 0 /100 WBCS
PLATELET # BLD AUTO: 271 THOUSANDS/UL (ref 149–390)
PMV BLD AUTO: 11.1 FL (ref 8.9–12.7)
POTASSIUM SERPL-SCNC: 4 MMOL/L (ref 3.5–5.3)
PROT SERPL-MCNC: 7.9 G/DL (ref 6.4–8.2)
RBC # BLD AUTO: 4.61 MILLION/UL (ref 3.81–5.12)
SODIUM SERPL-SCNC: 138 MMOL/L (ref 136–145)
TRIGL SERPL-MCNC: 217 MG/DL
WBC # BLD AUTO: 5.42 THOUSAND/UL (ref 4.31–10.16)

## 2020-10-28 PROCEDURE — 36415 COLL VENOUS BLD VENIPUNCTURE: CPT

## 2020-10-28 PROCEDURE — 85025 COMPLETE CBC W/AUTO DIFF WBC: CPT

## 2020-10-28 PROCEDURE — 80053 COMPREHEN METABOLIC PANEL: CPT

## 2020-10-28 PROCEDURE — 80061 LIPID PANEL: CPT

## 2020-11-05 ENCOUNTER — OFFICE VISIT (OUTPATIENT)
Dept: FAMILY MEDICINE CLINIC | Facility: CLINIC | Age: 60
End: 2020-11-05

## 2020-11-05 VITALS
DIASTOLIC BLOOD PRESSURE: 80 MMHG | TEMPERATURE: 97.9 F | SYSTOLIC BLOOD PRESSURE: 124 MMHG | BODY MASS INDEX: 30.54 KG/M2 | OXYGEN SATURATION: 97 % | RESPIRATION RATE: 19 BRPM | WEIGHT: 167 LBS | HEART RATE: 87 BPM

## 2020-11-05 DIAGNOSIS — E11.8 CONTROLLED DIABETES MELLITUS TYPE 2 WITH COMPLICATIONS, UNSPECIFIED WHETHER LONG TERM INSULIN USE (HCC): ICD-10-CM

## 2020-11-05 DIAGNOSIS — B37.9 YEAST INFECTION: ICD-10-CM

## 2020-11-05 DIAGNOSIS — E78.49 OTHER HYPERLIPIDEMIA: ICD-10-CM

## 2020-11-05 DIAGNOSIS — L30.9 ECZEMA, UNSPECIFIED TYPE: ICD-10-CM

## 2020-11-05 DIAGNOSIS — M16.12 PRIMARY OSTEOARTHRITIS OF LEFT HIP: ICD-10-CM

## 2020-11-05 DIAGNOSIS — E66.09 CLASS 1 OBESITY DUE TO EXCESS CALORIES WITH SERIOUS COMORBIDITY AND BODY MASS INDEX (BMI) OF 33.0 TO 33.9 IN ADULT: ICD-10-CM

## 2020-11-05 DIAGNOSIS — Z23 NEED FOR VACCINATION: Primary | ICD-10-CM

## 2020-11-05 PROCEDURE — 3079F DIAST BP 80-89 MM HG: CPT | Performed by: PHYSICIAN ASSISTANT

## 2020-11-05 PROCEDURE — G0008 ADMIN INFLUENZA VIRUS VAC: HCPCS

## 2020-11-05 PROCEDURE — 99214 OFFICE O/P EST MOD 30 MIN: CPT | Performed by: PHYSICIAN ASSISTANT

## 2020-11-05 PROCEDURE — 1036F TOBACCO NON-USER: CPT | Performed by: PHYSICIAN ASSISTANT

## 2020-11-05 PROCEDURE — 3074F SYST BP LT 130 MM HG: CPT | Performed by: PHYSICIAN ASSISTANT

## 2020-11-05 PROCEDURE — 90682 RIV4 VACC RECOMBINANT DNA IM: CPT

## 2020-11-05 RX ORDER — DULOXETIN HYDROCHLORIDE 60 MG/1
60 CAPSULE, DELAYED RELEASE ORAL DAILY
Qty: 90 CAPSULE | Refills: 3 | Status: SHIPPED | OUTPATIENT
Start: 2020-11-05 | End: 2021-08-16 | Stop reason: SDUPTHER

## 2020-11-05 RX ORDER — TRIAMCINOLONE ACETONIDE 5 MG/G
1 OINTMENT TOPICAL 2 TIMES DAILY
Qty: 30 G | Refills: 1 | Status: SHIPPED | OUTPATIENT
Start: 2020-11-05 | End: 2021-03-05 | Stop reason: SDUPTHER

## 2020-11-05 RX ORDER — FLUCONAZOLE 150 MG/1
150 TABLET ORAL ONCE
Qty: 1 TABLET | Refills: 0 | Status: SHIPPED | OUTPATIENT
Start: 2020-11-05 | End: 2020-11-05

## 2020-11-05 RX ORDER — CLOTRIMAZOLE 1 %
CREAM (GRAM) TOPICAL 2 TIMES DAILY
Qty: 30 G | Refills: 0 | Status: SHIPPED | OUTPATIENT
Start: 2020-11-05 | End: 2021-07-22

## 2020-11-05 RX ORDER — SIMVASTATIN 20 MG
20 TABLET ORAL
Qty: 90 TABLET | Refills: 1 | Status: SHIPPED | OUTPATIENT
Start: 2020-11-05 | End: 2021-03-01

## 2020-12-03 ENCOUNTER — TELEPHONE (OUTPATIENT)
Dept: FAMILY MEDICINE CLINIC | Facility: CLINIC | Age: 60
End: 2020-12-03

## 2020-12-03 ENCOUNTER — TELEMEDICINE (OUTPATIENT)
Dept: FAMILY MEDICINE CLINIC | Facility: CLINIC | Age: 60
End: 2020-12-03

## 2020-12-03 DIAGNOSIS — U07.1 COVID-19: ICD-10-CM

## 2020-12-03 DIAGNOSIS — U07.1 COVID-19: Primary | ICD-10-CM

## 2020-12-03 PROCEDURE — G2012 BRIEF CHECK IN BY MD/QHP: HCPCS | Performed by: NURSE PRACTITIONER

## 2020-12-03 PROCEDURE — U0003 INFECTIOUS AGENT DETECTION BY NUCLEIC ACID (DNA OR RNA); SEVERE ACUTE RESPIRATORY SYNDROME CORONAVIRUS 2 (SARS-COV-2) (CORONAVIRUS DISEASE [COVID-19]), AMPLIFIED PROBE TECHNIQUE, MAKING USE OF HIGH THROUGHPUT TECHNOLOGIES AS DESCRIBED BY CMS-2020-01-R: HCPCS | Performed by: NURSE PRACTITIONER

## 2020-12-04 LAB — SARS-COV-2 RNA SPEC QL NAA+PROBE: DETECTED

## 2020-12-09 ENCOUNTER — TELEMEDICINE (OUTPATIENT)
Dept: FAMILY MEDICINE CLINIC | Facility: CLINIC | Age: 60
End: 2020-12-09

## 2020-12-09 DIAGNOSIS — U07.1 COVID-19: Primary | ICD-10-CM

## 2020-12-09 PROCEDURE — G2012 BRIEF CHECK IN BY MD/QHP: HCPCS | Performed by: PHYSICIAN ASSISTANT

## 2020-12-19 DIAGNOSIS — E11.8 CONTROLLED DIABETES MELLITUS TYPE 2 WITH COMPLICATIONS, UNSPECIFIED WHETHER LONG TERM INSULIN USE (HCC): ICD-10-CM

## 2020-12-19 RX ORDER — EMPAGLIFLOZIN 10 MG/1
TABLET, FILM COATED ORAL
Qty: 30 TABLET | Refills: 3 | Status: SHIPPED | OUTPATIENT
Start: 2020-12-19 | End: 2021-02-08

## 2020-12-31 DIAGNOSIS — K21.9 GASTROESOPHAGEAL REFLUX DISEASE: ICD-10-CM

## 2020-12-31 RX ORDER — FAMOTIDINE 20 MG/1
TABLET, FILM COATED ORAL
Qty: 90 TABLET | Refills: 0 | Status: SHIPPED | OUTPATIENT
Start: 2020-12-31 | End: 2021-08-16

## 2021-01-24 DIAGNOSIS — E11.8 CONTROLLED DIABETES MELLITUS TYPE 2 WITH COMPLICATIONS, UNSPECIFIED WHETHER LONG TERM INSULIN USE (HCC): ICD-10-CM

## 2021-02-06 DIAGNOSIS — E11.8 CONTROLLED DIABETES MELLITUS TYPE 2 WITH COMPLICATIONS, UNSPECIFIED WHETHER LONG TERM INSULIN USE (HCC): ICD-10-CM

## 2021-02-08 RX ORDER — EMPAGLIFLOZIN 10 MG/1
TABLET, FILM COATED ORAL
Qty: 30 TABLET | Refills: 3 | Status: SHIPPED | OUTPATIENT
Start: 2021-02-08 | End: 2021-08-16 | Stop reason: SDUPTHER

## 2021-02-11 DIAGNOSIS — E11.8 CONTROLLED DIABETES MELLITUS TYPE 2 WITH COMPLICATIONS, UNSPECIFIED WHETHER LONG TERM INSULIN USE (HCC): ICD-10-CM

## 2021-02-12 DIAGNOSIS — E11.8 CONTROLLED DIABETES MELLITUS TYPE 2 WITH COMPLICATIONS, UNSPECIFIED WHETHER LONG TERM INSULIN USE (HCC): ICD-10-CM

## 2021-02-12 RX ORDER — BLOOD-GLUCOSE METER
KIT MISCELLANEOUS
Qty: 100 EACH | Refills: 5 | Status: SHIPPED | OUTPATIENT
Start: 2021-02-12 | End: 2022-04-26 | Stop reason: SDUPTHER

## 2021-02-13 DIAGNOSIS — E11.9 DIABETES MELLITUS TYPE 2, CONTROLLED (HCC): ICD-10-CM

## 2021-02-13 DIAGNOSIS — I10 HYPERTENSION, BENIGN: ICD-10-CM

## 2021-02-14 PROCEDURE — 4010F ACE/ARB THERAPY RXD/TAKEN: CPT | Performed by: PHYSICIAN ASSISTANT

## 2021-02-14 RX ORDER — LOSARTAN POTASSIUM 50 MG/1
TABLET ORAL
Qty: 90 TABLET | Refills: 1 | Status: SHIPPED | OUTPATIENT
Start: 2021-02-14 | End: 2021-08-16 | Stop reason: SDUPTHER

## 2021-02-15 DIAGNOSIS — E11.9 CONTROLLED TYPE 2 DIABETES MELLITUS WITHOUT COMPLICATION, WITHOUT LONG-TERM CURRENT USE OF INSULIN (HCC): Primary | ICD-10-CM

## 2021-02-15 RX ORDER — LANCETS
EACH MISCELLANEOUS 2 TIMES DAILY
Qty: 100 EACH | Refills: 5 | Status: SHIPPED | OUTPATIENT
Start: 2021-02-15

## 2021-02-15 RX ORDER — BLOOD-GLUCOSE METER
1 EACH MISCELLANEOUS DAILY
Qty: 1 KIT | Refills: 0 | Status: SHIPPED | OUTPATIENT
Start: 2021-02-15

## 2021-02-15 RX ORDER — BLOOD SUGAR DIAGNOSTIC
STRIP MISCELLANEOUS
Qty: 100 EACH | Refills: 5 | Status: SHIPPED | OUTPATIENT
Start: 2021-02-15 | End: 2022-04-26 | Stop reason: SDUPTHER

## 2021-02-15 RX ORDER — BLOOD SUGAR DIAGNOSTIC
STRIP MISCELLANEOUS
Refills: 0 | OUTPATIENT
Start: 2021-02-15

## 2021-02-25 ENCOUNTER — TELEPHONE (OUTPATIENT)
Dept: FAMILY MEDICINE CLINIC | Facility: CLINIC | Age: 61
End: 2021-02-25

## 2021-02-25 NOTE — TELEPHONE ENCOUNTER
Spoke with patient, she admits that she is happy with her weight loss and improved A1C / blood sugar control  She did not have any questions, I encouraged her to continue to practice good self-care for her good health    DDS

## 2021-03-01 DIAGNOSIS — E11.8 CONTROLLED DIABETES MELLITUS TYPE 2 WITH COMPLICATIONS, UNSPECIFIED WHETHER LONG TERM INSULIN USE (HCC): ICD-10-CM

## 2021-03-01 RX ORDER — SIMVASTATIN 20 MG
TABLET ORAL
Qty: 90 TABLET | Refills: 1 | Status: SHIPPED | OUTPATIENT
Start: 2021-03-01 | End: 2021-08-16 | Stop reason: SDUPTHER

## 2021-03-04 ENCOUNTER — LAB (OUTPATIENT)
Dept: LAB | Facility: CLINIC | Age: 61
End: 2021-03-04
Payer: COMMERCIAL

## 2021-03-04 DIAGNOSIS — E11.8 CONTROLLED DIABETES MELLITUS TYPE 2 WITH COMPLICATIONS, UNSPECIFIED WHETHER LONG TERM INSULIN USE (HCC): ICD-10-CM

## 2021-03-04 LAB
ALBUMIN SERPL BCP-MCNC: 3.9 G/DL (ref 3.5–5)
ALP SERPL-CCNC: 122 U/L (ref 46–116)
ALT SERPL W P-5'-P-CCNC: 22 U/L (ref 12–78)
ANION GAP SERPL CALCULATED.3IONS-SCNC: 5 MMOL/L (ref 4–13)
AST SERPL W P-5'-P-CCNC: 18 U/L (ref 5–45)
BASOPHILS # BLD AUTO: 0.04 THOUSANDS/ΜL (ref 0–0.1)
BASOPHILS NFR BLD AUTO: 1 % (ref 0–1)
BILIRUB SERPL-MCNC: 0.3 MG/DL (ref 0.2–1)
BUN SERPL-MCNC: 14 MG/DL (ref 5–25)
CALCIUM SERPL-MCNC: 9 MG/DL (ref 8.3–10.1)
CHLORIDE SERPL-SCNC: 105 MMOL/L (ref 100–108)
CHOLEST SERPL-MCNC: 179 MG/DL (ref 50–200)
CO2 SERPL-SCNC: 30 MMOL/L (ref 21–32)
CREAT SERPL-MCNC: 0.7 MG/DL (ref 0.6–1.3)
EOSINOPHIL # BLD AUTO: 0.07 THOUSAND/ΜL (ref 0–0.61)
EOSINOPHIL NFR BLD AUTO: 1 % (ref 0–6)
ERYTHROCYTE [DISTWIDTH] IN BLOOD BY AUTOMATED COUNT: 13.9 % (ref 11.6–15.1)
EST. AVERAGE GLUCOSE BLD GHB EST-MCNC: 126 MG/DL
GFR SERPL CREATININE-BSD FRML MDRD: 110 ML/MIN/1.73SQ M
GLUCOSE P FAST SERPL-MCNC: 101 MG/DL (ref 65–99)
HBA1C MFR BLD: 6 %
HCT VFR BLD AUTO: 42.9 % (ref 34.8–46.1)
HDLC SERPL-MCNC: 65 MG/DL
HGB BLD-MCNC: 13.2 G/DL (ref 11.5–15.4)
IMM GRANULOCYTES # BLD AUTO: 0.02 THOUSAND/UL (ref 0–0.2)
IMM GRANULOCYTES NFR BLD AUTO: 0 % (ref 0–2)
LDLC SERPL CALC-MCNC: 90 MG/DL (ref 0–100)
LYMPHOCYTES # BLD AUTO: 1.72 THOUSANDS/ΜL (ref 0.6–4.47)
LYMPHOCYTES NFR BLD AUTO: 30 % (ref 14–44)
MCH RBC QN AUTO: 30.1 PG (ref 26.8–34.3)
MCHC RBC AUTO-ENTMCNC: 30.8 G/DL (ref 31.4–37.4)
MCV RBC AUTO: 98 FL (ref 82–98)
MONOCYTES # BLD AUTO: 0.46 THOUSAND/ΜL (ref 0.17–1.22)
MONOCYTES NFR BLD AUTO: 8 % (ref 4–12)
NEUTROPHILS # BLD AUTO: 3.51 THOUSANDS/ΜL (ref 1.85–7.62)
NEUTS SEG NFR BLD AUTO: 60 % (ref 43–75)
NONHDLC SERPL-MCNC: 114 MG/DL
NRBC BLD AUTO-RTO: 0 /100 WBCS
PLATELET # BLD AUTO: 257 THOUSANDS/UL (ref 149–390)
PMV BLD AUTO: 11 FL (ref 8.9–12.7)
POTASSIUM SERPL-SCNC: 3.8 MMOL/L (ref 3.5–5.3)
PROT SERPL-MCNC: 7.6 G/DL (ref 6.4–8.2)
RBC # BLD AUTO: 4.39 MILLION/UL (ref 3.81–5.12)
SODIUM SERPL-SCNC: 140 MMOL/L (ref 136–145)
TRIGL SERPL-MCNC: 121 MG/DL
WBC # BLD AUTO: 5.82 THOUSAND/UL (ref 4.31–10.16)

## 2021-03-04 PROCEDURE — 83036 HEMOGLOBIN GLYCOSYLATED A1C: CPT

## 2021-03-04 PROCEDURE — 80053 COMPREHEN METABOLIC PANEL: CPT

## 2021-03-04 PROCEDURE — 85025 COMPLETE CBC W/AUTO DIFF WBC: CPT

## 2021-03-04 PROCEDURE — 36415 COLL VENOUS BLD VENIPUNCTURE: CPT

## 2021-03-04 PROCEDURE — 3044F HG A1C LEVEL LT 7.0%: CPT | Performed by: PHYSICIAN ASSISTANT

## 2021-03-04 PROCEDURE — 80061 LIPID PANEL: CPT

## 2021-03-04 NOTE — RESULT ENCOUNTER NOTE
Cholesterol was good and she got her A1c down to 6 which is great   I would continue what she has been doing

## 2021-03-05 ENCOUNTER — OFFICE VISIT (OUTPATIENT)
Dept: FAMILY MEDICINE CLINIC | Facility: CLINIC | Age: 61
End: 2021-03-05

## 2021-03-05 VITALS
SYSTOLIC BLOOD PRESSURE: 130 MMHG | HEART RATE: 100 BPM | OXYGEN SATURATION: 98 % | WEIGHT: 167 LBS | HEIGHT: 62 IN | BODY MASS INDEX: 30.73 KG/M2 | DIASTOLIC BLOOD PRESSURE: 70 MMHG | RESPIRATION RATE: 16 BRPM | TEMPERATURE: 97.3 F

## 2021-03-05 DIAGNOSIS — L30.9 ECZEMA, UNSPECIFIED TYPE: ICD-10-CM

## 2021-03-05 DIAGNOSIS — I10 HYPERTENSION, BENIGN: ICD-10-CM

## 2021-03-05 DIAGNOSIS — E11.8 CONTROLLED DIABETES MELLITUS TYPE 2 WITH COMPLICATIONS, UNSPECIFIED WHETHER LONG TERM INSULIN USE (HCC): ICD-10-CM

## 2021-03-05 DIAGNOSIS — M16.12 PRIMARY OSTEOARTHRITIS OF LEFT HIP: Primary | ICD-10-CM

## 2021-03-05 PROCEDURE — 1036F TOBACCO NON-USER: CPT | Performed by: PHYSICIAN ASSISTANT

## 2021-03-05 PROCEDURE — 99214 OFFICE O/P EST MOD 30 MIN: CPT | Performed by: PHYSICIAN ASSISTANT

## 2021-03-05 PROCEDURE — 3008F BODY MASS INDEX DOCD: CPT | Performed by: PHYSICIAN ASSISTANT

## 2021-03-05 PROCEDURE — 3078F DIAST BP <80 MM HG: CPT | Performed by: PHYSICIAN ASSISTANT

## 2021-03-05 PROCEDURE — 3075F SYST BP GE 130 - 139MM HG: CPT | Performed by: PHYSICIAN ASSISTANT

## 2021-03-05 RX ORDER — TRIAMCINOLONE ACETONIDE 5 MG/G
1 OINTMENT TOPICAL 2 TIMES DAILY
Qty: 30 G | Refills: 1 | Status: SHIPPED | OUTPATIENT
Start: 2021-03-05 | End: 2021-07-22

## 2021-03-05 RX ORDER — DICLOFENAC SODIUM 75 MG/1
75 TABLET, DELAYED RELEASE ORAL 2 TIMES DAILY
Qty: 60 TABLET | Refills: 2 | Status: SHIPPED | OUTPATIENT
Start: 2021-03-05 | End: 2021-06-06

## 2021-03-05 NOTE — PROGRESS NOTES
Assessment/Plan:    Controlled diabetes mellitus type 2 with complications ([facility] )    Lab Results   Component Value Date    HGBA1C 6 0 (H) 03/04/2021   Blood sugar is under much better control  Recommend same regimen  Follow up in 5 months  Hypertension, benign  Controlled with losartan  Chronic left-sided low back pain  Continue duloxetine which has helped  She has been using OTC voltaren with mild pain and will start oral diclofenac when pain is worse    Osteoarthritis of left hip  Continue duloxetine but start on voltaren with mild pain and diclofenac oral with severe pain         Problem List Items Addressed This Visit        Endocrine    Controlled diabetes mellitus type 2 with complications ([facility] )       Lab Results   Component Value Date    HGBA1C 6 0 (H) 03/04/2021   Blood sugar is under much better control  Recommend same regimen  Follow up in 5 months  Cardiovascular and Mediastinum    Hypertension, benign     Controlled with losartan  Musculoskeletal and Integument    Eczema    Relevant Medications    triamcinolone (KENALOG) 0 5 % ointment    Diclofenac Sodium (VOLTAREN) 1 %    Osteoarthritis of left hip - Primary     Continue duloxetine but start on voltaren with mild pain and diclofenac oral with severe pain         Relevant Medications    diclofenac (VOLTAREN) 75 mg EC tablet    Diclofenac Sodium (VOLTAREN) 1 %            Subjective:      Patient ID: Magui Chung is a 62 y o  female  HPI 62year old F here for follow up of htn, DM  Last A1C was 6 0 on 3/4 which has improved from July at 9 0  no side effects with medications  She is trying to eat well  She is getting left hip pain  Duloxetine does help some and so does voltaren when pain is mild  She tried a friends diclofenac oral and it helped a lot and allowed her to increase her activity  Pain is mostly over left hip but sometimes radiates into back or thigh         She is having right face pain  She has moles there and they get irritated  She would like to see a dermatologist      laura   The following portions of the patient's history were reviewed and updated as appropriate:   She  has a past medical history of Acquired hammer toe of right foot (3/15/2018), Arthritis, Asthma, Chronic pain disorder, Diabetes mellitus ([facility] ), Eczema, GERD (gastroesophageal reflux disease), History of panic attacks, Hyperlipidemia, Hypertension, Labral tear of shoulder (09/24/2014), Right foot pain, Rotator cuff syndrome, right (8/16/2017), and Wears glasses  She   Patient Active Problem List    Diagnosis Date Noted    Controlled diabetes mellitus type 2 with complications ([facility] 75 ) 55/15/2230    Class 1 obesity due to excess calories with serious comorbidity and body mass index (BMI) of 33 0 to 33 9 in adult 08/05/2020    Chronic left-sided low back pain 12/12/2018    Sacroiliitis ([facility] ) 06/06/2018    Hypertension, benign 12/14/2017    Osteoarthritis of left hip 08/09/2017    Dermatofibroma 06/26/2017    Diabetes mellitus type 2, controlled ([facility] ) 09/20/2016    Hyperlipidemia 09/20/2016    Fear of flying 04/30/2015    Allergic rhinitis 03/31/2015    Eczema 03/31/2015    Glenohumeral arthritis 09/24/2014    GERD (gastroesophageal reflux disease) 05/18/2012     She  has a past surgical history that includes Hernia repair; Ovary surgery; pr colonoscopy flx dx w/collj spec when pfrmd (N/A, 12/22/2016); Cholecystectomy; pr corrj hallux valgus w/sesmdc w/2 osteot (Right, 3/1/2019); and Foot surgery (Right)  Her family history includes Bone cancer in her maternal grandfather; Breast cancer in her maternal aunt; Cancer in her brother and maternal grandfather; Hypertension in her father and mother; Stomach cancer in her brother  She  reports that she has never smoked  She has never used smokeless tobacco  She reports that she does not drink alcohol or use drugs    Current Outpatient Medications Medication Sig Dispense Refill    acetaminophen (TYLENOL) 500 mg tablet Take 1 tablet (500 mg total) by mouth every 6 (six) hours as needed for mild pain 30 tablet 0    albuterol (PROVENTIL HFA,VENTOLIN HFA) 90 mcg/act inhaler Inhale 2 puffs every 6 (six) hours as needed for wheezing or shortness of breath 1 Inhaler 0    Blood Glucose Monitoring Suppl (Contour Next Monitor) w/Device KIT Use 1 each daily Check blood sugar 2 times day 1 kit 0    DULoxetine (CYMBALTA) 60 mg delayed release capsule Take 1 capsule (60 mg total) by mouth daily 90 capsule 3    famotidine (PEPCID) 20 mg tablet TAKE 1 TABLET BY MOUTH EVERY DAY AT BEDTIME AS NEEDED FOR HEARTBURN 90 tablet 0    glucose blood (Contour Next Test) test strip Check blood sugar 2 times day 100 each 5    glucose blood (FREESTYLE LITE) test strip USE TO TEST BLOOD SUGAR TWICE A  each 5    Jardiance 10 MG TABS TAKE 1 TABLET BY MOUTH EVERY DAY IN THE MORNING 30 tablet 3    losartan (COZAAR) 50 mg tablet TAKE 1 TABLET BY MOUTH EVERY DAY 90 tablet 1    metFORMIN (GLUCOPHAGE) 1000 MG tablet TAKE 1 TABLET BY MOUTH TWICE A DAY WITH MEALS 180 tablet 1    Microlet Lancets MISC Use 2 (two) times a day 100 each 5    ondansetron (ZOFRAN) 4 mg tablet Take 1 tablet (4 mg total) by mouth every 8 (eight) hours as needed for nausea or vomiting 20 tablet 0    simvastatin (ZOCOR) 20 mg tablet TAKE 1 TABLET BY MOUTH DAILY AT BEDTIME 90 tablet 1    triamcinolone (KENALOG) 0 5 % ointment Apply 1 application topically 2 (two) times a day To affected area 30 g 1    clotrimazole (LOTRIMIN) 1 % cream Apply topically 2 (two) times a day (Patient not taking: Reported on 3/5/2021) 30 g 0    diclofenac (VOLTAREN) 75 mg EC tablet Take 1 tablet (75 mg total) by mouth 2 (two) times a day 60 tablet 2    Diclofenac Sodium (VOLTAREN) 1 % Apply 2 g topically 4 (four) times a day 100 g 3     No current facility-administered medications for this visit        Current Outpatient Medications on File Prior to Visit   Medication Sig    acetaminophen (TYLENOL) 500 mg tablet Take 1 tablet (500 mg total) by mouth every 6 (six) hours as needed for mild pain    albuterol (PROVENTIL HFA,VENTOLIN HFA) 90 mcg/act inhaler Inhale 2 puffs every 6 (six) hours as needed for wheezing or shortness of breath    Blood Glucose Monitoring Suppl (Contour Next Monitor) w/Device KIT Use 1 each daily Check blood sugar 2 times day    DULoxetine (CYMBALTA) 60 mg delayed release capsule Take 1 capsule (60 mg total) by mouth daily    famotidine (PEPCID) 20 mg tablet TAKE 1 TABLET BY MOUTH EVERY DAY AT BEDTIME AS NEEDED FOR HEARTBURN    glucose blood (Contour Next Test) test strip Check blood sugar 2 times day    glucose blood (FREESTYLE LITE) test strip USE TO TEST BLOOD SUGAR TWICE A DAY    Jardiance 10 MG TABS TAKE 1 TABLET BY MOUTH EVERY DAY IN THE MORNING    losartan (COZAAR) 50 mg tablet TAKE 1 TABLET BY MOUTH EVERY DAY    metFORMIN (GLUCOPHAGE) 1000 MG tablet TAKE 1 TABLET BY MOUTH TWICE A DAY WITH MEALS    Microlet Lancets MISC Use 2 (two) times a day    ondansetron (ZOFRAN) 4 mg tablet Take 1 tablet (4 mg total) by mouth every 8 (eight) hours as needed for nausea or vomiting    simvastatin (ZOCOR) 20 mg tablet TAKE 1 TABLET BY MOUTH DAILY AT BEDTIME    clotrimazole (LOTRIMIN) 1 % cream Apply topically 2 (two) times a day (Patient not taking: Reported on 3/5/2021)    [DISCONTINUED] Empagliflozin (Jardiance) 10 MG TABS Take 1 tablet (10 mg total) by mouth every morning    [DISCONTINUED] losartan (COZAAR) 50 mg tablet Take 1 tablet (50 mg total) by mouth daily     No current facility-administered medications on file prior to visit  She is allergic to aspirin and latex       Review of Systems   Constitutional: Negative for activity change, appetite change, chills, fatigue and unexpected weight change  HENT: Negative for dental problem, ear pain, hearing loss and sore throat      Eyes: Negative for visual disturbance  Respiratory: Negative for cough and wheezing  Cardiovascular: Negative for chest pain  Gastrointestinal: Negative for abdominal pain, constipation, diarrhea and vomiting  Genitourinary: Negative for difficulty urinating and dysuria  Musculoskeletal: Positive for back pain (and hip)  Negative for arthralgias and myalgias  Skin: Negative for rash  Neurological: Negative for dizziness and headaches  Psychiatric/Behavioral: Negative for behavioral problems  Objective:      /70 (BP Location: Left arm, Patient Position: Sitting, Cuff Size: Standard)   Pulse 100   Temp (!) 97 3 °F (36 3 °C) (Temporal)   Resp 16   Ht 5' 2" (1 575 m)   Wt 75 8 kg (167 lb)   SpO2 98%   Breastfeeding No   BMI 30 54 kg/m²          Physical Exam  Vitals signs and nursing note reviewed  Constitutional:       General: She is not in acute distress  Appearance: She is well-developed  HENT:      Head: Normocephalic and atraumatic  Right Ear: External ear normal       Left Ear: External ear normal    Eyes:      Conjunctiva/sclera: Conjunctivae normal    Neck:      Musculoskeletal: Normal range of motion and neck supple  Thyroid: No thyromegaly  Cardiovascular:      Rate and Rhythm: Normal rate and regular rhythm  Heart sounds: Normal heart sounds  No murmur  Pulmonary:      Effort: No respiratory distress  Breath sounds: Normal breath sounds  No wheezing  Musculoskeletal:         General: Tenderness (lef thip) present  Lymphadenopathy:      Cervical: No cervical adenopathy  Skin:     Findings: Lesion (3 nevus on right check from 1 -1 5 cm) present  Neurological:      Mental Status: She is alert and oriented to person, place, and time        Gait: Gait normal    Psychiatric:         Mood and Affect: Mood normal          Behavior: Behavior normal

## 2021-03-06 NOTE — ASSESSMENT & PLAN NOTE
Continue duloxetine which has helped    She has been using OTC voltaren with mild pain and will start oral diclofenac when pain is worse

## 2021-03-06 NOTE — ASSESSMENT & PLAN NOTE
Lab Results   Component Value Date    HGBA1C 6 0 (H) 03/04/2021   Blood sugar is under much better control  Recommend same regimen  Follow up in 5 months

## 2021-05-20 ENCOUNTER — VBI (OUTPATIENT)
Dept: ADMINISTRATIVE | Facility: OTHER | Age: 61
End: 2021-05-20

## 2021-06-06 DIAGNOSIS — M16.12 PRIMARY OSTEOARTHRITIS OF LEFT HIP: ICD-10-CM

## 2021-06-06 RX ORDER — DICLOFENAC SODIUM 75 MG/1
TABLET, DELAYED RELEASE ORAL
Qty: 60 TABLET | Refills: 2 | Status: SHIPPED | OUTPATIENT
Start: 2021-06-06 | End: 2021-09-07

## 2021-07-07 ENCOUNTER — CONSULT (OUTPATIENT)
Dept: MULTI SPECIALTY CLINIC | Facility: CLINIC | Age: 61
End: 2021-07-07

## 2021-07-07 VITALS — WEIGHT: 170 LBS | HEIGHT: 62 IN | BODY MASS INDEX: 31.28 KG/M2

## 2021-07-07 DIAGNOSIS — D22.9 MULTIPLE MELANOCYTIC NEVI: ICD-10-CM

## 2021-07-07 DIAGNOSIS — D48.9 NEOPLASM OF UNCERTAIN BEHAVIOR: Primary | ICD-10-CM

## 2021-07-07 DIAGNOSIS — D18.01 CHERRY ANGIOMA: ICD-10-CM

## 2021-07-07 PROCEDURE — 99204 OFFICE O/P NEW MOD 45 MIN: CPT | Performed by: DERMATOLOGY

## 2021-07-07 PROCEDURE — 1036F TOBACCO NON-USER: CPT | Performed by: DERMATOLOGY

## 2021-07-07 NOTE — PROGRESS NOTES
[facility] Note     Patient Name: Mayra Etienne  Encounter Date: 7/7/21     Have you been cared for by a [facility] in the last 3 years and, if so, which one? No    · Have you traveled outside of the [address] [place] [address] in the past 3 months or outside of the [facility] area in the last 2 weeks? No     May we call your Preferred Phone number to discuss your specific medical information? Yes     May we leave a detailed message that includes your specific medical information? Yes      Today's Chief Concerns:   Concern #1:  Spot on cheek    Past Medical History:  Have you personally ever had or currently have any of the following? · Skin cancer (such as Melanoma, Basal Cell Carcinoma, Squamous Cell Carcinoma? (If Yes, please provide more detail)- No  · Eczema: YES  · Psoriasis: No  · HIV/AIDS: No  · Hepatitis B or C: No  · Tuberculosis: No  · Systemic Immunosuppression such as Diabetes, Biologic or Immunotherapy, Chemotherapy, Organ Transplantation, Bone Marrow Transplantation (If YES, please provide more detail): No  · Radiation Treatment (If YES, please provide more detail): No  · Any other major medical conditions/concerns? (If Yes, which types)- No      Family History:  Have any of your "first degree relatives" (parent, brother, sister, or child) had any of the following       · Skin cancer such as Melanoma or Merkel Cell Carcinoma or Pancreatic Cancer? No  · Eczema, Asthma, Hay Fever or Seasonal Allergies: No  · Psoriasis or Psoriatic Arthritis: No  · Do any other medical conditions seem to run in your family? If Yes, what condition and which relatives?   No    Current Medications:       Current Outpatient Medications:     acetaminophen (TYLENOL) 500 mg tablet, Take 1 tablet (500 mg total) by mouth every 6 (six) hours as needed for mild pain, Disp: 30 tablet, Rfl: 0    albuterol (PROVENTIL HFA,VENTOLIN HFA) 90 mcg/act inhaler, Inhale 2 puffs every 6 (six) hours as needed for wheezing or shortness of breath, Disp: 1 Inhaler, Rfl: 0    Blood Glucose Monitoring Suppl (Contour Next Monitor) w/Device KIT, Use 1 each daily Check blood sugar 2 times day, Disp: 1 kit, Rfl: 0    clotrimazole (LOTRIMIN) 1 % cream, Apply topically 2 (two) times a day (Patient not taking: Reported on 3/5/2021), Disp: 30 g, Rfl: 0    diclofenac (VOLTAREN) 75 mg EC tablet, TAKE 1 TABLET BY MOUTH TWICE A DAY, Disp: 60 tablet, Rfl: 2    Diclofenac Sodium (VOLTAREN) 1 %, Apply 2 g topically 4 (four) times a day, Disp: 100 g, Rfl: 3    DULoxetine (CYMBALTA) 60 mg delayed release capsule, Take 1 capsule (60 mg total) by mouth daily, Disp: 90 capsule, Rfl: 3    famotidine (PEPCID) 20 mg tablet, TAKE 1 TABLET BY MOUTH EVERY DAY AT BEDTIME AS NEEDED FOR HEARTBURN, Disp: 90 tablet, Rfl: 0    glucose blood (Contour Next Test) test strip, Check blood sugar 2 times day, Disp: 100 each, Rfl: 5    glucose blood (FREESTYLE LITE) test strip, USE TO TEST BLOOD SUGAR TWICE A DAY, Disp: 100 each, Rfl: 5    Jardiance 10 MG TABS, TAKE 1 TABLET BY MOUTH EVERY DAY IN THE MORNING, Disp: 30 tablet, Rfl: 3    losartan (COZAAR) 50 mg tablet, TAKE 1 TABLET BY MOUTH EVERY DAY, Disp: 90 tablet, Rfl: 1    metFORMIN (GLUCOPHAGE) 1000 MG tablet, TAKE 1 TABLET BY MOUTH TWICE A DAY WITH MEALS, Disp: 180 tablet, Rfl: 1    Microlet Lancets MISC, Use 2 (two) times a day, Disp: 100 each, Rfl: 5    ondansetron (ZOFRAN) 4 mg tablet, Take 1 tablet (4 mg total) by mouth every 8 (eight) hours as needed for nausea or vomiting, Disp: 20 tablet, Rfl: 0    simvastatin (ZOCOR) 20 mg tablet, TAKE 1 TABLET BY MOUTH DAILY AT BEDTIME, Disp: 90 tablet, Rfl: 1    triamcinolone (KENALOG) 0 5 % ointment, Apply 1 application topically 2 (two) times a day To affected area, Disp: 30 g, Rfl: 1      Review of Systems:  Have you recently had or currently have any of the following?   If YES, what are you doing for the problem? · Fever, chills or unintended weight loss: No  · Sudden loss or change in your vision: No  · Nausea, vomiting or blood in your stool: No  · Painful or swollen joints: No  · Wheezing or cough: No  · Changing mole or non-healing wound: YES, right cheek  · Nosebleeds: No  · Excessive sweating: No  · Easy or prolonged bleeding? No      · FEMALES ONLY:    · Are you pregnant or planning to become pregnant? No  · Are you currently or planning to be nursing or breast feeding? No    · Any known allergies? Allergies   Allergen Reactions    Aspirin Shortness Of Breath and Itching     swelling    Latex Itching and Rash   ·       Physical Exam:     Was a chaperone (Derm Clinical Assistant) present throughout the entire Physical Exam? Yes     Did the Dermatology Team specifically  the patient on the importance of a Full Skin Exam to be sure that nothing is missed clinically?  Yes}  o Did the patient ultimately request or accept a Full Skin Exam?  Yes  o Did the patient specifically refuse to have the areas "under-the-bra" examined by the Dermatologist? No  o Did the patient specifically refuse to have the areas "under-the-underwear" examined by the Dermatologist? No    CONSTITUTIONAL:   Vitals:    07/07/21 1106   Weight: 77 1 kg (170 lb)   Height: 5' 2" (1 575 m)       PSYCH: Normal mood and affect  EYES: Normal conjunctiva  ENT: Normal lips and oral mucosa  CARDIOVASCULAR: No edema  RESPIRATORY: Normal respirations  HEME/LYMPH/IMMUNO:  No regional lymphadenopathy except as noted below in "ASSESSMENT AND PLAN BY DIAGNOSIS"    SKIN:  FULL ORGAN SYSTEM EXAM  Hair, Scalp, Ears, Face Normal except as noted below in Assessment   Neck Normal except as noted below in Assessment   Right Arm/Hand/Fingers Normal except as noted below in Assessment   Left Arm/Hand/Fingers Normal except as noted below in Assessment   Chest/Breasts/Axillae Viewed areas Normal except as noted below in Assessment   Abdomen, Umbilicus Normal except as noted below in Assessment   Back/Spine Normal except as noted below in Assessment   Groin/Genitalia/Buttocks NOT EXAMINED   Right Leg, Foot, Toes Normal except as noted below in Assessment   Left Leg, Foot, Toes Normal except as noted below in Assessment        Assessment and Plan by Diagnosis:    History of Present Condition:     Duration:  How long has this been an issue for you?    o  > 1 year   Location Affected:  Where on the body is this affecting you?    o  Right cheek   Quality:  Is there any bleeding, pain, itch, burning/irritation, or redness associated with the skin lesion?    o  Intermittent bleeding   Severity:  Describe any bleeding, pain, itch, burning/irritation, or redness on a scale of 1 to 10 (with 10 being the worst)  o  2/10   Timing:  Does this condition seem to be there pretty constantly or do you notice it more at specific times throughout the day?    o  Intermittent   Context:  Have you ever noticed that this condition seems to be associated with specific activities you do?    o  None   Modifying Factors:    o Anything that seems to make the condition worse?    -  None  o What have you tried to do to make the condition better?    -  None   Associated Signs and Symptoms:  Does this skin lesion seem to be associated with any of the following:  o  SL AMB DERM SIGNS AND SYMPTOMS: Bleeding     Begin "ASSESSMENT AND PLAN BY DIAGNOSIS" here    NEOPLASM OF UNCERTAIN BEHAVIOR OF SKIN    Physical Exam:   (Anatomic Location); (Size and Morphological Description); (Differential Diagnosis):  o Right malar cheek: 7 mm blue papule; DDx: melanoma vs pigmented basal cell cancer vs blue nevus   Pertinent Positives: Growing over > 1 year  Intermittently bleeds   Pertinent Negatives:     Additional History of Present Condition:      Assessment and Plan:   I have discussed with the patient that a sample of skin via a "skin biopsy would be potentially helpful to further make a specific diagnosis under the microscope   Based on a thorough discussion of this condition and the management approach to it (including a comprehensive discussion of the known risks, side effects and potential benefits of treatment), the patient (family) agrees to implement the following specific plan:    o Procedure:  Skin Biopsy  After a thorough discussion of treatment options and risk/benefits/alternatives (including but not limited to local pain, scarring, dyspigmentation, blistering, possible superinfection, and inability to confirm a diagnosis via histopathology), verbal and written consent were obtained and portion of the rash was biopsied for tissue sample  See below for consent that was obtained from patient and subsequent Procedure Note   o Excisional biopsy planned for this Friday at [facility] location ([address] [place] [address], [address], [place], [place])   o Patient aware she will have a linear scar and stitches in place    AGUILA ANGIOMAS    Physical Exam:   Anatomic Location Affected:  Trunk   Morphological Description:  Scattered cherry red, 1-4 mm papules     Pertinent Positives:   Pertinent Negatives:      Assessment and Plan:  Based on a thorough discussion of this condition and the management approach to it (including a comprehensive discussion of the known risks, side effects and potential benefits of treatment), the patient (family) agrees to implement the following specific plan:   Reassured benign, no treatment needed    MELANOCYTIC NEVI ("Moles")    Physical Exam:   Anatomic Location Affected:   Mostly on sun-exposed areas of the face, chest, back   Morphological Description:  Scattered, 1-4mm round to ovoid, symmetrical-appearing, even bordered, skin colored to dark brown macules/papules, mostly in sun-exposed areas   Pertinent Positives:   Pertinent Negatives:    Assessment and Plan:  Based on a thorough discussion of this condition and the management approach to it (including a comprehensive discussion of the known risks, side effects and potential benefits of treatment), the patient (family) agrees to implement the following specific plan:   Use a moisturizer + sunscreen "combo" product such as Neutrogena Daily Defense SPF 50+ or CeraVe AM at least three times a day

## 2021-07-07 NOTE — PATIENT INSTRUCTIONS
NEOPLASM OF UNCERTAIN BEHAVIOR OF SKIN      Assessment and Plan:   I have discussed with the patient that a sample of skin via a "skin biopsy would be potentially helpful to further make a specific diagnosis under the microscope   Based on a thorough discussion of this condition and the management approach to it (including a comprehensive discussion of the known risks, side effects and potential benefits of treatment), the patient (family) agrees to implement the following specific plan:    o Procedure:  Skin Biopsy  After a thorough discussion of treatment options and risk/benefits/alternatives (including but not limited to local pain, scarring, dyspigmentation, blistering, possible superinfection, and inability to confirm a diagnosis via histopathology), verbal and written consent were obtained and portion of the rash was biopsied for tissue sample  See below for consent that was obtained from patient and subsequent Procedure Note   o Excisional biopsy planned for this Friday at [facility] ([address] [place] [address], [address], [place])  o Discussed that patient will have a linear scar and stitches in place    CHERRY ANGIOMAS    Assessment and Plan:  Based on a thorough discussion of this condition and the management approach to it (including a comprehensive discussion of the known risks, side effects and potential benefits of treatment), the patient (family) agrees to implement the following specific plan:   Reassured benign, no treatment needed    Assessment and Plan:    Cherry angioma, also known as [employer] spots, are benign vascular skin lesions  A "cherry angioma" is a firm red, blue or purple papule, 0 1-1 cm in diameter  When thrombosed, they can appear black in colour until evaluated with a dermatoscope when the red or purple colour is more easily seen  Cherry angioma may develop on any part of the body but most often appear on the scalp, face, lips and trunk    An angioma is due to proliferating endothelial cells; these are the cells that line the inside of a blood vessel  Angiomas can arise in early life or later in life; the most common type of angioma is a cherry angioma  Cherry angiomas are very common in males and females of any age or race  They are more noticeable in white skin than in skin of colour  They markedly increase in number from about the age of 36  There may be a family history of similar lesions  Eruptive cherry angiomas have been rarely reported to be associated with internal malignancy  The cause of angiomas is unknown  Genetic analysis of cherry angiomas has shown that they frequently carry specific somatic missense mutations in the GNAQ and GNA11 (Q209H) genes, which are involved in other vascular and melanocytic proliferations  Cherry angioma is usually diagnosed clinically and no investigations are necessary for the majority of lesions  It has a characteristic red-clod or lobular pattern on dermatoscopy (called lacunar pattern using conventional pattern analysis)  When there is uncertainty about the diagnosis, a biopsy may be performed  The angioma is composed of venules in a thickened papillary dermis  Collagen bundles may be prominent between the lobules  Cherry angiomas are harmless, so they do not usually have to be treated  Occasionally, they are removed to exclude a malignant skin lesion such as a nodular melanoma or because they are irritated or bleeding (and a subsequent risk for infection)  To decrease friction over the lesions, we recommend Neutrogena Daily Defense SPF 50+ at least 3 times a day      MELANOCYTIC NEVI ("Moles")      Assessment and Plan:  Based on a thorough discussion of this condition and the management approach to it (including a comprehensive discussion of the known risks, side effects and potential benefits of treatment), the patient (family) agrees to implement the following specific plan:   Use a moisturizer + sunscreen "combo" product such as Neutrogena Daily Defense SPF 50+ or CeraVe AM at least three times a day  Melanocytic Nevi  Melanocytic nevi ("moles") are tan or brown, raised or flat areas of the skin which have an increased number of melanocytes  Melanocytes are the cells in our body which make pigment and account for skin color  Some moles are present at birth (I e , "congenital nevi"), while others come up later in life (i e , "acquired nevi")  The sun can stimulate the body to make more moles  Sunburns are not the only thing that triggers more moles  Chronic sun exposure can do it too  Clinically distinguishing a healthy mole from melanoma may be difficult, even for experienced dermatologists  The "ABCDE's" of moles have been suggested as a means of helping to alert a person to a suspicious mole and the possible increased risk of melanoma  The suggestions for raising alert are as follows:    Asymmetry: Healthy moles tend to be symmetric, while melanomas are often asymmetric  Asymmetry means if you draw a line through the mole, the two halves do not match in color, size, shape, or surface texture  Asymmetry can be a result of rapid enlargement of a mole, the development of a raised area on a previously flat lesion, scaling, ulceration, bleeding or scabbing within the mole  Any mole that starts to demonstrate "asymmetry" should be examined promptly by a board certified dermatologist      Border: Healthy moles tend to have discrete, even borders  The border of a melanoma often blends into the normal skin and does not sharply delineate the mole from normal skin  Any mole that starts to demonstrate "uneven borders" should be examined promptly by a board certified dermatologist      Color: Healthy moles tend to be one color throughout  Melanomas tend to be made up of different colors ranging from dark black, blue, white, or red    Any mole that demonstrates a color change should be examined promptly by a board certified dermatologist      Diameter: Healthy moles tend to be smaller than 0 6 cm in size; an exception are "congenital nevi" that can be larger  Melanomas tend to grow and can often be greater than 0 6 cm (1/4 of an inch, or the size of a pencil eraser)  This is only a guideline, and many normal moles may be larger than 0 6 cm without being unhealthy  Any mole that starts to change in size (small to bigger or bigger to smaller) should be examined promptly by a board certified dermatologist      Evolving: Healthy moles tend to "stay the same "  Melanomas may often show signs of change or evolution such as a change in size, shape, color, or elevation  Any mole that starts to itch, bleed, crust, burn, hurt, or ulcerate or demonstrate a change or evolution should be examined promptly by a board certified dermatologist       Dysplastic Nevi  Dysplastic moles are moles that fit the ABCDE rules of melanoma but are not identified as melanomas when examined under the microscope  They may indicate an increased risk of melanoma in that person  If there is a family history of melanoma, most experts agree that the person may be at an increased risk for developing a melanoma  Experts still do not agree on what dysplastic moles mean in patients without a personal or family history of melanoma  Dysplastic moles are usually larger than common moles and have different colors within it with irregular borders  The appearance can be very similar to a melanoma  Biopsies of dysplastic moles may show abnormalities which are different from a regular mole  Melanoma  Malignant melanoma is a type of skin cancer that can be deadly if it spreads throughout the body  The incidence of melanoma in the [place] is growing faster than any other cancer  Melanoma usually grows near the surface of the skin for a period of time, and then begins to grow deeper into the skin   Once it grows deeper into the skin, the risk of spread to other organs greatly increases  Therefore, early detection and removal of a malignant melanoma may result in a better chance at a complete cure; removal after the tumor has spread may not be as effective, leading to worse clinical outcomes such as death  The true rate of nevus transformation into a melanoma is unknown  It has been estimated that the lifetime risk for any acquired melanocytic nevus on any 21year-old individual transforming into melanoma by age [de-identified] is 0 03% (1 in 3,164) for men and 0 009% (1 in 10,800) for women  The appearance of a "new mole" remains one of the most reliable methods for identifying a malignant melanoma  Occasionally, melanomas appear as rapidly growing, blue-black, dome-shaped bumps within a previous mole or previous area of normal skin  Other times, melanomas are suspected when a mole suddenly appears or changes  Itching, burning, or pain in a pigmented lesion should increase suspicion, but most patients with early melanoma have no skin discomfort whatsoever  Melanoma can occur anywhere on the skin, including areas that are difficult for self-examination  Many melanomas are first noticed by other family members  Suspicious-looking moles may be removed for microscopic examination  You may be able to prevent death from melanoma by doing two simple things:    1  Try to avoid unnecessary sun exposure and protect your skin when it is exposed to the sun  People who live near the equator, people who have intermittent exposures to large amounts of sun, and people who have had sunburns in childhood or adolescence have an increased risk for melanoma  Sun sense and vigilant sun protection may be keys to helping to prevent melanoma    We recommend wearing UPF-rated sun protective clothing and sunglasses whenever possible and applying a moisturizer-sunscreen combination product (SPF 50+) such as Neutrogena Daily Defense to sun exposed areas of skin at least three times a day     2  Have your moles regularly examined by a board certified dermatologist AND by yourself or a family member/friend at home  We recommend that you have your moles examined at least once a year by a board certified dermatologist   Use your birthday as an annual reminder to have your "Birthday Suit" (I e , your skin) examined; it is a nice birthday gift to yourself to know that your skin is healthy appearing! Additionally, at-home self examinations may be helpful for detecting a possible melanoma  Use the ABCDEs we discussed and check your moles once a month at home

## 2021-07-09 ENCOUNTER — OFFICE VISIT (OUTPATIENT)
Dept: DERMATOLOGY | Facility: CLINIC | Age: 61
End: 2021-07-09
Payer: COMMERCIAL

## 2021-07-09 VITALS — WEIGHT: 170 LBS | TEMPERATURE: 98.1 F | HEIGHT: 62 IN | BODY MASS INDEX: 31.28 KG/M2

## 2021-07-09 DIAGNOSIS — D48.9 NEOPLASM OF UNCERTAIN BEHAVIOR: Primary | ICD-10-CM

## 2021-07-09 PROCEDURE — 88305 TISSUE EXAM BY PATHOLOGIST: CPT | Performed by: STUDENT IN AN ORGANIZED HEALTH CARE EDUCATION/TRAINING PROGRAM

## 2021-07-09 PROCEDURE — 3008F BODY MASS INDEX DOCD: CPT | Performed by: DERMATOLOGY

## 2021-07-09 PROCEDURE — 12051 INTMD RPR FACE/MM 2.5 CM/<: CPT | Performed by: DERMATOLOGY

## 2021-07-09 PROCEDURE — 11442 EXC FACE-MM B9+MARG 1.1-2 CM: CPT | Performed by: DERMATOLOGY

## 2021-07-09 NOTE — PROGRESS NOTES
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 atypical nevus)  Post-Op Diagnosis: pending pathology report   Benign versus Malignant Unknown      Lesion Anatomic Location: Right cheek (Previous Accession Number: n/a)  Pre-op size: 9 mm x 5 mm  Size of defect:  11 mm x 7 mm (with 1 mm margins)  Final repaired wound length:  2 5 cm    Written and verbal, witnessed informed consent was obtained  I discussed that excision is a method of removing lesions both benign and malignant lesions  A portion of normal skin is often taken to ensure completeness of removal   I reviewed that procedure will include numbing the area,  cutting around and under defect, undermining tissue, and closing the wound with sutures both inside and out  These sutures are usually removed in 7 to 14 days  Risks (bleeding, pain, infection, scarring, recurrence) and benefits discussed  It was discussed with patient that every effort is made to minimize scar, but scarring is influenced also by extrinsic factor such as location, age and genetics  Time Out: performed:  yes  Correct patient: yes  Correct site per Clinic Report: yes  Correct site per Patient Report: yes    LOCAL ANESTHESIA: 1% xylocaine with epi     DESCRIPTION OF PROCEDURE: The patient was brought back into the procedure room, anesthetized locally, prepped and draped in the usual fashion  Using a #15 blade with a scalpel, the lesion was excised in elliptical fashion  The wound was  undermined in the  fascial plane  Hemostasis was achieved with light electrocoagulation  Purpose of undermining was to decrease wound tension and facilitate closure      The wound was closed with subcutaneous sutures as follows:    Deep suture:5-0 Vicryl      Epidermal edge closure was accomplished with superficial sutures as follows:    Superficial suture: 6-0 Prolene  Superficial suture type: Running    Estimated blood loss less than 3ml  The patient tolerated the procedure well without any complications  The wound was cleaned with sterile saline, dried off, surgical vaseline ointment was applied, and the wound was covered  A pressure dressing was applied for stabilization and light pressure  The patient was given detailed oral and written instructions on postoperative care as detailed in consent  The patient left in good medical condition  POSTOP DISCUSSION DISCUSSION AND INSTRUCTIONS FOR PATIENT      Rationale for Procedure  A skin excision allows the dermatologist to remove a lesion  The procedure involves a local numbing medication and removing the entire lesion  Typically, the lesion is being removed because it is atypical, traumatized, or for significant appearance reasons  The area will be open like a brush burn and allowed to heal    There will be no sutures  Tissue is sent to pathologist who will reconfirm diagnosis and verify completeness of lesion removal     Description of Procedure  We would like to perform a skin excision today  A local anesthetic, similar to the kind that a dentist uses when filling a cavity, will be injected with a very small needle into the skin area to be sampled  The injected skin and tissue underneath should go to sleep and become numbed so that no further pain should be felt  A scalpel will be used to cut around the lesion and tissue will be submitted to pathology for examination  Depending on the diagnosis the lesion will be excised with a certain amount of normal skin to help assure completeness of lesion removal   The physician will discuss in advance the anticipated size and extent of removal    Bleeding will occur, but it will stopped with direct pressure, sutures, and electrocautery      Surgical Vaseline-type ointment will also applied after the procedure to help create a barrier between the wound and the outside world       Risks and Potential Complications  The advantage of a skin excision is that it allows us to remove a problem lesion quickly  Although this usually permits the lesion to heal as soon as possible with the least scarring, there are some risks and potential complications that include but are not limited to the following:  - Some bleeding is normal at time of procedure and some bleeding on gauze is normal  the first few days after surgery  Profuse bleeding and bleeding with swelling and pain should be reported as detailed  below  - Infection is uncommon in skin surgery  Infection should be reported and is indicated by pain, redness, and discharge of purulent material   - Some dull to at time sharp pain could occur initially the day after surgery  Persistent pain or increasing pain days after surgery is not expected and should be reported  - Every effort is made to minimize scar, but location, size, and genetics do play a role in scar appearance  A surgical wound does not achieve its optimal appearance until 6 months  There are several treatments available if scarring would be problematic including scar creams, silicone pad, laser and scar revision   - Skin discoloration can occur especially in people of color  Its important to avoid sun on wound in first 6 months after surgery  Treatment is available if pigment is problematic   - Incomplete removal of the lesion or recurrence of lesion can occur and this would then require further treatment and more surgery   - Nerve Damage/Numbness/Loss of Function is very rare, but is most likely to occur if lesion is large or if it is in a high risk location  - Allergic Reaction to lidocaine is rare  More commonly,  epinephrine is used  with the lidocaine  Occasionally, epinephrine (aka adrenalin) may cause a brief  feeling of anxiety or jitteriness  - The person at the microscope  (pathologist) may provide additional information that was unexpected   This unexpected finding could provoke the need for additional treatment or evaluation  What You Will Need to Do After the Procedure  1  Keep the area clean and dry the first day  Try NOT to remove the bandage for the first day  2  Gently clean the area with soap and water and apply Vaseline ointment (this is over the counter and not a prescription) to the excision  site for up to 2 weeks  3  Apply a clean appropriately sized bandage to area  Gauze and paper tape are recommended for sensitive skin  4  Return for suture removal as instructed (generally 1 week for the face, 2 weeks for the body)  5  Take Acetaminophen (Tylenol) for discomfort, if no contraindications  Do NOT take Ibuprofen or aspirin unless specifically told to do so by your Dermatologist because these medications can make bleeding worse  6  Call our office immediately for signs of infection: fever, chills, increased redness, warmth, tenderness, discomfort/pain, or pus or foul smell coming from the wound  If bleeding is noticed, place a clean cloth over the area and apply firm pressure for thirty minutes  Check the wound ONLY after 30 minutes of direct pressure; do not cheat and sneak a peak, as that does not count

## 2021-07-09 NOTE — PATIENT INSTRUCTIONS
POSTOP DISCUSSION DISCUSSION AND INSTRUCTIONS FOR PATIENT    Justificación del procedimiento  Patti supresión de la piel permite que el dermatólogo quite patti Bridget Cave  El procedimiento consiste en un medicamento anestésico local y la eliminación de toda la lesión  Por lo general, la lesión se está extirpando porque es atípica, traumatizada o por razones significativas de apariencia  El Ernestene Rather estará abierta nia patti quemadura de cepillo y se le permitirá sanar  No habrá suturas  El tejido se envía a un patólogo que reconfirmará el diagnóstico y verificará la integridad de la eliminación de la lesión      Riesgos y complicaciones potenciales  La ventaja de patti escisión cutánea es que nos permite eliminar patti lesión problemática rápidamente  Aunque esto generalmente permite que la lesión se cure tan pronto nia sea posible con la tawanda cicatrización, hay algunos riesgos y complicaciones potenciales que incluyen indio no se limitan a lo siguiente:    · Un poco de sangrado es normal en el momento del procedimiento y un poco de sangrado en la gasa es normal los primeros días después de la [place]  El sangrado profuso y el sangrado con hinchazón y dolor deben informarse nia se detalla a continuación  · La infección es infrecuente en cirugía de la [facility]  La infección debe ser reportada y está indicada por dolor, enrojecimiento y secreción de material purulento  · Algunos dolor sordo a la vez boby podría ocurrir inicialmente el día después de la [place]  No se espera dolor persistente o aumento del dolor días después de la [facility] y se debe informar  · Se hace todo lo posible para minimizar la cicatriz, indio la Los alberto, el tamaño y la genética juegan un papel en la apariencia de la cicatriz  Patti herida quirúrgica no alcanza garcia aspecto óptimo [employer] 6 meses    Hay varios tratamientos disponibles si la cicatrización sería problemática, incluyendo cremas para cicatrices, almohadilla de silicona, láser y [address] de cicatrices  · La decoloración de la piel puede ocurrir especialmente en las personas de color  Es importante evitar el sol en la herida en los primeros 6 meses después de la [place]  El tratamiento está disponible si el pigmento es problemático   · El retiro incompleto de la lesión o de la repetición de la lesión puede ocurrir y éste entonces requeriría el tratamiento adicional y [place]  · El daño/entumecimiento/pérdida de la función del nervio es muy raro, indio es más probable ocurrir si la lesión es brad o si está en patti localización de alto riesgo  · La reacción alérgica a la lidocaína es luli  Más comúnmente, la epinefrina se [place] con la lidocaína  Ocasionalmente, la epinefrina (también conocida nia adrenalina) puede causar patti breve sensación de ansiedad o nerviosismo  · La persona en el microscopio (patólogo) puede proporcionar información adicional que fue inesperada  Arabella hallazgo inesperado podría provocar la necesidad de tratamiento o evaluación adicional         Lo que tendrá que hacer después del procedimiento  Mantenga el Thedora Singh y seca el primer día  Trate de NO quitar el vendaje delbert el primer día  Limpie suavemente el área con agua y Dara Hope y aplique ungüento de vaselina (esto es de venta lilian y no es patti receta) en el sitio de escisión hasta por 2 semanas  1  Aplique un vendaje limpio del tamaño adecuado en [place]  Se recomiendan gasas y cintas de papel para pieles sensibles  2  Devolución para la extracción de la sutura en 1 semana  3  [place] acetaminofén (Tylenol) o ibuprofeno para molestias, si no hay contraindicaciones  4  Llame a nuestra oficina inmediatamente para los signos de la infección: fiebre, escalofríos, aumento de enrojecimiento, calor, sensibilidad, incomodidad/dolor, o pus o mal olor que viene de la herida  Si se nota sangrado, coloque un paño limpio sobre [place] y aplique presión firme delbert treinta minutos    [place] 30 minutos de presión directa; no fe trampa y colarse un kim, ya que eso no cuenta  Si el sangrado persiste después de 30 minutos de presión directa legítima, intente patti dawn más de presión directa delbert 10 minutos adicionales en el [place]   Si el sangrado se vuelve más pesado o no se detiene después del jackie intento, llame a [facility] directamente al [phone] (SKIN) o, si después de horas, Susan Amos a garcia martinez de emergencias / departamento de emergencias local

## 2021-07-15 NOTE — RESULT ENCOUNTER NOTE
DERMATOPATHOLOGY RESULT NOTE    Results reviewed by ordering physician  Called patient to personally discuss results  No answer, left voicemail with result using [place] interpretor  Patient to call back if there are any questions  Instructions for Clinical Derm Team:   (remember to route Result Note to appropriate staff):    None    Result & Plan by Specimen:    Specimen A: benign  Plan: reassured, benign      Status:  Final result   Visible to patient:  No (inaccessible in [address]) Next appt:  07/16/2021 at 09:00 AM in Dermatology (Dermatology Nurse Lutz) Dx:  Neoplasm of uncertain behavior  0 Result Notes  Component   Case Report  Surgical Pathology Report                         Case: [id number]                                   Authorizing Provider: Schuyler Boogie MD          Collected:           07/09/2021                   Ordering Location:     [facility]      Received:            07/09/2021 [address] [place] [address]                                                                       Pathologist:           Ayaka Friend MD                                                           Specimen:    Skin, Other, Specimen A: skin, excisional biopsy, right cheek                            Final Diagnosis  A  Skin, right cheek, excisional biopsy:      Dermal nevus with focal mild atypia and features of irritation change; closely approaching peripheral specimen margin    Electronically signed by Ayaka Friend MD on 7/13/2021 at  7:11 PM  Additional Information   All reported additional testing was performed with appropriately reactive controls   These tests were developed and their performance characteristics determined by [address] [place] [address] or appropriate performing facility, though some tests may be performed on tissues which have not been validated for performance characteristics (such as staining performed on alcohol exposed cell blocks and decalcified tissues)   Results should be interpreted with caution and in the context of the patients' clinical condition  These tests may not be cleared or approved by the [place]  Food and Drug Administration, though the FDA has determined that such clearance or approval is not necessary  These tests are used for clinical purposes and they should not be regarded as investigational or for research  This laboratory has been approved by [facility], designated as a high-complexity laboratory and is qualified to perform these tests  Gemma Call Description     A  The specimen is received in formalin, labeled with the patient's name and hospital number, and is designated "skin excision of biopsy right cheek"  The specimen consists of a 2 0 x 1 0 cm ellipse of tan skin excised to a depth of 0 3 cm  The epithelial surface exhibits a brown hyper-pigmented macule located at the center of the epithelial surface less than 0 1 cm from the closest peripheral margin measuring 0 9 x 0 6 cm  The margin of resection is inked green and the epithelial tips are inked red  The specimen is serially sectioned revealing tan yellow-purple fibromembranous and fibrofatty cut surfaces  The specimen is entirely, sequentially submitted between sponges, 3 cassettes  1:1st and opposite tip, enface  2-3: Center, sequentially submitted, 2 pieces per cassette, brown hyper-pigmented macule is present in both cassettes      Note: The estimated total formalin fixation time based upon information provided by the submitting clinician and the standard processing schedule is under 72 hours      Willie       Clinical Information   Attention Dermpath     Specimen A: skin, excisional biopsy, right cheek  62 y o  female with 9 mm x 5 mm blue papule that has been growing and intermittently bleeds   DDx: melanoma vs atypical nevus  Resulting Agency [facility]      Specimen Collected: 07/09/21 Last Resulted: 07/13/21  7:11 PM

## 2021-07-16 ENCOUNTER — TELEPHONE (OUTPATIENT)
Dept: DERMATOLOGY | Facility: CLINIC | Age: 61
End: 2021-07-16

## 2021-07-16 ENCOUNTER — OFFICE VISIT (OUTPATIENT)
Dept: DERMATOLOGY | Facility: CLINIC | Age: 61
End: 2021-07-16

## 2021-07-16 DIAGNOSIS — Z48.02 ENCOUNTER FOR REMOVAL OF SUTURES: Primary | ICD-10-CM

## 2021-07-16 PROCEDURE — 99024 POSTOP FOLLOW-UP VISIT: CPT | Performed by: DERMATOLOGY

## 2021-07-16 NOTE — PROGRESS NOTES
Suture removal    Date/Time: 7/16/2021 8:58 AM  Performed by: Mayuri Elmore RN  Authorized by: Lay Estrada MD   Universal Protocol:  Consent: Verbal consent obtained  Consent given by: patient  Timeout called at: 7/16/2021 8:58 AM   Patient understanding: patient states understanding of the procedure being performed  Patient consent: the patient's understanding of the procedure matches consent given  Procedure consent: procedure consent matches procedure scheduled  Relevant documents: relevant documents present and verified  Test results: test results available and properly labeled  Site marked: the operative site was marked  Radiology Images displayed and confirmed  If images not available, report reviewed: imaging studies not available  Patient identity confirmed: verbally with patient        Patient location:  Clinic  Location:     Laterality:  Right    Location:  [address] [facility] location:  [facility] location:  R cheek  Procedure details: Tools used:  Suture removal kit    Wound appearance:  No sign(s) of infection, good wound healing and pink  Post-procedure details:     Post-removal:  Dressing applied and Steri-Strips applied    Patient tolerance of procedure: Tolerated well, no immediate complications  Comments:      Patient to follow up as needed

## 2021-07-16 NOTE — TELEPHONE ENCOUNTER
Called patient and left VM with [place] interpretor  Patient notified of biopsy result and instructed to call back if she has any questions        Abdullahi Doyle, PGY-2  Dermatology  [facility]

## 2021-07-22 ENCOUNTER — ANNUAL EXAM (OUTPATIENT)
Dept: OBGYN CLINIC | Facility: CLINIC | Age: 61
End: 2021-07-22

## 2021-07-22 VITALS
BODY MASS INDEX: 31.47 KG/M2 | HEART RATE: 84 BPM | WEIGHT: 171 LBS | DIASTOLIC BLOOD PRESSURE: 76 MMHG | HEIGHT: 62 IN | SYSTOLIC BLOOD PRESSURE: 115 MMHG

## 2021-07-22 DIAGNOSIS — F41.9 ANXIETY: ICD-10-CM

## 2021-07-22 DIAGNOSIS — Z12.4 SCREENING FOR CERVICAL CANCER: ICD-10-CM

## 2021-07-22 DIAGNOSIS — Z01.419 ENCOUNTER FOR GYNECOLOGICAL EXAMINATION WITHOUT ABNORMAL FINDING: Primary | ICD-10-CM

## 2021-07-22 DIAGNOSIS — Z12.31 ENCOUNTER FOR SCREENING MAMMOGRAM FOR MALIGNANT NEOPLASM OF BREAST: ICD-10-CM

## 2021-07-22 PROCEDURE — G0101 CA SCREEN;PELVIC/BREAST EXAM: HCPCS | Performed by: NURSE PRACTITIONER

## 2021-07-22 PROCEDURE — 3008F BODY MASS INDEX DOCD: CPT | Performed by: NURSE PRACTITIONER

## 2021-07-22 PROCEDURE — 3078F DIAST BP <80 MM HG: CPT | Performed by: NURSE PRACTITIONER

## 2021-07-22 PROCEDURE — 3725F SCREEN DEPRESSION PERFORMED: CPT | Performed by: NURSE PRACTITIONER

## 2021-07-22 PROCEDURE — 1036F TOBACCO NON-USER: CPT | Performed by: NURSE PRACTITIONER

## 2021-07-22 PROCEDURE — 3074F SYST BP LT 130 MM HG: CPT | Performed by: NURSE PRACTITIONER

## 2021-07-22 RX ORDER — ALPRAZOLAM 0.5 MG/1
0.5 TABLET ORAL
Qty: 1 TABLET | Refills: 0 | Status: SHIPPED | OUTPATIENT
Start: 2021-07-22 | End: 2021-08-16 | Stop reason: SDUPTHER

## 2021-07-22 NOTE — PROGRESS NOTES
Fidelia Adams is a 62 y o  female who presents today for annual GYN exam   Her last pap smear was performed 2019 and result was NILM with negative HPV  She reports no history of abnormal pap smears in her past  Her last mammogram was performed 2020 and result was WNL  She had colonoscopy performed 2016  She reports menses as absent since   Her general medical history has been reviewed and she reports it as follows:    Past Medical History:   Diagnosis Date    Acquired hammer toe of right foot 3/15/2018    S/p surg    Arthritis     Asthma     "one time with a cold, and used an inhaler"    Chronic pain disorder     hip pain left, and back    Diabetes mellitus ([address] )     Eczema     GERD (gastroesophageal reflux disease)     History of panic attacks     Hyperlipidemia     Hypertension     Labral tear of shoulder 2014    Right foot pain     bunionectomy today 3/1/2019    Rotator cuff syndrome, right 2017    Wears glasses      Past Surgical History:   Procedure Laterality Date    CHOLECYSTECTOMY      "had a panic attack"    FOOT SURGERY Right     HERNIA REPAIR      OVARY SURGERY      IA COLONOSCOPY FLX DX W/COLLJ SPEC WHEN PFRMD N/A 2016    Procedure: EGD AND COLONOSCOPY;  Surgeon: José Mcdonald MD;  Location: [facility] GI LAB;   Service: Gastroenterology    IA CORRJ HALLUX VALGUS W/SESMDC W/2 OSTEOT Right 3/1/2019    Procedure: DOUBLE OSTEOTOMY, ZINA/AKIN BUNIONECTOMY;  Surgeon: Marie Leone DPM;  Location: [facility] OR;  Service: Podiatry    SKIN BIOPSY       OB History        2    Para   2    Term   2       0    AB   0    Living   2       SAB   0    TAB   0    Ectopic   0    Multiple   0    Live Births   2               Social History     Tobacco Use    Smoking status: Never Smoker    Smokeless tobacco: Never Used   Vaping Use    Vaping Use: Never used   Substance Use Topics    Alcohol use: Never    Drug use: Never     Cancer-related family history includes Bone cancer in her maternal grandfather; Breast cancer in her maternal aunt; Cancer in her brother and maternal grandfather; Stomach cancer in her brother  There is no history of Colon cancer or Ovarian cancer  Current Outpatient Medications   Medication Instructions    acetaminophen (TYLENOL) 500 mg, Oral, Every 6 hours PRN    albuterol (PROVENTIL HFA,VENTOLIN HFA) 90 mcg/act inhaler 2 puffs, Inhalation, Every 6 hours PRN    Blood Glucose Monitoring Suppl (Contour Next Monitor) w/Device KIT 1 each, Does not apply, Daily, Check blood sugar 2 times day    diclofenac (VOLTAREN) 75 mg EC tablet TAKE 1 TABLET BY MOUTH TWICE A DAY    Diclofenac Sodium (VOLTAREN) 2 g, Topical, 4 times daily    DULoxetine (CYMBALTA) 60 mg, Oral, Daily    famotidine (PEPCID) 20 mg tablet TAKE 1 TABLET BY MOUTH EVERY DAY AT BEDTIME AS NEEDED FOR HEARTBURN    glucose blood (Contour Next Test) test strip Check blood sugar 2 times day    glucose blood (FREESTYLE LITE) test strip USE TO TEST BLOOD SUGAR TWICE A DAY    Jardiance 10 MG TABS TAKE 1 TABLET BY MOUTH EVERY DAY IN THE MORNING    losartan (COZAAR) 50 mg tablet TAKE 1 TABLET BY MOUTH EVERY DAY    metFORMIN (GLUCOPHAGE) 1000 MG tablet TAKE 1 TABLET BY MOUTH TWICE A DAY WITH MEALS    Microlet Lancets MISC Does not apply, 2 times daily    simvastatin (ZOCOR) 20 mg tablet TAKE 1 TABLET BY MOUTH DAILY AT BEDTIME       Review of Systems:  Review of Systems   Constitutional: Negative  Gastrointestinal: Negative  Genitourinary: Negative for difficulty urinating, menstrual problem, pelvic pain and vaginal discharge  Skin: Negative  Physical Exam:  /76   Pulse 84   Ht 5' 2" (1 575 m)   Wt 77 6 kg (171 lb)   BMI 31 28 kg/m²   Physical Exam  Constitutional:       General: She is not in acute distress  Appearance: She is well-developed     Genitourinary:      Vulva, vagina and uterus normal  No lesions in the vagina  No cervical motion tenderness or lesion  Uterus is not tender  No right or left adnexal mass present  Right adnexa not tender  Left adnexa not tender  Neck:      Thyroid: No thyromegaly  Cardiovascular:      Rate and Rhythm: Normal rate and regular rhythm  Pulmonary:      Effort: Pulmonary effort is normal    Chest:      Breasts:         Right: No mass, nipple discharge, skin change or tenderness  Left: No mass, nipple discharge, skin change or tenderness  Abdominal:      Palpations: Abdomen is soft  Tenderness: There is no abdominal tenderness  Musculoskeletal:      Cervical back: Neck supple  Neurological:      Mental Status: She is alert and oriented to person, place, and time  Skin:     General: Skin is warm and dry  Vitals reviewed  Assessment/Plan:   1  Normal well-woman GYN exam   2  Cervical cancer screening:  Normal cervical exam   Pap smear not indicated at this time  3  STD screening:  Patient declines  4  Breast cancer screening:  Normal breast exam   Order placed for bilateral screening mammogram   Given Rx Xanax one tablet to take pre-mammogram as she reports extreme anxiety with procedure  Reviewed breast self-awareness  5  Colon cancer screening:  Up to date  6  Depression Screening: Patient's depression screening was assessed with a PHQ-2 score of 2  Their PHQ-9 score was 7  Clinically patient does not have depression  No treatment is required  7  BMI Counseling: Body mass index is 31 28 kg/m²  Discussed the patient's BMI with her  The BMI is above normal  Patient referred to PCP due to patient being obese     8  Return to office in 1 year for annual GYN exam

## 2021-07-22 NOTE — PATIENT INSTRUCTIONS
OBESIDAD     Obesidad es cuando garcia índice de masa corporal [facility]) es superior a 30  Garcia Body mass index is 31 28 kg/m²  Bartholome Pinks Los riesgos de la obesidad incluyen  muchos problemas de [place], incluidas las lesiones y la discapacidad física  Es posible que deba realizarse exámenes para detectar lo siguiente:  · Diabetes     · Hipertensión o colesterol altoEnfermedades del corazón     · Enfermedad cardíaca     · Enfermedades del hígado o de la vesícula biliar     · Cáncer de colon, de pecho, de próstata, de hígado o de riñón     · El apnea del sueño     · Artritis o gota    Busque atención médica de inmediato si:   · Usted tiene un intenso dolor de jack, confusión o dificultad para hablar  · Usted tiene debilidad en un lado del cuerpo  · Usted tiene [place] [employer], sudoración o falta de aire  Pregúntele a garcia Jannifer Barrette vitaminas y minerales son adecuados para usted  · Usted tiene síntomas de enfermedad de la vesícula biliar o el hígado, nia dolor en la parte superior del abdomen  · Usted tiene dolor e incomodidad de rodillas o caderas al caminar  · Usted presenta síntomas de diabetes, nia exceso de apetito y sed y micción frecuente  · Usted presenta síntomas de apnea de sueño, nia roncar o tener sueño delbert el día  · Usted tiene preguntas o inquietudes acerca de garcia condición o cuidado  El tratamiento para la obesidad  se enfoca en ayudarle a bajar de peso para mejorar garcia taylor  Incluso patti reducción mínima del índice de [employer] corporal puede reducir el riesgo de muchos problemas de [place]  Garcia médico lo ayudará a fijarse patti meta para bajar de [place]  · Cambios en el estilo de paige  son los primeros pasos para tratar la obesidad  Estos cambios incluyen celina decisiones para consumir alimentos saludables y realizar patti actividad física con regularidad  El médico puede recomendarle un programa para bajar de peso que consta de capacitación, educación y [place]       · Medicamento  le pueden ayudar a bajar de [employer] en conjunto con Raj Fowler Malen Sergo y Nikos Tobar  · Cirugía  le puede ayudar a bajar de peso si usted es muy hussein y presenta otros problemas de taylor  Existen varios tipos de [place] para adelgazar  [place] a garcia médico más información  Cómo perder peso de forma exitosa:   · Propóngase metas accesibles y realistas  Un ejemplo de patti meta accesible es caminar 20 minutos los 5 días de [address]  Otro objetivo puede ser perder 5% de garcia peso corporal     · Coméntele a jeff amigos, familiares y compañeros de trabajo sobre jeff metas  y solicite que lo apoyen  Convide a un amigo para hacer ejercicio juntos o acuda a un deisy de motivación para bajar de [place]  · Identifique los alimentos o situaciones que le pueden provocar que coma en exceso y busque formas para evitarlos  Deshágase de alimentos altos en calorías en garcia hogar o en el trabajo  En la cocina tenga patti canasta con frutas frescas  Si el estrés es la causa para que usted coma más encuentre formas para sobrellevar el estrés  · Lleve un diario en el que registre lo que usted come y shital  También escriba la cantidad de tiempo que pasa realizando patti actividad física delbert el día  Pésese patti vez a la semana y anótelo en garcia diario  Cambios en la alimentación:  Usted necesitará consumir menos de 500 a 1 000 calorías al día de las que usted actualmente consume para perder entre 1 a 2 libras a la semana  Los siguientes cambios le ayudarán a disminuir la cantidad de las calorías que normalmente consume:  · [address] [facility] porciones  Utilice platos pequeños que no midan más de 9 pulgadas de diámetro  Llene la mitad del plato con frutas y verduras  Utilice las tazas medidoras para racionar los alimentos hasta que usted sepa nia se ve el tamaño de patti porción  · Consuma 3 comidas y 1 o 2 meriendas al día  Planee jeff comidas con anterioridad  Cocine y coma en la casa todo el tiempo que le sea posible  Coma lentamente  · Consuma frutas y verduras con todas las comidas  [place] y verduras son bajas en calorías y altas en fibra lo cual lo llena  No adicione mantequilla, ni margarina, ni salsas a base de crema a las verduras  Utilice las hierbas para sazonar las verduras al vapor  · Consuma menos grasas y alimentos fritos  Consuma yosi o pescado al horno o la haylie  Estas proteínas son más bajas en calorías y grasas que la carne [place]  Limite las comidas rápidas  Es mejor que utilice aderezos para la ensalada a base de aceite de [place] y vinagre en vez de aderezos en botella  · Limite la cantidad de azúcar que consume  No consuma bebidas azucaradas  Limite el consumo de alcohol  Cambios de actividad:  La actividad física es buena para garcia cuerpo por muchas razones  Le ayuda a quemar calorías y fortalecer jeff músculos  Hannah Pickle y la depresión y mejora garcia estado de ánimo  Además puede ayudarle a dormir mejor  Consulte con garcia médico antes de empezar un régimen de ejercicios  · Realice patti actividad física por lo menos por 30 minutos 5 días a la semana  Empiece despacio  Aparte tiempo cada día para patti actividad física que ag bazzi y [facility] sea [facility]  Es mejor realizar tanto un programa de pesas nia [place] para aumentar garcia ritmo cardíaco, nia caminar, montar en bicicleta o nadar  · Busque formas para ser [place]  Realice patti actividad de jardinería y limpiar la casa  [id number]   [address] escaleras en vez de utilizar el elevador  En garcia tiempo lilian concurra a eventos que le exijan caminar, nia festivales y ferias al [place]  Adicionar esta actividad física puede ayudarle a bajar y [address]  Acuda a jeff consultas de control con garcia médico según le indicaron  Puede que necesite consultar con un dietista  Anote jeff preguntas para que se acuerde de hacerlas delbert jeff visitas

## 2021-08-09 ENCOUNTER — OFFICE VISIT (OUTPATIENT)
Dept: DERMATOLOGY | Facility: CLINIC | Age: 61
End: 2021-08-09
Payer: COMMERCIAL

## 2021-08-09 DIAGNOSIS — Z51.89 VISIT FOR WOUND CHECK: Primary | ICD-10-CM

## 2021-08-09 PROCEDURE — 99212 OFFICE O/P EST SF 10 MIN: CPT | Performed by: DERMATOLOGY

## 2021-08-09 PROCEDURE — 1036F TOBACCO NON-USER: CPT | Performed by: DERMATOLOGY

## 2021-08-09 NOTE — PROGRESS NOTES
WOUND CHECK    Physical Exam:   Anatomic Location Affected:  Right cheek   Description of wound: Well healed scar line with 1 spitting suture on inferior end   Closure Type: Intermediate layered closure    Additional History of Present Condition:  S/P excision on 07/09/2021  Patient states there is cloudy fluid coming out of inferior end of scar line  No erythema present  Patient denies any pain or signs of infection  Patient is very happy with results  Assessment and Plan:  Based on a thorough discussion of this condition and the management approach to it (including a comprehensive discussion of the known risks, side effects and potential benefits of treatment), the patient (family) agrees to implement the following specific plan:   1 spitting suture removed   Advised patient to continue applying Vaseline daily for another week  Follow-up as needed

## 2021-08-13 ENCOUNTER — APPOINTMENT (OUTPATIENT)
Dept: LAB | Facility: CLINIC | Age: 61
End: 2021-08-13
Payer: COMMERCIAL

## 2021-08-13 DIAGNOSIS — E11.9 CONTROLLED TYPE 2 DIABETES MELLITUS WITHOUT COMPLICATION, WITHOUT LONG-TERM CURRENT USE OF INSULIN (HCC): ICD-10-CM

## 2021-08-13 LAB
ALBUMIN SERPL BCP-MCNC: 3.5 G/DL (ref 3.5–5)
ALP SERPL-CCNC: 138 U/L (ref 46–116)
ALT SERPL W P-5'-P-CCNC: 154 U/L (ref 12–78)
ANION GAP SERPL CALCULATED.3IONS-SCNC: 2 MMOL/L (ref 4–13)
AST SERPL W P-5'-P-CCNC: 90 U/L (ref 5–45)
BILIRUB SERPL-MCNC: 0.45 MG/DL (ref 0.2–1)
BUN SERPL-MCNC: 11 MG/DL (ref 5–25)
CALCIUM SERPL-MCNC: 8.9 MG/DL (ref 8.3–10.1)
CHLORIDE SERPL-SCNC: 109 MMOL/L (ref 100–108)
CHOLEST SERPL-MCNC: 205 MG/DL (ref 50–200)
CO2 SERPL-SCNC: 30 MMOL/L (ref 21–32)
CREAT SERPL-MCNC: 0.64 MG/DL (ref 0.6–1.3)
CREAT UR-MCNC: 155 MG/DL
EST. AVERAGE GLUCOSE BLD GHB EST-MCNC: 146 MG/DL
GFR SERPL CREATININE-BSD FRML MDRD: 112 ML/MIN/1.73SQ M
GLUCOSE P FAST SERPL-MCNC: 120 MG/DL (ref 65–99)
HBA1C MFR BLD: 6.7 %
HDLC SERPL-MCNC: 53 MG/DL
LDLC SERPL CALC-MCNC: 118 MG/DL (ref 0–100)
MICROALBUMIN UR-MCNC: 12.7 MG/L (ref 0–20)
MICROALBUMIN/CREAT 24H UR: 8 MG/G CREATININE (ref 0–30)
NONHDLC SERPL-MCNC: 152 MG/DL
POTASSIUM SERPL-SCNC: 4.5 MMOL/L (ref 3.5–5.3)
PROT SERPL-MCNC: 7.4 G/DL (ref 6.4–8.2)
SODIUM SERPL-SCNC: 141 MMOL/L (ref 136–145)
TRIGL SERPL-MCNC: 171 MG/DL

## 2021-08-13 PROCEDURE — 80053 COMPREHEN METABOLIC PANEL: CPT

## 2021-08-13 PROCEDURE — 36415 COLL VENOUS BLD VENIPUNCTURE: CPT

## 2021-08-13 PROCEDURE — 80061 LIPID PANEL: CPT

## 2021-08-13 PROCEDURE — 3044F HG A1C LEVEL LT 7.0%: CPT | Performed by: DERMATOLOGY

## 2021-08-13 PROCEDURE — 3061F NEG MICROALBUMINURIA REV: CPT | Performed by: DERMATOLOGY

## 2021-08-13 PROCEDURE — 3044F HG A1C LEVEL LT 7.0%: CPT | Performed by: PHYSICIAN ASSISTANT

## 2021-08-13 PROCEDURE — 82570 ASSAY OF URINE CREATININE: CPT

## 2021-08-13 PROCEDURE — 3061F NEG MICROALBUMINURIA REV: CPT | Performed by: PHYSICIAN ASSISTANT

## 2021-08-13 PROCEDURE — 82043 UR ALBUMIN QUANTITATIVE: CPT

## 2021-08-13 PROCEDURE — 83036 HEMOGLOBIN GLYCOSYLATED A1C: CPT

## 2021-08-16 ENCOUNTER — OFFICE VISIT (OUTPATIENT)
Dept: FAMILY MEDICINE CLINIC | Facility: CLINIC | Age: 61
End: 2021-08-16

## 2021-08-16 VITALS
HEIGHT: 62 IN | SYSTOLIC BLOOD PRESSURE: 114 MMHG | RESPIRATION RATE: 18 BRPM | TEMPERATURE: 97.3 F | WEIGHT: 169.6 LBS | DIASTOLIC BLOOD PRESSURE: 68 MMHG | BODY MASS INDEX: 31.21 KG/M2 | HEART RATE: 101 BPM | OXYGEN SATURATION: 97 %

## 2021-08-16 DIAGNOSIS — K21.9 GASTROESOPHAGEAL REFLUX DISEASE, UNSPECIFIED WHETHER ESOPHAGITIS PRESENT: Primary | ICD-10-CM

## 2021-08-16 DIAGNOSIS — Z78.0 POST-MENOPAUSE: ICD-10-CM

## 2021-08-16 DIAGNOSIS — M16.12 PRIMARY OSTEOARTHRITIS OF LEFT HIP: ICD-10-CM

## 2021-08-16 DIAGNOSIS — E11.8 CONTROLLED DIABETES MELLITUS TYPE 2 WITH COMPLICATIONS, UNSPECIFIED WHETHER LONG TERM INSULIN USE (HCC): ICD-10-CM

## 2021-08-16 DIAGNOSIS — Z13.820 SCREENING FOR OSTEOPOROSIS: ICD-10-CM

## 2021-08-16 DIAGNOSIS — I10 HYPERTENSION, BENIGN: ICD-10-CM

## 2021-08-16 DIAGNOSIS — R79.89 ELEVATED LIVER FUNCTION TESTS: ICD-10-CM

## 2021-08-16 DIAGNOSIS — E11.9 DIABETES MELLITUS TYPE 2, CONTROLLED (HCC): ICD-10-CM

## 2021-08-16 DIAGNOSIS — Z00.00 ENCOUNTER FOR ANNUAL WELLNESS EXAM IN MEDICARE PATIENT: ICD-10-CM

## 2021-08-16 DIAGNOSIS — F41.9 ANXIETY: ICD-10-CM

## 2021-08-16 PROBLEM — F41.8 SITUATIONAL ANXIETY: Status: ACTIVE | Noted: 2021-08-16

## 2021-08-16 PROCEDURE — G0439 PPPS, SUBSEQ VISIT: HCPCS | Performed by: PHYSICIAN ASSISTANT

## 2021-08-16 PROCEDURE — 1036F TOBACCO NON-USER: CPT | Performed by: PHYSICIAN ASSISTANT

## 2021-08-16 PROCEDURE — 4010F ACE/ARB THERAPY RXD/TAKEN: CPT | Performed by: PHYSICIAN ASSISTANT

## 2021-08-16 PROCEDURE — 99214 OFFICE O/P EST MOD 30 MIN: CPT | Performed by: PHYSICIAN ASSISTANT

## 2021-08-16 PROCEDURE — 3074F SYST BP LT 130 MM HG: CPT | Performed by: PHYSICIAN ASSISTANT

## 2021-08-16 PROCEDURE — 3725F SCREEN DEPRESSION PERFORMED: CPT | Performed by: PHYSICIAN ASSISTANT

## 2021-08-16 PROCEDURE — 3008F BODY MASS INDEX DOCD: CPT | Performed by: PHYSICIAN ASSISTANT

## 2021-08-16 PROCEDURE — 4010F ACE/ARB THERAPY RXD/TAKEN: CPT | Performed by: DERMATOLOGY

## 2021-08-16 PROCEDURE — 3078F DIAST BP <80 MM HG: CPT | Performed by: PHYSICIAN ASSISTANT

## 2021-08-16 PROCEDURE — 3008F BODY MASS INDEX DOCD: CPT | Performed by: DERMATOLOGY

## 2021-08-16 RX ORDER — DULOXETIN HYDROCHLORIDE 60 MG/1
60 CAPSULE, DELAYED RELEASE ORAL DAILY
Qty: 90 CAPSULE | Refills: 1 | Status: SHIPPED | OUTPATIENT
Start: 2021-08-16 | End: 2022-01-24 | Stop reason: SDUPTHER

## 2021-08-16 RX ORDER — EMPAGLIFLOZIN 10 MG/1
10 TABLET, FILM COATED ORAL EVERY MORNING
Qty: 90 TABLET | Refills: 1 | Status: SHIPPED | OUTPATIENT
Start: 2021-08-16 | End: 2022-01-24

## 2021-08-16 RX ORDER — ALPRAZOLAM 0.5 MG/1
0.5 TABLET ORAL
Qty: 5 TABLET | Refills: 0 | Status: SHIPPED | OUTPATIENT
Start: 2021-08-16 | End: 2022-01-24

## 2021-08-16 RX ORDER — OMEPRAZOLE 40 MG/1
40 CAPSULE, DELAYED RELEASE ORAL
Qty: 30 CAPSULE | Refills: 5 | Status: SHIPPED | OUTPATIENT
Start: 2021-08-16 | End: 2022-01-14 | Stop reason: SDUPTHER

## 2021-08-16 RX ORDER — LOSARTAN POTASSIUM 50 MG/1
50 TABLET ORAL DAILY
Qty: 90 TABLET | Refills: 1 | Status: SHIPPED | OUTPATIENT
Start: 2021-08-16 | End: 2022-01-24 | Stop reason: SDUPTHER

## 2021-08-16 RX ORDER — SIMVASTATIN 20 MG
20 TABLET ORAL
Qty: 90 TABLET | Refills: 1 | Status: SHIPPED | OUTPATIENT
Start: 2021-08-16 | End: 2022-01-24 | Stop reason: SDUPTHER

## 2021-08-16 NOTE — ASSESSMENT & PLAN NOTE
Lab Results   Component Value Date    HGBA1C 6 7 (H) 08/13/2021   continue on current medication Left hip replacement on Wednesday. Today noticed that left thigh is getting more swollen. Pain remains the same.

## 2021-08-16 NOTE — PROGRESS NOTES
Ailyn Chapin is here for her Subsequent Wellness visit  Health Risk Assessment:   Patient rates overall health as fair  Patient feels that their physical health rating is slightly better  Patient is satisfied with their life  Eyesight was rated as same  Hearing was rated as same  Patient feels that their emotional and mental health rating is same  Patients states they are never, rarely angry  Patient states they are never, rarely unusually tired/fatigued  Pain experienced in the last 7 days has been a lot  Patient's pain rating has been 7/10  Patient states that she has experienced no weight loss or gain in last 6 months  Depression Screening:   PHQ-2 Score: 0      Fall Risk Screening: In the past year, patient has experienced: no history of falling in past year      Urinary Incontinence Screening:   Patient has not leaked urine accidently in the last six months  Home Safety:  Patient has trouble with stairs inside or outside of their home  Patient has working smoke alarms Home safety hazards include: none  Nutrition:   Current diet is Diabetic  Medications:   Patient is not currently taking any over-the-counter supplements  Patient is able to manage medications  Activities of Daily Living (ADLs)/Instrumental Activities of Daily Living (IADLs):   Walk and transfer into and out of bed and chair?: Yes  Dress and groom yourself?: Yes    Bathe or shower yourself?: Yes    Feed yourself?  Yes  Do your laundry/housekeeping?: Yes  Manage your money, pay your bills and track your expenses?: Yes  Make your own meals?: Yes    Do your own shopping?: Yes    Previous Hospitalizations:   Any hospitalizations or ED visits within the last 12 months?: No      Advance Care Planning:   Living will: No    Durable POA for healthcare: No    Advanced directive: No    Advanced directive counseling given: Yes    Five wishes given: Yes      Cognitive Screening:   Provider or family/friend/caregiver concerned regarding cognition?: No    PREVENTIVE SCREENINGS      Cardiovascular Screening:    General: Screening Not Indicated and History Lipid Disorder      Diabetes Screening:     General: Screening Not Indicated and History Diabetes      Colorectal Cancer Screening:     General: Screening Current      Breast Cancer Screening:     General: Screening Current      Cervical Cancer Screening:    General: Screening Current      Osteoporosis Screening:    General: Risks and Benefits Discussed    Due for: DXA Axial      Abdominal Aortic Aneurysm (AAA) Screening:        General: Screening Not Indicated      Lung Cancer Screening:     General: Screening Not Indicated      Hepatitis C Screening:    General: Screening Current    Screening, Brief Intervention, and Referral to Treatment (SBIRT)    Screening  Typical number of drinks in a day: 0    AUDIT-C Screenin) How often did you have a drink containing alcohol in the past year? never  2) How many drinks did you have on a typical day when you were drinking in the past year? 0  3) How often did you have 6 or more drinks on one occasion in the past year? never    AUDIT-C Score: 0  Interpretation: Score 0-2 (female): Negative screen for alcohol misuse    Single Item Drug Screening:  How often have you used an illegal drug (including marijuana) or a prescription medication for non-medical reasons in the past year? never    Single Item Drug Screen Score: 0  Interpretation: Negative screen for possible drug use disorder    Other Counseling Topics:   Up to date with vision and dental and no hearing concerns

## 2021-08-16 NOTE — PROGRESS NOTES
Assessment/Plan:    Diabetes mellitus type 2, controlled ([facility] )    Lab Results   Component Value Date    HGBA1C 6 7 (H) 08/13/2021   continue on current medication    GERD (gastroesophageal reflux disease)  To restart omeprazole  Discussed avoiding triggering foods    Osteoarthritis of left hip  To recheck xray and to see orthopedics after  To restart voltaren gel and continue cymbalta 60    Situational anxiety  rx 5 pills of xanax  1 for dental procedure now and 1 for mammo         Problem List Items Addressed This Visit        Digestive    GERD (gastroesophageal reflux disease) - Primary     To restart omeprazole  Discussed avoiding triggering foods         Relevant Medications    omeprazole (PriLOSEC) 40 MG capsule       Endocrine    Diabetes mellitus type 2, controlled ([facility] )       Lab Results   Component Value Date    HGBA1C 6 7 (H) 08/13/2021   continue on current medication         Relevant Medications    metFORMIN (GLUCOPHAGE) 1000 MG tablet    simvastatin (ZOCOR) 20 mg tablet    Empagliflozin (Jardiance) 10 MG TABS    losartan (COZAAR) 50 mg tablet       Cardiovascular and Mediastinum    Hypertension, benign    Relevant Medications    losartan (COZAAR) 50 mg tablet       Musculoskeletal and Integument    Osteoarthritis of left hip     To recheck xray and to see orthopedics after   To restart voltaren gel and continue cymbalta 60         Relevant Medications    Diclofenac Sodium (VOLTAREN) 1 %    DULoxetine (CYMBALTA) 60 mg delayed release capsule    Other Relevant Orders    XR hip/pelv 4+ vw left if performed      Other Visit Diagnoses     Screening for osteoporosis        Relevant Orders    DXA bone density spine hip and pelvis    Post-menopause        Relevant Orders    DXA bone density spine hip and pelvis    Elevated liver function tests        Relevant Orders    Comprehensive metabolic panel    CBC and differential    Chronic Hepatitis Panel    Anxiety        Relevant Medications    ALPRAZolam (XANAX) 0 5 mg tablet    Encounter for annual wellness exam in Medicare patient                Subjective:      Patient ID: Ev Taveras is a 62 y o  female  HPI  60-year-old female with diabetes here for follow-up of this and for hypertension  Last hemoglobin A1c which appears performed August 13th was 6  7  She did have elevated LFTs ast 90 and   She has been getting a lot of acid relux  She used to take omeprazole 40 and it did help  Pain is in epigastric area and feels like acid  She also continues to get left hip pain  She has hx of OA  She takes voltaren gel and cymbalta and they do help  No numnbess of the feet  She also get anxiety with medical procedures  She has dental visit tomorrow and mammogram in October  Typically she needs xanax to calm her befofre this  She also gets anxiety with flying but otherwise is ok  The following portions of the patient's history were reviewed and updated as appropriate:   She  has a past medical history of Acquired hammer toe of right foot (3/15/2018), Arthritis, Asthma, Chronic pain disorder, Diabetes mellitus ([address] ), Eczema, GERD (gastroesophageal reflux disease), History of panic attacks, Hyperlipidemia, Hypertension, Labral tear of shoulder (09/24/2014), Right foot pain, Rotator cuff syndrome, right (8/16/2017), and Wears glasses    She   Patient Active Problem List    Diagnosis Date Noted    Situational anxiety 08/16/2021    Controlled diabetes mellitus type 2 with complications ([address] ) 58/94/1039    Class 1 obesity due to excess calories with serious comorbidity and body mass index (BMI) of 33 0 to 33 9 in adult 08/05/2020    Chronic left-sided low back pain 12/12/2018    Sacroiliitis ([address] ) 06/06/2018    Hypertension, benign 12/14/2017    Osteoarthritis of left hip 08/09/2017    Dermatofibroma 06/26/2017    Diabetes mellitus type 2, controlled ([address] ) 09/20/2016    Hyperlipidemia 09/20/2016    Allergic rhinitis 03/31/2015    Eczema 03/31/2015    Glenohumeral arthritis 09/24/2014    GERD (gastroesophageal reflux disease) 05/18/2012     She  has a past surgical history that includes Hernia repair; Ovary surgery; pr colonoscopy flx dx w/collj spec when pfrmd (N/A, 12/22/2016); Cholecystectomy; pr corrj hallux valgus w/sesmdc w/2 osteot (Right, 3/1/2019); Foot surgery (Right); and Skin biopsy  Her family history includes Bone cancer in her maternal grandfather; Breast cancer in her maternal aunt; Cancer in her brother and maternal grandfather; Hypertension in her father and mother; Stomach cancer in her brother  She  reports that she has never smoked  She has never used smokeless tobacco  She reports that she does not drink alcohol and does not use drugs    Current Outpatient Medications   Medication Sig Dispense Refill    acetaminophen (TYLENOL) 500 mg tablet Take 1 tablet (500 mg total) by mouth every 6 (six) hours as needed for mild pain 30 tablet 0    albuterol (PROVENTIL HFA,VENTOLIN HFA) 90 mcg/act inhaler Inhale 2 puffs every 6 (six) hours as needed for wheezing or shortness of breath 1 Inhaler 0    ALPRAZolam (XANAX) 0 5 mg tablet Take 1 tablet (0 5 mg total) by mouth 60 minutes pre-procedure 5 tablet 0    Blood Glucose Monitoring Suppl (Contour Next Monitor) w/Device KIT Use 1 each daily Check blood sugar 2 times day 1 kit 0    diclofenac (VOLTAREN) 75 mg EC tablet TAKE 1 TABLET BY MOUTH TWICE A DAY 60 tablet 2    Diclofenac Sodium (VOLTAREN) 1 % Apply 2 g topically 4 (four) times a day 100 g 5    DULoxetine (CYMBALTA) 60 mg delayed release capsule Take 1 capsule (60 mg total) by mouth daily 90 capsule 1    Empagliflozin (Jardiance) 10 MG TABS Take 1 tablet (10 mg total) by mouth every morning 90 tablet 1    glucose blood (Contour Next Test) test strip Check blood sugar 2 times day 100 each 5    glucose blood (FREESTYLE LITE) test strip USE TO TEST BLOOD SUGAR TWICE A  each 5    losartan (COZAAR) 50 mg tablet Take 1 tablet (50 mg total) by mouth daily 90 tablet 1    metFORMIN (GLUCOPHAGE) 1000 MG tablet Take 1 tablet (1,000 mg total) by mouth 2 (two) times a day with meals 180 tablet 1    Microlet Lancets MISC Use 2 (two) times a day 100 each 5    simvastatin (ZOCOR) 20 mg tablet Take 1 tablet (20 mg total) by mouth daily at bedtime 90 tablet 1    omeprazole (PriLOSEC) 40 MG capsule Take 1 capsule (40 mg total) by mouth daily before breakfast 30 capsule 5     No current facility-administered medications for this visit       Current Outpatient Medications on File Prior to Visit   Medication Sig    acetaminophen (TYLENOL) 500 mg tablet Take 1 tablet (500 mg total) by mouth every 6 (six) hours as needed for mild pain    albuterol (PROVENTIL HFA,VENTOLIN HFA) 90 mcg/act inhaler Inhale 2 puffs every 6 (six) hours as needed for wheezing or shortness of breath    Blood Glucose Monitoring Suppl (Contour Next Monitor) w/Device KIT Use 1 each daily Check blood sugar 2 times day    diclofenac (VOLTAREN) 75 mg EC tablet TAKE 1 TABLET BY MOUTH TWICE A DAY    glucose blood (Contour Next Test) test strip Check blood sugar 2 times day    glucose blood (FREESTYLE LITE) test strip USE TO TEST BLOOD SUGAR TWICE A DAY    Microlet Lancets MISC Use 2 (two) times a day    [DISCONTINUED] ALPRAZolam (XANAX) 0 5 mg tablet Take 1 tablet (0 5 mg total) by mouth 60 minutes pre-procedure    [DISCONTINUED] Diclofenac Sodium (VOLTAREN) 1 % Apply 2 g topically 4 (four) times a day    [DISCONTINUED] DULoxetine (CYMBALTA) 60 mg delayed release capsule Take 1 capsule (60 mg total) by mouth daily    [DISCONTINUED] Jardiance 10 MG TABS TAKE 1 TABLET BY MOUTH EVERY DAY IN THE MORNING    [DISCONTINUED] losartan (COZAAR) 50 mg tablet TAKE 1 TABLET BY MOUTH EVERY DAY    [DISCONTINUED] metFORMIN (GLUCOPHAGE) 1000 MG tablet TAKE 1 TABLET BY MOUTH TWICE A DAY WITH MEALS    [DISCONTINUED] simvastatin (ZOCOR) 20 mg tablet TAKE 1 TABLET BY MOUTH DAILY AT BEDTIME    [DISCONTINUED] famotidine (PEPCID) 20 mg tablet TAKE 1 TABLET BY MOUTH EVERY DAY AT BEDTIME AS NEEDED FOR HEARTBURN (Patient not taking: Reported on 8/16/2021)    [DISCONTINUED] losartan (COZAAR) 50 mg tablet Take 1 tablet (50 mg total) by mouth daily     No current facility-administered medications on file prior to visit  She is allergic to aspirin and latex       Review of Systems   Constitutional: Negative for chills and fever  HENT: Negative for ear pain and sore throat  Eyes: Negative for pain and visual disturbance  Respiratory: Negative for cough and shortness of breath  Cardiovascular: Negative for chest pain and palpitations  Gastrointestinal: Positive for abdominal pain  Negative for vomiting  Genitourinary: Negative for dysuria and hematuria  Musculoskeletal: Negative for arthralgias and back pain  Hip pain     Skin: Negative for color change and rash  Neurological: Negative for seizures and syncope  All other systems reviewed and are negative  Objective:      /68 (BP Location: Right arm, Patient Position: Sitting, Cuff Size: Standard)   Pulse 101   Temp (!) 97 3 °F (36 3 °C) (Temporal)   Resp 18   Ht 5' 2" (1 575 m)   Wt 76 9 kg (169 lb 9 6 oz)   SpO2 97%   Breastfeeding No   BMI 31 02 kg/m²          Physical Exam  Vitals and nursing note reviewed  Constitutional:       Appearance: She is well-developed  HENT:      Head: Normocephalic and atraumatic  Right Ear: External ear normal       Left Ear: External ear normal       Nose: Nose normal    Eyes:      Conjunctiva/sclera: Conjunctivae normal    Neck:      Thyroid: No thyromegaly  Cardiovascular:      Rate and Rhythm: Normal rate and regular rhythm  Pulses:           Dorsalis pedis pulses are 2+ on the right side and 2+ on the left side  Posterior tibial pulses are 2+ on the right side and 2+ on the left side  Heart sounds: Normal heart sounds  No murmur heard  Pulmonary:      Effort: Pulmonary effort is normal  No respiratory distress  Breath sounds: Normal breath sounds  Abdominal:      General: Bowel sounds are normal       Palpations: Abdomen is soft  There is no mass  Tenderness: There is no abdominal tenderness  There is no guarding  Musculoskeletal:         General: Normal range of motion  Cervical back: Normal range of motion and neck supple  Feet:      Right foot:      Skin integrity: No ulcer, skin breakdown, erythema, warmth, callus or dry skin  Left foot:      Skin integrity: No ulcer, skin breakdown, erythema, warmth, callus or dry skin  Lymphadenopathy:      Cervical: No cervical adenopathy  Skin:     General: Skin is warm  Neurological:      Mental Status: She is alert and oriented to person, place, and time  Psychiatric:         Mood and Affect: Mood normal          Behavior: Behavior normal          Patient's shoes and socks removed  Right Foot/Ankle   Right Foot Inspection  Skin Exam: skin normal and skin intact no dry skin, no warmth, no callus, no erythema, no maceration, no abnormal color, no pre-ulcer, no ulcer and no callus                          Toe Exam: ROM and strength within normal limits  Sensory     Proprioception: intact   Monofilament testing: intact  Vascular    The right DP pulse is 2+  The right PT pulse is 2+  Left Foot/Ankle  Left Foot Inspection  Skin Exam: skin normal and skin intactno dry skin, no warmth, no erythema, no maceration, normal color, no pre-ulcer, no ulcer and no callus                         Toe Exam: ROM and strength within normal limits                   Sensory     Proprioception: intact  Monofilament: intact  Vascular    The left DP pulse is 2+  The left PT pulse is 2+  Assign Risk Category:  No deformity present;  No loss of protective sensation;        Risk: 0

## 2021-08-16 NOTE — PATIENT INSTRUCTIONS
Visita de bienestar para los adultos   CUIDADO AMBULATORIO:   Patti visita de bienestar es cuando usted acude con un médico para que le lorraine exámenes de detección de problemas de [place]  Garcia médico también le dará consejos sobre cómo mantenerse saludable  Anote jeff preguntas para que se acuerde de hacerlas  Pregunte a garcia médico con qué frecuencia debería realizarse patti visita de bienestar  Lo que sucede en patti visita de bienestar: Garcia médico le preguntará sobre garcia taylor y agrcia historia familiar [address]  [place] incluye presión arterial noemi, enfermedades del corazón y cáncer  El médico le preguntará si tiene síntomas que le preocupen, si [facility] y [place] de ánimo  También se le preguntará acerca del uso de medicamentos, suplementos, alimentos y alcohol  Es posible que le lorraine cualquiera de lo siguiente:  · Garcia peso será revisado  Es posible que [facility] midan garcia altura para calcular garcia índice de masa corporal [facility]  [facility] indica si tiene un peso saludable  · Se verificarán garcia presión arterial y frecuencia cardíaca  También pueden revisar garcia temperatura  · Exámenes de [place] y [facility] podría realizarse  Se podrían realizar exámenes de eguene para revisar los niveles de [facility]  Los niveles anormales de colesterol aumentan el riesgo de enfermedad del corazón y accidente cerebrovascular  Puede que también necesite patti prueba de eugene u orina para revisar si tiene diabetes si usted está en mayor riesgo  Las pruebas de orina pueden hacerse para buscar signos de patti infección o patti enfermedad renal     · Un examen físico incluye la comprobación de jeff latidos del corazón y los pulmones con un estetoscopio  Garcia médico también podría revisarle la piel para buscar daños causados por el sol  · Pruebas de detección podría recomendarse  Se realiza un examen de detección para detectar enfermedades que pueden no causar síntomas   Los exámenes de [employer] necesite dependen de garcia edad, [place], antecedentes familiares y hábitos de paige  Por ejemplo, podrían recomendarle la exploración selectiva colorrectal si tiene 48 años o más  Si es [place], necesita los siguientes exámenes de detección:  · El examen de Papanicolaou se utiliza para detectar cáncer de ar uterino  El examen del Papanicolaou por lo general se realiza entre 3 a 5 años dependiendo de garcia edad  Puede necesitarlo más a menudo si usted ha tenido [employer] de la prueba de Papanicolaou en el pasado  Pregunte a garcia médico con qué frecuencia debería realizarse un examen de Papanicolaou  · Gaynel Colette es patti radiografía de jeff mamas para detectar cáncer de mama  Los expertos recomiendan [address] cada 2 años empezando a los 48 años de [place]  Es probable que usted necesite Gaynel Colette a los 52 años o antes si tiene riesgo alto de cáncer de seno  Hable con garcia médico sobre cuándo debe empezar con jeff mamografías y con cuánta frecuencia las necesita  Vacunas que podría necesitar:  · Debe recibir patti vacuna contra la influenza todos los [address] [place] [address]  La vacuna contra la influenza protege de la gripe  Varios tipos de virus causan la influenza  Debido a que los virus [place] con el [place], es necesaria la producción de nuevas vacunas cada año  · Debe recibir [place] vacuna de refuerzo contra el tétanos-difteria (Td) cada 10 años  Esta vacuna protege contra el tétanos y la difteria  El tétanos es patti infección severa que puede causar trismo y espasmos musculares dolorosos  La difteria es patti infección bacteriana grave que produce patti cubierta gruesa en la parte de atrás de la boca y garganta  · Debe recibir la vacuna contra el virus del papiloma humano (VPH) si usted es ton y [place] 19 y 32 años o es hombre y [place] 23 y 24 años y nunca la recibió  Esta vacuna protege contra la infección por VPH   El virus del papiloma humano o VPH es la infección más común que se transmite por contacto sexual  El virus del papiloma humano también podría provocar cáncer vaginal, del pene y del ano  · Debe recibir la vacuna antineumocócica si tiene más de 72 años  La vacuna antineumocócica es patti inyección que se administra para protegerlo contra patti enfermedad neumocócica  La enfermedad neumocócica es patti infección causada por la bacteria neumocócica  La infección puede causar neumonía, meningitis o patti infección del oído  · Debe recibir la vacuna contra la culebrilla Si tiene 61 años o más, incluso si ha tenido culebrilla antes  La vacuna contra la culebrilla (herpes zóster) es patti inyección usada para proteger contra el virus zóster que causa la varicela  Arabella es el mismo virus que causa la varicela  La culebrilla es un sarpullido doloroso que se desarrolla en personas que tuvieron varicela o morales estado expuestas al virus  Cómo comer saludable: Mi [place] es un modelo para planear comidas sanas  Muestra los tipos y cantidades de alimentos que deberían ir en un plato  Heidy Mash y verduras representan alrededor de la mitad de garcia plato y los granos y proteínas representan la otra mitad  Se incluye patti porción de productos lácteos al lado del plato  La cantidad de calorías y [address] de las porciones que usted necesita dependen de garcia edad, [place], peso y altura  Los ejemplos de alimentos saludables son:  · Consuma patti variedad de verduras nia las de color john oscuro, [place] y [place]  Usted también puede incluir verduras enlatadas bajas en sodio (sal) y verduras congeladas sin mantequilla ni salsas RSOOCQIT  · Consuma patti variedad de fruta frescas , las frutas enlatadas en 100% de jugo, fruta [place] y madhu secos  · Incluya granos enteros  Por lo menos la mitad de los granos que usted consume deben ser granos de flako integral  Por ejemplo, panes de grano entero, pasta integral, arroz integral y cereales de grano entero nia la caryn      · Consuma patti variedad de alimentos con proteínas nia mariscos (pescado y crustáceos), carne magra y carne de ave sin piel (pavo y yosi)  Ejemplos de calderon magras incluyen pierna, paleta o lomo de puerco y [place], solomillo o, lomo de res y carne [place] de res extra [place]  Otros alimentos ricos en proteínas son los huevos y sustitutos de [place], frijoles, chícharos, productos de soya, nueces y semillas  · Elija productos lácteos bajos en grasa [employer] o del 1% o yogur, queso y requesón bajos en grasa  · Limite las grasas poco saludables, nia la [place], la margarina dura y la [place]  Ejercicio: Realice patti actividad física por lo menos 30 minutos al día, la mayoría de los días de la [place]  Algunos de los ejercicios incluyen caminar, montar en bicicleta, bailar y nadar  Usted también puede realizar más actividad física usando las escaleras en vez de los ascensores o estacionarse más lejos cuando [place] a las tiendas  Incluya ejercicios para fortalecer los músculos 2 días a la semana  El ejercicio regular proporciona muchos beneficios para la taylor  Renny Arnot a controlar garcia peso y [employer] riesgo de diabetes tipo 2, presión arterial noemi, enfermedad del corazón y accidente cerebrovascular  El ejercicio [employer] puede ayudar a mejorar garcia estado de ánimo  Pregunte a garcia médico acerca del mejor plan de ejercicio para usted  Pautas generales de taylor y seguridad:  · No fume  La nicotina y otras sustancias químicas que contienen los cigarrillos y cigarros pueden dañar los pulmones  Pida información a garcia médico si usted actualmente fuma y necesita ayuda para dejar de fumar  Los cigarrillos electrónicos o el tabaco sin humo igualmente contienen nicotina  Consulte con garcia médico antes de [employer]  · Limite el consumo de alcohol  Un trago equivale a 12 onzas de cerveza, 5 onzas de vino o 1 onza y ½ de licor  · Baje de peso, si es necesario  El sobrepeso aumenta el riesgo de ciertas condiciones de [place]   Estos incluyen enfermedad del corazón, presión arterial noemi, diabetes tipo 2 y ciertos tipos de cáncer  · Proteja garcia piel  No tome el sol ni use shahram de bronceado  Use un protector solar con un FPS mayor a 13  Aplíquese el bloqueador por lo menos 15 minutos antes de que vaya a estar al [employer]  Vuelva a aplicarse la crema solar cada 2 horas  Use ropa protectora, sombrero y lentes para el sol cuando se encuentre afuera  · Conduzca con seguridad  Use siempre garcia cinturón de seguridad  Asegúrese que todos en el rox usan el cinturón de seguridad  Un cinturón de seguridad puede salvar garcia paige en nel de un accidente automovilístico  No use el celular cuando esté manejando  [place] puede hacer que se distraiga y causar un accidente  Es mejor que pare y se orille si necesita hacer patti Sergey Jed un texto  · Practique el sexo seguro  Use condones de látex si es sexualmente [place] y tiene más de [place] and [place]  Garcia médico puede recomendar exámenes de detección de infecciones de transmisión sexual (ITS)  · Use un miracle, un chaleco salvavidas y unos implementos de protección  Siempre use un miracle al [employer] en bicicleta o motocicleta, esquiar o jugar deportes que podrían causar patti lesión en la jack  Use implementos de protección cuando practique deportes  Use un chaleco salvavidas cuando esté en un bote o practicando actividades acuáticas  © Copyright [employer] 2021 Information is for End User's use only and may not be sold, redistributed or otherwise used for commercial purposes  All illustrations and images included in CareNotes® are the copyrighted property of [employer]  or [address] [place] [address] es sólo para uso en educación  Garcia intención no es darle un consejo médico sobre enfermedades o tratamientos  Colsulte con garcia Catherne Rogelio farmacéutico antes de seguir cualquier régimen médico para saber si es seguro y efectivo para usted

## 2021-08-18 ENCOUNTER — TELEPHONE (OUTPATIENT)
Dept: FAMILY MEDICINE CLINIC | Facility: CLINIC | Age: 61
End: 2021-08-18

## 2021-08-18 NOTE — TELEPHONE ENCOUNTER
[facility] and [facility] called to inform they are faxing over forms for DME for this patient today

## 2021-08-31 ENCOUNTER — OFFICE VISIT (OUTPATIENT)
Dept: DERMATOLOGY | Facility: CLINIC | Age: 61
End: 2021-08-31
Payer: COMMERCIAL

## 2021-08-31 DIAGNOSIS — Z51.89 VISIT FOR WOUND CHECK: Primary | ICD-10-CM

## 2021-08-31 PROCEDURE — 99212 OFFICE O/P EST SF 10 MIN: CPT | Performed by: DERMATOLOGY

## 2021-08-31 NOTE — PROGRESS NOTES
WOUND CHECK    Physical Exam:   Anatomic Location Affected:  Right Cheek   Description of wound: Clean, well healed scar with small lump on inferior end   Closure Type: Intermediate layered closure    Additional History of Present Condition:  Status post excision on 07/09/2021  Patient states there is a dark lump at the inferior end of the scar line that leaks bloody fluid when squeezed  No erythema present  Patient denies any pain or signs of infection  Patient is very happy with results  Assessment and Plan:  Based on a thorough discussion of this condition and the management approach to it (including a comprehensive discussion of the known risks, side effects and potential benefits of treatment), the patient (family) agrees to implement the following specific plan:   Cleaned with alcohol, small amount of bloody fluid on alcohol swab after cleaning  Small lump disappeared after fluid drained   Advised to continue to clean area daily and apply Vaseline until healed  Follow up as needed

## 2021-09-02 ENCOUNTER — VBI (OUTPATIENT)
Dept: ADMINISTRATIVE | Facility: OTHER | Age: 61
End: 2021-09-02

## 2021-09-02 DIAGNOSIS — M16.12 PRIMARY OSTEOARTHRITIS OF LEFT HIP: ICD-10-CM

## 2021-09-05 ENCOUNTER — HOSPITAL ENCOUNTER (EMERGENCY)
Facility: HOSPITAL | Age: 61
Discharge: HOME | End: 2021-09-05
Attending: EMERGENCY MEDICINE
Payer: COMMERCIAL

## 2021-09-05 VITALS
SYSTOLIC BLOOD PRESSURE: 140 MMHG | TEMPERATURE: 98.4 F | DIASTOLIC BLOOD PRESSURE: 78 MMHG | OXYGEN SATURATION: 96 % | RESPIRATION RATE: 16 BRPM | HEART RATE: 107 BPM
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
## 2021-09-05 PROCEDURE — 90715 TDAP VACCINE 7 YRS/> IM: CPT | Performed by: EMERGENCY MEDICINE

## 2021-09-05 PROCEDURE — 99284 EMERGENCY DEPT VISIT MOD MDM: CPT | Performed by: EMERGENCY MEDICINE

## 2021-09-05 PROCEDURE — 90471 IMMUNIZATION ADMIN: CPT

## 2021-09-05 PROCEDURE — 99283 EMERGENCY DEPT VISIT LOW MDM: CPT

## 2021-09-05 RX ORDER — AMOXICILLIN AND CLAVULANATE POTASSIUM 875; 125 MG/1; MG/1
1 TABLET, FILM COATED ORAL EVERY 12 HOURS
Qty: 10 TABLET | Refills: 0 | Status: SHIPPED | OUTPATIENT
Start: 2021-09-05 | End: 2021-09-10

## 2021-09-05 RX ORDER — AMOXICILLIN AND CLAVULANATE POTASSIUM 875; 125 MG/1; MG/1
1 TABLET, FILM COATED ORAL ONCE
Status: COMPLETED | OUTPATIENT
Start: 2021-09-05 | End: 2021-09-05

## 2021-09-05 RX ADMIN — AMOXICILLIN AND CLAVULANATE POTASSIUM 1 TABLET: 875; 125 TABLET, FILM COATED ORAL at 18:46

## 2021-09-05 RX ADMIN — TETANUS TOXOID, REDUCED DIPHTHERIA TOXOID AND ACELLULAR PERTUSSIS VACCINE, ADSORBED 0.5 ML: 5; 2.5; 8; 8; 2.5 SUSPENSION INTRAMUSCULAR at 18:48

## 2021-09-05 NOTE — ED ATTENDING ATTESTATION
9/5/2021  I, Danny Traylor MD, saw and evaluated the patient  I have discussed the patient with the resident/non-physician practitioner and agree with the resident's/non-physician practitioner's findings, Plan of Care, and MDM as documented in the resident's/non-physician practitioner's note, except where noted  All available labs and Radiology studies were reviewed  I was present for key portions of any procedure(s) performed by the resident/non-physician practitioner and I was immediately available to provide assistance  At this point I agree with the current assessment done in the Emergency Department  I have conducted an independent evaluation of this patient a history and physical is as follows:  Pt was opening door and stepped on her own dog's foot and dog bit her in toe  Dog is utd with shots   Pt si a dm PE: + bite wound to foot noted from MDM: senia  ED Course         Critical Care Time  Procedures

## 2021-09-05 NOTE — DISCHARGE INSTRUCTIONS
Please follow up with your primary care doctor within 2-3 days for wound re-evaluation  Return to the ER if you develop severe or worsening swelling of the toe or foot, thick drainage from the foot or develop fevers  Please take Augmentin two times per day for 5 days

## 2021-09-05 NOTE — ED NOTES
PT reports that it was their own personal dog who bit her  Dog is up to date on all vaccinations        Amelie Kiser RN  09/05/21 3363

## 2021-09-06 NOTE — ED PROVIDER NOTES
History  Chief Complaint   Patient presents with    Dog Bite     pt states that she didn't see her dog below her and she stepped back on the dogs paw and it bit her in the foot; pt said that she is diabetic & was worried about the laceration becoming infected     HPI     61 yo F with past medical history of diabetes who presents for evaluation after a dog bite to her right second toe  Patient states she accidentally stepped onto her dogs paw and he bit her in the toe  This happened approximately 30-45 minutes prior to arrival  Bleeding is controlled at this time  Patient denies any other bites  Denies numbness or weakness in the foot  Patient does not have peripheral neuropathy in her feet  She states she is a diabetic and was concerned about the wound getting infected so she came to the ER  Patient states her dog is vaccinated for rabies  Patient does not know when her last tetanus shot was  Patient is primarily [address] speaking, patient's  was able to translate for her, she declined  phone  Prior to Admission Medications   Prescriptions Last Dose Informant Patient Reported? Taking?    ALPRAZolam (XANAX) 0 5 mg tablet   No No   Sig: Take 1 tablet (0 5 mg total) by mouth 60 minutes pre-procedure   Blood Glucose Monitoring Suppl (Contour Next Monitor) w/Device KIT   No No   Sig: Use 1 each daily Check blood sugar 2 times day   DULoxetine (CYMBALTA) 60 mg delayed release capsule   No No   Sig: Take 1 capsule (60 mg total) by mouth daily   Diclofenac Sodium (VOLTAREN) 1 %   No No   Sig: Apply 2 g topically 4 (four) times a day   Empagliflozin (Jardiance) 10 MG TABS   No No   Sig: Take 1 tablet (10 mg total) by mouth every morning   Microlet Lancets MISC   No No   Sig: Use 2 (two) times a day   acetaminophen (TYLENOL) 500 mg tablet   No No   Sig: Take 1 tablet (500 mg total) by mouth every 6 (six) hours as needed for mild pain   albuterol (PROVENTIL HFA,VENTOLIN HFA) 90 mcg/act inhaler   No No Sig: Inhale 2 puffs every 6 (six) hours as needed for wheezing or shortness of breath   diclofenac (VOLTAREN) 75 mg EC tablet   No No   Sig: TAKE 1 TABLET BY MOUTH TWICE A DAY   glucose blood (Contour Next Test) test strip   No No   Sig: Check blood sugar 2 times day   glucose blood (FREESTYLE LITE) test strip   No No   Sig: USE TO TEST BLOOD SUGAR TWICE A DAY   losartan (COZAAR) 50 mg tablet   No No   Sig: Take 1 tablet (50 mg total) by mouth daily   metFORMIN (GLUCOPHAGE) 1000 MG tablet   No No   Sig: Take 1 tablet (1,000 mg total) by mouth 2 (two) times a day with meals   omeprazole (PriLOSEC) 40 MG capsule   No No   Sig: Take 1 capsule (40 mg total) by mouth daily before breakfast   simvastatin (ZOCOR) 20 mg tablet   No No   Sig: Take 1 tablet (20 mg total) by mouth daily at bedtime      Facility-Administered Medications: None       Past Medical History:   Diagnosis Date    Acquired hammer toe of right foot 3/15/2018    S/p surg    Arthritis     Asthma     "one time with a cold, and used an inhaler"    Chronic pain disorder     hip pain left, and back    Diabetes mellitus (HCC)     Eczema     GERD (gastroesophageal reflux disease)     History of panic attacks     Hyperlipidemia     Hypertension     Labral tear of shoulder 09/24/2014    Right foot pain     bunionectomy today 3/1/2019    Rotator cuff syndrome, right 8/16/2017    Wears glasses        Past Surgical History:   Procedure Laterality Date    CHOLECYSTECTOMY      "had a panic attack"    FOOT SURGERY Right     HERNIA REPAIR      OVARY SURGERY      VA COLONOSCOPY FLX DX W/COLLJ SPEC WHEN PFRMD N/A 12/22/2016    Procedure: EGD AND COLONOSCOPY;  Surgeon: Padmini Elmore MD;  Location: [facility] GI LAB;   Service: Gastroenterology    VA Y[place]ndee W/SESMDC W/2 OSTEOT Right 3/1/2019    Procedure: DOUBLE OSTEOTOMY, ZINA/AKIN BUNIONECTOMY;  Surgeon: Jessica Parikh DPM;  Location: [facility] OR;  Service: [facility] Family History   Problem Relation Age of Onset    Hypertension Mother     Hypertension Father     Bone cancer Maternal Grandfather     Cancer Maternal Grandfather         bone    Breast cancer Maternal Aunt     Stomach cancer Brother     Cancer Brother         stomach    Colon cancer Neg Hx     Ovarian cancer Neg Hx      I have reviewed and agree with the history as documented  E-Cigarette/Vaping    E-Cigarette Use Never User      E-Cigarette/Vaping Substances    Nicotine No     THC No     CBD No     Flavoring No     Other No     Unknown No      Social History     Tobacco Use    Smoking status: Never Smoker    Smokeless tobacco: Never Used   Vaping Use    Vaping Use: Never used   Substance Use Topics    Alcohol use: Never    Drug use: Never        Review of Systems   Constitutional: Negative for chills and fever  Musculoskeletal: Positive for arthralgias  Negative for joint swelling  Skin: Positive for wound  Neurological: Negative for weakness and numbness  All other systems reviewed and are negative  Physical Exam  ED Triage Vitals [09/05/21 1838]   Temperature Pulse Respirations Blood Pressure SpO2   98 4 °F (36 9 °C) (!) 107 16 140/78 96 %      Temp Source Heart Rate Source Patient Position - Orthostatic VS BP Location FiO2 (%)   Oral Monitor Lying Right arm --      Pain Score       2             Orthostatic Vital Signs  Vitals:    09/05/21 1838   BP: 140/78   Pulse: (!) 107   Patient Position - Orthostatic VS: Lying       Physical Exam  Vitals and nursing note reviewed  Constitutional:       General: She is not in acute distress  Appearance: Normal appearance  She is well-developed and normal weight  She is not ill-appearing, toxic-appearing or diaphoretic  HENT:      Head: Normocephalic and atraumatic        Right Ear: External ear normal       Left Ear: External ear normal       Nose: Nose normal    Eyes:      Extraocular Movements: Extraocular movements intact  Conjunctiva/sclera: Conjunctivae normal    Cardiovascular:      Rate and Rhythm: Normal rate and regular rhythm  Pulses: Normal pulses  Pulmonary:      Effort: Pulmonary effort is normal  No respiratory distress  Abdominal:      General: There is no distension  Musculoskeletal:         General: Tenderness present  Cervical back: Neck supple  Comments: Mild tenderness over bite wound on the dorsum of the right second toe  No significant swelling, redness or bruising  Skin:     General: Skin is warm and dry  Capillary Refill: Capillary refill takes less than 2 seconds  Coloration: Skin is not pale  Findings: No erythema or rash  Comments: Small <1cm bite wound to the dorsum of the right second toe  No active bleeding,  Wound is well approximated and appears to be superficial     Neurological:      General: No focal deficit present  Mental Status: She is alert and oriented to person, place, and time  Cranial Nerves: No cranial nerve deficit  Sensory: No sensory deficit  Motor: No weakness  Psychiatric:         Mood and Affect: Mood normal          Behavior: Behavior normal          ED Medications  Medications   amoxicillin-clavulanate (AUGMENTIN) 875-125 mg per tablet 1 tablet (1 tablet Oral Given 9/5/21 1846)   tetanus-diphtheria-acellular pertussis (BOOSTRIX) IM injection 0 5 mL (0 5 mL Intramuscular Given 9/5/21 1848)       Diagnostic Studies  Results Reviewed     None                 No orders to display         Procedures  Procedures      ED Course                                       MDM     63 yo F with past medical history of diabetes who presents for evaluation after a dog bite to her right second toe, occurred 30-45 minutes prior to arrival    Will update patient's tetanus shot  Patient's dog is up to date on rabies vaccine  Will prescribe Augmentin for infection prophylaxis BID for 5 days   Discussed with patient signs and symptoms of infection including worsening redness, pain, purulent discharge or fevers  Instructed patient to follow up with PCP within 2-3 days for re-evaluation  Patient expressed understanding and was agreeable for discharge  Disposition  Final diagnoses:   Dog bite, initial encounter     Time reflects when diagnosis was documented in both MDM as applicable and the Disposition within this note     Time User Action Codes Description Comment    9/5/2021  6:45 PM Yvette Diamond Add Roxanne Ply  0XXA] Dog bite, initial encounter       ED Disposition     ED Disposition Condition Date/Time Comment    Discharge Stable Sun Sep 5, 2021  6:51 PM Princess Ling discharge to home/self care              Follow-up Information     Follow up With Specialties Details Why Contact Info    Kishan Willams PA-C Family Medicine, Physician Assistant Schedule an appointment as soon as possible for a visit in 2 days  [address]  [address] [place] [address]  [phone]            Discharge Medication List as of 9/5/2021  6:52 PM      START taking these medications    Details   amoxicillin-clavulanate (AUGMENTIN) 875-125 mg per tablet Take 1 tablet by mouth every 12 (twelve) hours for 5 days, Starting Sun 9/5/2021, Until Fri 9/10/2021, Normal         CONTINUE these medications which have NOT CHANGED    Details   acetaminophen (TYLENOL) 500 mg tablet Take 1 tablet (500 mg total) by mouth every 6 (six) hours as needed for mild pain, Starting Mon 12/7/2020, Normal      albuterol (PROVENTIL HFA,VENTOLIN HFA) 90 mcg/act inhaler Inhale 2 puffs every 6 (six) hours as needed for wheezing or shortness of breath, Starting Mon 12/7/2020, Normal      ALPRAZolam (XANAX) 0 5 mg tablet Take 1 tablet (0 5 mg total) by mouth 60 minutes pre-procedure, Starting Mon 8/16/2021, Normal      Blood Glucose Monitoring Suppl (Contour Next Monitor) w/Device KIT Use 1 each daily Check blood sugar 2 times day, Starting Mon 2/15/2021, Normal      diclofenac (VOLTAREN) 75 mg EC tablet TAKE 1 TABLET BY MOUTH TWICE A DAY, Normal      Diclofenac Sodium (VOLTAREN) 1 % Apply 2 g topically 4 (four) times a day, Starting Mon 8/16/2021, Normal      DULoxetine (CYMBALTA) 60 mg delayed release capsule Take 1 capsule (60 mg total) by mouth daily, Starting Mon 8/16/2021, Normal      Empagliflozin (Jardiance) 10 MG TABS Take 1 tablet (10 mg total) by mouth every morning, Starting Mon 8/16/2021, Normal      !! glucose blood (Contour Next Test) test strip Check blood sugar 2 times day, Normal      !! glucose blood (FREESTYLE LITE) test strip USE TO TEST BLOOD SUGAR TWICE A DAY, Normal      losartan (COZAAR) 50 mg tablet Take 1 tablet (50 mg total) by mouth daily, Starting Mon 8/16/2021, Normal      metFORMIN (GLUCOPHAGE) 1000 MG tablet Take 1 tablet (1,000 mg total) by mouth 2 (two) times a day with meals, Starting Mon 8/16/2021, Normal      Microlet Lancets MISC Use 2 (two) times a day, Starting Mon 2/15/2021, Normal      omeprazole (PriLOSEC) 40 MG capsule Take 1 capsule (40 mg total) by mouth daily before breakfast, Starting Mon 8/16/2021, Normal      simvastatin (ZOCOR) 20 mg tablet Take 1 tablet (20 mg total) by mouth daily at bedtime, Starting Mon 8/16/2021, Normal       !! - Potential duplicate medications found  Please discuss with provider  No discharge procedures on file  PDMP Review       Value Time User    PDMP Reviewed  Yes 8/16/2021 11:21 AM Kishan Willams PA-C           ED Provider  Attending physically available and evaluated Norma Bermudez I managed the patient along with the ED Attending      Electronically Signed by         Honey Gleason MD  09/05/21 9609

## 2021-09-07 RX ORDER — DICLOFENAC SODIUM 75 MG/1
TABLET, DELAYED RELEASE ORAL
Qty: 60 TABLET | Refills: 2 | Status: SHIPPED | OUTPATIENT
Start: 2021-09-07 | End: 2021-12-14

## 2021-09-08 ENCOUNTER — APPOINTMENT (OUTPATIENT)
Dept: LAB | Facility: CLINIC | Age: 61
End: 2021-09-08
Payer: COMMERCIAL

## 2021-09-08 DIAGNOSIS — R79.89 ELEVATED LIVER FUNCTION TESTS: ICD-10-CM

## 2021-09-08 LAB
ALBUMIN SERPL BCP-MCNC: 3.6 G/DL (ref 3.5–5)
ALP SERPL-CCNC: 123 U/L (ref 46–116)
ALT SERPL W P-5'-P-CCNC: 130 U/L (ref 12–78)
ANION GAP SERPL CALCULATED.3IONS-SCNC: 5 MMOL/L (ref 4–13)
AST SERPL W P-5'-P-CCNC: 73 U/L (ref 5–45)
BASOPHILS # BLD AUTO: 0.04 THOUSANDS/ΜL (ref 0–0.1)
BASOPHILS NFR BLD AUTO: 1 % (ref 0–1)
BILIRUB SERPL-MCNC: 0.43 MG/DL (ref 0.2–1)
BUN SERPL-MCNC: 9 MG/DL (ref 5–25)
CALCIUM SERPL-MCNC: 9.1 MG/DL (ref 8.3–10.1)
CHLORIDE SERPL-SCNC: 107 MMOL/L (ref 100–108)
CO2 SERPL-SCNC: 27 MMOL/L (ref 21–32)
CREAT SERPL-MCNC: 0.63 MG/DL (ref 0.6–1.3)
EOSINOPHIL # BLD AUTO: 0.06 THOUSAND/ΜL (ref 0–0.61)
EOSINOPHIL NFR BLD AUTO: 1 % (ref 0–6)
ERYTHROCYTE [DISTWIDTH] IN BLOOD BY AUTOMATED COUNT: 13.2 % (ref 11.6–15.1)
GFR SERPL CREATININE-BSD FRML MDRD: 98 ML/MIN/1.73SQ M
GLUCOSE P FAST SERPL-MCNC: 106 MG/DL (ref 65–99)
HBV CORE AB SER QL: NORMAL
HBV CORE IGM SER QL: NORMAL
HBV SURFACE AG SER QL: NORMAL
HCT VFR BLD AUTO: 41.8 % (ref 34.8–46.1)
HCV AB SER QL: NORMAL
HGB BLD-MCNC: 12.8 G/DL (ref 11.5–15.4)
IMM GRANULOCYTES # BLD AUTO: 0.02 THOUSAND/UL (ref 0–0.2)
IMM GRANULOCYTES NFR BLD AUTO: 0 % (ref 0–2)
LYMPHOCYTES # BLD AUTO: 1.41 THOUSANDS/ΜL (ref 0.6–4.47)
LYMPHOCYTES NFR BLD AUTO: 23 % (ref 14–44)
MCH RBC QN AUTO: 30 PG (ref 26.8–34.3)
MCHC RBC AUTO-ENTMCNC: 30.6 G/DL (ref 31.4–37.4)
MCV RBC AUTO: 98 FL (ref 82–98)
MONOCYTES # BLD AUTO: 0.57 THOUSAND/ΜL (ref 0.17–1.22)
MONOCYTES NFR BLD AUTO: 9 % (ref 4–12)
NEUTROPHILS # BLD AUTO: 3.96 THOUSANDS/ΜL (ref 1.85–7.62)
NEUTS SEG NFR BLD AUTO: 66 % (ref 43–75)
NRBC BLD AUTO-RTO: 0 /100 WBCS
PLATELET # BLD AUTO: 228 THOUSANDS/UL (ref 149–390)
PMV BLD AUTO: 10.8 FL (ref 8.9–12.7)
POTASSIUM SERPL-SCNC: 4.2 MMOL/L (ref 3.5–5.3)
PROT SERPL-MCNC: 7.7 G/DL (ref 6.4–8.2)
RBC # BLD AUTO: 4.26 MILLION/UL (ref 3.81–5.12)
SODIUM SERPL-SCNC: 139 MMOL/L (ref 136–145)
WBC # BLD AUTO: 6.06 THOUSAND/UL (ref 4.31–10.16)

## 2021-09-08 PROCEDURE — 86803 HEPATITIS C AB TEST: CPT

## 2021-09-08 PROCEDURE — 85025 COMPLETE CBC W/AUTO DIFF WBC: CPT

## 2021-09-08 PROCEDURE — 87340 HEPATITIS B SURFACE AG IA: CPT

## 2021-09-08 PROCEDURE — 86705 HEP B CORE ANTIBODY IGM: CPT

## 2021-09-08 PROCEDURE — 86704 HEP B CORE ANTIBODY TOTAL: CPT

## 2021-09-08 PROCEDURE — 36415 COLL VENOUS BLD VENIPUNCTURE: CPT

## 2021-09-08 PROCEDURE — 80053 COMPREHEN METABOLIC PANEL: CPT

## 2021-09-10 ENCOUNTER — HOSPITAL ENCOUNTER (OUTPATIENT)
Dept: BONE DENSITY | Facility: MEDICAL CENTER | Age: 61
Discharge: HOME | End: 2021-09-10
Payer: COMMERCIAL

## 2021-09-10 DIAGNOSIS — Z78.0 POST-MENOPAUSE: ICD-10-CM

## 2021-09-10 DIAGNOSIS — Z13.820 SCREENING FOR OSTEOPOROSIS: ICD-10-CM

## 2021-09-10 PROCEDURE — 77080 DXA BONE DENSITY AXIAL: CPT

## 2021-09-24 ENCOUNTER — HOSPITAL ENCOUNTER (OUTPATIENT)
Dept: RADIOLOGY | Facility: HOSPITAL | Age: 61
Discharge: HOME | End: 2021-09-24
Payer: COMMERCIAL

## 2021-09-24 DIAGNOSIS — R79.89 ELEVATED LFTS: ICD-10-CM

## 2021-09-24 PROCEDURE — 76705 ECHO EXAM OF ABDOMEN: CPT

## 2021-09-28 ENCOUNTER — OFFICE VISIT (OUTPATIENT)
Dept: FAMILY MEDICINE CLINIC | Facility: CLINIC | Age: 61
End: 2021-09-28

## 2021-09-28 VITALS
OXYGEN SATURATION: 96 % | HEART RATE: 127 BPM | TEMPERATURE: 96.8 F | DIASTOLIC BLOOD PRESSURE: 60 MMHG | HEIGHT: 62 IN | WEIGHT: 167 LBS | BODY MASS INDEX: 30.73 KG/M2 | SYSTOLIC BLOOD PRESSURE: 110 MMHG | RESPIRATION RATE: 16 BRPM

## 2021-09-28 DIAGNOSIS — Z23 NEEDS FLU SHOT: ICD-10-CM

## 2021-09-28 DIAGNOSIS — M16.12 PRIMARY OSTEOARTHRITIS OF LEFT HIP: Primary | ICD-10-CM

## 2021-09-28 DIAGNOSIS — M46.1 SACROILIITIS (HCC): ICD-10-CM

## 2021-09-28 PROCEDURE — 90682 RIV4 VACC RECOMBINANT DNA IM: CPT | Performed by: PHYSICIAN ASSISTANT

## 2021-09-28 PROCEDURE — 99214 OFFICE O/P EST MOD 30 MIN: CPT | Performed by: PHYSICIAN ASSISTANT

## 2021-09-28 PROCEDURE — 3008F BODY MASS INDEX DOCD: CPT | Performed by: PHYSICIAN ASSISTANT

## 2021-09-28 PROCEDURE — 3008F BODY MASS INDEX DOCD: CPT | Performed by: DERMATOLOGY

## 2021-09-28 PROCEDURE — G0008 ADMIN INFLUENZA VIRUS VAC: HCPCS | Performed by: PHYSICIAN ASSISTANT

## 2021-09-28 NOTE — PROGRESS NOTES
Assessment/Plan:    Osteoarthritis of left hip  Continue cymbalta and voltaren gel  1  Dog bite on right second toe, resolved  -Continue current course of medications   -F/u if condition worsens     2  URI   -Supportive treatment including rest, fluids and nasal saline rinse encouraged as needed for symptoms   -Advised to follow up in a week if symptoms do not resolve or to follow up sooner if develops difficulty breathing or chest pain    -Pt agreed with plan     3  Flu shot   -Done in office     4  OA of left hip   -Refill of Voltaren gel ordered       Problem List Items Addressed This Visit        Musculoskeletal and Integument    Osteoarthritis of left hip - Primary     Continue cymbalta and voltaren gel         Relevant Medications    Diclofenac Sodium (VOLTAREN) 1 %    Sacroiliitis (HCC)      Other Visit Diagnoses     Needs flu shot        Relevant Orders    influenza vaccine, quadrivalent, recombinant, PF, 0 5 mL, for patients 18 yr+ (FLUBLOK) (Completed)            Subjective:      Patient ID: Jose Carlos Vazquez is a 62 y o  female  SHERRI BRUNER  is a 61 y/o F presenting to the office for a f/u of a dog bite which occurred 3 weeks ago  Reports the bite wound has been healing well and is almost gone  Pt reports compliance with course of Augmentin  Pt denies numbness, tingling, swelling, erythema, pain, fever, chills or skin changes at the toe  No issues with movement of toe or problems with ambulation  Reports toe "feels fine"  Pt also reports sudden onset congestion and sore throat x 2 days  She first noticed this after her niece was having flu like symptoms a few days ago  Covid test of niece was negative  Reports associated rhinorrhea, cough, myalgia, chest tightness and nausea  Denies vomiting, diarrhea, lose of smell, chest pain, and SOB  Pt has tried Mucinex for the congestion which offered minor relief  Pt also expressed desire to recieve flu shot and to have her medications refilled     URI   This is a new problem  The current episode started in the past 7 days  The problem has been unchanged  There has been no fever  Associated symptoms include congestion, coughing, nausea, rhinorrhea and a sore throat  Pertinent negatives include no abdominal pain, chest pain, diarrhea, dysuria, headaches, rash, vomiting or wheezing  She has tried decongestant for the symptoms  The treatment provided mild relief  The following portions of the patient's history were reviewed and updated as appropriate: allergies, past surgical history and problem list     Review of Systems   Constitutional: Negative for chills, fatigue and fever  HENT: Positive for congestion, rhinorrhea and sore throat  Negative for trouble swallowing  Eyes: Positive for discharge  Negative for pain, itching and visual disturbance  Respiratory: Positive for cough  Negative for chest tightness, shortness of breath and wheezing  Cardiovascular: Negative for chest pain and palpitations  Gastrointestinal: Positive for nausea  Negative for abdominal pain, diarrhea and vomiting  Genitourinary: Negative for difficulty urinating and dysuria  Musculoskeletal: Positive for myalgias  Skin: Negative for rash  Neurological: Negative for dizziness, numbness and headaches  Objective:      /60 (BP Location: Left arm, Patient Position: Sitting, Cuff Size: Standard)   Pulse (!) 127   Temp (!) 96 8 °F (36 °C) (Temporal)   Resp 16   Ht 5' 2" (1 575 m)   Wt 75 8 kg (167 lb)   SpO2 96%   Breastfeeding No   BMI 30 54 kg/m²          Physical Exam  Constitutional:       General: She is not in acute distress  Appearance: Normal appearance  She is normal weight  She is not ill-appearing, toxic-appearing or diaphoretic  HENT:      Head: Normocephalic and atraumatic        Right Ear: Tympanic membrane, ear canal and external ear normal       Left Ear: Tympanic membrane, ear canal and external ear normal       Mouth/Throat:      Mouth: Mucous membranes are moist       Pharynx: Oropharynx is clear  No oropharyngeal exudate or posterior oropharyngeal erythema  Cardiovascular:      Rate and Rhythm: Normal rate and regular rhythm  Pulses: Normal pulses  Heart sounds: Normal heart sounds  No murmur heard  No friction rub  No gallop  Pulmonary:      Effort: Pulmonary effort is normal  No respiratory distress  Breath sounds: Normal breath sounds  No wheezing, rhonchi or rales  Chest:      Chest wall: No tenderness  Musculoskeletal:         General: Signs of injury (healing wound on 2 right toe) present  No swelling, tenderness or deformity  Skin:     General: Skin is warm  Coloration: Skin is not pale  Findings: No erythema or rash  Neurological:      Mental Status: She is alert     Psychiatric:         Mood and Affect: Mood normal          Behavior: Behavior normal

## 2021-10-08 ENCOUNTER — HOSPITAL ENCOUNTER (OUTPATIENT)
Dept: MAMMOGRAPHY | Facility: CLINIC | Age: 61
Discharge: HOME | End: 2021-10-08
Payer: COMMERCIAL

## 2021-10-08 VITALS — BODY MASS INDEX: 30.73 KG/M2 | HEIGHT: 62 IN | WEIGHT: 167 LBS

## 2021-10-08 DIAGNOSIS — Z12.31 ENCOUNTER FOR SCREENING MAMMOGRAM FOR MALIGNANT NEOPLASM OF BREAST: ICD-10-CM

## 2021-10-08 PROCEDURE — 77063 BREAST TOMOSYNTHESIS BI: CPT

## 2021-10-08 PROCEDURE — 77067 SCR MAMMO BI INCL CAD: CPT

## 2021-11-17 ENCOUNTER — CONSULT (OUTPATIENT)
Dept: GASTROENTEROLOGY | Facility: CLINIC | Age: 61
End: 2021-11-17
Payer: COMMERCIAL

## 2021-11-17 VITALS
HEIGHT: 62 IN | HEART RATE: 93 BPM | BODY MASS INDEX: 31.83 KG/M2 | SYSTOLIC BLOOD PRESSURE: 150 MMHG | WEIGHT: 173 LBS | TEMPERATURE: 97.2 F | DIASTOLIC BLOOD PRESSURE: 88 MMHG

## 2021-11-17 DIAGNOSIS — R11.2 NAUSEA AND VOMITING, INTRACTABILITY OF VOMITING NOT SPECIFIED, UNSPECIFIED VOMITING TYPE: ICD-10-CM

## 2021-11-17 DIAGNOSIS — R63.4 WEIGHT LOSS: Primary | ICD-10-CM

## 2021-11-17 DIAGNOSIS — K21.9 GASTROESOPHAGEAL REFLUX DISEASE WITHOUT ESOPHAGITIS: ICD-10-CM

## 2021-11-17 DIAGNOSIS — R19.7 DIARRHEA, UNSPECIFIED TYPE: ICD-10-CM

## 2021-11-17 DIAGNOSIS — Z12.11 COLON CANCER SCREENING: ICD-10-CM

## 2021-11-17 DIAGNOSIS — R79.89 ELEVATED LFTS: ICD-10-CM

## 2021-11-17 PROCEDURE — 3008F BODY MASS INDEX DOCD: CPT | Performed by: INTERNAL MEDICINE

## 2021-11-17 PROCEDURE — 99204 OFFICE O/P NEW MOD 45 MIN: CPT | Performed by: INTERNAL MEDICINE

## 2021-11-17 PROCEDURE — 1036F TOBACCO NON-USER: CPT | Performed by: INTERNAL MEDICINE

## 2021-11-17 PROCEDURE — 3077F SYST BP >= 140 MM HG: CPT | Performed by: INTERNAL MEDICINE

## 2021-11-17 PROCEDURE — 3079F DIAST BP 80-89 MM HG: CPT | Performed by: INTERNAL MEDICINE

## 2021-11-17 RX ORDER — POLYETHYLENE GLYCOL 3350 17 G/17G
238 POWDER, FOR SOLUTION ORAL ONCE
Qty: 238 G | Refills: 0 | Status: SHIPPED | OUTPATIENT
Start: 2021-11-17 | End: 2022-01-27 | Stop reason: ALTCHOICE

## 2021-11-18 ENCOUNTER — VBI (OUTPATIENT)
Dept: ADMINISTRATIVE | Facility: OTHER | Age: 61
End: 2021-11-18

## 2021-11-18 ENCOUNTER — TELEPHONE (OUTPATIENT)
Dept: GASTROENTEROLOGY | Facility: CLINIC | Age: 61
End: 2021-11-18

## 2021-11-22 ENCOUNTER — HOSPITAL ENCOUNTER (OUTPATIENT)
Dept: RADIOLOGY | Facility: HOSPITAL | Age: 61
Discharge: HOME | End: 2021-11-22
Attending: INTERNAL MEDICINE
Payer: COMMERCIAL

## 2021-11-22 DIAGNOSIS — K21.9 GASTROESOPHAGEAL REFLUX DISEASE WITHOUT ESOPHAGITIS: ICD-10-CM

## 2021-11-22 DIAGNOSIS — R19.7 DIARRHEA, UNSPECIFIED TYPE: ICD-10-CM

## 2021-11-22 DIAGNOSIS — Z12.11 COLON CANCER SCREENING: ICD-10-CM

## 2021-11-22 DIAGNOSIS — R11.2 NAUSEA AND VOMITING, INTRACTABILITY OF VOMITING NOT SPECIFIED, UNSPECIFIED VOMITING TYPE: ICD-10-CM

## 2021-11-22 DIAGNOSIS — R63.4 WEIGHT LOSS: ICD-10-CM

## 2021-11-22 DIAGNOSIS — R79.89 ELEVATED LFTS: ICD-10-CM

## 2021-11-22 PROCEDURE — 76981 USE PARENCHYMA: CPT

## 2021-12-12 DIAGNOSIS — M16.12 PRIMARY OSTEOARTHRITIS OF LEFT HIP: ICD-10-CM

## 2021-12-14 RX ORDER — DICLOFENAC SODIUM 75 MG/1
TABLET, DELAYED RELEASE ORAL
Qty: 60 TABLET | Refills: 2 | Status: SHIPPED | OUTPATIENT
Start: 2021-12-14 | End: 2022-02-28 | Stop reason: SDUPTHER

## 2022-01-05 ENCOUNTER — OFFICE VISIT (OUTPATIENT)
Dept: PODIATRY | Facility: CLINIC | Age: 62
End: 2022-01-05
Payer: MEDICARE

## 2022-01-05 VITALS
DIASTOLIC BLOOD PRESSURE: 79 MMHG | HEIGHT: 62 IN | BODY MASS INDEX: 31.65 KG/M2 | WEIGHT: 172 LBS | SYSTOLIC BLOOD PRESSURE: 119 MMHG | HEART RATE: 108 BPM

## 2022-01-05 DIAGNOSIS — L60.0 INGROWN TOENAIL OF LEFT FOOT: Primary | ICD-10-CM

## 2022-01-05 PROCEDURE — 99212 OFFICE O/P EST SF 10 MIN: CPT | Performed by: PODIATRIST

## 2022-01-05 NOTE — PROGRESS NOTES
Assessment/Plan:      Diagnoses and all orders for this visit:    Ingrown toenail of left foot  Comments:  resolved          Subjective:     Patient ID: Xavier Salazar is a 62 y o  female  Patient had painful ingrown nail to the medial aspect of her left great toe  She cut out a large piece of toenail  The pain and redness went away but she wanted me to check it because she is diabetic  Review of Systems      Objective:     Physical Exam  Vitals reviewed  Constitutional:       Appearance: She is obese  She is not ill-appearing or diaphoretic  Cardiovascular:      Pulses:           Dorsalis pedis pulses are 2+ on the left side  Posterior tibial pulses are 2+ on the left side  Musculoskeletal:        Feet:    Feet:      Left foot:      Protective Sensation: 10 sites tested  10 sites sensed  Toenail Condition: Left toenails are abnormally thick  Neurological:      Mental Status: She is alert

## 2022-01-14 DIAGNOSIS — E11.8 CONTROLLED DIABETES MELLITUS TYPE 2 WITH COMPLICATIONS, UNSPECIFIED WHETHER LONG TERM INSULIN USE (HCC): ICD-10-CM

## 2022-01-14 DIAGNOSIS — I10 HYPERTENSION, BENIGN: ICD-10-CM

## 2022-01-14 DIAGNOSIS — K21.9 GASTROESOPHAGEAL REFLUX DISEASE, UNSPECIFIED WHETHER ESOPHAGITIS PRESENT: ICD-10-CM

## 2022-01-14 DIAGNOSIS — E11.9 DIABETES MELLITUS TYPE 2, CONTROLLED (HCC): ICD-10-CM

## 2022-01-17 RX ORDER — OMEPRAZOLE 40 MG/1
40 CAPSULE, DELAYED RELEASE ORAL
Qty: 30 CAPSULE | Refills: 0 | Status: SHIPPED | OUTPATIENT
Start: 2022-01-17 | End: 2022-02-14

## 2022-01-24 ENCOUNTER — OFFICE VISIT (OUTPATIENT)
Dept: FAMILY MEDICINE CLINIC | Facility: CLINIC | Age: 62
End: 2022-01-24

## 2022-01-24 VITALS
RESPIRATION RATE: 16 BRPM | WEIGHT: 170 LBS | TEMPERATURE: 97.5 F | HEART RATE: 88 BPM | DIASTOLIC BLOOD PRESSURE: 70 MMHG | SYSTOLIC BLOOD PRESSURE: 112 MMHG | BODY MASS INDEX: 31.28 KG/M2 | OXYGEN SATURATION: 97 % | HEIGHT: 62 IN

## 2022-01-24 DIAGNOSIS — E11.8 CONTROLLED DIABETES MELLITUS TYPE 2 WITH COMPLICATIONS, UNSPECIFIED WHETHER LONG TERM INSULIN USE (HCC): Primary | ICD-10-CM

## 2022-01-24 DIAGNOSIS — M16.12 PRIMARY OSTEOARTHRITIS OF LEFT HIP: ICD-10-CM

## 2022-01-24 DIAGNOSIS — M46.1 SACROILIITIS (HCC): ICD-10-CM

## 2022-01-24 DIAGNOSIS — I10 HYPERTENSION, BENIGN: ICD-10-CM

## 2022-01-24 DIAGNOSIS — E11.9 DIABETES MELLITUS TYPE 2, CONTROLLED (HCC): ICD-10-CM

## 2022-01-24 LAB — SL AMB POCT HEMOGLOBIN AIC: 7.5 (ref ?–6.5)

## 2022-01-24 PROCEDURE — 83036 HEMOGLOBIN GLYCOSYLATED A1C: CPT | Performed by: PHYSICIAN ASSISTANT

## 2022-01-24 PROCEDURE — 99214 OFFICE O/P EST MOD 30 MIN: CPT | Performed by: PHYSICIAN ASSISTANT

## 2022-01-24 RX ORDER — SIMVASTATIN 20 MG
20 TABLET ORAL
Qty: 90 TABLET | Refills: 1 | Status: SHIPPED | OUTPATIENT
Start: 2022-01-24 | End: 2022-01-24 | Stop reason: SDUPTHER

## 2022-01-24 RX ORDER — LOSARTAN POTASSIUM 50 MG/1
50 TABLET ORAL DAILY
Qty: 90 TABLET | Refills: 1 | Status: SHIPPED | OUTPATIENT
Start: 2022-01-24 | End: 2022-01-24 | Stop reason: SDUPTHER

## 2022-01-24 RX ORDER — DULOXETIN HYDROCHLORIDE 60 MG/1
60 CAPSULE, DELAYED RELEASE ORAL DAILY
Qty: 90 CAPSULE | Refills: 1 | Status: SHIPPED | OUTPATIENT
Start: 2022-01-24 | End: 2022-01-24 | Stop reason: SDUPTHER

## 2022-01-24 NOTE — ASSESSMENT & PLAN NOTE
Lab Results   Component Value Date    HGBA1C 7 5 (A) 01/24/2022   continue with metformin alone  If no change then to restart jardiance in 3 months    Due for dm eye exam

## 2022-01-24 NOTE — PATIENT INSTRUCTIONS
Control del peso   LO QUE NECESITA SABER:   ¿Por qué es importante controlar mi peso corporal? Tener sobrepeso aumenta garcia riesgo de presentar problemas de taylor nia enfermedades del corazón, hipertensión, diabetes tipo 2 y ciertos tipos de cáncer  También puede aumentar garcia riesgo de presentar osteoartritis, apnea del sueño y otros problemas respiratorios  Trate de bajar de peso de forma gradual y [place]  Incluso patti mínima pérdida de peso puede disminuir garcia riesgo de problemas de [place]  ¿Cómo puedo bajar de peso de [place] forma mccain? Patti forma mccain y saludable para perder peso es consumir menos calorías y realizar patti actividad física en forma regular  · Usted puede perder hasta 1 ayesha por semana al reducir el consumo de 500 calorías cada día  Usted puede reducir el consumo de calorías al comer porciones más pequeñas o eliminar los alimentos con alto contenido de calorías  Nona las etiquetas para determinar la cantidad de calorías que contienen los alimentos que consume  · También puede quemar calorías al realizar ejercicio nia caminar, nadar o montar en bicicleta  Es más probable que usted [employer] peso si hace de estos cambios parte de garcia estilo de paige  Realice patti actividad física por lo menos 30 minutos al día, la mayoría de los días de la [place]  Usted también puede realizar más actividad física usando las escaleras en vez de los ascensores o estacionarse más lejos cuando Junior Hones a las tiendas  Pregunte a garcia médico acerca del mejor plan de ejercicio para usted  ¿Cuál es un plan de alimentación qué me puede ayudar a controlar mi peso? Un plan de alimentación saludable incluye patti variedad de alimentos, contiene más pocas calorías y lo [place] a estar saludable  Un plan de alimentación saludable incluye lo siguiente:     · Consuma alimentos de grano integral con más frecuencia  Un plan de alimentación saludable debe contener alimentos con fibra   La fibra es la parte de las frutas, verduras y granos que garcia cuerpo no puede digerir  Los alimentos de granos integrales son saludables y suministran fibra adicional a garcia Brandin Grieve  Algunos ejemplos de alimentos de granos integrales son los panes integrales, pastas integrales, la caryn, el arroz integral y flako de bulgur  · Consuma patti variedad de verduras todos los días  [address], coliflor, col heather y [place]  Coma verduras anaranjadas nia las zanahorias, travis dulces y calabaza de invierno  · Consuma patti variedad de frutas todos los raeann  Escoja frutas frescas o enlatadas en garcia propio jugo o con jugo bajo en [place] en vez de jugo  El [place] de frutas tiene [address] o hina nada de Ирина  · Consuma productos lácteos con bajo contenido de [place]  [address] de 1%  Consuma yogur descremado y requesón (cottage) semidescremado  Trate de consumir quesos descremados nia el queso mozzarela y otros quesos semidescremado  · Seleccione calderon y otros alimentos con proteínas bajos en grasa  Escoja frijoles u [address]  Seleccione pescado, carne de aves sin piel (nia el yosi o [place]), rodriguez de carne [place] (de res o de cerdo)  Antes de cocinar las calderon o las aves denice cualquier parte de grasa visible  · Use menos grasas y aceites  Trate de hornear los alimentos en lugar de freírlos  Agregue a las [address], nia Everardo, creisa Lopez, condimentos para [place] y [facility]  Consuma menos alimentos de alto tenor graso  Coma menos alimentos altos en grasa nia las travis fritas, donas, helados y pasteles  · Consuma menos dulces  Limite los alimentos y las bebidas con un gran contenido de azúcar  Estos incluyen los caramelos, galletas, gaseosas normales y bebidas endulzadas  ¿Cuáles son las otras formas en que puedo disminuir las calorías? · Reduzca el tamaño de las porciones  ? Use platos pequeños para servirse porciones pequeñas  ?  No coma segundas porciones  ? Cuando coma en un restaurante, pida patti Chava Narda y guarde en patrice la mitad de la comida antes de empezar a comer  ? Comparta con alguien un plato de entrada  · Reemplace los bocadillos o meriendas altos en calorías por los saludables de menos calorías  ? Escoja frutas frescas, verduras, galletas de arroz bajo en grasa o palomitas de maíz en lugar de comer travis fritas de paquete, nueces o dulces de chocolate  ? [place] agua o bebidas dietéticas en lugar de las endulzadas  · No vaya al talavera cuando tenga hambre  Usted podría ser más propenso a elegir alimentos no saludables  Lleve patti lista de alimentos saludables y vaya de compras después de magnus comido  · Coma jeff comidas regularmente  No se salte ninguna comida  No omita ninguna comida porque esto puede conducir a comer más a patti hora más tarde del día  [place] podría traerle problemas para perder peso  Si no tiene tiempo para hacer comidas regulares, consuma un refrigerio saludable  Hable con un dietista para que lo ayude a crear un plan de comidas y un horario que shamir adecuados para usted  ¿Qué más debería tener en cuenta mientras trato de bajar de peso? · Esté consciente de las situaciones que podrían ocasionarle ganas de comer en exceso, nia el comer mientras duane la televisión  Busque formas para evitar estas situaciones  Por ejemplo, leer un libro, caminar o hacer trabajos manuales  · Reúnase con un deisy de apoyo o con personas que también están tratando de bajar de [place]  [place] le puede ayudar a mantenerse motivado y continuar progresando en garcia objetivo de perder peso  ACUERDOS SOBRE GARCIA CUIDADO:   Usted tiene el derecho de ayudar a planear garcia cuidado  Aprenda todo lo que pueda sobre garcia condición y nia darle tratamiento  Discuta jeff opciones de tratamiento con jeff médicos para decidir el cuidado que usted desea recibir  Usted siempre tiene el derecho de rechazar el tratamiento  Esta información es sólo para uso en educación  Garcia intención no es darle un consejo médico sobre enfermedades o tratamientos  Colsulte con garcia Lesleigh Sakshi farmacéutico antes de seguir cualquier régimen médico para saber si es seguro y efectivo para usted  © Copyright [facility] 2021 Information is for End User's use only and may not be sold, redistributed or otherwise used for commercial purposes   All illustrations and images included in CareNotes® are the copyrighted property of A D A M , Inc  or [address] [place] [address]

## 2022-01-24 NOTE — PROGRESS NOTES
Assessment/Plan:    Controlled diabetes mellitus type 2 with complications ([facility] )    Lab Results   Component Value Date    HGBA1C 7 5 (A) 01/24/2022       Diabetes mellitus type 2, controlled ([facility] )    Lab Results   Component Value Date    HGBA1C 7 5 (A) 01/24/2022   continue with metformin alone  If no change then to restart jardiance in 3 months  Due for dm eye exam       Hypertension, benign  Continue losartan    Osteoarthritis of left hip  To get new xray and will consider new PM referral         Problem List Items Addressed This Visit        Endocrine    Diabetes mellitus type 2, controlled ([facility] )       Lab Results   Component Value Date    HGBA1C 7 5 (A) 01/24/2022   continue with metformin alone  If no change then to restart jardiance in 3 months  Due for dm eye exam            Relevant Medications    losartan (COZAAR) 50 mg tablet    simvastatin (ZOCOR) 20 mg tablet    Other Relevant Orders    POCT hemoglobin A1c (Completed)    Controlled diabetes mellitus type 2 with complications (HCC) - Primary       Lab Results   Component Value Date    HGBA1C 7 5 (A) 01/24/2022            Relevant Medications    simvastatin (ZOCOR) 20 mg tablet    Other Relevant Orders    POCT hemoglobin A1c (Completed)       Cardiovascular and Mediastinum    Hypertension, benign     Continue losartan         Relevant Medications    losartan (COZAAR) 50 mg tablet       Musculoskeletal and Integument    Osteoarthritis of left hip     To get new xray and will consider new PM referral         Relevant Medications    Diclofenac Sodium (VOLTAREN) 1 %    DULoxetine (CYMBALTA) 60 mg delayed release capsule    Sacroiliitis (HCC)            Subjective:      Patient ID: Israel Galan is a 62 y o  female  HPI stratus   Here for follow up of diabetes  She was without medication for some time  Blood sugar has been normal now that she is taking  She has only been on metformin for months> 6 months        She still has left hip pain  voltaren gel and cymbalta help but it is unchanged  The following portions of the patient's history were reviewed and updated as appropriate:   She  has a past medical history of Acquired hammer toe of right foot (3/15/2018), Arthritis, Asthma, Chronic pain disorder, Diabetes mellitus ([facility] ), Eczema, GERD (gastroesophageal reflux disease), History of panic attacks, Hyperlipidemia, Hypertension, Labral tear of shoulder (09/24/2014), Right foot pain, Rotator cuff syndrome, right (8/16/2017), and Wears glasses  She   Patient Active Problem List    Diagnosis Date Noted    Situational anxiety 08/16/2021    Controlled diabetes mellitus type 2 with complications ([place] [address] ) 79/35/5839    Class 1 obesity due to excess calories with serious comorbidity and body mass index (BMI) of 30 0 to 30 9 in adult 08/05/2020    Chronic left-sided low back pain 12/12/2018    Sacroiliitis ([facility] ) 06/06/2018    Hypertension, benign 12/14/2017    Osteoarthritis of left hip 08/09/2017    Dermatofibroma 06/26/2017    Diabetes mellitus type 2, controlled ([place] [address] ) 09/20/2016    Hyperlipidemia 09/20/2016    Allergic rhinitis 03/31/2015    Eczema 03/31/2015    Glenohumeral arthritis 09/24/2014    GERD (gastroesophageal reflux disease) 05/18/2012     She  has a past surgical history that includes Hernia repair; Ovary surgery; pr colonoscopy flx dx w/collj spec when pfrmd (N/A, 12/22/2016); Cholecystectomy; pr corrj hallux valgus w/sesmdc w/2 osteot (Right, 3/1/2019); Foot surgery (Right); and Skin biopsy  Her family history includes Bone cancer in her maternal grandfather; Breast cancer in her maternal aunt; Cancer in her brother and maternal grandfather; Hypertension in her father and mother; Stomach cancer in her brother  She  reports that she has never smoked  She has never used smokeless tobacco  She reports that she does not drink alcohol and does not use drugs    Current Outpatient Medications   Medication Sig Dispense Refill  acetaminophen (TYLENOL) 500 mg tablet Take 1 tablet (500 mg total) by mouth every 6 (six) hours as needed for mild pain (Patient not taking: Reported on 9/28/2021) 30 tablet 0    albuterol (PROVENTIL HFA,VENTOLIN HFA) 90 mcg/act inhaler Inhale 2 puffs every 6 (six) hours as needed for wheezing or shortness of breath 1 Inhaler 0    ALPRAZolam (XANAX) 0 5 mg tablet Take 1 tablet (0 5 mg total) by mouth 60 minutes pre-procedure (Patient not taking: Reported on 11/17/2021 ) 5 tablet 0    bisacodyl (DULCOLAX) 5 mg EC tablet Take 4 tablets (20 mg total) by mouth once for 1 dose Colonoscopy prep 4 tablet 0    Blood Glucose Monitoring Suppl (Contour Next Monitor) w/Device KIT Use 1 each daily Check blood sugar 2 times day 1 kit 0    diclofenac (VOLTAREN) 75 mg EC tablet TAKE 1 TABLET BY MOUTH TWICE A DAY 60 tablet 2    Diclofenac Sodium (VOLTAREN) 1 % Apply 2 g topically 4 (four) times a day 100 g 5    DULoxetine (CYMBALTA) 60 mg delayed release capsule Take 1 capsule (60 mg total) by mouth daily 90 capsule 1    glucose blood (Contour Next Test) test strip Check blood sugar 2 times day 100 each 5    glucose blood (FREESTYLE LITE) test strip USE TO TEST BLOOD SUGAR TWICE A  each 5    losartan (COZAAR) 50 mg tablet Take 1 tablet (50 mg total) by mouth daily 90 tablet 1    metFORMIN (GLUCOPHAGE) 1000 MG tablet Take 1 tablet (1,000 mg total) by mouth 2 (two) times a day with meals 180 tablet 0    Microlet Lancets MISC Use 2 (two) times a day 100 each 5    omeprazole (PriLOSEC) 40 MG capsule Take 1 capsule (40 mg total) by mouth daily before breakfast 30 capsule 0    polyethylene glycol (MIRALAX) 17 g packet Take 238 g by mouth once for 1 dose For bowel prep  Mix in 64 oz of gatorade  Drink 32 oz at the rate of 8 oz/1 glass every 15 mins starting at 5 pm on the evening prior to day of procedure   Drink the remaining 32 oz 5 hours prior to procedure time at at the rate of 8 oz/1 glass every 15 mins until finished  238 g 0    simvastatin (ZOCOR) 20 mg tablet Take 1 tablet (20 mg total) by mouth daily at bedtime 90 tablet 1     No current facility-administered medications for this visit  Current Outpatient Medications on File Prior to Visit   Medication Sig    acetaminophen (TYLENOL) 500 mg tablet Take 1 tablet (500 mg total) by mouth every 6 (six) hours as needed for mild pain (Patient not taking: Reported on 9/28/2021)    albuterol (PROVENTIL HFA,VENTOLIN HFA) 90 mcg/act inhaler Inhale 2 puffs every 6 (six) hours as needed for wheezing or shortness of breath    ALPRAZolam (XANAX) 0 5 mg tablet Take 1 tablet (0 5 mg total) by mouth 60 minutes pre-procedure (Patient not taking: Reported on 11/17/2021 )    bisacodyl (DULCOLAX) 5 mg EC tablet Take 4 tablets (20 mg total) by mouth once for 1 dose Colonoscopy prep    Blood Glucose Monitoring Suppl (Contour Next Monitor) w/Device KIT Use 1 each daily Check blood sugar 2 times day    diclofenac (VOLTAREN) 75 mg EC tablet TAKE 1 TABLET BY MOUTH TWICE A DAY    glucose blood (Contour Next Test) test strip Check blood sugar 2 times day    glucose blood (FREESTYLE LITE) test strip USE TO TEST BLOOD SUGAR TWICE A DAY    metFORMIN (GLUCOPHAGE) 1000 MG tablet Take 1 tablet (1,000 mg total) by mouth 2 (two) times a day with meals    Microlet Lancets MISC Use 2 (two) times a day    omeprazole (PriLOSEC) 40 MG capsule Take 1 capsule (40 mg total) by mouth daily before breakfast    polyethylene glycol (MIRALAX) 17 g packet Take 238 g by mouth once for 1 dose For bowel prep  Mix in 64 oz of gatorade  Drink 32 oz at the rate of 8 oz/1 glass every 15 mins starting at 5 pm on the evening prior to day of procedure  Drink the remaining 32 oz 5 hours prior to procedure time at at the rate of 8 oz/1 glass every 15 mins until finished      [DISCONTINUED] Diclofenac Sodium (VOLTAREN) 1 % Apply 2 g topically 4 (four) times a day    [DISCONTINUED] DULoxetine (CYMBALTA) 60 mg delayed release capsule Take 1 capsule (60 mg total) by mouth daily    [DISCONTINUED] Empagliflozin (Jardiance) 10 MG TABS Take 1 tablet (10 mg total) by mouth every morning    [DISCONTINUED] losartan (COZAAR) 50 mg tablet Take 1 tablet (50 mg total) by mouth daily    [DISCONTINUED] simvastatin (ZOCOR) 20 mg tablet Take 1 tablet (20 mg total) by mouth daily at bedtime     No current facility-administered medications on file prior to visit  She is allergic to aspirin and latex       Review of Systems   Constitutional: Negative for activity change, appetite change, chills, fatigue and unexpected weight change  HENT: Negative for dental problem, ear pain, hearing loss and sore throat  Eyes: Negative for visual disturbance  Respiratory: Negative for cough and wheezing  Cardiovascular: Negative for chest pain  Gastrointestinal: Negative for abdominal pain, constipation, diarrhea and vomiting  Genitourinary: Negative for difficulty urinating and dysuria  Musculoskeletal: Positive for arthralgias  Negative for back pain and myalgias  Skin: Negative for rash  Neurological: Negative for dizziness and headaches  Psychiatric/Behavioral: Negative for behavioral problems  Objective:      /70 (BP Location: Left arm, Patient Position: Sitting, Cuff Size: Standard)   Pulse 88   Temp 97 5 °F (36 4 °C) (Temporal)   Resp 16   Ht 5' 2" (1 575 m)   Wt 77 1 kg (170 lb)   SpO2 97%   Breastfeeding No   BMI 31 09 kg/m²          Physical Exam  Vitals and nursing note reviewed  Constitutional:       General: She is not in acute distress  Appearance: She is well-developed  HENT:      Head: Normocephalic and atraumatic  Right Ear: External ear normal       Left Ear: External ear normal    Eyes:      Conjunctiva/sclera: Conjunctivae normal    Neck:      Thyroid: No thyromegaly  Cardiovascular:      Rate and Rhythm: Normal rate and regular rhythm        Heart sounds: Normal heart sounds  No murmur heard  Pulmonary:      Effort: Pulmonary effort is normal  No respiratory distress  Breath sounds: Normal breath sounds  No wheezing  Musculoskeletal:         General: Tenderness (lateral hip) present  Cervical back: Normal range of motion and neck supple  Lymphadenopathy:      Cervical: No cervical adenopathy  Neurological:      Mental Status: She is alert and oriented to person, place, and time     Psychiatric:         Mood and Affect: Mood normal          Behavior: Behavior normal

## 2022-01-26 ENCOUNTER — ANESTHESIA EVENT (OUTPATIENT)
Dept: ANESTHESIOLOGY | Facility: HOSPITAL | Age: 62
End: 2022-01-26

## 2022-01-26 ENCOUNTER — ANESTHESIA (OUTPATIENT)
Dept: ANESTHESIOLOGY | Facility: HOSPITAL | Age: 62
End: 2022-01-26

## 2022-01-26 ENCOUNTER — TELEPHONE (OUTPATIENT)
Dept: GASTROENTEROLOGY | Facility: HOSPITAL | Age: 62
End: 2022-01-26

## 2022-01-26 ENCOUNTER — HOSPITAL ENCOUNTER (OUTPATIENT)
Dept: RADIOLOGY | Facility: HOSPITAL | Age: 62
Discharge: HOME | End: 2022-01-26
Payer: MEDICARE

## 2022-01-26 DIAGNOSIS — M16.12 PRIMARY OSTEOARTHRITIS OF LEFT HIP: ICD-10-CM

## 2022-01-26 PROCEDURE — 73502 X-RAY EXAM HIP UNI 2-3 VIEWS: CPT

## 2022-01-26 NOTE — ANESTHESIA PREPROCEDURE EVALUATION
Procedure:  PRE-OP ONLY    Relevant Problems   CARDIO   (+) Hyperlipidemia   (+) Hypertension, benign      ENDO   (+) Controlled diabetes mellitus type 2 with complications (HCC)   (+) Diabetes mellitus type 2, controlled (HCC)      GI/HEPATIC   (+) GERD (gastroesophageal reflux disease)      MUSCULOSKELETAL   (+) Chronic left-sided low back pain   (+) Glenohumeral arthritis   (+) Osteoarthritis of left hip   (+) Sacroiliitis (HCC)      NEURO/PSYCH   (+) Situational anxiety      EKG 2/2019:  Normal sinus rhythm with sinus arrhythmia  Possible Left atrial enlargement  Left ventricular hypertrophy  Inferior infarct (cited on or before 11-FEB-2019)  Anterior infarct (cited on or before 11-FEB-2019)  Abnormal ECG  When compared with ECG of 26-NOV-2018 12:48,  No significant change was found    Lab Results   Component Value Date    WBC 6 06 09/08/2021    HGB 12 8 09/08/2021    HCT 41 8 09/08/2021    MCV 98 09/08/2021     09/08/2021     Lab Results   Component Value Date    SODIUM 139 09/08/2021    K 4 2 09/08/2021     09/08/2021    CO2 27 09/08/2021    BUN 9 09/08/2021    CREATININE 0 63 09/08/2021    GLUC 151 (H) 11/03/2018    CALCIUM 9 1 09/08/2021     No results found for: INR, PROTIME  Lab Results   Component Value Date    HGBA1C 7 5 (A) 01/24/2022                 Anesthesia Plan  ASA Score- 2     Anesthesia Type- IV sedation with anesthesia with ASA Monitors  Additional Monitors:   Airway Plan:           Plan Factors-    Chart reviewed  EKG reviewed  Existing labs reviewed  Patient summary reviewed  Induction- intravenous      Postoperative Plan-     Informed Consent-

## 2022-01-27 ENCOUNTER — ANESTHESIA EVENT (OUTPATIENT)
Dept: GASTROENTEROLOGY | Facility: HOSPITAL | Age: 62
End: 2022-01-27

## 2022-01-27 ENCOUNTER — HOSPITAL ENCOUNTER (OUTPATIENT)
Dept: GASTROENTEROLOGY | Facility: HOSPITAL | Age: 62
Setting detail: OUTPATIENT SURGERY
Discharge: HOME | End: 2022-01-27
Attending: INTERNAL MEDICINE | Admitting: INTERNAL MEDICINE
Payer: MEDICARE

## 2022-01-27 ENCOUNTER — ANESTHESIA (OUTPATIENT)
Dept: GASTROENTEROLOGY | Facility: HOSPITAL | Age: 62
End: 2022-01-27

## 2022-01-27 VITALS
OXYGEN SATURATION: 100 % | TEMPERATURE: 97.4 F | HEART RATE: 89 BPM | DIASTOLIC BLOOD PRESSURE: 64 MMHG | RESPIRATION RATE: 18 BRPM | SYSTOLIC BLOOD PRESSURE: 117 MMHG

## 2022-01-27 DIAGNOSIS — K21.9 GASTROESOPHAGEAL REFLUX DISEASE WITHOUT ESOPHAGITIS: ICD-10-CM

## 2022-01-27 DIAGNOSIS — R79.89 ELEVATED LFTS: ICD-10-CM

## 2022-01-27 DIAGNOSIS — R63.4 WEIGHT LOSS: ICD-10-CM

## 2022-01-27 DIAGNOSIS — Z12.11 COLON CANCER SCREENING: ICD-10-CM

## 2022-01-27 DIAGNOSIS — R19.7 DIARRHEA, UNSPECIFIED TYPE: ICD-10-CM

## 2022-01-27 DIAGNOSIS — R11.2 NAUSEA AND VOMITING, INTRACTABILITY OF VOMITING NOT SPECIFIED, UNSPECIFIED VOMITING TYPE: ICD-10-CM

## 2022-01-27 PROCEDURE — 88305 TISSUE EXAM BY PATHOLOGIST: CPT | Performed by: SPECIALIST

## 2022-01-27 PROCEDURE — G0121 COLON CA SCRN NOT HI RSK IND: HCPCS | Performed by: INTERNAL MEDICINE

## 2022-01-27 PROCEDURE — NC001 PR NO CHARGE: Performed by: INTERNAL MEDICINE

## 2022-01-27 PROCEDURE — 43239 EGD BIOPSY SINGLE/MULTIPLE: CPT | Performed by: INTERNAL MEDICINE

## 2022-01-27 RX ORDER — PROPOFOL 10 MG/ML
INJECTION, EMULSION INTRAVENOUS AS NEEDED
Status: DISCONTINUED | OUTPATIENT
Start: 2022-01-27 | End: 2022-01-27

## 2022-01-27 RX ORDER — SODIUM CHLORIDE, SODIUM LACTATE, POTASSIUM CHLORIDE, CALCIUM CHLORIDE 600; 310; 30; 20 MG/100ML; MG/100ML; MG/100ML; MG/100ML
INJECTION, SOLUTION INTRAVENOUS CONTINUOUS PRN
Status: DISCONTINUED | OUTPATIENT
Start: 2022-01-27 | End: 2022-01-27

## 2022-01-27 RX ORDER — PROPOFOL 10 MG/ML
INJECTION, EMULSION INTRAVENOUS CONTINUOUS PRN
Status: DISCONTINUED | OUTPATIENT
Start: 2022-01-27 | End: 2022-01-27

## 2022-01-27 RX ADMIN — PROPOFOL 50 MG: 10 INJECTION, EMULSION INTRAVENOUS at 09:27

## 2022-01-27 RX ADMIN — PROPOFOL 150 MCG/KG/MIN: 10 INJECTION, EMULSION INTRAVENOUS at 09:14

## 2022-01-27 RX ADMIN — PROPOFOL 100 MG: 10 INJECTION, EMULSION INTRAVENOUS at 09:14

## 2022-01-27 RX ADMIN — SODIUM CHLORIDE, SODIUM LACTATE, POTASSIUM CHLORIDE, AND CALCIUM CHLORIDE: .6; .31; .03; .02 INJECTION, SOLUTION INTRAVENOUS at 09:10

## 2022-01-27 NOTE — ANESTHESIA PREPROCEDURE EVALUATION
Procedure:  COLONOSCOPY  EGD    Relevant Problems   CARDIO   (+) Hyperlipidemia   (+) Hypertension, benign      ENDO   (+) Controlled diabetes mellitus type 2 with complications (HCC)   (+) Diabetes mellitus type 2, controlled ([address] )      GI/HEPATIC   (+) GERD (gastroesophageal reflux disease)      /RENAL (within normal limits)      HEMATOLOGY (within normal limits)      MUSCULOSKELETAL   (+) Chronic left-sided low back pain   (+) Glenohumeral arthritis   (+) Osteoarthritis of left hip   (+) Sacroiliitis (HCC)      NEURO/PSYCH   (+) Situational anxiety      PULMONARY   (+) Asthma (URI-induced)      Other   (+) Allergic rhinitis   (+) Class 1 obesity due to excess calories with serious comorbidity and body mass index (BMI) of 30 0 to 30 9 in adult      EKG 2/2019:  Normal sinus rhythm with sinus arrhythmia  Possible Left atrial enlargement  Left ventricular hypertrophy  Inferior infarct (cited on or before 11-FEB-2019)  Anterior infarct (cited on or before 11-FEB-2019)  Abnormal ECG  When compared with ECG of 26-NOV-2018 12:48,  No significant change was found    Lab Results   Component Value Date    WBC 6 06 09/08/2021    HGB 12 8 09/08/2021    HCT 41 8 09/08/2021    MCV 98 09/08/2021     09/08/2021     Lab Results   Component Value Date    SODIUM 139 09/08/2021    K 4 2 09/08/2021     09/08/2021    CO2 27 09/08/2021    BUN 9 09/08/2021    CREATININE 0 63 09/08/2021    GLUC 151 (H) 11/03/2018    CALCIUM 9 1 09/08/2021     No results found for: INR, PROTIME  Lab Results   Component Value Date    HGBA1C 7 5 (A) 01/24/2022            Physical Exam    Airway    Mallampati score: II  TM Distance: >3 FB  Neck ROM: full     Dental   No notable dental hx     Cardiovascular  Cardiovascular exam normal    Pulmonary  Pulmonary exam normal     Other Findings        Anesthesia Plan  ASA Score- 2     Anesthesia Type- IV sedation with anesthesia with ASA Monitors           Additional Monitors:   Airway Plan: Plan Factors-Exercise tolerance (METS): >4 METS  Chart reviewed  EKG reviewed  Existing labs reviewed  Patient summary reviewed  Induction- intravenous  Postoperative Plan-     Informed Consent- Anesthetic plan and risks discussed with patient  I personally reviewed this patient with the CRNA  Discussed and agreed on the Anesthesia Plan with the CRNA  Partha Sutherland

## 2022-01-27 NOTE — ANESTHESIA POSTPROCEDURE EVALUATION
Post-Op Assessment Note    CV Status:  Stable  Pain Score: 0    Pain management: adequate     Mental Status:  Alert and sleepy   Hydration Status:  Euvolemic   PONV Controlled:  Controlled   Airway Patency:  Patent      Post Op Vitals Reviewed: Yes      Staff: CRNA         No complications documented      BP (!) 86/50 (01/27/22 1001)    Temp     Pulse 96 (01/27/22 1001)   Resp 18 (01/27/22 1001)    SpO2 97 % (01/27/22 1001)

## 2022-01-27 NOTE — H&P
History and Physical - [facility]  Judith Lane 62 y o  female MRN: [id number]                  HPI: Judith Lane is a 62y o  year old female who presents for nausea, vomiting, diarrhea      REVIEW OF SYSTEMS: Per the HPI, and otherwise unremarkable  Historical Information   Past Medical History:   Diagnosis Date    Acquired hammer toe of right foot 3/15/2018    S/p surg    Arthritis     Asthma     "one time with a cold, and used an inhaler"    Chronic pain disorder     hip pain left, and back    Diabetes mellitus ([address] )     Eczema     GERD (gastroesophageal reflux disease)     History of panic attacks     Hyperlipidemia     Hypertension     Labral tear of shoulder 09/24/2014    Right foot pain     bunionectomy today 3/1/2019    Rotator cuff syndrome, right 8/16/2017    Wears glasses      Past Surgical History:   Procedure Laterality Date    CHOLECYSTECTOMY      "had a panic attack"    FOOT SURGERY Right     HERNIA REPAIR      OVARY SURGERY      IL COLONOSCOPY FLX DX W/COLLJ SPEC WHEN PFRMD N/A 12/22/2016    Procedure: EGD AND COLONOSCOPY;  Surgeon: Juan Wallace MD;  Location: [facility] GI LAB;   Service: Gastroenterology    IL CORRJ HALLUX VALGUS W/SESMDC W/2 OSTEOT Right 3/1/2019    Procedure: DOUBLE OSTEOTOMY, ZINA/AKIN BUNIONECTOMY;  Surgeon: Liborio Ashley DPM;  Location: [facility] OR;  Service: Podiatry    SKIN BIOPSY       Social History   Social History     Substance and Sexual Activity   Alcohol Use Never     Social History     Substance and Sexual Activity   Drug Use Never     Social History     Tobacco Use   Smoking Status Never Smoker   Smokeless Tobacco Never Used     Family History   Problem Relation Age of Onset    Hypertension Mother     Hypertension Father     Bone cancer Maternal Grandfather     Cancer Maternal Grandfather         bone    Breast cancer Maternal Aunt     Stomach cancer Brother     Cancer Brother stomach    Colon cancer Neg Hx     Ovarian cancer Neg Hx        Meds/Allergies       Current Outpatient Medications:     diclofenac (VOLTAREN) 75 mg EC tablet    Diclofenac Sodium (VOLTAREN) 1 %    DULoxetine (CYMBALTA) 60 mg delayed release capsule    losartan (COZAAR) 50 mg tablet    metFORMIN (GLUCOPHAGE) 1000 MG tablet    omeprazole (PriLOSEC) 40 MG capsule    simvastatin (ZOCOR) 20 mg tablet    acetaminophen (TYLENOL) 500 mg tablet    albuterol (PROVENTIL HFA,VENTOLIN HFA) 90 mcg/act inhaler    Blood Glucose Monitoring Suppl (Contour Next Monitor) w/Device KIT    glucose blood (Contour Next Test) test strip    glucose blood (FREESTYLE LITE) test strip    Microlet Lancets MISC    Allergies   Allergen Reactions    Aspirin Shortness Of Breath and Itching     swelling    Latex Itching and Rash       Objective     /71   Pulse 94   Temp (!) 97 4 °F (36 3 °C) (Tympanic)   Resp 18   SpO2 95%       PHYSICAL EXAM    Gen: NAD  Head: NCAT  CV: RRR  CHEST: Clear  ABD: soft, NT/ND  EXT: no edema      ASSESSMENT/PLAN:  This is a 62y o  year old female here for EGD and colonoscopy, and she is stable and optimized for her procedure

## 2022-02-03 ENCOUNTER — TELEPHONE (OUTPATIENT)
Dept: GASTROENTEROLOGY | Facility: CLINIC | Age: 62
End: 2022-02-03

## 2022-02-03 NOTE — TELEPHONE ENCOUNTER
Called pt via [facility] , went over results and advised to have blood work done   No further questions

## 2022-02-03 NOTE — TELEPHONE ENCOUNTER
----- Message from Almon Lefort, MA sent at 2/3/2022 10:23 AM EST -----    ----- Message -----  From: Joanne Ritchie MD  Sent: 2/3/2022  10:21 AM EST  To: Duncan Plascencia MD, #    Dear staff,    Patient is Kiswahili speaking only  Please inform patient that her biopsies were all normal  Her duodenal biopsies show increased white blood cells which can be from a lot of things one of which is celiac, others are medications (NSAIDs)  We need her t  o obtain her blood work which was ordered on 11/17 which includes testing celiac serology  She will follow up with dr Chepe Fox on 03/23 after the blood work is done  Thank-you!   Joanne Ritchie MD  Gastroenterology Fellow

## 2022-02-08 DIAGNOSIS — U07.1 COVID-19: ICD-10-CM

## 2022-02-08 RX ORDER — ALBUTEROL SULFATE 90 UG/1
AEROSOL, METERED RESPIRATORY (INHALATION)
Qty: 18 G | Refills: 1 | Status: SHIPPED | OUTPATIENT
Start: 2022-02-08

## 2022-02-09 ENCOUNTER — TELEPHONE (OUTPATIENT)
Dept: FAMILY MEDICINE CLINIC | Facility: CLINIC | Age: 62
End: 2022-02-09

## 2022-02-09 ENCOUNTER — APPOINTMENT (OUTPATIENT)
Dept: LAB | Facility: CLINIC | Age: 62
End: 2022-02-09
Payer: MEDICARE

## 2022-02-09 DIAGNOSIS — Z12.11 COLON CANCER SCREENING: ICD-10-CM

## 2022-02-09 DIAGNOSIS — R19.7 DIARRHEA, UNSPECIFIED TYPE: ICD-10-CM

## 2022-02-09 DIAGNOSIS — R63.4 WEIGHT LOSS: ICD-10-CM

## 2022-02-09 DIAGNOSIS — R79.89 ELEVATED LFTS: ICD-10-CM

## 2022-02-09 DIAGNOSIS — R11.2 NAUSEA AND VOMITING, INTRACTABILITY OF VOMITING NOT SPECIFIED, UNSPECIFIED VOMITING TYPE: ICD-10-CM

## 2022-02-09 DIAGNOSIS — K21.9 GASTROESOPHAGEAL REFLUX DISEASE WITHOUT ESOPHAGITIS: ICD-10-CM

## 2022-02-09 DIAGNOSIS — F40.243 ANXIETY WITH FLYING: Primary | ICD-10-CM

## 2022-02-09 LAB
FERRITIN SERPL-MCNC: 66 NG/ML (ref 8–388)
IGA SERPL-MCNC: 250 MG/DL (ref 70–400)
IRON SATN MFR SERPL: 22 % (ref 15–50)
IRON SERPL-MCNC: 63 UG/DL (ref 50–170)
TIBC SERPL-MCNC: 284 UG/DL (ref 250–450)

## 2022-02-09 PROCEDURE — 86364 TISS TRNSGLTMNASE EA IG CLAS: CPT

## 2022-02-09 PROCEDURE — 82728 ASSAY OF FERRITIN: CPT

## 2022-02-09 PROCEDURE — 86708 HEPATITIS A ANTIBODY: CPT

## 2022-02-09 PROCEDURE — 86015 ACTIN ANTIBODY EACH: CPT

## 2022-02-09 PROCEDURE — 86038 ANTINUCLEAR ANTIBODIES: CPT

## 2022-02-09 PROCEDURE — 86706 HEP B SURFACE ANTIBODY: CPT

## 2022-02-09 PROCEDURE — 82784 ASSAY IGA/IGD/IGG/IGM EACH: CPT

## 2022-02-09 PROCEDURE — 83550 IRON BINDING TEST: CPT

## 2022-02-09 PROCEDURE — 82390 ASSAY OF CERULOPLASMIN: CPT

## 2022-02-09 PROCEDURE — 36415 COLL VENOUS BLD VENIPUNCTURE: CPT

## 2022-02-09 PROCEDURE — 86381 MITOCHONDRIAL ANTIBODY EACH: CPT

## 2022-02-09 PROCEDURE — 83540 ASSAY OF IRON: CPT

## 2022-02-09 RX ORDER — ALPRAZOLAM 0.5 MG/1
0.5 TABLET ORAL ONCE AS NEEDED
Qty: 4 TABLET | Refills: 0 | Status: SHIPPED | OUTPATIENT
Start: 2022-02-09 | End: 2022-04-26 | Stop reason: SDUPTHER

## 2022-02-09 NOTE — TELEPHONE ENCOUNTER
Patient came in and stated, there is blood work to be ordered to check her sugar prior to appointment in April  She also says she is traveling on 3/23 and needs the medication prescribed to her previously for flying  Yodit Hardin says Elder Langford gave her the medication before to help her anxiety  She believes it is called Xanax  5mg

## 2022-02-09 NOTE — TELEPHONE ENCOUNTER
No need for bloodwork to check BG, her last HgA1c was in January 4 tabs of Xanax 0 5 mg sent to pharmacy

## 2022-02-10 LAB
ACTIN IGG SERPL-ACNC: 5 UNITS (ref 0–19)
CERULOPLASMIN SERPL-MCNC: 25.2 MG/DL (ref 19–39)
HAV AB SER QL IA: REACTIVE
HBV SURFACE AB SER-ACNC: 36.5 MIU/ML
MITOCHONDRIA M2 IGG SER-ACNC: <20 UNITS (ref 0–20)
TTG IGA SER-ACNC: <2 U/ML (ref 0–3)

## 2022-02-11 LAB — RYE IGE QN: NEGATIVE

## 2022-02-14 DIAGNOSIS — K21.9 GASTROESOPHAGEAL REFLUX DISEASE, UNSPECIFIED WHETHER ESOPHAGITIS PRESENT: ICD-10-CM

## 2022-02-14 RX ORDER — OMEPRAZOLE 40 MG/1
CAPSULE, DELAYED RELEASE ORAL
Qty: 30 CAPSULE | Refills: 0 | Status: SHIPPED | OUTPATIENT
Start: 2022-02-14 | End: 2022-03-10

## 2022-02-28 DIAGNOSIS — M16.12 PRIMARY OSTEOARTHRITIS OF LEFT HIP: ICD-10-CM

## 2022-03-01 RX ORDER — DICLOFENAC SODIUM 75 MG/1
75 TABLET, DELAYED RELEASE ORAL 2 TIMES DAILY
Qty: 60 TABLET | Refills: 2 | Status: SHIPPED | OUTPATIENT
Start: 2022-03-01

## 2022-03-10 DIAGNOSIS — K21.9 GASTROESOPHAGEAL REFLUX DISEASE, UNSPECIFIED WHETHER ESOPHAGITIS PRESENT: ICD-10-CM

## 2022-03-10 RX ORDER — OMEPRAZOLE 40 MG/1
CAPSULE, DELAYED RELEASE ORAL
Qty: 30 CAPSULE | Refills: 0 | Status: SHIPPED | OUTPATIENT
Start: 2022-03-10 | End: 2022-03-23 | Stop reason: SDUPTHER

## 2022-03-23 ENCOUNTER — OFFICE VISIT (OUTPATIENT)
Dept: GASTROENTEROLOGY | Facility: CLINIC | Age: 62
End: 2022-03-23
Payer: MEDICARE

## 2022-03-23 ENCOUNTER — APPOINTMENT (OUTPATIENT)
Dept: LAB | Facility: CLINIC | Age: 62
End: 2022-03-23
Payer: MEDICARE

## 2022-03-23 VITALS
TEMPERATURE: 98 F | HEIGHT: 62 IN | DIASTOLIC BLOOD PRESSURE: 80 MMHG | BODY MASS INDEX: 31.54 KG/M2 | WEIGHT: 171.4 LBS | SYSTOLIC BLOOD PRESSURE: 116 MMHG

## 2022-03-23 DIAGNOSIS — K52.9 CHRONIC DIARRHEA: Primary | ICD-10-CM

## 2022-03-23 DIAGNOSIS — R79.89 ELEVATED LFTS: ICD-10-CM

## 2022-03-23 DIAGNOSIS — K21.9 GASTROESOPHAGEAL REFLUX DISEASE, UNSPECIFIED WHETHER ESOPHAGITIS PRESENT: ICD-10-CM

## 2022-03-23 LAB
ALBUMIN SERPL BCP-MCNC: 4 G/DL (ref 3.5–5)
ALP SERPL-CCNC: 132 U/L (ref 46–116)
ALT SERPL W P-5'-P-CCNC: 61 U/L (ref 12–78)
AST SERPL W P-5'-P-CCNC: 32 U/L (ref 5–45)
BILIRUB DIRECT SERPL-MCNC: 0.11 MG/DL (ref 0–0.2)
BILIRUB SERPL-MCNC: 0.36 MG/DL (ref 0.2–1)
PROT SERPL-MCNC: 8 G/DL (ref 6.4–8.2)

## 2022-03-23 PROCEDURE — 36415 COLL VENOUS BLD VENIPUNCTURE: CPT

## 2022-03-23 PROCEDURE — 80076 HEPATIC FUNCTION PANEL: CPT

## 2022-03-23 PROCEDURE — 99214 OFFICE O/P EST MOD 30 MIN: CPT | Performed by: INTERNAL MEDICINE

## 2022-03-23 RX ORDER — CHOLESTYRAMINE 4 G/9G
1 POWDER, FOR SUSPENSION ORAL 2 TIMES DAILY WITH MEALS
Qty: 60 PACKET | Refills: 5 | Status: SHIPPED | OUTPATIENT
Start: 2022-03-23 | End: 2022-06-26

## 2022-03-23 RX ORDER — OMEPRAZOLE 40 MG/1
40 CAPSULE, DELAYED RELEASE ORAL
Qty: 30 CAPSULE | Refills: 5 | Status: SHIPPED | OUTPATIENT
Start: 2022-03-23 | End: 2022-09-19

## 2022-03-23 NOTE — PROGRESS NOTES
[facility] - Outpatient Follow-up Note  Iman Lewis 62 y o  female MRN: [id number]  Encounter: [id number]          ASSESSMENT AND PLAN:      1  Gastroesophageal reflux disease, unspecified whether esophagitis present  2  Nausea    Uncontrolled  She is taking PPI intermittently  Counseled patient to take omeprazole 40 mg 30-60 minutes before breakfast daily and monitor symptoms  2  Chronic diarrhea  Based on history of symptoms starting after cholecystectomy, seems to be bile induced diarrhea  Will start her on cholestyramine twice daily  If diarrhea not improved then continue further workup including stool studies  3  Abnormal LFTs   She was COVID positive when she had significant elevation of LFTs  She also has risk factors for nonalcoholic fatty liver disease  Elastography did not show any significant fibrosis  Will repeat LFTs and if still elevated then get liver biopsy to investigate etiology for liver disease  No evidence of advanced liver disease  4  Colon cancer screening  Colonoscopy in 2021 was negative  Repeat colonoscopy in 9 years  RTC 6 months  Sapna Call MD  Gastroenterology Fellow  [address]  Date: March 23, 2022      - cholestyramine Javier Guo) 4 g packet; Take 1 packet (4 g total) by mouth 2 (two) times a day with meals  Dispense: 60 packet; Refill: 5    ______________________________________________________________________    SUBJECTIVE:  29-year-old with diabetes, GERD, obesity, abnormal LFTs, status post cholecystectomy presents for follow-up  She was seen last time in clinic in November 2021 when she complained of nausea, vomiting and diarrhea  Liver workup has been negative so far for abnormal LFTs  She got EGD which showed gastritis and small fundic gland polyps  Biopsy negative for H pylori  Follow biopsies negative for celiac disease  Celiac serology was negative    Colonoscopy showed diverticulosis  Her son, Melecio Mcguire, accompanied her to clinic today and provided translation  Currently patient reports that she has been taking omeprazole as needed  She continues to have nausea and heartburn but it is controlled when she takes omeprazole  She has intermittent loose bowel movement  She says that she had cholecystectomy 10-15 years ago and after that loose bowel movements started  She denies taking any more NSAIDs  REVIEW OF SYSTEMS IS OTHERWISE NEGATIVE  Historical Information   Past Medical History:   Diagnosis Date    Acquired hammer toe of right foot 3/15/2018    S/p surg    Arthritis     Asthma     "one time with a cold, and used an inhaler"    Chronic pain disorder     hip pain left, and back    Diabetes mellitus ([address] )     Eczema     GERD (gastroesophageal reflux disease)     History of panic attacks     Hyperlipidemia     Hypertension     Labral tear of shoulder 09/24/2014    Right foot pain     bunionectomy today 3/1/2019    Rotator cuff syndrome, right 8/16/2017    Wears glasses      Past Surgical History:   Procedure Laterality Date    CHOLECYSTECTOMY      "had a panic attack"    FOOT SURGERY Right     HERNIA REPAIR      OVARY SURGERY      CT COLONOSCOPY FLX DX W/COLLJ SPEC WHEN PFRMD N/A 12/22/2016    Procedure: EGD AND COLONOSCOPY;  Surgeon: Freya Cortez MD;  Location: [facility] GI LAB;   Service: Gastroenterology    CT CORRJ HALLUX VALGUS W/SESMDC W/2 OSTEOT Right 3/1/2019    Procedure: DOUBLE OSTEOTOMY, ZINA/AKIN BUNIONECTOMY;  Surgeon: Melissa Jackman DPM;  Location: [facility] OR;  Service: Podiatry    SKIN BIOPSY       Social History   Social History     Substance and Sexual Activity   Alcohol Use Never     Social History     Substance and Sexual Activity   Drug Use Never     Social History     Tobacco Use   Smoking Status Never Smoker   Smokeless Tobacco Never Used     Family History   Problem Relation Age of Onset    Hypertension Mother    Ronnie Pert Hypertension Father     Bone cancer Maternal Grandfather     Cancer Maternal Grandfather         bone    Breast cancer Maternal Aunt     Stomach cancer Brother     Cancer Brother         stomach    Colon cancer Neg Hx     Ovarian cancer Neg Hx        Meds/Allergies       Current Outpatient Medications:     albuterol (PROVENTIL HFA,VENTOLIN HFA) 90 mcg/act inhaler    ALPRAZolam (XANAX) 0 5 mg tablet    Blood Glucose Monitoring Suppl (Contour Next Monitor) w/Device KIT    diclofenac (VOLTAREN) 75 mg EC tablet    Diclofenac Sodium (VOLTAREN) 1 %    DULoxetine (CYMBALTA) 60 mg delayed release capsule    glucose blood (Contour Next Test) test strip    glucose blood (FREESTYLE LITE) test strip    losartan (COZAAR) 50 mg tablet    metFORMIN (GLUCOPHAGE) 1000 MG tablet    Microlet Lancets MISC    omeprazole (PriLOSEC) 40 MG capsule    simvastatin (ZOCOR) 20 mg tablet    acetaminophen (TYLENOL) 500 mg tablet    cholestyramine (QUESTRAN) 4 g packet    Allergies   Allergen Reactions    Aspirin Shortness Of Breath and Itching     swelling    Latex Itching and Rash           Objective     Blood pressure 116/80, temperature 98 °F (36 7 °C), temperature source Tympanic, height 5' 2" (1 575 m), weight 77 7 kg (171 lb 6 4 oz), not currently breastfeeding  Body mass index is 31 35 kg/m²  PHYSICAL EXAM:      General Appearance:   Alert, cooperative, no distress   HEENT:   Normocephalic, atraumatic, anicteric      Neck:  Supple, symmetrical, trachea midline   Lungs:   Clear to auscultation bilaterally; no rales, rhonchi or wheezing; respirations unlabored    Heart[de-identified]   Regular rate and rhythm; no murmur, rub, or gallop     Abdomen:   Soft, non-tender, non-distended; normal bowel sounds; no masses, no organomegaly    Genitalia:   Deferred    Rectal:   Deferred    Extremities:  No cyanosis, clubbing or edema    Pulses:  2+ and symmetric    Skin:  No jaundice, rashes, or lesions    Lymph nodes:  No palpable cervical lymphadenopathy        Lab Results:   No visits with results within 1 Day(s) from this visit  Latest known visit with results is:   Appointment on 02/09/2022   Component Date Value    TISSUE TRANSGLUTAMINASE * 02/09/2022 <2     IGA 02/09/2022 250 0     Hep A Total Ab 02/09/2022 Reactive*    Hep B S Ab 02/09/2022 36 50     Mitochondrial Ab 02/09/2022 <20 0     Smooth Muscle Ab 02/09/2022 5     GLORIA 02/09/2022 Negative     Iron Saturation 02/09/2022 22     TIBC 02/09/2022 284     Iron 02/09/2022 63     Ferritin 02/09/2022 66     Ceruloplasmin 02/09/2022 25 2          Radiology Results:   No results found

## 2022-04-21 ENCOUNTER — VBI (OUTPATIENT)
Dept: ADMINISTRATIVE | Facility: OTHER | Age: 62
End: 2022-04-21

## 2022-04-21 NOTE — TELEPHONE ENCOUNTER
04/21/22 9:42 AM     [facility]       Zuly Allison

## 2022-04-26 ENCOUNTER — OFFICE VISIT (OUTPATIENT)
Dept: FAMILY MEDICINE CLINIC | Facility: CLINIC | Age: 62
End: 2022-04-26

## 2022-04-26 VITALS
HEART RATE: 78 BPM | TEMPERATURE: 97.6 F | WEIGHT: 177.4 LBS | DIASTOLIC BLOOD PRESSURE: 86 MMHG | SYSTOLIC BLOOD PRESSURE: 128 MMHG | OXYGEN SATURATION: 98 % | BODY MASS INDEX: 32.65 KG/M2 | RESPIRATION RATE: 16 BRPM | HEIGHT: 62 IN

## 2022-04-26 DIAGNOSIS — R79.89 ELEVATED LFTS: ICD-10-CM

## 2022-04-26 DIAGNOSIS — E11.8 CONTROLLED DIABETES MELLITUS TYPE 2 WITH COMPLICATIONS, UNSPECIFIED WHETHER LONG TERM INSULIN USE (HCC): Primary | ICD-10-CM

## 2022-04-26 DIAGNOSIS — F40.243 ANXIETY WITH FLYING: ICD-10-CM

## 2022-04-26 DIAGNOSIS — E11.9 DIABETES MELLITUS TYPE 2, CONTROLLED (HCC): ICD-10-CM

## 2022-04-26 DIAGNOSIS — I10 HYPERTENSION, BENIGN: ICD-10-CM

## 2022-04-26 DIAGNOSIS — E55.9 VITAMIN D DEFICIENCY: ICD-10-CM

## 2022-04-26 DIAGNOSIS — E11.9 CONTROLLED TYPE 2 DIABETES MELLITUS WITHOUT COMPLICATION, WITHOUT LONG-TERM CURRENT USE OF INSULIN (HCC): ICD-10-CM

## 2022-04-26 DIAGNOSIS — L30.9 ECZEMA, UNSPECIFIED TYPE: ICD-10-CM

## 2022-04-26 DIAGNOSIS — M16.12 PRIMARY OSTEOARTHRITIS OF LEFT HIP: ICD-10-CM

## 2022-04-26 DIAGNOSIS — E78.5 HYPERLIPIDEMIA, UNSPECIFIED HYPERLIPIDEMIA TYPE: ICD-10-CM

## 2022-04-26 LAB — SL AMB POCT HEMOGLOBIN AIC: 7.1 (ref ?–6.5)

## 2022-04-26 PROCEDURE — 83036 HEMOGLOBIN GLYCOSYLATED A1C: CPT | Performed by: FAMILY MEDICINE

## 2022-04-26 PROCEDURE — 99214 OFFICE O/P EST MOD 30 MIN: CPT | Performed by: FAMILY MEDICINE

## 2022-04-26 RX ORDER — LOSARTAN POTASSIUM 50 MG/1
50 TABLET ORAL DAILY
Qty: 90 TABLET | Refills: 1 | Status: SHIPPED | OUTPATIENT
Start: 2022-04-26

## 2022-04-26 RX ORDER — DULOXETIN HYDROCHLORIDE 60 MG/1
60 CAPSULE, DELAYED RELEASE ORAL DAILY
Qty: 90 CAPSULE | Refills: 1 | Status: SHIPPED | OUTPATIENT
Start: 2022-04-26

## 2022-04-26 RX ORDER — SIMVASTATIN 20 MG
20 TABLET ORAL
Qty: 90 TABLET | Refills: 1 | Status: SHIPPED | OUTPATIENT
Start: 2022-04-26

## 2022-04-26 RX ORDER — TRIAMCINOLONE ACETONIDE 0.25 MG/G
OINTMENT TOPICAL 2 TIMES DAILY
Qty: 30 G | Refills: 1 | Status: SHIPPED | OUTPATIENT
Start: 2022-04-26

## 2022-04-26 RX ORDER — BLOOD SUGAR DIAGNOSTIC
STRIP MISCELLANEOUS
Qty: 100 EACH | Refills: 5 | Status: SHIPPED | OUTPATIENT
Start: 2022-04-26

## 2022-04-26 RX ORDER — BLOOD-GLUCOSE METER
KIT MISCELLANEOUS
Qty: 100 EACH | Refills: 5 | Status: SHIPPED | OUTPATIENT
Start: 2022-04-26 | End: 2022-05-06 | Stop reason: SDUPTHER

## 2022-04-26 RX ORDER — ALPRAZOLAM 0.5 MG/1
0.5 TABLET ORAL ONCE AS NEEDED
Qty: 4 TABLET | Refills: 0 | Status: SHIPPED | OUTPATIENT
Start: 2022-04-26

## 2022-04-26 NOTE — PROGRESS NOTES
Assessment/Plan:    Controlled diabetes mellitus type 2 with complications ([facility])    Lab Results   Component Value Date    HGBA1C 7 1 (A) 04/26/2022   Improved compared to prior  Continue Metformin 1000 mg BID and low carb diet        Diagnoses and all orders for this visit:    Controlled diabetes mellitus type 2 with complications, unspecified whether long term insulin use ([facility])  -     POCT hemoglobin A1c  -     metFORMIN (GLUCOPHAGE) 1000 MG tablet; Take 1 tablet (1,000 mg total) by mouth 2 (two) times a day with meals  -     simvastatin (ZOCOR) 20 mg tablet; Take 1 tablet (20 mg total) by mouth daily at bedtime  -     glucose blood (FREESTYLE LITE) test strip; USE TO TEST BLOOD SUGAR TWICE A DAY  -     CBC and differential; Future  -     Comprehensive metabolic panel; Future  -     Lipid panel; Future  -     Vitamin D 25 hydroxy; Future  -     TSH, 3rd generation with Free T4 reflex; Future  -     Microalbumin / creatinine urine ratio; Future    Controlled type 2 diabetes mellitus without complication, without long-term current use of insulin ([facility])  -     glucose blood (Contour Next Test) test strip; Check blood sugar 2 times day    Eczema, unspecified type  -     triamcinolone (KENALOG) 0 025 % ointment; Apply topically 2 (two) times a day    Elevated LFTs  -     Comprehensive metabolic panel; Future    Hyperlipidemia, unspecified hyperlipidemia type  -     Lipid panel; Future    Vitamin D deficiency  -     Vitamin D 25 hydroxy; Future    Primary osteoarthritis of left hip  -     DULoxetine (CYMBALTA) 60 mg delayed release capsule; Take 1 capsule (60 mg total) by mouth daily    Hypertension, benign  -     losartan (COZAAR) 50 mg tablet; Take 1 tablet (50 mg total) by mouth daily    Diabetes mellitus type 2, controlled ([facility])  -     losartan (COZAAR) 50 mg tablet; Take 1 tablet (50 mg total) by mouth daily    Anxiety with flying  -     ALPRAZolam (XANAX) 0 5 mg tablet;  Take 1 tablet (0 5 mg total) by mouth once as needed for anxiety for up to 1 dose          Subjective:      Patient ID: Mana Oh is a 65 y o  female  66 yo  female here today for follow up  Currently without complains  Denies symptoms of hypo or hyperglycemia  Checks feet daily  Has eye doctors appointment schedule for next month  States her eczema often acts up during the summer so she is requesting triamcinolone to have handy, which helps resolve it  Hip pain slightly improved, is scheduled to follow up with ortho later this week       The following portions of the patient's history were reviewed and updated as appropriate:   She  has a past medical history of Acquired hammer toe of right foot (3/15/2018), Arthritis, Asthma, Chronic pain disorder, Diabetes mellitus ([facility] ), Eczema, GERD (gastroesophageal reflux disease), History of panic attacks, Hyperlipidemia, Hypertension, Labral tear of shoulder (09/24/2014), Right foot pain, Rotator cuff syndrome, right (8/16/2017), and Wears glasses  She   Patient Active Problem List    Diagnosis Date Noted    Asthma     Situational anxiety 08/16/2021    Controlled diabetes mellitus type 2 with complications ([facility] ) 75/22/4244    Class 1 obesity due to excess calories with serious comorbidity and body mass index (BMI) of 30 0 to 30 9 in adult 08/05/2020    Chronic left-sided low back pain 12/12/2018    Sacroiliitis ([facility] ) 06/06/2018    Hypertension, benign 12/14/2017    Osteoarthritis of left hip 08/09/2017    Dermatofibroma 06/26/2017    Diabetes mellitus type 2, controlled ([facility] ) 09/20/2016    Hyperlipidemia 09/20/2016    Allergic rhinitis 03/31/2015    Eczema 03/31/2015    Glenohumeral arthritis 09/24/2014    GERD (gastroesophageal reflux disease) 05/18/2012     She  has a past surgical history that includes Hernia repair; Ovary surgery; pr colonoscopy flx dx w/collj spec when pfrmd (N/A, 12/22/2016); Cholecystectomy; pr corrj hallux valgus w/sesmdc w/2 osteot (Right, 3/1/2019);  Foot surgery (Right); and Skin biopsy  Her family history includes Bone cancer in her maternal grandfather; Breast cancer in her maternal aunt; Cancer in her brother and maternal grandfather; Hypertension in her father and mother; Stomach cancer in her brother  She  reports that she has never smoked  She has never used smokeless tobacco  She reports that she does not drink alcohol and does not use drugs    Current Outpatient Medications   Medication Sig Dispense Refill    albuterol (PROVENTIL HFA,VENTOLIN HFA) 90 mcg/act inhaler TAKE 2 PUFFS BY MOUTH EVERY 6 HOURS AS NEEDED FOR WHEEZE OR FOR SHORTNESS OF BREATH 18 g 1    ALPRAZolam (XANAX) 0 5 mg tablet Take 1 tablet (0 5 mg total) by mouth once as needed for anxiety for up to 1 dose 4 tablet 0    cholestyramine (QUESTRAN) 4 g packet Take 1 packet (4 g total) by mouth 2 (two) times a day with meals 60 packet 5    diclofenac (VOLTAREN) 75 mg EC tablet Take 1 tablet (75 mg total) by mouth 2 (two) times a day 60 tablet 2    Diclofenac Sodium (VOLTAREN) 1 % Apply 2 g topically 4 (four) times a day 100 g 5    DULoxetine (CYMBALTA) 60 mg delayed release capsule Take 1 capsule (60 mg total) by mouth daily 90 capsule 1    losartan (COZAAR) 50 mg tablet Take 1 tablet (50 mg total) by mouth daily 90 tablet 1    metFORMIN (GLUCOPHAGE) 1000 MG tablet Take 1 tablet (1,000 mg total) by mouth 2 (two) times a day with meals 180 tablet 3    omeprazole (PriLOSEC) 40 MG capsule Take 1 capsule (40 mg total) by mouth daily before breakfast 30 capsule 5    simvastatin (ZOCOR) 20 mg tablet Take 1 tablet (20 mg total) by mouth daily at bedtime 90 tablet 1    acetaminophen (TYLENOL) 500 mg tablet Take 1 tablet (500 mg total) by mouth every 6 (six) hours as needed for mild pain (Patient not taking: Reported on 9/28/2021) 30 tablet 0    Blood Glucose Monitoring Suppl (Contour Next Monitor) w/Device KIT Use 1 each daily Check blood sugar 2 times day 1 kit 0    glucose blood (Contour Next Test) test strip Check blood sugar 2 times day 100 each 5    glucose blood (FREESTYLE LITE) test strip USE TO TEST BLOOD SUGAR TWICE A  each 5    Microlet Lancets MISC Use 2 (two) times a day 100 each 5    triamcinolone (KENALOG) 0 025 % ointment Apply topically 2 (two) times a day 30 g 1     No current facility-administered medications for this visit       Current Outpatient Medications on File Prior to Visit   Medication Sig    albuterol (PROVENTIL HFA,VENTOLIN HFA) 90 mcg/act inhaler TAKE 2 PUFFS BY MOUTH EVERY 6 HOURS AS NEEDED FOR WHEEZE OR FOR SHORTNESS OF BREATH    cholestyramine (QUESTRAN) 4 g packet Take 1 packet (4 g total) by mouth 2 (two) times a day with meals    diclofenac (VOLTAREN) 75 mg EC tablet Take 1 tablet (75 mg total) by mouth 2 (two) times a day    Diclofenac Sodium (VOLTAREN) 1 % Apply 2 g topically 4 (four) times a day    omeprazole (PriLOSEC) 40 MG capsule Take 1 capsule (40 mg total) by mouth daily before breakfast    [DISCONTINUED] ALPRAZolam (XANAX) 0 5 mg tablet Take 1 tablet (0 5 mg total) by mouth once as needed for anxiety for up to 1 dose    [DISCONTINUED] DULoxetine (CYMBALTA) 60 mg delayed release capsule Take 1 capsule (60 mg total) by mouth daily    [DISCONTINUED] losartan (COZAAR) 50 mg tablet Take 1 tablet (50 mg total) by mouth daily    [DISCONTINUED] metFORMIN (GLUCOPHAGE) 1000 MG tablet Take 1 tablet (1,000 mg total) by mouth 2 (two) times a day with meals    [DISCONTINUED] simvastatin (ZOCOR) 20 mg tablet Take 1 tablet (20 mg total) by mouth daily at bedtime    acetaminophen (TYLENOL) 500 mg tablet Take 1 tablet (500 mg total) by mouth every 6 (six) hours as needed for mild pain (Patient not taking: Reported on 9/28/2021)    Blood Glucose Monitoring Suppl (Contour Next Monitor) w/Device KIT Use 1 each daily Check blood sugar 2 times day    Microlet Lancets MISC Use 2 (two) times a day    [DISCONTINUED] glucose blood (Contour Next Test) test strip Check blood sugar 2 times day    [DISCONTINUED] glucose blood (FREESTYLE LITE) test strip USE TO TEST BLOOD SUGAR TWICE A DAY     No current facility-administered medications on file prior to visit       Review of Systems   Musculoskeletal: Positive for arthralgias  All other systems reviewed and are negative  Objective:      /86 (BP Location: Right arm, Patient Position: Sitting, Cuff Size: Adult)   Pulse 78   Temp 97 6 °F (36 4 °C) (Temporal)   Resp 16   Ht 5' 2" (1 575 m)   Wt 80 5 kg (177 lb 6 4 oz)   SpO2 98%   BMI 32 45 kg/m²          Physical Exam  Vitals and nursing note reviewed  Constitutional:       Appearance: She is well-developed  HENT:      Head: Normocephalic  Right Ear: External ear normal       Left Ear: External ear normal       Nose: Nose normal    Eyes:      Conjunctiva/sclera: Conjunctivae normal       Pupils: Pupils are equal, round, and reactive to light  Neck:      Thyroid: No thyromegaly  Cardiovascular:      Rate and Rhythm: Normal rate and regular rhythm  Heart sounds: Normal heart sounds  Pulmonary:      Effort: Pulmonary effort is normal       Breath sounds: Normal breath sounds  Abdominal:      Palpations: Abdomen is soft  Tenderness: There is no abdominal tenderness  There is no guarding or rebound  Musculoskeletal:         General: Normal range of motion  Cervical back: Normal range of motion and neck supple  Skin:     General: Skin is dry  Neurological:      Mental Status: She is alert and oriented to person, place, and time  Deep Tendon Reflexes: Reflexes are normal and symmetric

## 2022-04-26 NOTE — ASSESSMENT & PLAN NOTE
Lab Results   Component Value Date    HGBA1C 7 1 (A) 04/26/2022   Improved compared to prior  Continue Metformin 1000 mg BID and low carb diet

## 2022-04-29 ENCOUNTER — OFFICE VISIT (OUTPATIENT)
Dept: OBGYN CLINIC | Facility: CLINIC | Age: 62
End: 2022-04-29
Payer: MEDICARE

## 2022-04-29 VITALS
SYSTOLIC BLOOD PRESSURE: 147 MMHG | HEIGHT: 62 IN | BODY MASS INDEX: 32.76 KG/M2 | HEART RATE: 72 BPM | WEIGHT: 178 LBS | DIASTOLIC BLOOD PRESSURE: 83 MMHG

## 2022-04-29 DIAGNOSIS — Z01.818 PREOP TESTING: ICD-10-CM

## 2022-04-29 DIAGNOSIS — M16.12 PRIMARY OSTEOARTHRITIS OF LEFT HIP: Primary | ICD-10-CM

## 2022-04-29 PROCEDURE — 99215 OFFICE O/P EST HI 40 MIN: CPT | Performed by: ORTHOPAEDIC SURGERY

## 2022-04-29 RX ORDER — FOLIC ACID 1 MG/1
1 TABLET ORAL DAILY
Qty: 30 TABLET | Refills: 0 | Status: SHIPPED | OUTPATIENT
Start: 2022-04-29 | End: 2022-05-23

## 2022-04-29 RX ORDER — CHLORHEXIDINE GLUCONATE 0.12 MG/ML
15 RINSE ORAL ONCE
Status: CANCELLED | OUTPATIENT
Start: 2022-04-29 | End: 2022-04-29

## 2022-04-29 RX ORDER — FERROUS SULFATE TAB EC 324 MG (65 MG FE EQUIVALENT) 324 (65 FE) MG
324 TABLET DELAYED RESPONSE ORAL
Qty: 60 TABLET | Refills: 0 | Status: SHIPPED | OUTPATIENT
Start: 2022-04-29 | End: 2022-05-23

## 2022-04-29 RX ORDER — ASCORBIC ACID 500 MG
500 TABLET ORAL DAILY
Qty: 60 TABLET | Refills: 0 | Status: SHIPPED | OUTPATIENT
Start: 2022-04-29

## 2022-04-29 RX ORDER — ACETAMINOPHEN 325 MG/1
975 TABLET ORAL ONCE
Status: CANCELLED | OUTPATIENT
Start: 2022-04-29 | End: 2022-04-29

## 2022-04-29 RX ORDER — CHLORHEXIDINE GLUCONATE 4 G/100ML
SOLUTION TOPICAL DAILY PRN
Status: CANCELLED | OUTPATIENT
Start: 2022-04-29

## 2022-04-29 RX ORDER — SODIUM CHLORIDE 9 MG/ML
125 INJECTION, SOLUTION INTRAVENOUS CONTINUOUS
Status: CANCELLED | OUTPATIENT
Start: 2022-04-29

## 2022-04-29 NOTE — PROGRESS NOTES
Assessment/Plan:   Diagnoses and all orders for this visit:    Primary osteoarthritis of left hip      · Reviewed today's physical exam and most recent x-ray findings with patient at time of visit  · Discussed continued conservative management versus surgical intervention via total hip arthroplasty  · Procedure, prognosis, benefits, risks were discussed in great detail, and patient expresses understanding  · An  was utilized throughout this entire conversation  · Detailed consent was obtained at time of visit  · At this time we will consent patient for left anterior total hip arthroplasty   · Goals for this to safely get patient home the same day  · Pros and cons of operative and non operative intervention discussed with patient  Summary complications include but not limited to infection, bleeding, scarring, nerve injury, vascular injury, hip dislocation, leg-length discrepancy, DVT, PE, death, loss of limb, not obtain result patient wished, fracture  All these were understood and patient was consented for operative intervention for her pathology  · The patient will obtain Eliquis for postop DVT prophylaxis  · Given her past medical history which includes diabetes, preop labs and medical clearance will be required  · She will follow-up 10-14 days postop for re-evaluation, strength check, range of motion check, and incision check  I have spent 60 minutes with patient and patient's son today in which greater than 50% of this time was spent in counseling/coordination of care regarding All that as stated above including surgical intervention as well as pros and cons of operative and non operative intervention  ·     Subjective:   Patient ID: Feli Akbar  1960     HPI  Patient is a 65 y o  female who presents for evaluation of chronic left hip pain and stiffness of greater than 3+ years  Patient is Argentine-speaking only, was seen with the assistance of language services via hot iPad    She reports that she did experience a fall a few years ago which resulted in injury to the right foot, left shoulder, and left hip  But she did have hip pain prior to that incident  She was previously evaluated regards to this issue by Dr Moncho Healy within [facility]  She has had previous conservative treatment via physical therapy, and multiple corticosteroid injections with waning results  On today's presentation she describes her pain as being mostly deep, lateral, and posterior  Her pain is exacerbated with prolonged weight-bearing activity, or prolonged sedentary positioning  She denies any associated numbness, tingling, bruising, or swelling  The following portions of the patient's history were reviewed and updated as appropriate:  Past medical history, past surgical history, Family history, social history, current medications and allergies    Past Medical History:   Diagnosis Date    Acquired hammer toe of right foot 3/15/2018    S/p surg    Arthritis     Asthma     "one time with a cold, and used an inhaler"    Chronic pain disorder     hip pain left, and back    Diabetes mellitus ([address] )     Eczema     GERD (gastroesophageal reflux disease)     History of panic attacks     Hyperlipidemia     Hypertension     Labral tear of shoulder 09/24/2014    Right foot pain     bunionectomy today 3/1/2019    Rotator cuff syndrome, right 8/16/2017    Wears glasses        Past Surgical History:   Procedure Laterality Date    CHOLECYSTECTOMY      "had a panic attack"    FOOT SURGERY Right     HERNIA REPAIR      OVARY SURGERY      OR COLONOSCOPY FLX DX W/COLLJ SPEC WHEN PFRMD N/A 12/22/2016    Procedure: EGD AND COLONOSCOPY;  Surgeon: Tamara Liriano MD;  Location: [facility] GI LAB;   Service: Gastroenterology    OR [place] W/SESMDC W/2 OSTEOT Right 3/1/2019    Procedure: DOUBLE OSTEOTOMY, ZINA/AKIN BUNIONECTOMY;  Surgeon: Galo Cardozo DPM;  Location: [facility] OR;  Service: Podiatry    SKIN BIOPSY         Family History   Problem Relation Age of Onset    Hypertension Mother     Hypertension Father     Bone cancer Maternal Grandfather     Cancer Maternal Grandfather         bone    Breast cancer Maternal Aunt     Stomach cancer Brother     Cancer Brother         stomach    Colon cancer Neg Hx     Ovarian cancer Neg Hx        Social History     Socioeconomic History    Marital status: /Civil Union     Spouse name: None    Number of children: None    Years of education: None    Highest education level: None   Occupational History    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   Tobacco Use    Smoking status: Never Smoker    Smokeless tobacco: Never Used   Vaping Use    Vaping Use: Never used   Substance and Sexual Activity    Alcohol use: Never    Drug use: Never    Sexual activity: Yes     Partners: Male     Birth control/protection: Post-menopausal   Other Topics Concern    None   Social History Narrative    None     Social Determinants of Health     Financial Resource Strain: Low Risk     Difficulty of Paying Living Expenses: Not hard at all   Food Insecurity: No Food Insecurity    Worried About Running Out of Food in the Last Year: Never true    Yeung Sachs in the Last Year: Never true   Transportation Needs: No Transportation Needs    Lack of Transportation (Medical): No    Lack of Transportation (Non-Medical): No   Physical Activity: Not on file   Stress: No Stress Concern Present    Feeling of Stress :  Only a little   Social Connections: Not on file   Intimate Partner Violence: Not on file   Housing Stability: [address] Unable to Pay for Housing in the Last Year: No    Number of [place] in the Last Year: 1    Unstable Housing in the Last Year: No         Current Outpatient Medications:     albuterol (PROVENTIL HFA,VENTOLIN HFA) 90 mcg/act inhaler, TAKE 2 PUFFS BY MOUTH EVERY 6 HOURS AS NEEDED FOR WHEEZE OR FOR SHORTNESS OF BREATH, Disp: 18 g, Rfl: 1    ALPRAZolam (XANAX) 0 5 mg tablet, Take 1 tablet (0 5 mg total) by mouth once as needed for anxiety for up to 1 dose, Disp: 4 tablet, Rfl: 0    Blood Glucose Monitoring Suppl (Contour Next Monitor) w/Device KIT, Use 1 each daily Check blood sugar 2 times day, Disp: 1 kit, Rfl: 0    cholestyramine (QUESTRAN) 4 g packet, Take 1 packet (4 g total) by mouth 2 (two) times a day with meals, Disp: 60 packet, Rfl: 5    diclofenac (VOLTAREN) 75 mg EC tablet, Take 1 tablet (75 mg total) by mouth 2 (two) times a day, Disp: 60 tablet, Rfl: 2    Diclofenac Sodium (VOLTAREN) 1 %, Apply 2 g topically 4 (four) times a day, Disp: 100 g, Rfl: 5    DULoxetine (CYMBALTA) 60 mg delayed release capsule, Take 1 capsule (60 mg total) by mouth daily, Disp: 90 capsule, Rfl: 1    glucose blood (Contour Next Test) test strip, Check blood sugar 2 times day, Disp: 100 each, Rfl: 5    glucose blood (FREESTYLE LITE) test strip, USE TO TEST BLOOD SUGAR TWICE A DAY, Disp: 100 each, Rfl: 5    losartan (COZAAR) 50 mg tablet, Take 1 tablet (50 mg total) by mouth daily, Disp: 90 tablet, Rfl: 1    metFORMIN (GLUCOPHAGE) 1000 MG tablet, Take 1 tablet (1,000 mg total) by mouth 2 (two) times a day with meals, Disp: 180 tablet, Rfl: 3    Microlet Lancets MISC, Use 2 (two) times a day, Disp: 100 each, Rfl: 5    omeprazole (PriLOSEC) 40 MG capsule, Take 1 capsule (40 mg total) by mouth daily before breakfast, Disp: 30 capsule, Rfl: 5    simvastatin (ZOCOR) 20 mg tablet, Take 1 tablet (20 mg total) by mouth daily at bedtime, Disp: 90 tablet, Rfl: 1    triamcinolone (KENALOG) 0 025 % ointment, Apply topically 2 (two) times a day, Disp: 30 g, Rfl: 1    acetaminophen (TYLENOL) 500 mg tablet, Take 1 tablet (500 mg total) by mouth every 6 (six) hours as needed for mild pain (Patient not taking: Reported on 9/28/2021), Disp: 30 tablet, Rfl: 0    Allergies   Allergen Reactions    Aspirin Shortness Of Breath and Itching     swelling    Latex Itching and Rash       Review of Systems   Constitutional: Negative for chills, fever and unexpected weight change  HENT: Negative for hearing loss, nosebleeds and sore throat  Eyes: Negative for pain, redness and visual disturbance  Respiratory: Negative for cough, shortness of breath and wheezing  Cardiovascular: Negative for chest pain, palpitations and leg swelling  Gastrointestinal: Negative for abdominal pain, nausea and vomiting  Endocrine: Negative for polydipsia and polyuria  Genitourinary: Negative for dysuria and hematuria  Musculoskeletal:        As noted in HPI   Skin: Negative for rash and wound  Neurological: Negative for dizziness, numbness and headaches  Psychiatric/Behavioral: Negative for decreased concentration and suicidal ideas  The patient is not nervous/anxious  Objective:  /83   Pulse 72   Ht 5' 2" (1 575 m)   Wt 80 7 kg (178 lb)   BMI 32 56 kg/m²     Ortho Exam  Left hip -   Patient presents with no obvious anatomical deformity  Ambulates with antalgic gait pattern  Uses single-point cane as assistive device  Nontender over lumbar midline  Palpable spasm over paraspinal musculature  Mildly TTP over greater trochanter/trochanteric bursa  TTP over PSIS, TTP over piriformis/glute med  ROM:  Neutral seated IR, 0°-30° seated ER with pain, 100° seated hip flexion  Stiff passive circumduction with pain  Knee flexor and extensor mechanisms intact  + MACARIO, + FADIR  + log roll  2+ TP and DP pulses with brisk capillary refill to the toes  Sural, saphenous, tibial, superficial and deep peroneal motor and sensory distributions intact  Sensation light touch intact distally      Physical Exam  Vitals and nursing note reviewed  Constitutional:       General: She is not in acute distress  Appearance: She is well-developed  HENT:      Head: Normocephalic and atraumatic     Eyes:      Conjunctiva/sclera: Conjunctivae normal    Cardiovascular:      Rate and Rhythm: Normal rate  Pulmonary:      Effort: Pulmonary effort is normal    Musculoskeletal:      Cervical back: Neck supple  Skin:     General: Skin is warm and dry  Capillary Refill: Capillary refill takes less than 2 seconds  Neurological:      Mental Status: She is alert and oriented to person, place, and time     Psychiatric:         Mood and Affect: Mood normal          Behavior: Behavior normal         Diagnostic Test Review:  Attending Physician has personally reviewed pertinent imaging in PACS, impression is as follows:    Review of radiographic series taken 1/26/2022 of the AP pelvis and left hip shows advanced primary osteoarthritis of the femoral acetabular joint with complete joint space loss    Procedures   No procedures performed this visit    Scribe Attestation    I,:   am acting as a scribe while in the presence of the attending physician :       I,:   personally performed the services described in this documentation    as scribed in my presence :

## 2022-05-02 ENCOUNTER — TELEPHONE (OUTPATIENT)
Dept: OBGYN CLINIC | Facility: CLINIC | Age: 62
End: 2022-05-02

## 2022-05-06 ENCOUNTER — TELEPHONE (OUTPATIENT)
Dept: FAMILY MEDICINE CLINIC | Facility: CLINIC | Age: 62
End: 2022-05-06

## 2022-05-06 DIAGNOSIS — E11.8 CONTROLLED DIABETES MELLITUS TYPE 2 WITH COMPLICATIONS, UNSPECIFIED WHETHER LONG TERM INSULIN USE (HCC): ICD-10-CM

## 2022-05-06 RX ORDER — BLOOD-GLUCOSE METER
KIT MISCELLANEOUS
Qty: 100 EACH | Refills: 5 | Status: SHIPPED | OUTPATIENT
Start: 2022-05-06

## 2022-05-06 NOTE — TELEPHONE ENCOUNTER
PATIENT CONTACTED OFFICE STATING HER PRESCRIPTION FOR TEST STRIPS ARE NOT COVERED BY HER INSURANCE BECAUSE OF THE INSTRUCIONES OF USE (2 TIMES A DAY) , SHE STATES PHARMACIST TOLD HER IF THE PROVIDER CHANGES IT TO ONCE A DAY INSURANCE WILL COVER IT   PLEASE ADVISE

## 2022-05-20 ENCOUNTER — TELEPHONE (OUTPATIENT)
Dept: OBGYN CLINIC | Facility: HOSPITAL | Age: 62
End: 2022-05-20

## 2022-05-20 ENCOUNTER — PREP FOR PROCEDURE (OUTPATIENT)
Dept: OBGYN CLINIC | Facility: HOSPITAL | Age: 62
End: 2022-05-20

## 2022-05-20 NOTE — TELEPHONE ENCOUNTER
5/27/22 addendum:  Pt did s/w her son Fish Euceda and she reports he has agreed to care for her, stay over if needed, and transport her to scheduled appts  He is agreeable to drive her to post-op PT, she reports they did agree on [facility] for post-op PT  Sending to the PT  to please call Son, Fish Euceda, to schedule post-op PT appt at [facility]  Pt was encouraged to get her ordered lab work drawn at any [address], especially prior to her PCP appt  She was encouraged to call back with other questions or concerns  5/20/22  Preoperative Elective Admission Assessment-S/w pt using  via , [facility]#[id number] x30min  Pt is agreeable to same day DC if safe and able  Pt To call NN back with update caregiver support and PT site  Living Situation:  Pt lives alone in a multiple level home  Pt's home is in a 2 floor home, resides on 1st floor now  No steps to enter the home  First Floor Setup: Yes, pt resides on the first floor of her home now  DME: Pt would like a BSC- NN placed order through [place] 5/20  Has a cane and RW at home  Patient's Current Level of Function: Pt currently ambulates using cane, DIL and granddaughter help sometimes with ADLs  They assist pt on bad days only  Post-op Caregiver: Pt's son is supportive but she plans to explore with son what care he is willing to provide and report back (if he would be willing to stay for a night or two with her until she is more comfortable, if he will drive her to post-op PT sessions etc  )  She reports  son will drive pt to and home from surgery, Would get her settled in home but will explore with him if he is able to help more  Pt to call back NN team with update  Caregiver Name and phone number for Inpatient discharge needs: Kylie Tillman, ph# [phone]  Currently receive any HHC/aides/community supports: No     Post-op Transport:    To/from hospital:Son  To/from PT: Pt exploring  Uses community transport now: No Outpatient Physical Therapy Site:  Site: Not scheduled, unaware of where she would want  [facility] outpt PT site is close to pt and she would like to attend there  Discussed [facility]  Pt will discuss with son and report back with decision  pre and post-op appts scheduled? No, pt exploring w/ son     Medication Management: self  Preferred Pharmacy for Post-op Medications: [id number] [place] [address] [facility];  placed tx team sticky  note  Blood Management Vitamin Regimen: Pt confirms taking as prescribed  Post-op anticoagulant: eliquis 5mg RXd  Educated for post-op use only  Pt has at home ready for post-op use  DC Plan:  Pt plans for DC to home, she is agreeable to a same day DC  Agreeable to attend OP PT  Barriers to DC identified preoperatively:None    BMI: 32 56    Caresense: N/A,pt Amharic speaking    Patient Education:  Pt educated on post-op pain, early mobilization (POD0), Average inpt LOS, OP PT goal   Patient educated that our goal is to appropriately discharge patient based off their post-op function while striving to maintain maximal independence  The goal is to discharge patient to home and for them to attend outpatient physical therapy  Assigned to care team? Yes    CHW referral placed for medicare with  PCP/[facility] PCP? Ins [id number]-MÓNICA [address]    SW referral: Not as of now  Pt is working with family/son to explore what support she has in them

## 2022-05-23 DIAGNOSIS — Z01.818 PREOP TESTING: ICD-10-CM

## 2022-05-23 DIAGNOSIS — M16.12 PRIMARY OSTEOARTHRITIS OF LEFT HIP: ICD-10-CM

## 2022-05-23 RX ORDER — FERROUS SULFATE TAB EC 324 MG (65 MG FE EQUIVALENT) 324 (65 FE) MG
TABLET DELAYED RESPONSE ORAL
Qty: 60 TABLET | Refills: 0 | Status: SHIPPED | OUTPATIENT
Start: 2022-05-23 | End: 2022-06-16

## 2022-05-23 RX ORDER — FOLIC ACID 1 MG/1
TABLET ORAL
Qty: 30 TABLET | Refills: 0 | Status: SHIPPED | OUTPATIENT
Start: 2022-05-23 | End: 2022-06-16

## 2022-05-24 ENCOUNTER — TELEPHONE (OUTPATIENT)
Dept: OBGYN CLINIC | Facility: CLINIC | Age: 62
End: 2022-05-24

## 2022-05-24 LAB
DME PARACHUTE DELIVERY DATE ACTUAL: NORMAL
DME PARACHUTE DELIVERY DATE EXPECTED: NORMAL
DME PARACHUTE DELIVERY DATE REQUESTED: NORMAL
DME PARACHUTE ITEM DESCRIPTION: NORMAL
DME PARACHUTE ORDER STATUS: NORMAL
DME PARACHUTE SUPPLIER NAME: NORMAL
DME PARACHUTE SUPPLIER PHONE: NORMAL

## 2022-06-01 NOTE — TELEPHONE ENCOUNTER
I spoke to pt's son, Hector Miguel, who said he would call back in a few minutes to schedule his mom's PT  I just tried again since he didn't call back, but had to [facility]  I will continue to try to reach him

## 2022-06-06 ENCOUNTER — LAB (OUTPATIENT)
Dept: LAB | Facility: CLINIC | Age: 62
End: 2022-06-06
Payer: MEDICARE

## 2022-06-06 DIAGNOSIS — M16.12 PRIMARY OSTEOARTHRITIS OF LEFT HIP: ICD-10-CM

## 2022-06-06 DIAGNOSIS — Z01.818 PREOP TESTING: ICD-10-CM

## 2022-06-06 LAB
ABO GROUP BLD: NORMAL
ALBUMIN SERPL BCP-MCNC: 3.6 G/DL (ref 3.5–5)
ALP SERPL-CCNC: 134 U/L (ref 46–116)
ALT SERPL W P-5'-P-CCNC: 184 U/L (ref 12–78)
ANION GAP SERPL CALCULATED.3IONS-SCNC: 2 MMOL/L (ref 4–13)
APTT PPP: 26 SECONDS (ref 23–37)
AST SERPL W P-5'-P-CCNC: 103 U/L (ref 5–45)
BASOPHILS # BLD AUTO: 0.03 THOUSANDS/ΜL (ref 0–0.1)
BASOPHILS NFR BLD AUTO: 1 % (ref 0–1)
BILIRUB SERPL-MCNC: 0.3 MG/DL (ref 0.2–1)
BLD GP AB SCN SERPL QL: NEGATIVE
BUN SERPL-MCNC: 12 MG/DL (ref 5–25)
CALCIUM SERPL-MCNC: 9.5 MG/DL (ref 8.3–10.1)
CHLORIDE SERPL-SCNC: 108 MMOL/L (ref 100–108)
CO2 SERPL-SCNC: 30 MMOL/L (ref 21–32)
CREAT SERPL-MCNC: 0.73 MG/DL (ref 0.6–1.3)
EOSINOPHIL # BLD AUTO: 0.06 THOUSAND/ΜL (ref 0–0.61)
EOSINOPHIL NFR BLD AUTO: 1 % (ref 0–6)
ERYTHROCYTE [DISTWIDTH] IN BLOOD BY AUTOMATED COUNT: 13.4 % (ref 11.6–15.1)
EST. AVERAGE GLUCOSE BLD GHB EST-MCNC: 154 MG/DL
FERRITIN SERPL-MCNC: 100 NG/ML (ref 8–388)
GFR SERPL CREATININE-BSD FRML MDRD: 89 ML/MIN/1.73SQ M
GLUCOSE P FAST SERPL-MCNC: 114 MG/DL (ref 65–99)
HBA1C MFR BLD: 7 %
HCT VFR BLD AUTO: 39 % (ref 34.8–46.1)
HGB BLD-MCNC: 12 G/DL (ref 11.5–15.4)
IMM GRANULOCYTES # BLD AUTO: 0.01 THOUSAND/UL (ref 0–0.2)
IMM GRANULOCYTES NFR BLD AUTO: 0 % (ref 0–2)
INR PPP: 0.89 (ref 0.84–1.19)
IRON SATN MFR SERPL: 32 % (ref 15–50)
IRON SERPL-MCNC: 91 UG/DL (ref 50–170)
LYMPHOCYTES # BLD AUTO: 1.29 THOUSANDS/ΜL (ref 0.6–4.47)
LYMPHOCYTES NFR BLD AUTO: 28 % (ref 14–44)
MCH RBC QN AUTO: 29.9 PG (ref 26.8–34.3)
MCHC RBC AUTO-ENTMCNC: 30.8 G/DL (ref 31.4–37.4)
MCV RBC AUTO: 97 FL (ref 82–98)
MONOCYTES # BLD AUTO: 0.49 THOUSAND/ΜL (ref 0.17–1.22)
MONOCYTES NFR BLD AUTO: 11 % (ref 4–12)
NEUTROPHILS # BLD AUTO: 2.76 THOUSANDS/ΜL (ref 1.85–7.62)
NEUTS SEG NFR BLD AUTO: 59 % (ref 43–75)
NRBC BLD AUTO-RTO: 0 /100 WBCS
PLATELET # BLD AUTO: 198 THOUSANDS/UL (ref 149–390)
PMV BLD AUTO: 11 FL (ref 8.9–12.7)
POTASSIUM SERPL-SCNC: 4.7 MMOL/L (ref 3.5–5.3)
PROT SERPL-MCNC: 7.2 G/DL (ref 6.4–8.2)
PROTHROMBIN TIME: 11.7 SECONDS (ref 11.6–14.5)
RBC # BLD AUTO: 4.01 MILLION/UL (ref 3.81–5.12)
RETICS # AUTO: ABNORMAL 10*3/UL (ref 14097–95744)
RETICS # CALC: 1.91 % (ref 0.37–1.87)
RH BLD: POSITIVE
SODIUM SERPL-SCNC: 140 MMOL/L (ref 136–145)
SPECIMEN EXPIRATION DATE: NORMAL
TIBC SERPL-MCNC: 285 UG/DL (ref 250–450)
WBC # BLD AUTO: 4.64 THOUSAND/UL (ref 4.31–10.16)

## 2022-06-06 PROCEDURE — 83540 ASSAY OF IRON: CPT

## 2022-06-06 PROCEDURE — 86901 BLOOD TYPING SEROLOGIC RH(D): CPT

## 2022-06-06 PROCEDURE — 80053 COMPREHEN METABOLIC PANEL: CPT

## 2022-06-06 PROCEDURE — 36415 COLL VENOUS BLD VENIPUNCTURE: CPT

## 2022-06-06 PROCEDURE — 82728 ASSAY OF FERRITIN: CPT

## 2022-06-06 PROCEDURE — 85045 AUTOMATED RETICULOCYTE COUNT: CPT

## 2022-06-06 PROCEDURE — 83036 HEMOGLOBIN GLYCOSYLATED A1C: CPT

## 2022-06-06 PROCEDURE — 85730 THROMBOPLASTIN TIME PARTIAL: CPT

## 2022-06-06 PROCEDURE — 85610 PROTHROMBIN TIME: CPT

## 2022-06-06 PROCEDURE — 86850 RBC ANTIBODY SCREEN: CPT

## 2022-06-06 PROCEDURE — 83550 IRON BINDING TEST: CPT

## 2022-06-06 PROCEDURE — 86900 BLOOD TYPING SEROLOGIC ABO: CPT

## 2022-06-06 PROCEDURE — 85025 COMPLETE CBC W/AUTO DIFF WBC: CPT

## 2022-06-07 ENCOUNTER — APPOINTMENT (OUTPATIENT)
Dept: LAB | Facility: HOSPITAL | Age: 62
End: 2022-06-07
Attending: PHYSICIAN ASSISTANT
Payer: MEDICARE

## 2022-06-07 DIAGNOSIS — Z01.818 PREOP TESTING: ICD-10-CM

## 2022-06-07 DIAGNOSIS — M16.12 PRIMARY OSTEOARTHRITIS OF LEFT HIP: ICD-10-CM

## 2022-06-07 LAB
ATRIAL RATE: 82 BPM
P AXIS: 40 DEGREES
PR INTERVAL: 168 MS
QRS AXIS: -13 DEGREES
QRSD INTERVAL: 70 MS
QT INTERVAL: 336 MS
QTC INTERVAL: 392 MS
T WAVE AXIS: 97 DEGREES
VENTRICULAR RATE: 82 BPM

## 2022-06-07 PROCEDURE — 93010 ELECTROCARDIOGRAM REPORT: CPT | Performed by: INTERNAL MEDICINE

## 2022-06-07 PROCEDURE — 93005 ELECTROCARDIOGRAM TRACING: CPT

## 2022-06-10 RX ORDER — MULTIVITAMIN
1 CAPSULE ORAL DAILY
COMMUNITY

## 2022-06-10 NOTE — PRE-PROCEDURE INSTRUCTIONS
Pre-Surgery Instructions:   Medication Instructions    acetaminophen (TYLENOL) 500 mg tablet Uses PRN- OK to take day of surgery    albuterol (PROVENTIL HFA,VENTOLIN HFA) 90 mcg/act inhaler Uses PRN- OK to take day of surgery    ALPRAZolam (XANAX) 0 5 mg tablet Uses PRN- OK to take day of surgery    ascorbic acid (VITAMIN C) 500 MG tablet Hold day of surgery   cholestyramine (QUESTRAN) 4 g packet Hold day of surgery   Diclofenac Sodium (VOLTAREN) 1 % Stop taking 7 days prior to surgery   DULoxetine (CYMBALTA) 60 mg delayed release capsule Take night before surgery   ferrous sulfate 324 (65 Fe) mg Hold day of surgery   folic acid (FOLVITE) 1 mg tablet Hold day of surgery   losartan (COZAAR) 50 mg tablet Hold day of surgery   metFORMIN (GLUCOPHAGE) 1000 MG tablet Hold day of surgery   Multiple Vitamin (multivitamin) capsule Hold day of surgery   omeprazole (PriLOSEC) 40 MG capsule Take day of surgery   simvastatin (ZOCOR) 20 mg tablet Take night before surgery    triamcinolone (KENALOG) 0 025 % ointment Stop taking 1 day prior to surgery  You will receive a phone call from hospital for arrival time  Please call surgeons office if any changes in your condition  Wear easy on/off clothing; consider type of surgery;  Valuables, jewelry, piercing's please keep at home  **COVID-19  education/surgical guidelines  Updated covid    Visitation policy  Pt reports family not vaccinated-explained current policy  Please: No contact lenses or eye make up, artificial eyelashes    Please secure transportation     Follow pre surgery showering or cleaning instructions as  Reviewed by nurse or surgeons office  Questions answered and concerns addressed    Ortho class completed- see nurse rocael note  Patient has walker, raised toilet seat  Lives:  Pt lives alone in a multiple level home  Pt's home is in a 2 floor home, resides on 1st floor now  No steps to enter the home   pt resides on the first floor of her home now  IS discussed  PT evaluation completed or in place  Attire addressed regarding particular surgery  Encouraged healthy lifestyle, instructed to notify surgeon of any changes in health pre-op  Answered questions and addresses concerns  Call to be received before surgery with arrival time

## 2022-06-14 ENCOUNTER — APPOINTMENT (OUTPATIENT)
Dept: LAB | Facility: CLINIC | Age: 62
End: 2022-06-14
Payer: MEDICARE

## 2022-06-14 ENCOUNTER — CONSULT (OUTPATIENT)
Dept: FAMILY MEDICINE CLINIC | Facility: CLINIC | Age: 62
End: 2022-06-14

## 2022-06-14 VITALS
DIASTOLIC BLOOD PRESSURE: 84 MMHG | HEART RATE: 95 BPM | TEMPERATURE: 98.7 F | SYSTOLIC BLOOD PRESSURE: 132 MMHG | OXYGEN SATURATION: 98 % | HEIGHT: 62 IN | WEIGHT: 176.5 LBS | BODY MASS INDEX: 32.48 KG/M2 | RESPIRATION RATE: 18 BRPM

## 2022-06-14 DIAGNOSIS — Z01.818 PRE-OP EVALUATION: ICD-10-CM

## 2022-06-14 DIAGNOSIS — Z01.818 PRE-OP EVALUATION: Primary | ICD-10-CM

## 2022-06-14 LAB
ALBUMIN SERPL BCP-MCNC: 3.5 G/DL (ref 3.5–5)
ALP SERPL-CCNC: 121 U/L (ref 46–116)
ALT SERPL W P-5'-P-CCNC: 172 U/L (ref 12–78)
ANION GAP SERPL CALCULATED.3IONS-SCNC: 2 MMOL/L (ref 4–13)
AST SERPL W P-5'-P-CCNC: 92 U/L (ref 5–45)
BILIRUB SERPL-MCNC: 0.38 MG/DL (ref 0.2–1)
BUN SERPL-MCNC: 10 MG/DL (ref 5–25)
CALCIUM SERPL-MCNC: 9.2 MG/DL (ref 8.3–10.1)
CHLORIDE SERPL-SCNC: 108 MMOL/L (ref 100–108)
CO2 SERPL-SCNC: 30 MMOL/L (ref 21–32)
CREAT SERPL-MCNC: 0.84 MG/DL (ref 0.6–1.3)
GFR SERPL CREATININE-BSD FRML MDRD: 75 ML/MIN/1.73SQ M
GLUCOSE P FAST SERPL-MCNC: 93 MG/DL (ref 65–99)
POTASSIUM SERPL-SCNC: 4.7 MMOL/L (ref 3.5–5.3)
PROT SERPL-MCNC: 8 G/DL (ref 6.4–8.2)
SODIUM SERPL-SCNC: 140 MMOL/L (ref 136–145)

## 2022-06-14 PROCEDURE — 99214 OFFICE O/P EST MOD 30 MIN: CPT | Performed by: FAMILY MEDICINE

## 2022-06-14 PROCEDURE — 36415 COLL VENOUS BLD VENIPUNCTURE: CPT

## 2022-06-14 PROCEDURE — 80053 COMPREHEN METABOLIC PANEL: CPT

## 2022-06-14 NOTE — H&P (VIEW-ONLY)
FAMILY PRACTICE PRE-OPERATIVE EVALUATION  [facility] PHYSICIAN GROUP [facility]    NAME: Irina Schwab  AGE: 65 y o  SEX: female  : 1960   DATE: 2022   [id number] [facility] Pre-Operative Evaluation      Chief Complaint: Pre-operative Evaluation  Surgery: L hip Replacement  Anticipated Date of Surgery: 22     History of Present Illness:     Patient has no prior history of bleeding issues or blood clots  Chronic conditions, medications and allergies were reviewed  Pt has Apixaban at home and is aware to not commence until after the procedure  There is no currently active infectious process  Assessment of Cardiac Risk:  · No unstable or severe angina or MI in the last 6 weeks or history of stent placement in the last year   · No decompensated heart failure (e g  New onset heart failure, NYHA functional class IV heart failure, or worsening existing heart failure) in past 3 mos  · No severe heart valve disease including aortic stenosis or symptomatic mitral stenosis     Exercise Capacity:  · Not able to walk 4 blocks without symptoms of pain in hip   · Not able to walk 2 flights without symptoms of pain in hip     NO Prior Anesthesia Reactions  No prolonged steroid use in past 6 mos  Review of Systems:     Review of Systems   Constitutional: Negative for chills and fever  HENT: Negative for ear pain and sore throat  Eyes: Negative for pain and visual disturbance  Respiratory: Negative for cough and shortness of breath  Cardiovascular: Negative for chest pain and palpitations  Gastrointestinal: Negative for abdominal pain and vomiting  Genitourinary: Negative for dysuria and hematuria  Musculoskeletal: Positive for arthralgias and gait problem  Negative for back pain  Skin: Negative for color change and rash  Neurological: Negative for seizures and syncope     Psychiatric/Behavioral: The patient is nervous/anxious  All other systems reviewed and are negative  Current Problem List:     Patient Active Problem List   Diagnosis    Allergic rhinitis    Dermatofibroma    Diabetes mellitus type 2, controlled ([facility] [address]    Eczema    GERD (gastroesophageal reflux disease)    Glenohumeral arthritis    Hyperlipidemia    Hypertension, benign    Osteoarthritis of left hip    Sacroiliitis (HCC)    Chronic left-sided low back pain    Class 1 obesity due to excess calories with serious comorbidity and body mass index (BMI) of 30 0 to 30 9 in adult    Controlled diabetes mellitus type 2 with complications ([facility] [address] )    Situational anxiety    Asthma       Allergies:      Allergies   Allergen Reactions    Aspirin Shortness Of Breath and Itching     swelling    Latex Itching and Rash     Current Medications:       Current Outpatient Medications:     acetaminophen (TYLENOL) 500 mg tablet, Take 1 tablet (500 mg total) by mouth every 6 (six) hours as needed for mild pain, Disp: 30 tablet, Rfl: 0    albuterol (PROVENTIL HFA,VENTOLIN HFA) 90 mcg/act inhaler, TAKE 2 PUFFS BY MOUTH EVERY 6 HOURS AS NEEDED FOR WHEEZE OR FOR SHORTNESS OF BREATH, Disp: 18 g, Rfl: 1    ALPRAZolam (XANAX) 0 5 mg tablet, Take 1 tablet (0 5 mg total) by mouth once as needed for anxiety for up to 1 dose, Disp: 4 tablet, Rfl: 0    apixaban (Eliquis) 5 mg, Take 1 tablet (5 mg total) by mouth 2 (two) times a day for 28 days Do not start taking medication until after total joint arthroplasty, Disp: 56 tablet, Rfl: 0    ascorbic acid (VITAMIN C) 500 MG tablet, Take 1 tablet (500 mg total) by mouth daily, Disp: 60 tablet, Rfl: 0    Blood Glucose Monitoring Suppl (Contour Next Monitor) w/Device KIT, Use 1 each daily Check blood sugar 2 times day, Disp: 1 kit, Rfl: 0    cholestyramine (QUESTRAN) 4 g packet, Take 1 packet (4 g total) by mouth 2 (two) times a day with meals, Disp: 60 packet, Rfl: 5    diclofenac (VOLTAREN) 75 mg EC tablet, Take 1 tablet (75 mg total) by mouth 2 (two) times a day, Disp: 60 tablet, Rfl: 2    Diclofenac Sodium (VOLTAREN) 1 %, Apply 2 g topically 4 (four) times a day, Disp: 100 g, Rfl: 5    DULoxetine (CYMBALTA) 60 mg delayed release capsule, Take 1 capsule (60 mg total) by mouth daily, Disp: 90 capsule, Rfl: 1    ferrous sulfate 324 (65 Fe) mg, TAKE 1 TABLET BY MOUTH 2 TIMES A DAY BEFORE MEALS, Disp: 60 tablet, Rfl: 0    folic acid (FOLVITE) 1 mg tablet, TAKE 1 TABLET BY MOUTH EVERY DAY, Disp: 30 tablet, Rfl: 0    glucose blood (Contour Next Test) test strip, Check blood sugar 2 times day, Disp: 100 each, Rfl: 5    glucose blood (FREESTYLE LITE) test strip, Use once a day, Disp: 100 each, Rfl: 5    losartan (COZAAR) 50 mg tablet, Take 1 tablet (50 mg total) by mouth daily, Disp: 90 tablet, Rfl: 1    metFORMIN (GLUCOPHAGE) 1000 MG tablet, Take 1 tablet (1,000 mg total) by mouth 2 (two) times a day with meals, Disp: 180 tablet, Rfl: 3    Microlet Lancets MISC, Use 2 (two) times a day, Disp: 100 each, Rfl: 5    Multiple Vitamin (multivitamin) capsule, Take 1 capsule by mouth daily, Disp: , Rfl:     omeprazole (PriLOSEC) 40 MG capsule, Take 1 capsule (40 mg total) by mouth daily before breakfast, Disp: 30 capsule, Rfl: 5    simvastatin (ZOCOR) 20 mg tablet, Take 1 tablet (20 mg total) by mouth daily at bedtime, Disp: 90 tablet, Rfl: 1    triamcinolone (KENALOG) 0 025 % ointment, Apply topically 2 (two) times a day, Disp: 30 g, Rfl: 1    Past Medical History:     Past Medical History:   Diagnosis Date    Acquired hammer toe of right foot 3/15/2018    S/p surg    Arthritis     Asthma     "one time with a cold, and used an inhaler"    Chronic pain disorder     hip pain left, and back    Diabetes mellitus (HCC)     Eczema     GERD (gastroesophageal reflux disease)     History of panic attacks     Hyperlipidemia     Hypertension     Labral tear of shoulder 09/24/2014    Right foot pain     bunionectomy today 3/1/2019    Rotator cuff syndrome, right 8/16/2017    Wears glasses      Past Surgical History:   Procedure Laterality Date    CHOLECYSTECTOMY      "had a panic attack"    FOOT SURGERY Right     HERNIA REPAIR      OVARY SURGERY      MN COLONOSCOPY FLX DX W/COLLJ SPEC WHEN PFRMD N/A 12/22/2016    Procedure: EGD AND COLONOSCOPY;  Surgeon: Kay Lennox, MD;  Location: [facility] GI LAB;   Service: Gastroenterology    MN CORRJ HALLUX VALGUS W/SESMDC W/2 OSTEOT Right 3/1/2019    Procedure: DOUBLE OSTEOTOMY, ZINA/AKIN BUNIONECTOMY;  Surgeon: Rama Jane DPM;  Location: [facility];  Service: Podiatry    SKIN BIOPSY       Family History   Problem Relation Age of Onset    Hypertension Mother     Hypertension Father     Bone cancer Maternal Grandfather     Cancer Maternal Grandfather         bone    Breast cancer Maternal Aunt     Stomach cancer Brother     Cancer Brother         stomach    Colon cancer Neg Hx     Ovarian cancer Neg Hx      Social History     Socioeconomic History    Marital status: /Civil Union     Spouse name: Not on file    Number of children: Not on file    Years of education: Not on file    Highest education level: Not on file   Occupational History    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   Tobacco Use    Smoking status: Never Smoker    Smokeless tobacco: Never Used   Vaping Use    Vaping Use: Never used   Substance and Sexual Activity    Alcohol use: Never    Drug use: Never    Sexual activity: Yes     Partners: Male     Birth control/protection: Post-menopausal   Other Topics Concern    Not on file   Social History Narrative    Not on file     Social Determinants of Health     Financial Resource Strain: Low Risk     Difficulty of Paying Living Expenses: Not hard at all   Food Insecurity: No Food Insecurity    Worried About Running Out of Food in the Last Year: Never true    [place] of Food in the Last Year: Never true   Transportation Needs: No Transportation Needs    Lack of Transportation (Medical): No    Lack of Transportation (Non-Medical): No   Physical Activity: Not on file   Stress: No Stress Concern Present    Feeling of Stress : Only a little   Social Connections: Not on file   Intimate Partner Violence: Not on file   Housing Stability: [address] Unable to Pay for Housing in the Last Year: No    Number of [place] in the Last Year: 1    Unstable Housing in the Last Year: No     Physical Exam:     /84 (BP Location: Left arm, Patient Position: Sitting, Cuff Size: Adult)   Pulse 95   Temp 98 7 °F (37 1 °C) (Temporal)   Resp 18   Ht 5' 2" (1 575 m)   Wt 80 1 kg (176 lb 8 oz)   SpO2 98%   BMI 32 28 kg/m²     Physical Exam  Vitals and nursing note reviewed  Constitutional:       Appearance: Normal appearance  HENT:      Head: Normocephalic  Nose: Nose normal       Mouth/Throat:      Mouth: Mucous membranes are moist    Neck:      Thyroid: No thyroid mass, thyromegaly or thyroid tenderness  Cardiovascular:      Rate and Rhythm: Normal rate and regular rhythm  Pulses: Normal pulses  Heart sounds: Normal heart sounds  Pulmonary:      Effort: Pulmonary effort is normal  No respiratory distress  Breath sounds: Normal breath sounds  No wheezing  Abdominal:      Palpations: Abdomen is soft  Musculoskeletal:      Cervical back: Full passive range of motion without pain  Skin:     General: Skin is warm  Neurological:      General: No focal deficit present  Mental Status: She is alert and oriented to person, place, and time  Psychiatric:         Behavior: Behavior normal      Operative site has been examined and clear of skin infection and inflammation  Assessment & Recommendations:     _ Addendum 6/16/22 16:42   Patient is cleared for surgery as repeat liver function testing reveal no evidence of worsening function       Surgical Procedure risk category: LOW

## 2022-06-14 NOTE — PROGRESS NOTES
FAMILY PRACTICE PRE-OPERATIVE EVALUATION  [facility]IAN GROUP [facility]    NAME: Lu Guerra  AGE: 65 y o  SEX: female  : 1960   DATE: 2022   [id number] [facility] Pre-Operative Evaluation      Chief Complaint: Pre-operative Evaluation  Surgery: L hip Replacement  Anticipated Date of Surgery: 22     History of Present Illness:     Patient has no prior history of bleeding issues or blood clots  Chronic conditions, medications and allergies were reviewed  Pt has Apixaban at home and is aware to not commence until after the procedure  There is no currently active infectious process  Assessment of Cardiac Risk:  · No unstable or severe angina or MI in the last 6 weeks or history of stent placement in the last year   · No decompensated heart failure (e g  New onset heart failure, NYHA functional class IV heart failure, or worsening existing heart failure) in past 3 mos  · No severe heart valve disease including aortic stenosis or symptomatic mitral stenosis     Exercise Capacity:  · Not able to walk 4 blocks without symptoms of pain in hip   · Not able to walk 2 flights without symptoms of pain in hip     NO Prior Anesthesia Reactions  No prolonged steroid use in past 6 mos  Review of Systems:     Review of Systems   Constitutional: Negative for chills and fever  HENT: Negative for ear pain and sore throat  Eyes: Negative for pain and visual disturbance  Respiratory: Negative for cough and shortness of breath  Cardiovascular: Negative for chest pain and palpitations  Gastrointestinal: Negative for abdominal pain and vomiting  Genitourinary: Negative for dysuria and hematuria  Musculoskeletal: Positive for arthralgias and gait problem  Negative for back pain  Skin: Negative for color change and rash  Neurological: Negative for seizures and syncope     Psychiatric/Behavioral: The patient is nervous/anxious  All other systems reviewed and are negative  Current Problem List:     Patient Active Problem List   Diagnosis    Allergic rhinitis    Dermatofibroma    Diabetes mellitus type 2, controlled ([facility] [address] [facility]    Eczema    GERD (gastroesophageal reflux disease)    Glenohumeral arthritis    Hyperlipidemia    Hypertension, benign    Osteoarthritis of left hip    Sacroiliitis (HCC)    Chronic left-sided low back pain    Class 1 obesity due to excess calories with serious comorbidity and body mass index (BMI) of 30 0 to 30 9 in adult    Controlled diabetes mellitus type 2 with complications ([facility] [address] )    Situational anxiety    Asthma       Allergies:      Allergies   Allergen Reactions    Aspirin Shortness Of Breath and Itching     swelling    Latex Itching and Rash     Current Medications:       Current Outpatient Medications:     acetaminophen (TYLENOL) 500 mg tablet, Take 1 tablet (500 mg total) by mouth every 6 (six) hours as needed for mild pain, Disp: 30 tablet, Rfl: 0    albuterol (PROVENTIL HFA,VENTOLIN HFA) 90 mcg/act inhaler, TAKE 2 PUFFS BY MOUTH EVERY 6 HOURS AS NEEDED FOR WHEEZE OR FOR SHORTNESS OF BREATH, Disp: 18 g, Rfl: 1    ALPRAZolam (XANAX) 0 5 mg tablet, Take 1 tablet (0 5 mg total) by mouth once as needed for anxiety for up to 1 dose, Disp: 4 tablet, Rfl: 0    apixaban (Eliquis) 5 mg, Take 1 tablet (5 mg total) by mouth 2 (two) times a day for 28 days Do not start taking medication until after total joint arthroplasty, Disp: 56 tablet, Rfl: 0    ascorbic acid (VITAMIN C) 500 MG tablet, Take 1 tablet (500 mg total) by mouth daily, Disp: 60 tablet, Rfl: 0    Blood Glucose Monitoring Suppl (Contour Next Monitor) w/Device KIT, Use 1 each daily Check blood sugar 2 times day, Disp: 1 kit, Rfl: 0    cholestyramine (QUESTRAN) 4 g packet, Take 1 packet (4 g total) by mouth 2 (two) times a day with meals, Disp: 60 packet, Rfl: 5    diclofenac (VOLTAREN) 75 mg EC tablet, Take 1 tablet (75 mg total) by mouth 2 (two) times a day, Disp: 60 tablet, Rfl: 2    Diclofenac Sodium (VOLTAREN) 1 %, Apply 2 g topically 4 (four) times a day, Disp: 100 g, Rfl: 5    DULoxetine (CYMBALTA) 60 mg delayed release capsule, Take 1 capsule (60 mg total) by mouth daily, Disp: 90 capsule, Rfl: 1    ferrous sulfate 324 (65 Fe) mg, TAKE 1 TABLET BY MOUTH 2 TIMES A DAY BEFORE MEALS, Disp: 60 tablet, Rfl: 0    folic acid (FOLVITE) 1 mg tablet, TAKE 1 TABLET BY MOUTH EVERY DAY, Disp: 30 tablet, Rfl: 0    glucose blood (Contour Next Test) test strip, Check blood sugar 2 times day, Disp: 100 each, Rfl: 5    glucose blood (FREESTYLE LITE) test strip, Use once a day, Disp: 100 each, Rfl: 5    losartan (COZAAR) 50 mg tablet, Take 1 tablet (50 mg total) by mouth daily, Disp: 90 tablet, Rfl: 1    metFORMIN (GLUCOPHAGE) 1000 MG tablet, Take 1 tablet (1,000 mg total) by mouth 2 (two) times a day with meals, Disp: 180 tablet, Rfl: 3    Microlet Lancets MISC, Use 2 (two) times a day, Disp: 100 each, Rfl: 5    Multiple Vitamin (multivitamin) capsule, Take 1 capsule by mouth daily, Disp: , Rfl:     omeprazole (PriLOSEC) 40 MG capsule, Take 1 capsule (40 mg total) by mouth daily before breakfast, Disp: 30 capsule, Rfl: 5    simvastatin (ZOCOR) 20 mg tablet, Take 1 tablet (20 mg total) by mouth daily at bedtime, Disp: 90 tablet, Rfl: 1    triamcinolone (KENALOG) 0 025 % ointment, Apply topically 2 (two) times a day, Disp: 30 g, Rfl: 1    Past Medical History:     Past Medical History:   Diagnosis Date    Acquired hammer toe of right foot 3/15/2018    S/p surg    Arthritis     Asthma     "one time with a cold, and used an inhaler"    Chronic pain disorder     hip pain left, and back    Diabetes mellitus (HCC)     Eczema     GERD (gastroesophageal reflux disease)     History of panic attacks     Hyperlipidemia     Hypertension     Labral tear of shoulder 09/24/2014    Right foot pain     bunionectomy today 3/1/2019    Rotator cuff syndrome, right 8/16/2017    Wears glasses      Past Surgical History:   Procedure Laterality Date    CHOLECYSTECTOMY      "had a panic attack"    FOOT SURGERY Right     HERNIA REPAIR      OVARY SURGERY      MD COLONOSCOPY FLX DX W/COLLJ SPEC WHEN PFRMD N/A 12/22/2016    Procedure: EGD AND COLONOSCOPY;  Surgeon: Terrence Lima MD;  Location: [facility] GI LAB;   Service: Gastroenterology    MD CORRJ HALLUX VALGUS W/SESMDC W/2 OSTEOT Right 3/1/2019    Procedure: DOUBLE OSTEOTOMY, ZINA/AKIN BUNIONECTOMY;  Surgeon: Cecelia Mayes DPM;  Location: [facility] OR;  Service: Podiatry    SKIN BIOPSY       Family History   Problem Relation Age of Onset    Hypertension Mother     Hypertension Father     Bone cancer Maternal Grandfather     Cancer Maternal Grandfather         bone    Breast cancer Maternal Aunt     Stomach cancer Brother     Cancer Brother         stomach    Colon cancer Neg Hx     Ovarian cancer Neg Hx      Social History     Socioeconomic History    Marital status: /Civil Union     Spouse name: Not on file    Number of children: Not on file    Years of education: Not on file    Highest education level: Not on file   Occupational History    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   Tobacco Use    Smoking status: Never Smoker    Smokeless tobacco: Never Used   Vaping Use    Vaping Use: Never used   Substance and Sexual Activity    Alcohol use: Never    Drug use: Never    Sexual activity: Yes     Partners: Male     Birth control/protection: Post-menopausal   Other Topics Concern    Not on file   Social History Narrative    Not on file     Social Determinants of Health     Financial Resource Strain: Low Risk     Difficulty of Paying Living Expenses: Not hard at all   Food Insecurity: No Food Insecurity    Worried About Running Out of Food in the Last Year: Never true    [place] of Food in the Last Year: Never true   Transportation Needs: No Transportation Needs    Lack of Transportation (Medical): No    Lack of Transportation (Non-Medical): No   Physical Activity: Not on file   Stress: No Stress Concern Present    Feeling of Stress : Only a little   Social Connections: Not on file   Intimate Partner Violence: Not on file   Housing Stability: [address] Unable to Pay for Housing in the Last Year: No    Number of [place] in the Last Year: 1    Unstable Housing in the Last Year: No     Physical Exam:     /84 (BP Location: Left arm, Patient Position: Sitting, Cuff Size: Adult)   Pulse 95   Temp 98 7 °F (37 1 °C) (Temporal)   Resp 18   Ht 5' 2" (1 575 m)   Wt 80 1 kg (176 lb 8 oz)   SpO2 98%   BMI 32 28 kg/m²     Physical Exam  Vitals and nursing note reviewed  Constitutional:       Appearance: Normal appearance  HENT:      Head: Normocephalic  Nose: Nose normal       Mouth/Throat:      Mouth: Mucous membranes are moist    Neck:      Thyroid: No thyroid mass, thyromegaly or thyroid tenderness  Cardiovascular:      Rate and Rhythm: Normal rate and regular rhythm  Pulses: Normal pulses  Heart sounds: Normal heart sounds  Pulmonary:      Effort: Pulmonary effort is normal  No respiratory distress  Breath sounds: Normal breath sounds  No wheezing  Abdominal:      Palpations: Abdomen is soft  Musculoskeletal:      Cervical back: Full passive range of motion without pain  Skin:     General: Skin is warm  Neurological:      General: No focal deficit present  Mental Status: She is alert and oriented to person, place, and time  Psychiatric:         Behavior: Behavior normal      Operative site has been examined and clear of skin infection and inflammation  Assessment & Recommendations:     _ Addendum 6/16/22 16:42   Patient is cleared for surgery as repeat liver function testing reveal no evidence of worsening function       Surgical Procedure risk category: LOW

## 2022-06-16 ENCOUNTER — EVALUATION (OUTPATIENT)
Dept: PHYSICAL THERAPY | Facility: MEDICAL CENTER | Age: 62
End: 2022-06-16
Payer: MEDICARE

## 2022-06-16 ENCOUNTER — ANESTHESIA EVENT (OUTPATIENT)
Dept: PERIOP | Facility: HOSPITAL | Age: 62
End: 2022-06-16
Payer: MEDICARE

## 2022-06-16 DIAGNOSIS — Z01.818 PREOP TESTING: ICD-10-CM

## 2022-06-16 DIAGNOSIS — M16.12 PRIMARY OSTEOARTHRITIS OF LEFT HIP: ICD-10-CM

## 2022-06-16 DIAGNOSIS — M16.12 PRIMARY OSTEOARTHRITIS OF LEFT HIP: Primary | ICD-10-CM

## 2022-06-16 PROCEDURE — 97112 NEUROMUSCULAR REEDUCATION: CPT | Performed by: PHYSICAL MEDICINE & REHABILITATION

## 2022-06-16 PROCEDURE — 97161 PT EVAL LOW COMPLEX 20 MIN: CPT | Performed by: PHYSICAL MEDICINE & REHABILITATION

## 2022-06-16 RX ORDER — FOLIC ACID 1 MG/1
TABLET ORAL
Qty: 90 TABLET | Refills: 1 | Status: SHIPPED | OUTPATIENT
Start: 2022-06-16

## 2022-06-16 RX ORDER — FERROUS SULFATE TAB EC 324 MG (65 MG FE EQUIVALENT) 324 (65 FE) MG
TABLET DELAYED RESPONSE ORAL
Qty: 180 TABLET | Refills: 1 | Status: SHIPPED | OUTPATIENT
Start: 2022-06-16

## 2022-06-16 NOTE — PROGRESS NOTES
PT Evaluation     Today's date: 2022  Patient name: Michela Do  : 1960  MRN: [id number]  Referring provider: Sravanthi Daniels PA-C  Dx:   Encounter Diagnosis     ICD-10-CM    1  Primary osteoarthritis of left hip  M16 12 Ambulatory referral to Physical Therapy   2  Preop testing  [id number] [id number] Ambulatory referral to Physical Therapy                  Assessment  Assessment details: Patient is a 65 y o  female who reports to outpatient physical therapy for pre-op L hip strength and ROM measurements  No further referral appears necessary at this time based upon examination results  Probable movement impairment diagnosis of hypomobility resulting in pathoanatomical symptoms of decreased ROM, pain, and decreased strength and limiting ability to ambulate, ambulate stairs, exercise, cook and clean  Skilled physical therapy is warranted for the application of the interventions found below in the planned intervention section  Prognosis is good given HEP compliance and attendance to physical therapy 2x for 8 weeks, following surgery  Please contact me if you have any questions or recommendations  Thank you for the referral and the opportunity to share in [facility] [place]  Patient verbalized understanding of POC, HEP, and return demonstrated HEP  Impairments: abnormal coordination, abnormal gait, abnormal muscle firing, abnormal or restricted ROM, abnormal movement, activity intolerance, impaired balance, impaired physical strength, lacks appropriate home exercise program, pain with function and weight-bearing intolerance  Understanding of Dx/Px/POC: good   Prognosis: good    Goals  Impairment Goals  - Decrease pain by 50% in 4 weeks  - Increase left flexibility by 50% in 4 weeks  - Increase left lower extremity strength golbally to 4/5 in 6 weeks  - Increase left hip abductor and external rotator strength strength to 3+/5  6 weeks      Functional Goals  - Return to Prior Level of Function in 8 weeks  - Patient will be independent with HEP in 8 weeks    Plan  Patient would benefit from: skilled PT  Planned modality interventions: cryotherapy  Planned therapy interventions: joint mobilization, manual therapy, neuromuscular re-education, patient education, strengthening, stretching, therapeutic activities, therapeutic exercise, home exercise program, functional ROM exercises, Tavares taping and postural training  Frequency: 2-3x week  Treatment plan discussed with: patient        Subjective Evaluation    History of Present Illness  Mechanism of injury: Work/School: retired,   Home Life: her son cuts the grass, she does cooking and cleaning, but cannot pick things off of the floor, she is not able to shower normally secondary to pain, putting on socks is very painful,   Hobbies/exercise: walking,   Pain location: anterior groin,   HPI: Over the years her pain has gotten worse and worse,   Aggravating factors:   Relieving factors:   PMH:  Patient Goals:  Pain  Current pain ratin  At best pain ratin  At worst pain rating: 10    Patient Goals  Patient goal: be able to walk without pain, play with her grandchildren        Objective     Static Posture     Comments  MMTs:  Hip flex (L1-L2): R: 4+/5, L: 3+/5  Knee ext (L3-L4): R: 4+/5, L: 4/5  Knee flex (S2-S3): R: 4+/5, L: 3+/5  DF (L4): R: 4/5, L: 4/5  Great Toe (L5): R: 4/5, L: 4/5  Standing PF (S1): R: 3+/5, L: unable to test due to pain in SLS/5  Sidelying Hip Abd (L4-S1): R: 2+/5, L: 2-/5    ROM:  Hip flex (0-120): AROM: R: WNL, L: 92:  Knee Ext (0): AROM: R: WNL, L: 2:  Knee Flex (0-130): AROM: R: WNL, L: 102: Hip ER: AROM (0-45): R: 32, L: 19:   Hip IR: AROM (0-45): R: 19, L: 3:  SLR (0-90): AROM: R: 60, L: 45:             Precautions: none      Manuals 2022                                                                Neuro Re-Ed                                                                                           HEP and Return Demo 10"            Ther Ex                                                                                                                     Ther Activity                                       Gait Training                                       Modalities

## 2022-06-20 ENCOUNTER — APPOINTMENT (OUTPATIENT)
Dept: RADIOLOGY | Facility: HOSPITAL | Age: 62
End: 2022-06-20
Payer: MEDICARE

## 2022-06-20 ENCOUNTER — ANESTHESIA (OUTPATIENT)
Dept: PERIOP | Facility: HOSPITAL | Age: 62
End: 2022-06-20
Payer: MEDICARE

## 2022-06-20 ENCOUNTER — HOSPITAL ENCOUNTER (OUTPATIENT)
Facility: HOSPITAL | Age: 62
Setting detail: OUTPATIENT SURGERY
Discharge: HOME | End: 2022-06-20
Attending: ORTHOPAEDIC SURGERY | Admitting: ORTHOPAEDIC SURGERY
Payer: MEDICARE

## 2022-06-20 VITALS
TEMPERATURE: 97.2 F | SYSTOLIC BLOOD PRESSURE: 119 MMHG | OXYGEN SATURATION: 95 % | DIASTOLIC BLOOD PRESSURE: 65 MMHG | BODY MASS INDEX: 32.5 KG/M2 | RESPIRATION RATE: 16 BRPM | HEART RATE: 95 BPM | WEIGHT: 176.59 LBS | HEIGHT: 62 IN

## 2022-06-20 DIAGNOSIS — Z96.642 STATUS POST TOTAL HIP REPLACEMENT, LEFT: ICD-10-CM

## 2022-06-20 DIAGNOSIS — Z96.642 S/P TOTAL LEFT HIP ARTHROPLASTY: Primary | ICD-10-CM

## 2022-06-20 LAB
GLUCOSE SERPL-MCNC: 153 MG/DL (ref 65–140)
GLUCOSE SERPL-MCNC: 183 MG/DL (ref 65–140)

## 2022-06-20 PROCEDURE — 82948 REAGENT STRIP/BLOOD GLUCOSE: CPT

## 2022-06-20 PROCEDURE — 73502 X-RAY EXAM HIP UNI 2-3 VIEWS: CPT

## 2022-06-20 PROCEDURE — 97167 OT EVAL HIGH COMPLEX 60 MIN: CPT

## 2022-06-20 PROCEDURE — 97116 GAIT TRAINING THERAPY: CPT

## 2022-06-20 PROCEDURE — 27130 TOTAL HIP ARTHROPLASTY: CPT | Performed by: ORTHOPAEDIC SURGERY

## 2022-06-20 PROCEDURE — NC001 PR NO CHARGE: Performed by: PHYSICIAN ASSISTANT

## 2022-06-20 PROCEDURE — C1776 JOINT DEVICE (IMPLANTABLE): HCPCS | Performed by: ORTHOPAEDIC SURGERY

## 2022-06-20 PROCEDURE — C1789 PROSTHESIS, BREAST, IMP: HCPCS | Performed by: ORTHOPAEDIC SURGERY

## 2022-06-20 PROCEDURE — 97163 PT EVAL HIGH COMPLEX 45 MIN: CPT

## 2022-06-20 DEVICE — PINNACLE HIP SOLUTIONS ALTRX POLYETHYLENE ACETABULAR LINER NEUTRAL 32MM ID 48MM OD
Type: IMPLANTABLE DEVICE | Site: ACETABULUM | Status: FUNCTIONAL
Brand: PINNACLE ALTRX

## 2022-06-20 DEVICE — ACTIS DUOFIX HIP PROSTHESIS (FEMORAL STEM 12/14 TAPER CEMENTLESS SIZE 3 STD COLLAR)  CE
Type: IMPLANTABLE DEVICE | Site: FEMUR | Status: FUNCTIONAL
Brand: ACTIS

## 2022-06-20 DEVICE — BIOLOX DELTA CERAMIC FEMORAL HEAD 32MM DIA +1 12/14 TAPER
Type: IMPLANTABLE DEVICE | Site: FEMUR | Status: FUNCTIONAL
Brand: BIOLOX DELTA

## 2022-06-20 DEVICE — PINNACLE GRIPTION ACETABULAR SHELL SECTOR 48MM OD
Type: IMPLANTABLE DEVICE | Site: ACETABULUM | Status: FUNCTIONAL
Brand: PINNACLE GRIPTION

## 2022-06-20 RX ORDER — LOSARTAN POTASSIUM 50 MG/1
50 TABLET ORAL DAILY
Status: CANCELLED | OUTPATIENT
Start: 2022-06-22

## 2022-06-20 RX ORDER — ONDANSETRON 2 MG/ML
4 INJECTION INTRAMUSCULAR; INTRAVENOUS ONCE AS NEEDED
Status: DISCONTINUED | OUTPATIENT
Start: 2022-06-20 | End: 2022-06-20 | Stop reason: HOSPADM

## 2022-06-20 RX ORDER — SODIUM CHLORIDE 9 MG/ML
INJECTION, SOLUTION INTRAVENOUS CONTINUOUS PRN
Status: DISCONTINUED | OUTPATIENT
Start: 2022-06-20 | End: 2022-06-20

## 2022-06-20 RX ORDER — PROMETHAZINE HYDROCHLORIDE 25 MG/ML
12.5 INJECTION, SOLUTION INTRAMUSCULAR; INTRAVENOUS ONCE AS NEEDED
Status: DISCONTINUED | OUTPATIENT
Start: 2022-06-20 | End: 2022-06-20 | Stop reason: HOSPADM

## 2022-06-20 RX ORDER — CALCIUM CARBONATE 200(500)MG
1000 TABLET,CHEWABLE ORAL DAILY PRN
Status: CANCELLED | OUTPATIENT
Start: 2022-06-20

## 2022-06-20 RX ORDER — HYDROMORPHONE HCL/PF 1 MG/ML
0.5 SYRINGE (ML) INJECTION
Status: DISCONTINUED | OUTPATIENT
Start: 2022-06-20 | End: 2022-06-20 | Stop reason: HOSPADM

## 2022-06-20 RX ORDER — ONDANSETRON 2 MG/ML
4 INJECTION INTRAMUSCULAR; INTRAVENOUS EVERY 6 HOURS PRN
Status: DISCONTINUED | OUTPATIENT
Start: 2022-06-20 | End: 2022-06-20 | Stop reason: HOSPADM

## 2022-06-20 RX ORDER — DOCUSATE SODIUM 100 MG/1
100 CAPSULE, LIQUID FILLED ORAL 2 TIMES DAILY
Qty: 10 CAPSULE | Refills: 0 | Status: CANCELLED | OUTPATIENT
Start: 2022-06-20

## 2022-06-20 RX ORDER — SODIUM CHLORIDE, SODIUM LACTATE, POTASSIUM CHLORIDE, CALCIUM CHLORIDE 600; 310; 30; 20 MG/100ML; MG/100ML; MG/100ML; MG/100ML
75 INJECTION, SOLUTION INTRAVENOUS CONTINUOUS
Status: CANCELLED | OUTPATIENT
Start: 2022-06-20

## 2022-06-20 RX ORDER — CEFAZOLIN SODIUM 2 G/50ML
2000 SOLUTION INTRAVENOUS ONCE
Status: DISCONTINUED | OUTPATIENT
Start: 2022-06-20 | End: 2022-06-20 | Stop reason: HOSPADM

## 2022-06-20 RX ORDER — MIDAZOLAM HYDROCHLORIDE 2 MG/2ML
INJECTION, SOLUTION INTRAMUSCULAR; INTRAVENOUS AS NEEDED
Status: DISCONTINUED | OUTPATIENT
Start: 2022-06-20 | End: 2022-06-20

## 2022-06-20 RX ORDER — FOLIC ACID 1 MG/1
1000 TABLET ORAL DAILY
Status: CANCELLED | OUTPATIENT
Start: 2022-06-20

## 2022-06-20 RX ORDER — CEFAZOLIN SODIUM 2 G/50ML
SOLUTION INTRAVENOUS AS NEEDED
Status: DISCONTINUED | OUTPATIENT
Start: 2022-06-20 | End: 2022-06-20

## 2022-06-20 RX ORDER — SODIUM CHLORIDE, SODIUM LACTATE, POTASSIUM CHLORIDE, CALCIUM CHLORIDE 600; 310; 30; 20 MG/100ML; MG/100ML; MG/100ML; MG/100ML
125 INJECTION, SOLUTION INTRAVENOUS CONTINUOUS
Status: DISCONTINUED | OUTPATIENT
Start: 2022-06-20 | End: 2022-06-20 | Stop reason: HOSPADM

## 2022-06-20 RX ORDER — ROCURONIUM BROMIDE 10 MG/ML
INJECTION, SOLUTION INTRAVENOUS AS NEEDED
Status: DISCONTINUED | OUTPATIENT
Start: 2022-06-20 | End: 2022-06-20

## 2022-06-20 RX ORDER — LIDOCAINE HYDROCHLORIDE 10 MG/ML
INJECTION, SOLUTION EPIDURAL; INFILTRATION; INTRACAUDAL; PERINEURAL AS NEEDED
Status: DISCONTINUED | OUTPATIENT
Start: 2022-06-20 | End: 2022-06-20

## 2022-06-20 RX ORDER — LABETALOL HYDROCHLORIDE 5 MG/ML
5 INJECTION, SOLUTION INTRAVENOUS
Status: DISCONTINUED | OUTPATIENT
Start: 2022-06-20 | End: 2022-06-20 | Stop reason: HOSPADM

## 2022-06-20 RX ORDER — ONDANSETRON 2 MG/ML
INJECTION INTRAMUSCULAR; INTRAVENOUS AS NEEDED
Status: DISCONTINUED | OUTPATIENT
Start: 2022-06-20 | End: 2022-06-20

## 2022-06-20 RX ORDER — OXYCODONE HYDROCHLORIDE 5 MG/1
TABLET ORAL
Qty: 30 TABLET | Refills: 0 | Status: CANCELLED | OUTPATIENT
Start: 2022-06-20

## 2022-06-20 RX ORDER — ACETAMINOPHEN 325 MG/1
975 TABLET ORAL EVERY 8 HOURS
Status: CANCELLED | OUTPATIENT
Start: 2022-06-20

## 2022-06-20 RX ORDER — ACETAMINOPHEN 325 MG/1
975 TABLET ORAL ONCE
Status: COMPLETED | OUTPATIENT
Start: 2022-06-20 | End: 2022-06-20

## 2022-06-20 RX ORDER — LIDOCAINE HYDROCHLORIDE AND EPINEPHRINE 10; 10 MG/ML; UG/ML
INJECTION, SOLUTION INFILTRATION; PERINEURAL AS NEEDED
Status: DISCONTINUED | OUTPATIENT
Start: 2022-06-20 | End: 2022-06-20 | Stop reason: HOSPADM

## 2022-06-20 RX ORDER — DOCUSATE SODIUM 100 MG/1
100 CAPSULE, LIQUID FILLED ORAL 3 TIMES DAILY PRN
Qty: 40 CAPSULE | Refills: 1 | Status: SHIPPED | OUTPATIENT
Start: 2022-06-20

## 2022-06-20 RX ORDER — ALBUTEROL SULFATE 2.5 MG/3ML
2.5 SOLUTION RESPIRATORY (INHALATION) ONCE AS NEEDED
Status: DISCONTINUED | OUTPATIENT
Start: 2022-06-20 | End: 2022-06-20 | Stop reason: HOSPADM

## 2022-06-20 RX ORDER — SENNOSIDES 8.6 MG
650 CAPSULE ORAL EVERY 8 HOURS PRN
Qty: 30 TABLET | Refills: 0 | Status: SHIPPED | OUTPATIENT
Start: 2022-06-20

## 2022-06-20 RX ORDER — PROPOFOL 10 MG/ML
INJECTION, EMULSION INTRAVENOUS AS NEEDED
Status: DISCONTINUED | OUTPATIENT
Start: 2022-06-20 | End: 2022-06-20

## 2022-06-20 RX ORDER — OXYCODONE HYDROCHLORIDE 5 MG/1
10 TABLET ORAL EVERY 4 HOURS PRN
Status: CANCELLED | OUTPATIENT
Start: 2022-06-20

## 2022-06-20 RX ORDER — OXYCODONE HYDROCHLORIDE 5 MG/1
5 TABLET ORAL EVERY 4 HOURS PRN
Status: DISCONTINUED | OUTPATIENT
Start: 2022-06-20 | End: 2022-06-20 | Stop reason: HOSPADM

## 2022-06-20 RX ORDER — FERROUS SULFATE 325(65) MG
325 TABLET ORAL 2 TIMES DAILY WITH MEALS
Refills: 1 | Status: CANCELLED | OUTPATIENT
Start: 2022-06-20

## 2022-06-20 RX ORDER — MEPERIDINE HYDROCHLORIDE 25 MG/ML
12.5 INJECTION INTRAMUSCULAR; INTRAVENOUS; SUBCUTANEOUS ONCE
Status: DISCONTINUED | OUTPATIENT
Start: 2022-06-20 | End: 2022-06-20 | Stop reason: HOSPADM

## 2022-06-20 RX ORDER — FENTANYL CITRATE/PF 50 MCG/ML
25 SYRINGE (ML) INJECTION
Status: DISCONTINUED | OUTPATIENT
Start: 2022-06-20 | End: 2022-06-20 | Stop reason: HOSPADM

## 2022-06-20 RX ORDER — LABETALOL HYDROCHLORIDE 5 MG/ML
INJECTION, SOLUTION INTRAVENOUS AS NEEDED
Status: DISCONTINUED | OUTPATIENT
Start: 2022-06-20 | End: 2022-06-20

## 2022-06-20 RX ORDER — CHLORHEXIDINE GLUCONATE 4 G/100ML
SOLUTION TOPICAL DAILY PRN
Status: DISCONTINUED | OUTPATIENT
Start: 2022-06-20 | End: 2022-06-20 | Stop reason: HOSPADM

## 2022-06-20 RX ORDER — DOCUSATE SODIUM 100 MG/1
100 CAPSULE, LIQUID FILLED ORAL 2 TIMES DAILY
Status: CANCELLED | OUTPATIENT
Start: 2022-06-20

## 2022-06-20 RX ORDER — DULOXETIN HYDROCHLORIDE 60 MG/1
60 CAPSULE, DELAYED RELEASE ORAL DAILY
Status: CANCELLED | OUTPATIENT
Start: 2022-06-20

## 2022-06-20 RX ORDER — MAGNESIUM HYDROXIDE 1200 MG/15ML
LIQUID ORAL AS NEEDED
Status: DISCONTINUED | OUTPATIENT
Start: 2022-06-20 | End: 2022-06-20 | Stop reason: HOSPADM

## 2022-06-20 RX ORDER — ALBUTEROL SULFATE 90 UG/1
2 AEROSOL, METERED RESPIRATORY (INHALATION) EVERY 6 HOURS PRN
Status: CANCELLED | OUTPATIENT
Start: 2022-06-20

## 2022-06-20 RX ORDER — METHOCARBAMOL 500 MG/1
500 TABLET, FILM COATED ORAL EVERY 6 HOURS SCHEDULED
Status: CANCELLED | OUTPATIENT
Start: 2022-06-20

## 2022-06-20 RX ORDER — HYDROMORPHONE HCL/PF 1 MG/ML
SYRINGE (ML) INJECTION AS NEEDED
Status: DISCONTINUED | OUTPATIENT
Start: 2022-06-20 | End: 2022-06-20

## 2022-06-20 RX ORDER — SENNOSIDES 8.6 MG
650 CAPSULE ORAL EVERY 8 HOURS PRN
Qty: 30 TABLET | Refills: 0 | Status: CANCELLED | OUTPATIENT
Start: 2022-06-20 | End: 2022-07-20

## 2022-06-20 RX ORDER — OXYCODONE HYDROCHLORIDE 5 MG/1
5 TABLET ORAL EVERY 6 HOURS PRN
Qty: 30 TABLET | Refills: 0 | Status: SHIPPED | OUTPATIENT
Start: 2022-06-20

## 2022-06-20 RX ORDER — FENTANYL CITRATE 50 UG/ML
INJECTION, SOLUTION INTRAMUSCULAR; INTRAVENOUS AS NEEDED
Status: DISCONTINUED | OUTPATIENT
Start: 2022-06-20 | End: 2022-06-20

## 2022-06-20 RX ORDER — HYDROMORPHONE HCL/PF 1 MG/ML
0.5 SYRINGE (ML) INJECTION EVERY 2 HOUR PRN
Status: CANCELLED | OUTPATIENT
Start: 2022-06-20 | End: 2022-06-22

## 2022-06-20 RX ORDER — ENOXAPARIN SODIUM 100 MG/ML
40 INJECTION SUBCUTANEOUS DAILY
Status: CANCELLED | OUTPATIENT
Start: 2022-06-20

## 2022-06-20 RX ORDER — CHLORHEXIDINE GLUCONATE 0.12 MG/ML
15 RINSE ORAL ONCE
Status: COMPLETED | OUTPATIENT
Start: 2022-06-20 | End: 2022-06-20

## 2022-06-20 RX ORDER — SODIUM CHLORIDE 9 MG/ML
125 INJECTION, SOLUTION INTRAVENOUS CONTINUOUS
Status: DISCONTINUED | OUTPATIENT
Start: 2022-06-20 | End: 2022-06-20 | Stop reason: HOSPADM

## 2022-06-20 RX ORDER — ALPRAZOLAM 0.5 MG/1
0.5 TABLET ORAL ONCE AS NEEDED
Status: DISCONTINUED | OUTPATIENT
Start: 2022-06-20 | End: 2022-06-20 | Stop reason: HOSPADM

## 2022-06-20 RX ORDER — ASCORBIC ACID 500 MG
500 TABLET ORAL DAILY
Status: CANCELLED | OUTPATIENT
Start: 2022-06-20

## 2022-06-20 RX ORDER — PANTOPRAZOLE SODIUM 40 MG/1
40 TABLET, DELAYED RELEASE ORAL
Refills: 5 | Status: CANCELLED | OUTPATIENT
Start: 2022-06-20

## 2022-06-20 RX ORDER — CEFAZOLIN SODIUM 1 G/50ML
1000 SOLUTION INTRAVENOUS EVERY 8 HOURS
Status: CANCELLED | OUTPATIENT
Start: 2022-06-20 | End: 2022-06-21

## 2022-06-20 RX ORDER — LIDOCAINE HYDROCHLORIDE 10 MG/ML
0.5 INJECTION, SOLUTION EPIDURAL; INFILTRATION; INTRACAUDAL; PERINEURAL ONCE AS NEEDED
Status: DISCONTINUED | OUTPATIENT
Start: 2022-06-20 | End: 2022-06-20 | Stop reason: HOSPADM

## 2022-06-20 RX ADMIN — FENTANYL CITRATE 50 MCG: 50 INJECTION INTRAMUSCULAR; INTRAVENOUS at 08:09

## 2022-06-20 RX ADMIN — MIDAZOLAM HYDROCHLORIDE 2 MG: 1 INJECTION, SOLUTION INTRAMUSCULAR; INTRAVENOUS at 07:35

## 2022-06-20 RX ADMIN — FENTANYL CITRATE 100 MCG: 50 INJECTION INTRAMUSCULAR; INTRAVENOUS at 07:42

## 2022-06-20 RX ADMIN — CEFAZOLIN SODIUM 2000 MG: 2 SOLUTION INTRAVENOUS at 07:34

## 2022-06-20 RX ADMIN — ACETAMINOPHEN 975 MG: 325 TABLET ORAL at 07:29

## 2022-06-20 RX ADMIN — HYDROMORPHONE HYDROCHLORIDE 0.5 MG: 1 INJECTION, SOLUTION INTRAMUSCULAR; INTRAVENOUS; SUBCUTANEOUS at 08:21

## 2022-06-20 RX ADMIN — HYDROMORPHONE HYDROCHLORIDE 0.5 MG: 1 INJECTION, SOLUTION INTRAMUSCULAR; INTRAVENOUS; SUBCUTANEOUS at 07:55

## 2022-06-20 RX ADMIN — FENTANYL CITRATE 50 MCG: 50 INJECTION INTRAMUSCULAR; INTRAVENOUS at 08:17

## 2022-06-20 RX ADMIN — ONDANSETRON 4 MG: 2 INJECTION INTRAMUSCULAR; INTRAVENOUS at 07:47

## 2022-06-20 RX ADMIN — CHLORHEXIDINE GLUCONATE 15 ML: 1.2 RINSE ORAL at 07:31

## 2022-06-20 RX ADMIN — TRANEXAMIC ACID 1000 MG: 1 INJECTION, SOLUTION INTRAVENOUS at 07:38

## 2022-06-20 RX ADMIN — SODIUM CHLORIDE: 0.9 INJECTION, SOLUTION INTRAVENOUS at 07:46

## 2022-06-20 RX ADMIN — ROCURONIUM BROMIDE 50 MG: 50 INJECTION, SOLUTION INTRAVENOUS at 07:42

## 2022-06-20 RX ADMIN — LIDOCAINE HYDROCHLORIDE 50 MG: 10 INJECTION, SOLUTION EPIDURAL; INFILTRATION; INTRACAUDAL at 07:42

## 2022-06-20 RX ADMIN — SODIUM CHLORIDE: 0.9 INJECTION, SOLUTION INTRAVENOUS at 08:49

## 2022-06-20 RX ADMIN — ROCURONIUM BROMIDE 10 MG: 50 INJECTION, SOLUTION INTRAVENOUS at 08:40

## 2022-06-20 RX ADMIN — SUGAMMADEX 200 MG: 100 INJECTION, SOLUTION INTRAVENOUS at 09:03

## 2022-06-20 RX ADMIN — ALPRAZOLAM 0.5 MG: 0.5 TABLET ORAL at 13:22

## 2022-06-20 RX ADMIN — SODIUM CHLORIDE: 0.9 INJECTION, SOLUTION INTRAVENOUS at 07:24

## 2022-06-20 RX ADMIN — LABETALOL HYDROCHLORIDE 5 MG: 5 INJECTION, SOLUTION INTRAVENOUS at 08:20

## 2022-06-20 RX ADMIN — OXYCODONE HYDROCHLORIDE 5 MG: 5 TABLET ORAL at 16:08

## 2022-06-20 RX ADMIN — PROPOFOL 150 MG: 10 INJECTION, EMULSION INTRAVENOUS at 07:42

## 2022-06-20 NOTE — ANESTHESIA POSTPROCEDURE EVALUATION
Post-Op Assessment Note    CV Status:  Stable  Pain Score: 0    Pain management: adequate     Mental Status:  Alert and awake   Hydration Status:  Euvolemic   PONV Controlled:  Controlled   Airway Patency:  Patent  Airway: intubated      Post Op Vitals Reviewed: Yes      Staff: CRNA         No complications documented      BP   176/86   Temp  97 3   Pulse 87   Resp   18   SpO2   99

## 2022-06-20 NOTE — ANESTHESIA PREPROCEDURE EVALUATION
Procedure:  ARTHROPLASTY HIP TOTAL ANTERIOR (Left Hip)    Relevant Problems   CARDIO   (+) Hyperlipidemia   (+) Hypertension, benign      ENDO   (+) Controlled diabetes mellitus type 2 with complications (HCC)   (+) Diabetes mellitus type 2, controlled (HCC)      GI/HEPATIC   (+) GERD (gastroesophageal reflux disease)      /RENAL (within normal limits)      HEMATOLOGY (within normal limits)      MUSCULOSKELETAL   (+) Chronic left-sided low back pain   (+) Glenohumeral arthritis   (+) Osteoarthritis of left hip   (+) Sacroiliitis (HCC)      NEURO/PSYCH   (+) Chronic left-sided low back pain   (+) Situational anxiety      PULMONARY   (+) Asthma (URI-induced)      EKG 2/2019:  Normal sinus rhythm with sinus arrhythmia  Possible Left atrial enlargement  Left ventricular hypertrophy  Inferior infarct (cited on or before 11-FEB-2019)  Anterior infarct (cited on or before 11-FEB-2019)  Abnormal ECG  When compared with ECG of 26-NOV-2018 12:48,  No significant change was found    Lab Results   Component Value Date    WBC 4 64 06/06/2022    HGB 12 0 06/06/2022    HCT 39 0 06/06/2022    MCV 97 06/06/2022     06/06/2022     Lab Results   Component Value Date    SODIUM 140 06/14/2022    K 4 7 06/14/2022     06/14/2022    CO2 30 06/14/2022    BUN 10 06/14/2022    CREATININE 0 84 06/14/2022    GLUC 151 (H) 11/03/2018    CALCIUM 9 2 06/14/2022     Lab Results   Component Value Date    INR 0 89 06/06/2022    PROTIME 11 7 06/06/2022     Lab Results   Component Value Date    HGBA1C 7 0 (H) 06/06/2022            Physical Exam    Airway    Mallampati score: II  TM Distance: >3 FB  Neck ROM: full     Dental   No notable dental hx     Cardiovascular  Cardiovascular exam normal    Pulmonary  Pulmonary exam normal     Other Findings        Anesthesia Plan  ASA Score- 2     Anesthesia Type- spinal with ASA Monitors  Additional Monitors:   Airway Plan:     Comment: Patient seen and examined  History reviewed    Patient to be done under general anesthesia with ETT and routine monitors  Risks discussed with the patient  Consent obtained          Plan Factors-Exercise tolerance (METS): >4 METS  Chart reviewed  EKG reviewed  Existing labs reviewed  Patient summary reviewed  Induction- intravenous  Postoperative Plan-     Informed Consent- Anesthetic plan and risks discussed with patient  I personally reviewed this patient with the CRNA  Discussed and agreed on the Anesthesia Plan with the CRNA  Tonia Console

## 2022-06-20 NOTE — INTERVAL H&P NOTE
H&P reviewed  After examining the patient I find no changes in the patients condition since the H&P had been written      Vitals:    06/20/22 0707   BP: 163/76   Pulse: 72   Resp: 20   Temp: (!) 97 °F (36 1 °C)   SpO2: 98%   Patient seen and examined  To the OR for Left anterior GERTRUDE  Pros and cons of op and non-op intervention discussed with patient  We will follow up postoperatively

## 2022-06-20 NOTE — PHYSICAL THERAPY NOTE
PHYSICAL THERAPY EVALUATION NOTE    Patient Name: Hortencia Lopez  Today's Date: 6/20/2022  AGE:   65 y o  Mrn:   [id number]  ADMIT DX:  Primary osteoarthritis of left hip [M16 12]  Preop testing [Z01 818]    Past Medical History:   Diagnosis Date    Acquired hammer toe of right foot 03/15/2018    S/p surg    Arthritis     Asthma     "one time with a cold, and used an inhaler"    Chronic pain disorder     hip pain left, and back    Diabetes mellitus ([address] )     Eczema     Enlarged liver     GERD (gastroesophageal reflux disease)     History of panic attacks     Hyperlipidemia     Hypertension     Labral tear of shoulder 09/24/2014    Right foot pain     bunionectomy today 3/1/2019    Rotator cuff syndrome, right 08/16/2017    Wears glasses      Length Of Stay: 0  PHYSICAL THERAPY EVALUATION :    06/20/22 1347   PT Last Visit   PT Visit Date 06/20/22   Pain Assessment   Pain Assessment Tool 0-10   Pain Score 5   Pain Location/Orientation Orientation: Left; Location: Hip   Hospital Pain Intervention(s) Repositioned; Other (Comment)  (crowied Naila Burns)   Restrictions/Precautions   LLE Weight Bearing Per Order FWB  (and anterior hip precautions)   Home Living   Type of [address] [place] [address] Two level; Able to live on main level with bedroom/bathroom; Other (Comment)  (no CHRISSY)   Additional Comments pt lives alone on 1st floor and family lives upstairs  ambulates w/ cane  owns walker and bedside commode  independent w/ ADLs  no falls in last 6 months  family will drive pt to outpatient PT appointments  General   Additional Pertinent History 6/20/22 at 11:00 blood pressure was 179/85  Family/Caregiver Present No   Cognition   Arousal/Participation Alert   Orientation Level Oriented to person;Oriented to place; Other (Comment)  (pt was identified w/ full name, birth date)   Following Commands Follows one step commands without difficulty   Subjective   Subjective pt seen supine in bed  agreed to PT eval  reports having left hip pain  pt is mostly [place] speaking  RUE Assessment   RUE Assessment WFL   LUE Assessment   LUE Assessment WFL   RLE Assessment   RLE Assessment WFL  (4 to 4+/5)   LLE Assessment   LLE Assessment X  (hip flexion 3-/5 extension 3/5, knee flexion 3-/5 extension 3/5, ankle 3/5)   Coordination   Movements are Fluid and Coordinated 1   Light Touch   RLE Light Touch Grossly intact   LLE Light Touch Grossly intact   Bed Mobility   Supine to Sit 4  Minimal assistance   Additional items Assist x 1;HOB elevated; Increased time required;Verbal cues;LE management  (for trunk/LE positioning)   Additional Comments pt noted having nausea during session and vomited twice  Radha Cook was notified  seated blood presure was 184/86 and follow up was 179/85  Transfers   Sit to Stand 5  Supervision   Additional items Increased time required;Verbal cues  (for hand placement)   Stand to Sit 5  Supervision   Additional items Increased time required;Verbal cues  (for body positioning)   Ambulation/Elevation   Gait pattern Decreased L stance; Antalgic; Foward flexed   Gait Assistance 5  Supervision   Additional items Verbal cues  (for walker positioning)   Assistive Device Rolling walker   Distance 20 feet  (additional not possible due to fatigue and pain)   Balance   Static Sitting Fair +   Static Standing Fair -  (w/ roller walker)   Ambulatory Fair -  (w/ roller walker)   Activity Tolerance   Activity Tolerance Patient limited by fatigue;Patient limited by pain   Nurse Made Aware spoke to ERIBERTO Prado OT, Lexee SOT, Dr Chai Saez, Ya Myers CM   Assessment   Prognosis Fair   Problem List Decreased strength;Decreased range of motion;Decreased endurance; Impaired balance;Decreased mobility; Decreased safety awareness;Orthopedic restrictions;Pain   Assessment Pt presents secondary to failed conservative treatment of left hip DJD for left anterior total hip arthroplasty  Pt with leg length discrepancy (left shorter than right lower extremity)  Dx: left hip DJD  6/20/22 left anterior GERTRUDE  order placed for PT eval and tx, w/ activity order of FWB L LE and anterior hip precautions  pt presents w/ comorbidities of hyperlipidemia, HTN, DM, chronic low back pain, arthritis, and asthma and personal factors of mobilizing w/ assistive device, preferred language not English (language barrier) and anxiety  pt presents w/ pain, weakness, decreased ROM, decreased endurance, impaired balance, gait deviations, decreased safety awareness, orthopedic restrictions and fall risk  these impairments are evident in findings from physical examination (weakness and decreased ROM), mobility assessment (need for standby to min assist w/ all phases of mobility when usually mobilizing independently, tolerance to only 20 feet of ambulation and need for cueing for mobility technique), and Barthel Index: 65/100  pt needed input for mobility technique  pt is at risk for falls due to physical and safety awareness deficits  pt's clinical presentation is unstable/unpredictable (evident in poor blood pressure control, need for assist w/ all phases of mobility when usually mobilizing independently, tolerance to only 20 feet of ambulation, pain impacting overall mobility status and need for input for mobility technique/safety)  pt needs inpatient PT tx to improve mobility deficits and progress mobility training as appropriate  discharge recommendation is for home w/ family support and outpatient PT to reduce fall risk and maximize level of functional independence  Goals   Patient Goals pt wants to walk recreationally   STG Expiration Date 06/23/22   Short Term Goal #1 pt will:  Increase L LE strength 1/2 grade to facilitate independent mobility, Perform bed mobility independently to increase level of independence, Perform all transfers independently to improve independence, Ambulate 300 ft  with roller walker independently w/o LOB to improve functional independence and facilitate return to recreational ambulation, Increase ambulatory balance 1 grade to decrease risk for falls, Complete exercise program independently to increase strength and endurance, Tolerate 3 hr OOB to faciliate upright tolerance, Improve Barthel Index score to 85 or greater to facilitate independence, Recall and adhere to anterior hip precautions of L LE to facilitate surgical healing and Complete Timed Up and Go or Comfortable Gait Speed to further assess mobility and monitor progress   PT Treatment Day 1   Plan   Treatment/Interventions LE strengthening/ROM; Functional transfer training; Therapeutic exercise; Endurance training;Patient/family training;Equipment eval/education; Bed mobility;Gait training   PT Frequency Twice a day   Recommendation   PT Discharge Recommendation Home with outpatient rehabilitation   Additional Comments recommend roller walker use w/ mobility   AM-PAC Basic Mobility Inpatient   Turning in Bed Without Bedrails 4   Lying on Back to Sitting on Edge of Flat Bed 3   Moving Bed to Chair 3   Standing Up From Chair 3   Walk in Room 3   Climb 3-5 Stairs 1   Basic Mobility Inpatient Raw Score 17   Basic Mobility Standardized Score 39 67   Highest Level Of Mobility   -[facility] Goal 5: Stand one or more mins   -HLM Achieved 7: Walk 25 feet or more   Barthel Index   Feeding 10   Bathing 0   Grooming Score 5   Dressing Score 5   Bladder Score 10   Bowels Score 10   Toilet Use Score 5   Transfers (Bed/Chair) Score 10   Mobility (Level Surface) Score 10   Stairs Score 0   Barthel Index Score 65   Additional Treatment Session   Start Time 1347   End Time 1413   Treatment Assessment Pt agreed to participate in PT intervention  Therapist provided education to pt for mobility technique including anterior hip precautions, transfer technique, and roller walker management   Education was provided due to findings from evaluation  Sit < - > stand transfers modified independent  Ambulated 100 feet, 120 feet w/ roller walker modified independently  Seated rest break was needed  Ambulation was limited by pain, fatigue, and nausea Pt was found to have improvement after education w/ increased ambulation tolerance  Pt continues to be a fall risk  Therapist reviewed car transfer technique w/ verbal instruction, demonstration and teachback  Anterior hip precaution education was completed in these was and also utilized handout  Pt asked appropriate questions and had appropriate understanding of presented material  continued inpatient PT tx is indicated to reduce fall risk factors  Equipment Use roller walker   Additional Treatment Day 1   End of Consult   Patient Position at End of Consult Bedside chair; All needs within reach   End of Consult Comments Traci Patrick NSG removed nasal cannula  resting pulse ox 96% and 73 BPM, active 94% and 81 BPM      The patient's AM-PAC Basic Mobility Inpatient Short Form Raw Score is 17  A Raw score of greater than 16 suggests the patient may benefit from discharge to home  Please also refer to the recommendation of the Physical Therapist for safe discharge planning  Skilled PT recommended while in hospital and upon DC to progress pt toward treatment goals       Columba Galeano, PT

## 2022-06-20 NOTE — PLAN OF CARE
Problem: OCCUPATIONAL THERAPY ADULT  Goal: Performs self-care activities at highest level of function for planned discharge setting  See evaluation for individualized goals  Description: Treatment Interventions: ADL retraining, Functional transfer training, Endurance training, Patient/family training, Equipment evaluation/education, Compensatory technique education, Activityengagement, Energy conservation          See flowsheet documentation for full assessment, interventions and recommendations  Note: Limitation: Decreased ADL status, Decreased UE strength, Decreased endurance, Decreased Safe judgement during ADL, Decreased self-care trans, Decreased high-level ADLs  Prognosis: Good  Assessment: Pt is a 65 y o  female seen for OT evaluation s/p admission to [facility] on [facility]/20/2022 for elective L GERTRUDE, anterior approach  Pt is POD #0 and is FWB  Pt has a significant PMH impacting occupational performance including: DM II, HTN, OA of L hip, obesity, situational anxiety, asthma  Pt with no recent admissions in the last 2 months  Pt with active OT evaluation and treatment orders  PTA pt living with family in [facility], pt with FF, pt requires (A) with ADLs and IADLs, (-)falls, (+)drives  Pt is motivated to return to home  Personal and environmental factors supporting pt at time of IE include social support, attitude towards recovery and accessible home environment  Personal and environmental factors inhibiting engagement in occupations include difficulty completing ADLs and difficulty completing IADLs  During evaluation pt performed as is outlined above in flowsheet  Pt required VC for safety and VC for attention to task  Standardized assessments used to assist in identifying performance deficits include AMPAC 6-Clicks and Barthel ADL Index   Performance deficits that affect the pts occupational performance during the initial evaluation include impaired balance, endurance, activity tolerance, functional standing tolerance and overall strength, safety awareness and insight into deficits  Based on pts functional performance and deficits the following occupations will be addressed in OT treatments in order to maximize pts independence and overall occupational performance: grooming, bathing/showering, toileting and toilet hygiene, dressing and functional mobility  Goals are listed below  Upon discharge from acute care setting recommend d/c to Home with family support This evaluation required an extensive review of medical and/or therapy records and additional review of physical, cognitive and psychosocial history related to functional performance  Based upon functional performance deficits and assessments, this evaluation has been identified as a high complexity evaluation       OT Discharge Recommendation: No rehabilitation needs (no OT needs)

## 2022-06-20 NOTE — OP NOTE
[id number]                                                                                                        Mana Oh                                                                                                        AN OR MAIN    PreOp Dx: left hip DJD    Postop Dx: left hip DJD    Procedure: left anterior total hip arthroplasty    Surgeon: Felicia Mackenzie MD    Assistants: Ellie Jones PA-C, Otoniel Paul MD    Fluids:     EBL:     Drains: none    Specimens: none    Complications: none    Condition: stable,Transferred to postanesthesia care unit  Implants: Depuy      Acetabulum size 48    Acetabular poly 48/32 neutral    Femur Actis, size 3    Femoral head size 32/+1 ceramic    65 y o  female , presents at this, time, secondary to failed conservative treatment of left hip DJD for left anterior total hip arthroplasty  Patient with leg length discrepancy (left shorter than right lower extremity)    The patient was told of all the pros and cons of operative and nonoperative intervention  Some of the complications of operative intervention include infection, bleeding, scarring, nerve injury, vascular injury,leg length discrepancy, hip dislocation, continued pain, decreased range of motion, DVT, PE death, loss of limb, fracture, need for further surgery, not obtain an results  The patient wished  Patient did consent for operative intervention for this pathology  Patient was brought to the operating room  Patient was anesthetized as anesthesia team  Patient was prepped and draped in normal sterile fashion  After this was done, we did conduct a time out to make sure correct  Patient was in the room, prepped marked and draped  Implants were in the room, Rep  For the implants were present, DVT prophylaxis and antibiotics were dressed  An anterior approach was used  Incision was made 2 cm lateral and 1 cm distal to the ASIS and extended this incision distally   After going through skin, fatty tissue, fascia ted was identified and incised  The fascial layer Was incised going towards the ASIS And extending it distally to incorporate our entire incision  We were able to go through the internervous planes until we were able to identify the anterior perforating vessels  Once this was done with able to obtain hemostasis  Able to excise the pre-capsular, fatty tissue  After this was done, we were able to do all releases, which included, the inferior portion of the femoral neck going towards the lesser trochanteric region, the iliocapsularis, and also, the piriformis recess  We then were able to make our femoral neck cut  Femoral head was removed  Labrum was excised  Fatty tissue within the acetabulum was also excised  At this, time  We did remain to be appropriate theta angle and anteversion  We did place a trial acetabular component, and with the aid of radiographic imaging  It was noted that all was appropriate  At this, time, we were able to place, our true acetabular component  Our theta angle and anteversion were noted to be appropriate  At this time, we are able to place, our true acetabular poly-ethylene  At this, time  We did release on the medial aspect of the greater trochanteric region  We externally rotated  The femur as well as extending at the hip and adducting the femur to obtain appropriate visualization of the proximal femur  We were able to use our box osteotome, canal finder, and rasp in order to make sure there, we avoided any varus, placement of our implant  We did broach to appropriate size, making sure we had correct version  Calcar planer was then used  Trial femoral neck and head were placed  Patient was placed through provocative ranges of motion to make sure that stability was obtained  Radiographic imaging confirmed appropriate leg lengths  At this time  We did remove a trial femoral neck and head  After the hip was dislocated   All broach handle was placed within a trial femoral component to make sure that the implant was still stable  Once this was confirmed, we did remove the trial femoral component  Copious irrigation was used at the operative site  True femoral implant was placed  Trial femoral head was placed once more in order to confirm appropriate leg lengths and appropriate stability  Once as noted to be appropriate, we placed a true femoral head  We did check for stability, leg lengths and final radiographs were taken  Irrigation was used once more at the operative site  Fascial layer was reapproximated with barbed sutures  2-0 Vicryl sutures were used for the subcutaneous layer  Monocryl sutures were utilized for the subcuticular closure  The wounds were cleaned and dried  Exofin sealant was used to augment the closure  4 x 4, ABDs, and Tegaderm was placed   Patient was subsequently awakened noted to be in stable condition and transferred to postanesthesia care unit

## 2022-06-20 NOTE — OCCUPATIONAL THERAPY NOTE
Occupational Therapy Evaluation     Patient Name: Patricia Gibbons  Today's Date: 6/20/2022  Problem List  Principal Problem:    Status post total hip replacement, left  Active Problems:    Diabetes mellitus type 2, controlled ([facility] [address] )    GERD (gastroesophageal reflux disease)    Hypertension, benign    Osteoarthritis of left hip    Chronic left-sided low back pain    Class 1 obesity due to excess calories with serious comorbidity and body mass index (BMI) of 30 0 to 30 9 in adult    Controlled diabetes mellitus type 2 with complications (HCC)    Situational anxiety    Asthma    Past Medical History  Past Medical History:   Diagnosis Date    Acquired hammer toe of right foot 03/15/2018    S/p surg    Arthritis     Asthma     "one time with a cold, and used an inhaler"    Chronic pain disorder     hip pain left, and back    Diabetes mellitus ([facility] [address] )     Eczema     Enlarged liver     GERD (gastroesophageal reflux disease)     History of panic attacks     Hyperlipidemia     Hypertension     Labral tear of shoulder 09/24/2014    Right foot pain     bunionectomy today 3/1/2019    Rotator cuff syndrome, right 08/16/2017    Wears glasses      Past Surgical History  Past Surgical History:   Procedure Laterality Date    CHOLECYSTECTOMY      "had a panic attack"    FOOT SURGERY Right     HERNIA REPAIR      OVARY SURGERY      WA COLONOSCOPY FLX DX W/COLLJ SPEC WHEN PFRMD N/A 12/22/2016    Procedure: EGD AND COLONOSCOPY;  Surgeon: Tamara Liriano MD;  Location: [facility] GI LAB; Service: Gastroenterology    WA [place] W/SESMDC W/2 OSTEOT Right 3/1/2019    Procedure: DOUBLE OSTEOTOMY, ZINA/AKIN BUNIONECTOMY;  Surgeon: Galo Cardozo DPM;  Location: [facility] OR;  Service: Podiatry    SKIN BIOPSY             06/20/22 1327   OT Last Visit   OT Visit Date 06/20/22   Note Type   Note type Evaluation   Additional Comments Pt is mainly Citizen of [place] speaking, does understand small conversations in [address]   PT RJ present to assist with translation   Restrictions/Precautions   Weight Bearing Precautions Per Order Yes   RLE Weight Bearing Per Order FWB   LLE Weight Bearing Per Order FWB   Pain Assessment   Pain Assessment Tool 0-10   Pain Score No Pain   Home Living   Type of Home House   Home Layout Two level; Able to live on main level with bedroom/bathroom  (0 [address], FFSU)   Bathroom Shower/Tub Walk-in shower   Bathroom Toilet Standard   Bathroom Equipment Commode   Home Equipment Cane   Additional Comments use of SPC at baseline   Prior Function   Lives With Family  (family lives on 2nd floor)   ADL Assistance Needs assistance  (family (A) with LB dressing)   IADLs Needs assistance  (family assists)   Falls in the last 6 months 0   Vocational Retired   Comments (+)drives, family can drive on d/c   Lifestyle   Autonomy PTA pt living with family in [place], pt with [address], pt requires (A) with ADLs and IADLs, (-)falls, (+)drives   Reciprocal Relationships supportive son   Service to Others retired   [address] enjoys doing things around the house   Subjective   Subjective "I'm okay, I want to walk"   ADL   Grooming Assistance 6  [address]  Supervision/Setup    [address]  Supervision/Setup   LB [address] Deficit Increased time to complete; Thread RLE into pants; Thread LLE into pants;Pull up over hips  (A with threading into LLE)   Toileting Assistance  5  Supervision/Setup   Bed Mobility   Supine to Sit 5  Supervision   Additional items Increased time required;Verbal cues; Bedrails   Additional Comments OOB and in chair at end of session   Transfers   Sit to Stand 5  Supervision   Additional items Increased time required   Stand to Sit 5  Supervision   Additional items Increased time required   Toilet transfer 5  Supervision   Additional items Increased time required;Standard toilet  (grab bar)   Additional Comments Pt with episdoe of emesis standing at toilet, mod I in standing  RN aware, seat provided to pt   Functional Mobility   Functional Mobility 5  Supervision   Additional Comments functional household distance, use of RW   Balance   Static Sitting Good   Dynamic Sitting Good   Static Standing Good   Dynamic Standing Fair +   Ambulatory Fair +   Activity Tolerance   Activity Tolerance Patient tolerated treatment well   Medical Staff Made Aware PT ANDREW RN Tita Speaker   RUE Assessment   RUE Assessment WFL   LUE Assessment   LUE Assessment WFL   Hand Function   Gross Motor Coordination Functional   Fine Motor Coordination Functional   Cognition   Overall Cognitive Status WFL   Arousal/Participation Alert; Cooperative   Attention Within functional limits   Orientation Level Oriented X4   Memory Within functional limits   Following Commands Follows all commands and directions without difficulty   Comments pleasant and cooperative, Urdu speaking   Assessment   Limitation Decreased ADL status; Decreased UE strength;Decreased endurance;Decreased Safe judgement during ADL;Decreased self-care trans;Decreased high-level ADLs   Prognosis Good   Assessment Pt is a 65 y o  female seen for OT evaluation s/p admission to [address] [place] [address] on 6/20/2022 for elective L GERTRUDE, anterior approach  Pt is POD #0 and is FWB  Pt has a significant PMH impacting occupational performance including: DM II, HTN, OA of L hip, obesity, situational anxiety, asthma  Pt with no recent admissions in the last 2 months  Pt with active OT evaluation and treatment orders  PTA pt living with family in [facility], pt with FF, pt requires (A) with ADLs and IADLs, (-)falls, (+)drives  Pt is motivated to return to home  Personal and environmental factors supporting pt at time of IE include social support, attitude towards recovery and accessible home environment   Personal and environmental factors inhibiting engagement in occupations include difficulty completing ADLs and difficulty completing IADLs  During evaluation pt performed as is outlined above in flowsheet  Pt required VC for safety and VC for attention to task  Standardized assessments used to assist in identifying performance deficits include AMPAC 6-Clicks and Barthel ADL Index  Performance deficits that affect the pts occupational performance during the initial evaluation include impaired balance, endurance, activity tolerance, functional standing tolerance and overall strength, safety awareness and insight into deficits  Based on pts functional performance and deficits the following occupations will be addressed in OT treatments in order to maximize pts independence and overall occupational performance: grooming, bathing/showering, toileting and toilet hygiene, dressing and functional mobility  Goals are listed below  Upon discharge from acute care setting recommend d/c to Home with family support This evaluation required an extensive review of medical and/or therapy records and additional review of physical, cognitive and psychosocial history related to functional performance  Based upon functional performance deficits and assessments, this evaluation has been identified as a high complexity evaluation  Goals   Patient Goals to walk   LTG Time Frame 10-14   Long Term Goal see goals listed below   Plan   Treatment Interventions ADL retraining;Functional transfer training; Endurance training;Patient/family training;Equipment evaluation/education; Compensatory technique education; Activityengagement; Energy conservation   Goal Expiration Date 06/30/22   OT Treatment Day 0   OT Frequency 3-5x/wk   Recommendation   OT Discharge Recommendation No rehabilitation needs  (no OT needs)   AM-PAC Daily Activity Inpatient   Lower Body Dressing 3   Bathing 3   Toileting 3   Upper Body Dressing 4   Grooming 4   Eating 4   Daily Activity Raw Score 21   Daily Activity Standardized Score (Calc for Raw Score >=11) 44 27   AM-[facility] Applied Cognition Inpatient   Following a Speech/Presentation 4   Understanding Ordinary Conversation 4   Taking Medications 4   Remembering Where Things Are Placed or Put Away 4   Remembering List of 4-5 Errands 4   Taking Care of Complicated Tasks 4   Applied Cognition Raw Score 24   Applied Cognition Standardized Score 62 21     GOALS:      -Patient will perform grooming tasks standing at sink with overall Mod I    -Patient will be Mod I with LB ADLs with use of AE and AD as needed    -Patient will complete toileting w/ Mod I w/ G hygiene/thoroughness    -Patient will demonstrate Mod I with bed mobility for ability to manage own comfort and initiate OOB tasks      -Patient will perform functional transfers with Mod I to/from all surfaces using DME as needed    -Patient will be Mod I with functional mobility to/from bathroom for increased independence with toileting tasks    The patient's raw score on the AM-PAC Daily Activity inpatient short form is 21, standardized score is 44 27, greater than 39 4  Patients at this level are likely to benefit from discharge to home  Please refer to the recommendation of the Occupational Therapist for safe discharge planning        At the end of the session, all needs met and pt seated in bedside chair and call bell within reach    [facility], OTR/L

## 2022-06-20 NOTE — DISCHARGE INSTRUCTIONS
Discharge Instructions - [address] [facility] 65 y o  female MRN: [id number]  Unit/Bed#: APU 6    Weight Bearing Status:                                           Weight Bearing as tolerated to the left lower extremity  Anterior total hip precautions    DVT prophylaxis:  Complete course of Eliquis as directed    Pain:  Start oxycodone 5 mg every 6 hrs after last dose taken after surgery for 1 day  Transition to oxycodone 5 mg every 6 hours as needed    Showering Instructions:   Do not shower until 5 days from date of surgery    Dressing Instructions: May remove dressing 5 days from date of surgery or may leave dressing in place until follow up office visit 2 weeks from surgery date  Keep dressing/incision clean, dry and intact until follow up appointment  Driving Instructions:  No driving until cleared by Orthopaedic Surgery  PT/OT:  Continue PT/OT on outpatient basis as directed    Appt Instructions: If you do not have your appointment, please call the clinic at [phone]  Otherwise followup as scheduled    Contact the office sooner if you experience any increased numbness/tingling in the extremities

## 2022-06-20 NOTE — PLAN OF CARE
Problem: PHYSICAL THERAPY ADULT  Goal: Performs mobility at highest level of function for planned discharge setting  See evaluation for individualized goals  Description: Treatment/Interventions: LE strengthening/ROM, Functional transfer training, Therapeutic exercise, Endurance training, Patient/family training, Equipment eval/education, Bed mobility, Gait training          See flowsheet documentation for full assessment, interventions and recommendations  Note: Prognosis: Fair  Problem List: Decreased strength, Decreased range of motion, Decreased endurance, Impaired balance, Decreased mobility, Decreased safety awareness, Orthopedic restrictions, Pain  Assessment: Pt presents secondary to failed conservative treatment of left hip DJD for left anterior total hip arthroplasty  Pt with leg length discrepancy (left shorter than right lower extremity)  Dx: left hip DJD  6/20/22 left anterior GERTRUDE  order placed for PT eval and tx, w/ activity order of FWB L LE and anterior hip precautions  pt presents w/ comorbidities of hyperlipidemia, HTN, DM, chronic low back pain, arthritis, and asthma and personal factors of mobilizing w/ assistive device, preferred language not English (language barrier) and anxiety  pt presents w/ pain, weakness, decreased ROM, decreased endurance, impaired balance, gait deviations, decreased safety awareness, orthopedic restrictions and fall risk  these impairments are evident in findings from physical examination (weakness and decreased ROM), mobility assessment (need for standby to min assist w/ all phases of mobility when usually mobilizing independently, tolerance to only 20 feet of ambulation and need for cueing for mobility technique), and Barthel Index: 65/100  pt needed input for mobility technique  pt is at risk for falls due to physical and safety awareness deficits   pt's clinical presentation is unstable/unpredictable (evident in poor blood pressure control, need for assist w/ all phases of mobility when usually mobilizing independently, tolerance to only 20 feet of ambulation, pain impacting overall mobility status and need for input for mobility technique/safety)  pt needs inpatient PT tx to improve mobility deficits and progress mobility training as appropriate  discharge recommendation is for home w/ family support and outpatient PT to reduce fall risk and maximize level of functional independence  PT Discharge Recommendation: Home with outpatient rehabilitation          See flowsheet documentation for full assessment

## 2022-06-21 NOTE — PROGRESS NOTES
Pt Daughter in law here to pick her up  Given all discharge instructions  Family translated and verbalized understanding  Dr Benton Michael contacted due to meds never making it to the pharmacy  Pharmacy called and there were no meds ordered for the patient  Dr Benton Michael notified and will place order from home  Pt daughter aware

## 2022-06-23 ENCOUNTER — OFFICE VISIT (OUTPATIENT)
Dept: PHYSICAL THERAPY | Facility: MEDICAL CENTER | Age: 62
End: 2022-06-23
Payer: MEDICARE

## 2022-06-23 ENCOUNTER — TELEPHONE (OUTPATIENT)
Dept: OBGYN CLINIC | Facility: HOSPITAL | Age: 62
End: 2022-06-23

## 2022-06-23 VITALS — DIASTOLIC BLOOD PRESSURE: 73 MMHG | SYSTOLIC BLOOD PRESSURE: 118 MMHG

## 2022-06-23 DIAGNOSIS — Z96.642 STATUS POST TOTAL REPLACEMENT OF LEFT HIP: ICD-10-CM

## 2022-06-23 DIAGNOSIS — M16.12 PRIMARY OSTEOARTHRITIS OF LEFT HIP: Primary | ICD-10-CM

## 2022-06-23 PROCEDURE — 97010 HOT OR COLD PACKS THERAPY: CPT

## 2022-06-23 PROCEDURE — 97164 PT RE-EVAL EST PLAN CARE: CPT

## 2022-06-23 PROCEDURE — 97110 THERAPEUTIC EXERCISES: CPT

## 2022-06-23 NOTE — PROGRESS NOTES
PT Re-Evaluation     Today's date: 2022  Patient name: Irina Schwab  : 1960  MRN: [id number]  Referring provider: Debra Crisostomo PA-C  Dx:   Encounter Diagnosis     ICD-10-CM    1  Primary osteoarthritis of left hip  M16 12    2  Status post total replacement of left hip  Z96 642        Start Time: 1450  Stop Time: 1550  Total time in clinic (min): 60 minutes    Assessment  Assessment details: Patient is a 65 y o  female presenting to OP PT s/p post-op L Total hip arthroplasty completed on 22  Pt currently amb with RW, WBAT, appearance of antalgic gait, decrease LLE stance time, forward flexed posture, and Right trunk lean  Pt reports limitations include, difficulty with prolonged standing, walking, stair climbing, and sit to stand transfers  Patient and  are aware of post-op precautions for anterior approach which has also been reviewed today in PT  She demonstrate LLE ROM deficits and LLE strength deficits (formal MMT not completed due to post-op status)  HEP was administered and reviewed during session  Pt would benefit from skilled PT to address aforementioned functional deficits and impairments to improve return to PLOF  Impairments: abnormal gait, abnormal or restricted ROM, impaired balance, impaired physical strength and weight-bearing intolerance    Symptom irritability: moderateUnderstanding of Dx/Px/POC: good   Prognosis: good    Goals  1  Decrease pain by 50% in 4 weeks  2  Increase left lower extremity strength golbally to 4/5 in 6 weeks  3  Increase left hip abductor and external rotator strength strength to 3+/5  6 weeks  4  Pt will demonstrate 20 deg improvement in Left hip flex/abd  to increase tolerance to getting into/out of her chair, and putting on shoes/socks  1  Return to Prior Level of Function in 8 weeks  2  Pt will demonstrate [place] with progressive home exercise program to improve carryover and decrease recurrence of symptoms    3  Pt will demonstrate 20% improvement in FOTO score to increase tolerance to functional activities   4  Pt will demonstrate 4+/5 in LE strength to allow for improved tolerance to prolonged amb/standing in 6-8 weeks      Plan  Planned modality interventions: manual electrical stimulation, TENS, low level laser therapy, cryotherapy, thermotherapy: paraffin bath, ultrasound and unattended electrical stimulation  Planned therapy interventions: balance, balance/weight bearing training, manual therapy, joint mobilization, patient education, muscle pump exercises, neuromuscular re-education, therapeutic activities, therapeutic exercise, strengthening, massage and Tavares taping  Frequency: 2x week  Duration in visits: 16  Duration in weeks: 8  Plan of Care beginning date: 2022  Plan of Care expiration date: 2022  Treatment plan discussed with: patient and family        Subjective Evaluation    History of Present Illness  Date of surgery: 2022  Mechanism of injury: surgery  Mechanism of injury: Pt presents to OP PT s/p GERTRUDE with no complications  Pt reports pain patient had prior to surgery is significantly relieved, however, she has feelings of discomfort typically expected with surgery  She rates pain as an 8/10 today, and notes pain is elevated with prolonged standing and ambulation  Pt reports she has been able to amb up to 100 ft with RW       Work/School: retired,   Home Life: PRIOR TO POST OP: her son cuts the grass, she does cooking and cleaning, but cannot pick things off of the floor, she is not able to shower normally secondary to pain, putting on socks is very painful: Currently restrictions are based of post-op precautions  Hobbies/exercise: walking,   Pain location: anterior groin,   HPI: Over the years her pain has gotten worse and worse,           Not a recurrent problem   Quality of life: good    Pain  Current pain ratin  At best pain ratin  At worst pain ratin  Quality: pressure and discomfort  Relieving factors: ice and medications  Aggravating factors: walking, standing and stair climbing  Progression: improved      Diagnostic Tests  X-ray: abnormal  Treatments  Previous treatment: physical therapy and medication  Current treatment: medication  Patient Goals  Patient goals for therapy: increased strength, independence with ADLs/IADLs, decreased pain, decreased edema, improved balance and increased motion          Objective     Passive Range of Motion   Left Hip   Flexion: 80 degrees with pain  Extension: -30 degrees with pain  Abduction: 30 degrees with pain    Strength/Myotome Testing     Right Hip   Planes of Motion   Flexion: 4  External rotation: 4+  Internal rotation: 4    Additional Strength Details  Right knee 5/5, flex 4+/5  Left knee flex 4-/5, Knee ext 3/5    *Left Hip strength testing not completed due to surgery*    Integumentary assessment:   Dressing placement, edema present, no sig redness noted  Dressing may be removed 5 days post-op per Surgeon DC instructions (6/25/22) or at 2 week follow up with MD Jimbo CASEY PRIOR TO SURGERY  ROM:  Hip flex (0-120): AROM: R: WNL  Knee Ext (0): AROM: R: WNL  Knee Flex (0-130): AROM: R: WNL  Hip ER: AROM (0-45): R: 32  Hip IR: AROM (0-45): R: 19  SLR (0-90): AROM: R: 60      Precautions: S/P GERTRUDE Anterior Approach       Manuals RE 6/23/22            Hip PROM FH                                                   Neuro Re-Ed             Sit to stands             Stairclimbing             Amb with cane                                                    HEP  FH            Ther Ex             Quad sets, HS sets 5sec x10            TKE             Heel slides flexion 5sec x10             SAQ 2x10            Standing SLR flex              HR/TR 2x10            Mini squats             Step ups             Marches              bridges 2x10            Ankle pumps 3x10             Ther Activity                                       Gait Training Modalities             CP 10'

## 2022-06-23 NOTE — TELEPHONE ENCOUNTER
#[id number]    Pt contacted to complete a postoperative follow up call assessment  Contacted patient, no answer no VM option  Attempted patients [facility]- VM left with

## 2022-06-26 DIAGNOSIS — K52.9 CHRONIC DIARRHEA: ICD-10-CM

## 2022-06-26 DIAGNOSIS — K21.9 GASTROESOPHAGEAL REFLUX DISEASE, UNSPECIFIED WHETHER ESOPHAGITIS PRESENT: ICD-10-CM

## 2022-06-26 RX ORDER — CHOLESTYRAMINE 4 G/9G
POWDER, FOR SUSPENSION ORAL
Qty: 180 PACKET | Refills: 1 | Status: SHIPPED | OUTPATIENT
Start: 2022-06-26

## 2022-07-01 ENCOUNTER — APPOINTMENT (OUTPATIENT)
Dept: RADIOLOGY | Facility: AMBULARY SURGERY CENTER | Age: 62
End: 2022-07-01
Payer: MEDICARE

## 2022-07-01 ENCOUNTER — OFFICE VISIT (OUTPATIENT)
Dept: OBGYN CLINIC | Facility: CLINIC | Age: 62
End: 2022-07-01

## 2022-07-01 VITALS
SYSTOLIC BLOOD PRESSURE: 123 MMHG | DIASTOLIC BLOOD PRESSURE: 74 MMHG | WEIGHT: 176 LBS | HEIGHT: 62 IN | HEART RATE: 89 BPM | BODY MASS INDEX: 32.39 KG/M2

## 2022-07-01 DIAGNOSIS — Z96.642 S/P TOTAL LEFT HIP ARTHROPLASTY: Primary | ICD-10-CM

## 2022-07-01 DIAGNOSIS — Z96.642 S/P TOTAL LEFT HIP ARTHROPLASTY: ICD-10-CM

## 2022-07-01 PROCEDURE — 73502 X-RAY EXAM HIP UNI 2-3 VIEWS: CPT

## 2022-07-01 PROCEDURE — 99024 POSTOP FOLLOW-UP VISIT: CPT | Performed by: PHYSICIAN ASSISTANT

## 2022-07-01 NOTE — PROGRESS NOTES
65 y o female presents for 2 weeks postoperative visit status post left  Anterior GERTRUDE  Patient states she is doing well  Patient states having some numbness surrounding her incision region she denies any significant pain patient is able to ambulate assistance of walker  Patient is taking Eliquis for DVT prophylaxis denies any calf pain chest pain shortness of breath numbness tingling  Review of Systems  Review of systems negative unless otherwise specified in HPI    Past Medical History  Past Medical History:   Diagnosis Date    Acquired hammer toe of right foot 03/15/2018    S/p surg    Arthritis     Asthma     "one time with a cold, and used an inhaler"    Chronic pain disorder     hip pain left, and back    Diabetes mellitus ([address] )     Eczema     Enlarged liver     GERD (gastroesophageal reflux disease)     History of panic attacks     Hyperlipidemia     Hypertension     Labral tear of shoulder 09/24/2014    Right foot pain     bunionectomy today 3/1/2019    Rotator cuff syndrome, right 08/16/2017    Wears glasses        Past Surgical History  Past Surgical History:   Procedure Laterality Date    CHOLECYSTECTOMY      "had a panic attack"    FOOT SURGERY Right     HERNIA REPAIR      OVARY SURGERY      PA COLONOSCOPY FLX DX W/COLLJ SPEC WHEN PFRMD N/A 12/22/2016    Procedure: EGD AND COLONOSCOPY;  Surgeon: Gold Pelletier MD;  Location: [facility];   Service: Gastroenterology    PA [place] W/SESMDC W/2 OSTEOT Right 3/1/2019    Procedure: DOUBLE OSTEOTOMY, ZINA/AKIN BUNIONECTOMY;  Surgeon: Willam Sherman DPM;  Location: [facility];  Service: Podiatry    PA TOTAL HIP ARTHROPLASTY Left 6/20/2022    Procedure: ARTHROPLASTY HIP TOTAL ANTERIOR;  Surgeon: Barry Marie MD;  Location:  Main OR;  Service: Orthopedics    SKIN BIOPSY         Current Medications  Current Outpatient Medications on File Prior to Visit   Medication Sig Dispense Refill    acetaminophen (TYLENOL) 650 mg CR tablet Take 1 tablet (650 mg total) by mouth every 8 (eight) hours as needed for mild pain 30 tablet 0    albuterol (PROVENTIL HFA,VENTOLIN HFA) 90 mcg/act inhaler TAKE 2 PUFFS BY MOUTH EVERY 6 HOURS AS NEEDED FOR WHEEZE OR FOR SHORTNESS OF BREATH 18 g 1    ALPRAZolam (XANAX) 0 5 mg tablet Take 1 tablet (0 5 mg total) by mouth once as needed for anxiety for up to 1 dose 4 tablet 0    apixaban (Eliquis) 5 mg Take 1 tablet (5 mg total) by mouth 2 (two) times a day for 28 days Do not start taking medication until after total joint arthroplasty 56 tablet 0    ascorbic acid (VITAMIN C) 500 MG tablet Take 1 tablet (500 mg total) by mouth daily 60 tablet 0    Blood Glucose Monitoring Suppl (Contour Next Monitor) w/Device KIT Use 1 each daily Check blood sugar 2 times day 1 kit 0    cholestyramine (QUESTRAN) 4 g packet TAKE 1 PACKET BY MOUTH 2 TIMES A DAY WITH MEALS  180 packet 1    diclofenac (VOLTAREN) 75 mg EC tablet Take 1 tablet (75 mg total) by mouth 2 (two) times a day 60 tablet 2    Diclofenac Sodium (VOLTAREN) 1 % Apply 2 g topically 4 (four) times a day 100 g 5    docusate sodium (COLACE) 100 mg capsule Take 1 capsule (100 mg total) by mouth 3 (three) times a day as needed for constipation 40 capsule 1    DULoxetine (CYMBALTA) 60 mg delayed release capsule Take 1 capsule (60 mg total) by mouth daily 90 capsule 1    ferrous sulfate 324 (65 Fe) mg TAKE 1 TABLET BY MOUTH 2 TIMES A DAY BEFORE MEALS  367 tablet 1    folic acid (FOLVITE) 1 mg tablet TAKE 1 TABLET BY MOUTH EVERY DAY 90 tablet 1    glucose blood (Contour Next Test) test strip Check blood sugar 2 times day 100 each 5    glucose blood (FREESTYLE LITE) test strip Use once a day 100 each 5    losartan (COZAAR) 50 mg tablet Take 1 tablet (50 mg total) by mouth daily 90 tablet 1    metFORMIN (GLUCOPHAGE) 1000 MG tablet Take 1 tablet (1,000 mg total) by mouth 2 (two) times a day with meals 180 tablet 3    Microlet Lancets MISC Use 2 (two) times a day 100 each 5    Multiple Vitamin (multivitamin) capsule Take 1 capsule by mouth daily      omeprazole (PriLOSEC) 40 MG capsule Take 1 capsule (40 mg total) by mouth daily before breakfast 30 capsule 5    oxyCODONE (ROXICODONE) 5 immediate release tablet Take 1 tablet (5 mg total) by mouth every 6 (six) hours as needed for moderate pain Max Daily Amount: 20 mg 30 tablet 0    simvastatin (ZOCOR) 20 mg tablet Take 1 tablet (20 mg total) by mouth daily at bedtime 90 tablet 1    triamcinolone (KENALOG) 0 025 % ointment Apply topically 2 (two) times a day 30 g 1    acetaminophen (TYLENOL) 500 mg tablet Take 1 tablet (500 mg total) by mouth every 6 (six) hours as needed for mild pain (Patient not taking: Reported on 7/1/2022) 30 tablet 0     No current facility-administered medications on file prior to visit         Recent Labs (HCT,HGB,PT,INR,ESR,CRP,GLU,HgA1C)  0   Lab Value Date/Time    HCT 39 0 06/06/2022 1120    HCT 37 8 09/12/2016 1155    HGB 12 0 06/06/2022 1120    HGB 12 4 09/12/2016 1155    WBC 4 64 06/06/2022 1120    WBC 9 5 09/12/2016 1155    INR 0 89 06/06/2022 1120    HGBA1C 7 0 (H) 06/06/2022 1120    HGBA1C 8 2 (H) 11/03/2018 1022         Physical exam  · General: Awake, Alert, Oriented  · Eyes: Pupils equal, round and reactive to light  · Heart: regular rate and rhythm  · Lungs: No audible wheezing    left Lower extremity  · Examination patient's left lower extremity incisions well healing no erythema active hip flexion to 90° no pain with internal-external rotation active knee extension active ankle dorsiflexion without pain calf nontender palpation bilaterally    Imaging  X-rays of the left hip reviewed x-rays reveal well reduced left total hip arthroplasty without evidence of loosening    Assessment:   Status post 2 weeks left anterior total hip arthroplasty   Plan:  Weightbearing as tolerated left lower extremity   Anterior total hip precautions   Patient may continue Eliquis for DVT prophylaxis   Physical therapy range of motion stretching strengthening exercise   Follow-up in 2 months time repeat x-rays left hip

## 2022-07-06 ENCOUNTER — OFFICE VISIT (OUTPATIENT)
Dept: PHYSICAL THERAPY | Facility: MEDICAL CENTER | Age: 62
End: 2022-07-06
Payer: MEDICARE

## 2022-07-06 DIAGNOSIS — Z01.818 PREOP TESTING: ICD-10-CM

## 2022-07-06 DIAGNOSIS — Z96.642 STATUS POST TOTAL REPLACEMENT OF LEFT HIP: ICD-10-CM

## 2022-07-06 DIAGNOSIS — M16.12 PRIMARY OSTEOARTHRITIS OF LEFT HIP: Primary | ICD-10-CM

## 2022-07-06 PROCEDURE — 97112 NEUROMUSCULAR REEDUCATION: CPT | Performed by: PHYSICAL MEDICINE & REHABILITATION

## 2022-07-06 PROCEDURE — 97110 THERAPEUTIC EXERCISES: CPT | Performed by: PHYSICAL MEDICINE & REHABILITATION

## 2022-07-06 NOTE — PROGRESS NOTES
Daily Note     Today's date: 2022  Patient name: Amada Peck  : 1960  MRN: [id number]  Referring provider: Denia Lazar PA-C  Dx:   Encounter Diagnosis     ICD-10-CM    1  Primary osteoarthritis of left hip  M16 12    2  Status post total replacement of left hip  Z96 642    3  Preop testing  Z01 818                   Subjective: Thomas Chavez reported "I am doing the exercises at home, I am feeling much better now than before surgery "      Objective: See treatment diary below      Assessment: Tolerated treatment well  Patient would benefit from continued PT  Thomas Chavez was able to progress her therapy program as seen below  She denied an increase in pain however, she did report notable fatigue  Plan: Continue per plan of care        Precautions: S/P GERTRUDE Anterior Approach       Manuals RE 22           Hip PROM FH                                                   Neuro Re-Ed             Sit to stands  3x10           Stairclimbing  3x10           Amb with cane             Side Steps  3x10                                     HEP  FH            Ther Ex             Quad sets, HS sets 5sec x10            Heel Raises/Calf Stretch  3x10 4x30"           Heel slides flexion 5sec x10             SAQ 2x10            Standing SLR flex   3x10           HR/TR 2x10            Mini squats             Step ups  3x10           Marches  3x10            bridges 2x10 3x10           Ankle pumps 3x10  X           Ther Activity                                       Gait Training                                       Modalities             CP 10'

## 2022-07-08 ENCOUNTER — OFFICE VISIT (OUTPATIENT)
Dept: PHYSICAL THERAPY | Facility: MEDICAL CENTER | Age: 62
End: 2022-07-08
Payer: MEDICARE

## 2022-07-08 DIAGNOSIS — M16.12 PRIMARY OSTEOARTHRITIS OF LEFT HIP: Primary | ICD-10-CM

## 2022-07-08 DIAGNOSIS — Z96.642 STATUS POST TOTAL REPLACEMENT OF LEFT HIP: ICD-10-CM

## 2022-07-08 PROCEDURE — 97112 NEUROMUSCULAR REEDUCATION: CPT | Performed by: PHYSICAL MEDICINE & REHABILITATION

## 2022-07-08 PROCEDURE — 97110 THERAPEUTIC EXERCISES: CPT | Performed by: PHYSICAL MEDICINE & REHABILITATION

## 2022-07-08 NOTE — PROGRESS NOTES
Daily Note     Today's date: 2022  Patient name: Isai Soliman  : 1960  MRN: [id number]  Referring provider: Alicia Smith PA-C  Dx:   Encounter Diagnosis     ICD-10-CM    1  Primary osteoarthritis of left hip  M16 12    2  Status post total replacement of left hip  Z96 642                   Subjective: Lia Osullivan reported "      Objective: See treatment diary below      Assessment: Tolerated treatment well  Patient would benefit from continued PT  Lia Osullivan was not able to perform supine SLRs during today's session  Additional marching was added to her HEP which shows progress towards her goals  Next session progress her balance exercises  Plan: Continue per plan of care        Precautions: S/P GERTRUDE Anterior Approach       Manuals RE 22          Hip PROM FH  JR                                                 Neuro Re-Ed             Sit to stands  3x10 3x10          Stairclimbing  3x10 3x10          Amb with cane             Side Steps  3x10 3x10                                    HEP  FH            Ther Ex             Quad sets, HS sets 5sec x10            Heel Raises/Calf Stretch  3x10 4x30" 3x10 4x30"          Heel slides flexion 5sec x10             SAQ 2x10            Standing SLR flex   3x10 3x10          HR/TR 2x10            Mini squats             Step ups  3x10 3x10          Marches  3x10 3x10          Bridges 2x10 3x10 10# 3x10          Ankle pumps 3x10  X           Ther Activity                                       Gait Training                                       Modalities             CP 10'

## 2022-07-12 ENCOUNTER — OFFICE VISIT (OUTPATIENT)
Dept: PHYSICAL THERAPY | Facility: MEDICAL CENTER | Age: 62
End: 2022-07-12
Payer: MEDICARE

## 2022-07-12 DIAGNOSIS — M16.12 PRIMARY OSTEOARTHRITIS OF LEFT HIP: Primary | ICD-10-CM

## 2022-07-12 DIAGNOSIS — Z96.642 STATUS POST TOTAL REPLACEMENT OF LEFT HIP: ICD-10-CM

## 2022-07-12 PROCEDURE — 97112 NEUROMUSCULAR REEDUCATION: CPT | Performed by: PHYSICAL MEDICINE & REHABILITATION

## 2022-07-12 PROCEDURE — 97110 THERAPEUTIC EXERCISES: CPT | Performed by: PHYSICAL MEDICINE & REHABILITATION

## 2022-07-12 NOTE — PROGRESS NOTES
Daily Note     Today's date: 2022  Patient name: Bishop Amato  : 1960  MRN: [id number]  Referring provider: Raymond Neumann PA-C  Dx:   Encounter Diagnosis     ICD-10-CM    1  Primary osteoarthritis of left hip  M16 12    2  Status post total replacement of left hip  Z96 642                   Subjective: Georges Kiser reported "I am doing okay, getting stronger "      Objective: See treatment diary below      Assessment: Tolerated treatment well  Patient would benefit from continued PT  Georges Kiser is not yet able to do supine SLRs, however, she is demonstrating increased control with her other exercises  Georges Kiser was able to progress her resistance as seen below  Plan: Continue per plan of care        Precautions: S/P GERTRUDE Anterior Approach       Manuals RE 22         Hip PROM FH  JR JR                                                Neuro Re-Ed             Sit to stands  3x10 3x10 3x10         Stairclimbing  3x10 3x10 3x10         Amb with cane             Side Steps  3x10 3x10 3x10         Step Ups    3x10                      HEP  FH            Ther Ex             Quad sets, HS sets 5sec x10            Heel Raises/Calf Stretch  3x10 4x30" 3x10 4x30" 3x10 4x30"         Heel slides flexion 5sec x10   X          SAQ 2x10            Standing SLR flex   3x10 3x10 3x10         HR/TR 2x10   3x10         Mini squats    1x10         Step ups  3x10 3x10          Marches  3x10 3x10 2# 3x10         Bridges 2x10 3x10 10# 3x10 20# 3x10         Ankle pumps 3x10  X           Standing Hip flexor stretch    4x30"                                   Gait Training                                       Modalities             CP 10'

## 2022-07-14 ENCOUNTER — APPOINTMENT (OUTPATIENT)
Dept: PHYSICAL THERAPY | Facility: MEDICAL CENTER | Age: 62
End: 2022-07-14
Payer: MEDICARE

## 2022-07-19 ENCOUNTER — OFFICE VISIT (OUTPATIENT)
Dept: PHYSICAL THERAPY | Facility: MEDICAL CENTER | Age: 62
End: 2022-07-19
Payer: MEDICARE

## 2022-07-19 DIAGNOSIS — M16.12 PRIMARY OSTEOARTHRITIS OF LEFT HIP: Primary | ICD-10-CM

## 2022-07-19 DIAGNOSIS — Z96.642 STATUS POST TOTAL REPLACEMENT OF LEFT HIP: ICD-10-CM

## 2022-07-19 PROCEDURE — 97110 THERAPEUTIC EXERCISES: CPT | Performed by: PHYSICAL MEDICINE & REHABILITATION

## 2022-07-19 PROCEDURE — 97112 NEUROMUSCULAR REEDUCATION: CPT | Performed by: PHYSICAL MEDICINE & REHABILITATION

## 2022-07-19 NOTE — PROGRESS NOTES
Daily Note     Today's date: 2022  Patient name: Sha Farris  : 1960  MRN: [id number]  Referring provider: Marivel Iqbal PA-C  Dx:   Encounter Diagnosis     ICD-10-CM    1  Primary osteoarthritis of left hip  M16 12    2  Status post total replacement of left hip  Z96 642                   Subjective: Delgado Henley reported "I am doing well, I am able to walk around the house without my walker "      Objective: See treatment diary below      Assessment: Tolerated treatment well  Patient would benefit from continued PT  Delgado Henley was able to progress her therapy program as seen below  This shows progress towards her goals  She denied an increase in pain, however, she did demonstrate difficulty with assisted SLRs  Plan: Continue per plan of care        Precautions: S/P GERTRUDE Anterior Approach       Manuals RE 22        Hip PROM FH  JR JR JR                                               Neuro Re-Ed             Sit to stands  3x10 3x10 3x10 3x10        Stairclimbing  3x10 3x10 3x10 3x10        Amb with cane             Side Steps  3x10 3x10 3x10 3x10        Step Ups    3x10 3x10        Assisted SLRs     3x10        HEP  FH            Ther Ex             Quad sets, HS sets 5sec x10            Heel Raises/Calf Stretch  3x10 4x30" 3x10 4x30" 3x10 4x30" 3x10 4x30"        Heel slides flexion 5sec x10   X          SAQ 2x10            Standing SLR flex   3x10 3x10 3x10 3x10        HR/TR 2x10   3x10 3x10        Mini squats    1x10 1x10        Step ups  3x10 3x10          Marches  3x10 3x10 2# 3x10 2# 3x10        Bridges 2x10 3x10 10# 3x10 20# 3x10 20# 3x10        Ankle pumps 3x10  X           Standing Hip flexor stretch    4x30" 4x30"                                  Gait Training                                       Modalities             CP 10'

## 2022-07-21 ENCOUNTER — OFFICE VISIT (OUTPATIENT)
Dept: PHYSICAL THERAPY | Facility: MEDICAL CENTER | Age: 62
End: 2022-07-21
Payer: MEDICARE

## 2022-07-21 DIAGNOSIS — Z96.642 STATUS POST TOTAL REPLACEMENT OF LEFT HIP: ICD-10-CM

## 2022-07-21 DIAGNOSIS — M16.12 PRIMARY OSTEOARTHRITIS OF LEFT HIP: Primary | ICD-10-CM

## 2022-07-21 PROCEDURE — 97110 THERAPEUTIC EXERCISES: CPT | Performed by: PHYSICAL MEDICINE & REHABILITATION

## 2022-07-21 PROCEDURE — 97112 NEUROMUSCULAR REEDUCATION: CPT | Performed by: PHYSICAL MEDICINE & REHABILITATION

## 2022-07-21 NOTE — PROGRESS NOTES
Daily Note     Today's date: 2022  Patient name: Philip Ball  : 1960  MRN: [id number]  Referring provider: Christiana Tamayo PA-C  Dx:   Encounter Diagnosis     ICD-10-CM    1  Primary osteoarthritis of left hip  M16 12    2  Status post total replacement of left hip  Z96 642                   Subjective: Liz Davidson reported "I am doing better, I can walk more easily "      Objective: See treatment diary below      Assessment: Tolerated treatment well  Patient would benefit from continued PT  Liz Davidson was able to perform her SLRs without assistance during today's session which shows progress towards her goals  She continues to need to work on her gait  Heel to toe gait pattern is needed  Plan: Continue per plan of care        Precautions: S/P GERTRUDE Anterior Approach       Manuals RE 22       Hip PROM FH  JR JR JR JR                                              Neuro Re-Ed             Sit to stands  3x10 3x10 3x10 3x10 3x14       Stairclimbing  3x10 3x10 3x10 3x10 3x14       Amb with cane             Side Steps  3x10 3x10 3x10 3x10 3x14       Step Ups    3x10 3x10 3x14       Assisted SLRs     3x10 SLRs 3x8       HEP  FH            Ther Ex             Quad sets, HS sets 5sec x10            Heel Raises/Calf Stretch  3x10 4x30" 3x10 4x30" 3x10 4x30" 3x10 4x30" 3x10 4x30"       Heel slides flexion 5sec x10   X          SAQ 2x10            Standing SLR flex   3x10 3x10 3x10 3x10 3x10       HR/TR 2x10   3x10 3x10 3x10       Mini squats    1x10 1x10        Step ups  3x10 3x10          Marches  3x10 3x10 2# 3x10 2# 3x10 2# 3x10       Bridges 2x10 3x10 10# 3x10 20# 3x10 20# 3x10 20# 3x10       Ankle pumps 3x10  X           Standing Hip flexor stretch    4x30" 4x30" 4x30"                                 Gait Training                                       Modalities             CP 10'

## 2022-07-26 ENCOUNTER — OFFICE VISIT (OUTPATIENT)
Dept: PHYSICAL THERAPY | Facility: MEDICAL CENTER | Age: 62
End: 2022-07-26
Payer: MEDICARE

## 2022-07-26 DIAGNOSIS — Z96.642 STATUS POST TOTAL REPLACEMENT OF LEFT HIP: ICD-10-CM

## 2022-07-26 DIAGNOSIS — M16.12 PRIMARY OSTEOARTHRITIS OF LEFT HIP: Primary | ICD-10-CM

## 2022-07-26 PROCEDURE — 97112 NEUROMUSCULAR REEDUCATION: CPT | Performed by: PHYSICAL MEDICINE & REHABILITATION

## 2022-07-26 PROCEDURE — 97110 THERAPEUTIC EXERCISES: CPT | Performed by: PHYSICAL MEDICINE & REHABILITATION

## 2022-07-26 NOTE — PROGRESS NOTES
Daily Note     Today's date: 2022  Patient name: Augustus Lord  : 1960  MRN: [id number]  Referring provider: Romel Man PA-C  Dx:   Encounter Diagnosis     ICD-10-CM    1  Primary osteoarthritis of left hip  M16 12    2  Status post total replacement of left hip  Z96 642                   Subjective: Michael Castro reported "I am walking much better "      Objective: See treatment diary below      Assessment: Tolerated treatment well  Patient would benefit from continued PT  Michael Castro used no gait assistance devices during today's session  She was also able perform SLRs without any assistance which shows progress towards her goals  Plan: Continue per plan of care        Precautions: S/P GERTRUDE Anterior Approach       Manuals RE 22      Hip PROM [address] JR                                             Neuro Re-Ed             Sit to stands  3x10 3x10 3x10 3x10 3x14 3x15      Stairclimbing  3x10 3x10 3x10 3x10 3x14 3x15      Amb with cane             Side Steps  3x10 3x10 3x10 3x10 3x14 3x14      Step Ups    3x10 3x10 3x14       Assisted SLRs     3x10 SLRs 3x8 3x10      HEP  FH            Ther Ex             Quad sets, HS sets 5sec x10            Heel Raises/Calf Stretch  3x10 4x30" 3x10 4x30" 3x10 4x30" 3x10 4x30" 3x10 4x30" 3x15 4x30"      Heel slides flexion 5sec x10   X          SAQ 2x10            Standing SLR flex   3x10 3x10 3x10 3x10 3x10 XXX      HR/TR 2x10   3x10 3x10 3x10 XXX      Mini squats    1x10 1x10  XXX      Step ups  3x10 3x10          Marches  3x10 3x10 2# 3x10 2# 3x10 2# 3x10 4# 3x10      Bridges 2x10 3x10 10# 3x10 20# 3x10 20# 3x10 20# 3x10 25# 3x10      Ankle pumps 3x10  X           Standing Hip flexor stretch    4x30" 4x30" 4x30" 4x30"                                Gait Training                                       Modalities             CP 10'

## 2022-07-28 ENCOUNTER — OFFICE VISIT (OUTPATIENT)
Dept: PHYSICAL THERAPY | Facility: MEDICAL CENTER | Age: 62
End: 2022-07-28
Payer: MEDICARE

## 2022-07-28 DIAGNOSIS — Z96.642 STATUS POST TOTAL REPLACEMENT OF LEFT HIP: ICD-10-CM

## 2022-07-28 DIAGNOSIS — M16.12 PRIMARY OSTEOARTHRITIS OF LEFT HIP: Primary | ICD-10-CM

## 2022-07-28 PROCEDURE — 97110 THERAPEUTIC EXERCISES: CPT | Performed by: PHYSICAL MEDICINE & REHABILITATION

## 2022-07-28 PROCEDURE — 97140 MANUAL THERAPY 1/> REGIONS: CPT | Performed by: PHYSICAL MEDICINE & REHABILITATION

## 2022-07-28 PROCEDURE — 97112 NEUROMUSCULAR REEDUCATION: CPT | Performed by: PHYSICAL MEDICINE & REHABILITATION

## 2022-07-28 NOTE — PROGRESS NOTES
Daily Note     Today's date: 2022  Patient name: Jose L Rodriguez  : 1960  MRN: [id number]  Referring provider: Judy Beth PA-C  Dx:   Encounter Diagnosis     ICD-10-CM    1  Primary osteoarthritis of left hip  M16 12    2  Status post total replacement of left hip  Z96 642                   Subjective: Vishal Terry reported "I am doing better, I am walking better "      Objective: See treatment diary below      Assessment: Tolerated treatment well  Patient would benefit from continued PT  Vishal Terry demonstrated notable deficit in hip extension so additional manual therapy was performed to increase ROM  Plan: Continue per plan of care        Precautions: S/P GERTRUDE Anterior Approach       Manuals RE 22     Hip PROM [address] JR                                            Neuro Re-Ed             Sit to stands  3x10 3x10 3x10 3x10 3x14 3x15 7s# 3x10     Stairclimbing  3x10 3x10 3x10 3x10 3x14 3x15 3x15     Amb with cane             Side Steps  3x10 3x10 3x10 3x10 3x14 3x14 3x14     Step Ups    3x10 3x10 3x14       Assisted SLRs     3x10 SLRs 3x8 3x10 3x10     HEP  FH            Ther Ex             Quad sets, HS sets 5sec x10            Heel Raises/Calf Stretch  3x10 4x30" 3x10 4x30" 3x10 4x30" 3x10 4x30" 3x10 4x30" 3x15 4x30" 3x15 4x30"     Heel slides flexion 5sec x10   X          SAQ 2x10            Standing SLR flex   3x10 3x10 3x10 3x10 3x10 XXX      HR/TR 2x10   3x10 3x10 3x10 XXX      Mini squats    1x10 1x10  XXX      Step ups  3x10 3x10          Marches  3x10 3x10 2# 3x10 2# 3x10 2# 3x10 4# 3x10 4# 3x10     Bridges 2x10 3x10 10# 3x10 20# 3x10 20# 3x10 20# 3x10 25# 3x10 25# 3x10     Ankle pumps 3x10  X           Standing Hip flexor stretch    4x30" 4x30" 4x30" 4x30" 4x30"                               Gait Training                                       Modalities             CP 10'

## 2022-08-01 ENCOUNTER — OFFICE VISIT (OUTPATIENT)
Dept: PHYSICAL THERAPY | Facility: MEDICAL CENTER | Age: 62
End: 2022-08-01
Payer: MEDICARE

## 2022-08-01 DIAGNOSIS — M16.12 PRIMARY OSTEOARTHRITIS OF LEFT HIP: Primary | ICD-10-CM

## 2022-08-01 DIAGNOSIS — Z96.642 STATUS POST TOTAL REPLACEMENT OF LEFT HIP: ICD-10-CM

## 2022-08-01 PROCEDURE — 97112 NEUROMUSCULAR REEDUCATION: CPT | Performed by: PHYSICAL MEDICINE & REHABILITATION

## 2022-08-01 PROCEDURE — 97110 THERAPEUTIC EXERCISES: CPT | Performed by: PHYSICAL MEDICINE & REHABILITATION

## 2022-08-01 NOTE — PROGRESS NOTES
Daily Note     Today's date: 2022  Patient name: Suyapa Raines  : 1960  MRN: [id number]  Referring provider: Kvng Fernandez PA-C  Dx:   Encounter Diagnosis     ICD-10-CM    1  Primary osteoarthritis of left hip  M16 12    2  Status post total replacement of left hip  Z96 642                   Subjective: Landon Primrose reported "I am doing good "      Objective: See treatment diary below      Assessment: Tolerated treatment well  Patient would benefit from continued PT  Landon Primrose was able to progress her resistance as seen below, this shows progress towards her goals  She denied an increase in pain throughout the session  She continues to demonstrated improved gait  However, her stride length continues to be limited  Plan: Continue per plan of care        Precautions: S/P GERTRUDE Anterior Approach       Manuals RE 22    Hip PROM [address] [facility]                                           Neuro Re-Ed             Sit to stands  3x10 3x10 3x10 3x10 3x14 3x15 7s# 3x10 10s# 3x10    Stairclimbing  3x10 3x10 3x10 3x10 3x14 3x15 3x15 3x15    Amb with cane             Side Steps  3x10 3x10 3x10 3x10 3x14 3x14 3x14 3x14    Step Ups    3x10 3x10 3x14       Assisted SLRs     3x10 SLRs 3x8 3x10 3x10 3x12    HEP  FH            Ther Ex             Quad sets, HS sets 5sec x10            Heel Raises/Calf Stretch  3x10 4x30" 3x10 4x30" 3x10 4x30" 3x10 4x30" 3x10 4x30" 3x15 4x30" 3x15 4x30" 3x15 4x30"    Heel slides flexion 5sec x10   X          SAQ 2x10            Standing SLR flex   3x10 3x10 3x10 3x10 3x10 XXX      HR/TR 2x10   3x10 3x10 3x10 XXX      Mini squats    1x10 1x10  XXX      Step ups  3x10 3x10          Marches  3x10 3x10 2# 3x10 2# 3x10 2# 3x10 4# 3x10 4# 3x10 4# 3x10    Bridges 2x10 3x10 10# 3x10 20# 3x10 20# 3x10 20# 3x10 25# 3x10 25# 3x10 25# 3x10    Ankle pumps 3x10  X           Standing Hip flexor stretch    4x30" 4x30" 4x30" 4x30" 4x30" 4x30"                              Gait Training             Two laps         2x                 Modalities             CP 10'

## 2022-08-05 ENCOUNTER — OFFICE VISIT (OUTPATIENT)
Dept: PHYSICAL THERAPY | Facility: MEDICAL CENTER | Age: 62
End: 2022-08-05
Payer: MEDICARE

## 2022-08-05 DIAGNOSIS — Z96.642 STATUS POST TOTAL REPLACEMENT OF LEFT HIP: ICD-10-CM

## 2022-08-05 DIAGNOSIS — M16.12 PRIMARY OSTEOARTHRITIS OF LEFT HIP: Primary | ICD-10-CM

## 2022-08-05 PROCEDURE — 97112 NEUROMUSCULAR REEDUCATION: CPT | Performed by: PHYSICAL MEDICINE & REHABILITATION

## 2022-08-05 PROCEDURE — 97110 THERAPEUTIC EXERCISES: CPT | Performed by: PHYSICAL MEDICINE & REHABILITATION

## 2022-08-05 NOTE — PROGRESS NOTES
Daily Note     Today's date: 2022  Patient name: Artrajiv Knowles  : 1960  MRN: [id number]  Referring provider: Dayan Vang PA-C  Dx:   Encounter Diagnosis     ICD-10-CM    1  Primary osteoarthritis of left hip  M16 12    2  Status post total replacement of left hip  Z96 642                   Subjective: Aleksander Parker reported "I am doing well, a little better "      Objective: See treatment diary below      Assessment: Tolerated treatment well  Patient would benefit from continued PT  Aleksander Parker was able to progress her resistance as seen below  This shows progress towards her goals  She denied pain throughout the session  Plan: Continue per plan of care        Precautions: S/P GERTRUDE Anterior Approach       Manuals RE 22   Hip PROM [address]                                          Neuro Re-Ed             Sit to stands  3x10 3x10 3x10 3x10 3x14 3x15 7s# 3x10 10s# 3x10 15s# 3x10   Stairclimbing  3x10 3x10 3x10 3x10 3x14 3x15 3x15 3x15 3x15   Amb with cane             Side Steps  3x10 3x10 3x10 3x10 3x14 3x14 3x14 3x14 3x14   Step Ups    3x10 3x10 3x14       Assisted SLRs     3x10 SLRs 3x8 3x10 3x10 3x12 1# 3x12   HEP  FH            Ther Ex             Quad sets, HS sets 5sec x10            Heel Raises/Calf Stretch  3x10 4x30" 3x10 4x30" 3x10 4x30" 3x10 4x30" 3x10 4x30" 3x15 4x30" 3x15 4x30" 3x15 4x30" 3x15 4x30"   Heel slides flexion 5sec x10   X          SAQ 2x10            Standing SLR flex   3x10 3x10 3x10 3x10 3x10 XXX      HR/TR 2x10   3x10 3x10 3x10 XXX      Mini squats    1x10 1x10  XXX      Step ups  3x10 3x10          Marches  3x10 3x10 2# 3x10 2# 3x10 2# 3x10 4# 3x10 4# 3x10 4# 3x10 5# 3x10   Bridges 2x10 3x10 10# 3x10 20# 3x10 20# 3x10 20# 3x10 25# 3x10 25# 3x10 25# 3x10 25# 3x10   Ankle pumps 3x10  X           Standing Hip flexor stretch    4x30" 4x30" 4x30" 4x30" 4x30" 4x30" 4x30" Gait Training             Two laps         2x                 Modalities             CP 10'

## 2022-08-09 ENCOUNTER — OFFICE VISIT (OUTPATIENT)
Dept: PHYSICAL THERAPY | Facility: MEDICAL CENTER | Age: 62
End: 2022-08-09
Payer: MEDICARE

## 2022-08-09 DIAGNOSIS — Z96.642 STATUS POST TOTAL REPLACEMENT OF LEFT HIP: ICD-10-CM

## 2022-08-09 DIAGNOSIS — M16.12 PRIMARY OSTEOARTHRITIS OF LEFT HIP: Primary | ICD-10-CM

## 2022-08-09 PROCEDURE — 97112 NEUROMUSCULAR REEDUCATION: CPT | Performed by: PHYSICAL MEDICINE & REHABILITATION

## 2022-08-09 PROCEDURE — 97110 THERAPEUTIC EXERCISES: CPT | Performed by: PHYSICAL MEDICINE & REHABILITATION

## 2022-08-09 NOTE — PROGRESS NOTES
Daily Note     Today's date: 2022  Patient name: Dima Romo  : 1960  MRN: [id number]  Referring provider: Robin Donahue PA-C  Dx:   Encounter Diagnosis     ICD-10-CM    1  Primary osteoarthritis of left hip  M16 12    2  Status post total replacement of left hip  Z96 642                   Subjective: Niles Collins reported "I am doing well "      Objective: See treatment diary below      Assessment: Tolerated treatment well  Patient would benefit from continued PT  Niles Collins was able to progress her balance training which shows progress towards her goals  She was also able to progress to lunges, which she reported notable fatigue during  Plan: Continue per plan of care        Precautions: S/P GERTRUDE Anterior Approach       Manuals 2022   Hip PROM [address]                                          Neuro Re-Ed             Sit to stands 3x12  3x10 3x10 3x10 3x14 3x15 7s# 3x10 10s# 3x10 15s# 3x10   Stairclimbing 3x15  3x10 3x10 3x10 3x14 3x15 3x15 3x15 3x15   Amb with cane             Side Steps 3x15  3x10 3x10 3x10 3x14 3x14 3x14 3x14 3x14   Step Ups 3x15   3x10 3x10 3x14       Assisted SLRs 2# 3x12    3x10 SLRs 3x8 3x10 3x10 3x12 1# 3x12   HEP              Ther Ex             Quad sets, HS sets             Heel Raises/Calf Stretch 3x15 4x30"  3x10 4x30" 3x10 4x30" 3x10 4x30" 3x10 4x30" 3x15 4x30" 3x15 4x30" 3x15 4x30" 3x15 4x30"   Heel slides flexion   X          SAQ             Standing SLR flex    3x10 3x10 3x10 3x10 XXX      HR/TR    3x10 3x10 3x10 XXX      Mini squats    1x10 1x10  XXX      Step ups   3x10          Marches 5# 3x10  3x10 2# 3x10 2# 3x10 2# 3x10 4# 3x10 4# 3x10 4# 3x10 5# 3x10   Bridges 25# 3x10  10# 3x10 20# 3x10 20# 3x10 20# 3x10 25# 3x10 25# 3x10 25# 3x10 25# 3x10   Ankle pumps             Standing Hip flexor stretch 4x30"   4x30" 4x30" 4x30" 4x30" 4x30" 4x30" 4x30"   Lunges 3x10 Gait Training             Two laps         2x                 Modalities             CP

## 2022-08-15 ENCOUNTER — EVALUATION (OUTPATIENT)
Dept: PHYSICAL THERAPY | Facility: MEDICAL CENTER | Age: 62
End: 2022-08-15
Payer: MEDICARE

## 2022-08-15 DIAGNOSIS — Z96.642 STATUS POST TOTAL REPLACEMENT OF LEFT HIP: ICD-10-CM

## 2022-08-15 DIAGNOSIS — M16.12 PRIMARY OSTEOARTHRITIS OF LEFT HIP: Primary | ICD-10-CM

## 2022-08-15 PROCEDURE — 97112 NEUROMUSCULAR REEDUCATION: CPT | Performed by: PHYSICAL MEDICINE & REHABILITATION

## 2022-08-15 PROCEDURE — 97110 THERAPEUTIC EXERCISES: CPT | Performed by: PHYSICAL MEDICINE & REHABILITATION

## 2022-08-15 NOTE — PROGRESS NOTES
PT Re-Evaluation     Today's date: 8/15/2022  Patient name: Sha Farris  : 1960  MRN: [id number]  Referring provider: Marivel Iqbal PA-C  Dx:   Encounter Diagnosis     ICD-10-CM    1  Primary osteoarthritis of left hip  M16 12    2  Status post total replacement of left hip  Z96 642                   Assessment  Assessment details: Patient is a 65 y o  female presenting to OP PT s/p post-op L Total hip arthroplasty completed on 22  Delgado Henley has progressed to using no assistive device for ambulation, slight decrease LLE stance time secondary to hip extension hypomobility  Delgado Henley has made towards her goals including ambulation, standing tolerance, strength and ROM  However, she continues to have some deficits in hip extension ROM and hip flexion strength  Pt would benefit from skilled PT to address aforementioned functional deficits and impairments to improve return to PLOF  Impairments: abnormal gait, abnormal or restricted ROM, impaired balance, impaired physical strength and weight-bearing intolerance    Symptom irritability: moderateUnderstanding of Dx/Px/POC: good   Prognosis: good    Goals  1  Decrease pain by 50% in 4 weeks  - met  2  Increase left lower extremity strength golbally to 4/5 in 6 weeks  - partially met  3  Increase left hip abductor and external rotator strength strength to 3+/5  6 weeks  - met  4  Pt will demonstrate 20 deg improvement in Left hip flex/abd  to increase tolerance to getting into/out of her chair, and putting on shoes/socks - met  1  Return to Prior Level of Function in 8 weeks - partially met  2  Pt will demonstrate [place] with progressive home exercise program to improve carryover and decrease recurrence of symptoms  - met  3  Pt will demonstrate 20% improvement in FOTO score to increase tolerance to functional activities - met  4   Pt will demonstrate 4+/5 in LE strength to allow for improved tolerance to prolonged amb/standing in 6-8 weeks - partially met      Plan  Plan details: PT 2x a week for 4 weeks    Planned modality interventions: manual electrical stimulation, TENS, low level laser therapy, cryotherapy, thermotherapy: paraffin bath, ultrasound and unattended electrical stimulation  Planned therapy interventions: balance, balance/weight bearing training, manual therapy, joint mobilization, patient education, muscle pump exercises, neuromuscular re-education, therapeutic activities, therapeutic exercise, strengthening, massage and Tavares taping  Frequency: 2x week  Treatment plan discussed with: patient and family        Subjective Evaluation    History of Present Illness  Date of surgery: 2022  Mechanism of injury: surgery  Mechanism of injury: Hima lowery reported "I am doing much better, but I still have some difficulty with longer walks "          Not a recurrent problem   Quality of life: good    Pain  Current pain ratin  At best pain ratin  At worst pain ratin  Quality: pressure and discomfort  Relieving factors: ice and medications  Aggravating factors: walking, standing and stair climbing  Progression: improved      Diagnostic Tests  X-ray: abnormal  Treatments  Previous treatment: physical therapy and medication  Current treatment: medication  Patient Goals  Patient goals for therapy: increased strength, independence with ADLs/IADLs, decreased pain, decreased edema, improved balance and increased motion          Objective     Passive Range of Motion   Left Hip   Flexion: 102 degrees   Extension: 3 degrees   Abduction: 40 degrees     Strength/Myotome Testing     Left Hip   Planes of Motion   Flexion: 4-  Abduction: 3+  Adduction: 3+  External rotation: 4-  Internal rotation: 3+    Right Hip   Planes of Motion   Flexion: 4  External rotation: 4+  Internal rotation: 4    Integumentary assessment:   Dressing placement, edema present, no sig redness noted  Dressing may be removed 5 days post-op per Surgeon DC instructions (6/25/22) or at 2 week follow up with MD Jimbo CASEY PRIOR TO SURGERY  ROM:  Hip flex (0-120): AROM: R: WNL  Knee Ext (0): AROM: R: WNL  Knee Flex (0-130): AROM: R: WNL  Hip ER: AROM (0-45): R: 32  Hip IR: AROM (0-45): R: 19  SLR (0-90): AROM: R: 60      Precautions: S/P GERTRUDE Anterior Approach       Manuals 8/9/2022 8/15/2022    7/21/2022 7/26/2022 7/28/2022 8/1/2022 8/5/2022   Hip PROM [address]                                           Neuro Re-Ed             Sit to stands 3x12 15s# 3x15    3x14 3x15 7s# 3x10 10s# 3x10 15s# 3x10   Stairclimbing 3x15 10# 3x15    3x14 3x15 3x15 3x15 3x15   Amb with cane             Side Steps 3x15 10# 3x15    3x14 3x14 3x14 3x14 3x14   Step Ups 3x15 10# 3x15    3x14       SLRs 2# 3x12 2# 3x12    SLRs 3x8 3x10 3x10 3x12 1# 3x12   HEP              Ther Ex             Quad sets, HS sets             Heel Raises/Calf Stretch 3x15 4x30" 3x15 4x30"    3x10 4x30" 3x15 4x30" 3x15 4x30" 3x15 4x30" 3x15 4x30"   Heel slides flexion             SAQ             Standing SLR flex       3x10 XXX      HR/TR      3x10 XXX      Mini squats       XXX      Step ups             Marches 5# 3x10 5# 3x10    2# 3x10 4# 3x10 4# 3x10 4# 3x10 5# 3x10   Bridges 25# 3x10 25# 3x10    20# 3x10 25# 3x10 25# 3x10 25# 3x10 25# 3x10   Ankle pumps             Standing Hip flexor stretch 4x30" 4x30"    4x30" 4x30" 4x30" 4x30" 4x30"   Lunges 3x10 3x10                        Gait Training             Two laps         2x                 Modalities             CP

## 2022-08-23 ENCOUNTER — OFFICE VISIT (OUTPATIENT)
Dept: PHYSICAL THERAPY | Facility: MEDICAL CENTER | Age: 62
End: 2022-08-23
Payer: MEDICARE

## 2022-08-23 DIAGNOSIS — Z96.642 STATUS POST TOTAL REPLACEMENT OF LEFT HIP: ICD-10-CM

## 2022-08-23 DIAGNOSIS — M16.12 PRIMARY OSTEOARTHRITIS OF LEFT HIP: Primary | ICD-10-CM

## 2022-08-23 PROCEDURE — 97140 MANUAL THERAPY 1/> REGIONS: CPT | Performed by: PHYSICAL MEDICINE & REHABILITATION

## 2022-08-23 PROCEDURE — 97110 THERAPEUTIC EXERCISES: CPT | Performed by: PHYSICAL MEDICINE & REHABILITATION

## 2022-08-23 PROCEDURE — 97112 NEUROMUSCULAR REEDUCATION: CPT | Performed by: PHYSICAL MEDICINE & REHABILITATION

## 2022-08-23 NOTE — PROGRESS NOTES
Daily Note     Today's date: 2022  Patient name: Xavier Salazar  : 1960  MRN: [id number]  Referring provider: Andrés Brown PA-C  Dx:   Encounter Diagnosis     ICD-10-CM    1  Primary osteoarthritis of left hip  M16 12    2  Status post total replacement of left hip  Z96 642                   Subjective: Omar Toro reported "I am doing okay, I feel like I am walking better "      Objective: See treatment diary below      Assessment: Tolerated treatment well  Patient would benefit from continued PT  Omar Toro was able to progress her therapy program as seen below  Patient continues to make progress towards her goals  Gait is her most limiting factor  Plan: Continue per plan of care        Precautions: S/P GERTRUDE Anterior Approach       Manuals 2022 8/15/2022 2022   2022 2022 2022 2022 2022   Hip PROM [address]                                          Neuro Re-Ed             Sit to stands 3x12 15s# 3x15 15s# 3x15   3x14 3x15 7s# 3x10 10s# 3x10 15s# 3x10   Stairclimbing 3x15 10# 3x15 10# 3x15   3x14 3x15 3x15 3x15 3x15   Amb with cane             Side Steps 3x15 10# 3x15 10# 3x15   3x14 3x14 3x14 3x14 3x14   Step Ups 3x15 10# 3x15 10# 3x15   3x14       SLRs 2# 3x12 2# 3x12 2# 3x12   SLRs 3x8 3x10 3x10 3x12 1# 3x12   HEP              Ther Ex             Quad sets, HS sets             Heel Raises/Calf Stretch 3x15 4x30" 3x15 4x30" 3x15 4x30"   3x10 4x30" 3x15 4x30" 3x15 4x30" 3x15 4x30" 3x15 4x30"   Heel slides flexion             SAQ             Standing SLR flex       3x10 XXX      HR/TR      3x10 XXX      Mini squats       XXX      Step ups             Marches 5# 3x10 5# 3x10 5# 3x10   2# 3x10 4# 3x10 4# 3x10 4# 3x10 5# 3x10   Bridges 25# 3x10 25# 3x10 25# 3x10   20# 3x10 25# 3x10 25# 3x10 25# 3x10 25# 3x10   Ankle pumps             Standing Hip flexor stretch 4x30" 4x30" 4x30"   4x30" 4x30" 4x30" 4x30" 4x30"   Lunges 3x10 3x10 3x10 Gait Training             Heel to toe   4x      2x                 Modalities             CP

## 2022-08-30 DIAGNOSIS — M16.12 PRIMARY OSTEOARTHRITIS OF LEFT HIP: ICD-10-CM

## 2022-08-30 DIAGNOSIS — E11.9 DIABETES MELLITUS TYPE 2, CONTROLLED (HCC): ICD-10-CM

## 2022-08-30 DIAGNOSIS — E11.8 CONTROLLED DIABETES MELLITUS TYPE 2 WITH COMPLICATIONS, UNSPECIFIED WHETHER LONG TERM INSULIN USE (HCC): ICD-10-CM

## 2022-08-30 DIAGNOSIS — I10 HYPERTENSION, BENIGN: ICD-10-CM

## 2022-08-30 RX ORDER — DICLOFENAC SODIUM 75 MG/1
75 TABLET, DELAYED RELEASE ORAL 2 TIMES DAILY
Qty: 60 TABLET | Refills: 2 | Status: SHIPPED | OUTPATIENT
Start: 2022-08-30

## 2022-08-30 RX ORDER — LOSARTAN POTASSIUM 50 MG/1
50 TABLET ORAL DAILY
Qty: 90 TABLET | Refills: 1 | Status: SHIPPED | OUTPATIENT
Start: 2022-08-30 | End: 2022-10-12 | Stop reason: SDUPTHER

## 2022-09-01 ENCOUNTER — OFFICE VISIT (OUTPATIENT)
Dept: PHYSICAL THERAPY | Facility: MEDICAL CENTER | Age: 62
End: 2022-09-01
Payer: MEDICARE

## 2022-09-01 DIAGNOSIS — Z96.642 STATUS POST TOTAL REPLACEMENT OF LEFT HIP: ICD-10-CM

## 2022-09-01 DIAGNOSIS — M16.12 PRIMARY OSTEOARTHRITIS OF LEFT HIP: Primary | ICD-10-CM

## 2022-09-01 PROCEDURE — 97110 THERAPEUTIC EXERCISES: CPT | Performed by: PHYSICAL MEDICINE & REHABILITATION

## 2022-09-01 PROCEDURE — 97140 MANUAL THERAPY 1/> REGIONS: CPT | Performed by: PHYSICAL MEDICINE & REHABILITATION

## 2022-09-01 PROCEDURE — 97112 NEUROMUSCULAR REEDUCATION: CPT | Performed by: PHYSICAL MEDICINE & REHABILITATION

## 2022-09-01 NOTE — PROGRESS NOTES
Daily Note     Today's date: 2022  Patient name: Nava Lockwood  : 1960  MRN: [id number]  Referring provider: Missy Goodson PA-C  Dx:   Encounter Diagnosis     ICD-10-CM    1  Primary osteoarthritis of left hip  M16 12    2  Status post total replacement of left hip  Z96 642                   Subjective: Heather Cardenas reported "I am doing well "      Objective: See treatment diary below      Assessment: Tolerated treatment well  Patient would benefit from continued PT  Heather Cardenas demonstrated improved walking mechanics and demonstrated being able to perform her exercises with increased resistance  Plan: Continue per plan of care        Precautions: S/P GERTRUDE Anterior Approach       Manuals 2022 8/15/2022 2022 2022  2022 2022 2022 2022 2022   Hip PROM [id number]                                          Neuro Re-Ed             Sit to stands 3x12 15s# 3x15 15s# 3x15 20# 3x15  3x14 3x15 7s# 3x10 10s# 3x10 15s# 3x10   Stairclimbing 3x15 10# 3x15 10# 3x15 10s# 3x15  3x14 3x15 3x15 3x15 3x15   Amb with cane             Side Steps 3x15 10# 3x15 10# 3x15 10s# 3x15  3x14 3x14 3x14 3x14 3x14   Step Ups 3x15 10# 3x15 10# 3x15 10s# 3x15  3x14       SLRs 2# 3x12 2# 3x12 2# 3x12 2s# 3x12  SLRs 3x8 3x10 3x10 3x12 1# 3x12   HEP              Ther Ex             Quad sets, HS sets             Heel Raises/Calf Stretch 3x15 4x30" 3x15 4x30" 3x15 4x30" 3x15 4x30"  3x10 4x30" 3x15 4x30" 3x15 4x30" 3x15 4x30" 3x15 4x30"   Heel slides flexion             SAQ             Standing SLR flex       3x10 XXX      HR/TR      3x10 XXX      Mini squats       XXX      Step ups             Marches 5# 3x10 5# 3x10 5# 3x10 5s# 3x10  2# 3x10 4# 3x10 4# 3x10 4# 3x10 5# 3x10   Bridges 25# 3x10 25# 3x10 25# 3x10 25# 3x10  20# 3x10 25# 3x10 25# 3x10 25# 3x10 25# 3x10   Ankle pumps             Standing Hip flexor stretch 4x30" 4x30" 4x30" 4x30"  4x30" 4x30" 4x30" 4x30" 4x30"   Lunges 3x10 3x10 3x10 3x10                      Gait Training             Heel to toe   4x      2x                 Modalities             CP

## 2022-09-06 ENCOUNTER — OFFICE VISIT (OUTPATIENT)
Dept: PHYSICAL THERAPY | Facility: MEDICAL CENTER | Age: 62
End: 2022-09-06
Payer: MEDICARE

## 2022-09-06 DIAGNOSIS — Z96.642 STATUS POST TOTAL REPLACEMENT OF LEFT HIP: ICD-10-CM

## 2022-09-06 DIAGNOSIS — M16.12 PRIMARY OSTEOARTHRITIS OF LEFT HIP: Primary | ICD-10-CM

## 2022-09-06 PROCEDURE — 97112 NEUROMUSCULAR REEDUCATION: CPT | Performed by: PHYSICAL MEDICINE & REHABILITATION

## 2022-09-06 PROCEDURE — 97110 THERAPEUTIC EXERCISES: CPT | Performed by: PHYSICAL MEDICINE & REHABILITATION

## 2022-09-06 PROCEDURE — 97140 MANUAL THERAPY 1/> REGIONS: CPT | Performed by: PHYSICAL MEDICINE & REHABILITATION

## 2022-09-06 NOTE — PROGRESS NOTES
Daily Note     Today's date: 2022  Patient name: Bishop Amato  : 1960  MRN: [id number]  Referring provider: Raymond Neumann PA-C  Dx:   Encounter Diagnosis     ICD-10-CM    1  Primary osteoarthritis of left hip  M16 12    2  Status post total replacement of left hip  Z96 642                   Subjective: Georges Kiser reported "I am doing well "      Objective: See treatment diary below      Assessment: Tolerated treatment well  Patient would benefit from continued PT  Georges Kiser demonstrated walking with improved gait mechanics which shows progress towards her goals  She was able to increase her resistance for her STSs which shows progress towards her goals  Plan: Continue per plan of care        Precautions: S/P GERTRUDE Anterior Approach       Manuals 2022 8/15/2022 2022 2022 2022    2022 2022   Hip PROM [address] [facility]                                          Neuro Re-Ed             Sit to stands 3x12 15s# 3x15 15s# 3x15 20# 3x15 30# 3x15    10s# 3x10 15s# 3x10   Stairclimbing 3x15 10# 3x15 10# 3x15 10s# 3x15 15s# 3x15    3x15 3x15   Amb with cane             Side Steps 3x15 10# 3x15 10# 3x15 10s# 3x15 15s# 3x15    3x14 3x14   Step Ups 3x15 10# 3x15 10# 3x15 10s# 3x15 15s# 3x10        SLRs 2# 3x12 2# 3x12 2# 3x12 2s# 3x12 15s# 3x12    3x12 1# 3x12   HEP              Ther Ex             Quad sets, HS sets             Heel Raises/Calf Stretch 3x15 4x30" 3x15 4x30" 3x15 4x30" 3x15 4x30" 3x15 4x30"    3x15 4x30" 3x15 4x30"   Heel slides flexion             SAQ             Standing SLR flex              HR/TR             Mini squats             Step ups             Marches 5# 3x10 5# 3x10 5# 3x10 5s# 3x10 15s# 3x10    4# 3x10 5# 3x10   Bridges 25# 3x10 25# 3x10 25# 3x10 25# 3x10 30# 3x10    25# 3x10 25# 3x10   Ankle pumps             Standing Hip flexor stretch 4x30" 4x30" 4x30" 4x30" 4x30"    4x30" 4x30"   Lunges 3x10 3x10 3x10 3x10 3x10                     Gait Training Heel to toe   4x  4x    2x                 Modalities             CP

## 2022-09-09 ENCOUNTER — APPOINTMENT (OUTPATIENT)
Dept: RADIOLOGY | Facility: AMBULARY SURGERY CENTER | Age: 62
End: 2022-09-09
Attending: ORTHOPAEDIC SURGERY
Payer: MEDICARE

## 2022-09-09 ENCOUNTER — OFFICE VISIT (OUTPATIENT)
Dept: OBGYN CLINIC | Facility: CLINIC | Age: 62
End: 2022-09-09

## 2022-09-09 VITALS
HEART RATE: 65 BPM | DIASTOLIC BLOOD PRESSURE: 83 MMHG | SYSTOLIC BLOOD PRESSURE: 149 MMHG | HEIGHT: 62 IN | WEIGHT: 174 LBS | BODY MASS INDEX: 32.02 KG/M2

## 2022-09-09 DIAGNOSIS — Z96.642 S/P TOTAL LEFT HIP ARTHROPLASTY: Primary | ICD-10-CM

## 2022-09-09 DIAGNOSIS — Z96.642 S/P TOTAL LEFT HIP ARTHROPLASTY: ICD-10-CM

## 2022-09-09 PROCEDURE — 99024 POSTOP FOLLOW-UP VISIT: CPT | Performed by: ORTHOPAEDIC SURGERY

## 2022-09-09 PROCEDURE — 73502 X-RAY EXAM HIP UNI 2-3 VIEWS: CPT

## 2022-09-09 NOTE — PROGRESS NOTES
65 y o female presents for 3 months postoperative visit status post left  Anterior GERTRUDE  Patient states she is doing well  She has continued with PT and has been making good progress  Review of Systems  Review of systems negative unless otherwise specified in HPI    Past Medical History  Past Medical History:   Diagnosis Date    Acquired hammer toe of right foot 03/15/2018    S/p surg    Arthritis     Asthma     "one time with a cold, and used an inhaler"    Chronic pain disorder     hip pain left, and back    Diabetes mellitus ([address] )     Eczema     Enlarged liver     GERD (gastroesophageal reflux disease)     History of panic attacks     Hyperlipidemia     Hypertension     Labral tear of shoulder 09/24/2014    Right foot pain     bunionectomy today 3/1/2019    Rotator cuff syndrome, right 08/16/2017    Wears glasses        Past Surgical History  Past Surgical History:   Procedure Laterality Date    CHOLECYSTECTOMY      "had a panic attack"    FOOT SURGERY Right     HERNIA REPAIR      OVARY SURGERY      [place] COLONOSCOPY FLX DX W/COLLJ SPEC WHEN PFRMD N/A 12/22/2016    Procedure: EGD AND COLONOSCOPY;  Surgeon: Gary Noguera MD;  Location: [facility];   Service: Gastroenterology    [place] W/SESMDC W/2 OSTEOT Right 3/1/2019    Procedure: DOUBLE OSTEOTOMY, ZINA/AKIN BUNIONECTOMY;  Surgeon: Janeen Chavez DPM;  Location: [facility];  Service: Podiatry    NH TOTAL HIP ARTHROPLASTY Left 6/20/2022    Procedure: ARTHROPLASTY HIP TOTAL ANTERIOR;  Surgeon: Rosie Pak MD;  Location:  Main OR;  Service: Orthopedics    SKIN BIOPSY         Current Medications  Current Outpatient Medications on File Prior to Visit   Medication Sig Dispense Refill    acetaminophen (TYLENOL) 650 mg CR tablet Take 1 tablet (650 mg total) by mouth every 8 (eight) hours as needed for mild pain 30 tablet 0    albuterol (PROVENTIL HFA,VENTOLIN HFA) 90 mcg/act inhaler TAKE 2 PUFFS BY MOUTH EVERY 6 HOURS AS NEEDED FOR WHEEZE OR FOR SHORTNESS OF BREATH 18 g 1    ALPRAZolam (XANAX) 0 5 mg tablet Take 1 tablet (0 5 mg total) by mouth once as needed for anxiety for up to 1 dose 4 tablet 0    ascorbic acid (VITAMIN C) 500 MG tablet Take 1 tablet (500 mg total) by mouth daily 60 tablet 0    Blood Glucose Monitoring Suppl (Contour Next Monitor) w/Device KIT Use 1 each daily Check blood sugar 2 times day 1 kit 0    cholestyramine (QUESTRAN) 4 g packet TAKE 1 PACKET BY MOUTH 2 TIMES A DAY WITH MEALS  180 packet 1    diclofenac (VOLTAREN) 75 mg EC tablet Take 1 tablet (75 mg total) by mouth 2 (two) times a day 60 tablet 2    Diclofenac Sodium (VOLTAREN) 1 % Apply 2 g topically 4 (four) times a day 100 g 5    DULoxetine (CYMBALTA) 60 mg delayed release capsule Take 1 capsule (60 mg total) by mouth daily 90 capsule 1    ferrous sulfate 324 (65 Fe) mg TAKE 1 TABLET BY MOUTH 2 TIMES A DAY BEFORE MEALS  399 tablet 1    folic acid (FOLVITE) 1 mg tablet TAKE 1 TABLET BY MOUTH EVERY DAY 90 tablet 1    glucose blood (Contour Next Test) test strip Check blood sugar 2 times day 100 each 5    glucose blood (FREESTYLE LITE) test strip Use once a day 100 each 5    losartan (COZAAR) 50 mg tablet Take 1 tablet (50 mg total) by mouth daily 90 tablet 1    metFORMIN (GLUCOPHAGE) 1000 MG tablet Take 1 tablet (1,000 mg total) by mouth 2 (two) times a day with meals 180 tablet 3    Microlet Lancets MISC Use 2 (two) times a day 100 each 5    Multiple Vitamin (multivitamin) capsule Take 1 capsule by mouth daily      omeprazole (PriLOSEC) 40 MG capsule Take 1 capsule (40 mg total) by mouth daily before breakfast 30 capsule 5    simvastatin (ZOCOR) 20 mg tablet Take 1 tablet (20 mg total) by mouth daily at bedtime 90 tablet 1    triamcinolone (KENALOG) 0 025 % ointment Apply topically 2 (two) times a day 30 g 1    acetaminophen (TYLENOL) 500 mg tablet Take 1 tablet (500 mg total) by mouth every 6 (six) hours as needed for mild pain (Patient not taking: No sig reported) 30 tablet 0    apixaban (Eliquis) 5 mg Take 1 tablet (5 mg total) by mouth 2 (two) times a day for 28 days Do not start taking medication until after total joint arthroplasty 56 tablet 0    docusate sodium (COLACE) 100 mg capsule Take 1 capsule (100 mg total) by mouth 3 (three) times a day as needed for constipation (Patient not taking: Reported on 9/9/2022) 40 capsule 1    oxyCODONE (ROXICODONE) 5 immediate release tablet Take 1 tablet (5 mg total) by mouth every 6 (six) hours as needed for moderate pain Max Daily Amount: 20 mg (Patient not taking: Reported on 9/9/2022) 30 tablet 0     No current facility-administered medications on file prior to visit         Recent Labs (HCT,HGB,PT,INR,ESR,CRP,GLU,HgA1C)  0   Lab Value Date/Time    HCT 39 0 06/06/2022 1120    HCT 37 8 09/12/2016 1155    HGB 12 0 06/06/2022 1120    HGB 12 4 09/12/2016 1155    WBC 4 64 06/06/2022 1120    WBC 9 5 09/12/2016 1155    INR 0 89 06/06/2022 1120    HGBA1C 7 0 (H) 06/06/2022 1120    HGBA1C 8 2 (H) 11/03/2018 1022         Physical exam  · General: Awake, Alert, Oriented  · Eyes: Pupils equal, round and reactive to light  · Heart: regular rate and rhythm  · Lungs: No audible wheezing    left Lower extremity  · Examination patient's left lower extremity incisions well healed no erythema active hip flexion to 90° no pain with internal-external rotation active knee extension active ankle dorsiflexion without pain calf nontender palpation bilaterally    Imaging  X-rays of the left hip reviewed with the patient x-rays reveal well reduced left total hip arthroplasty without evidence of loosening    Assessment:    3 months s/p left anterior total hip arthroplasty   Plan:  Weightbearing as tolerated left lower extremity   Anterior total hip precautions   PT with transition to HEP  Follow-up in 9 months time repeat x-rays left hip

## 2022-10-05 DIAGNOSIS — K21.9 GASTROESOPHAGEAL REFLUX DISEASE, UNSPECIFIED WHETHER ESOPHAGITIS PRESENT: ICD-10-CM

## 2022-10-05 RX ORDER — OMEPRAZOLE 40 MG/1
CAPSULE, DELAYED RELEASE ORAL
Qty: 90 CAPSULE | Refills: 1 | Status: SHIPPED | OUTPATIENT
Start: 2022-10-05

## 2022-10-11 ENCOUNTER — TELEPHONE (OUTPATIENT)
Dept: FAMILY MEDICINE CLINIC | Facility: CLINIC | Age: 62
End: 2022-10-11

## 2022-10-11 ENCOUNTER — APPOINTMENT (OUTPATIENT)
Dept: LAB | Facility: CLINIC | Age: 62
End: 2022-10-11
Payer: MEDICARE

## 2022-10-11 DIAGNOSIS — E11.8 CONTROLLED DIABETES MELLITUS TYPE 2 WITH COMPLICATIONS, UNSPECIFIED WHETHER LONG TERM INSULIN USE (HCC): ICD-10-CM

## 2022-10-11 DIAGNOSIS — E78.5 HYPERLIPIDEMIA, UNSPECIFIED HYPERLIPIDEMIA TYPE: ICD-10-CM

## 2022-10-11 DIAGNOSIS — E55.9 VITAMIN D DEFICIENCY: ICD-10-CM

## 2022-10-11 DIAGNOSIS — R79.89 ELEVATED LFTS: ICD-10-CM

## 2022-10-11 LAB
25(OH)D3 SERPL-MCNC: 37.2 NG/ML (ref 30–100)
ALBUMIN SERPL BCP-MCNC: 3.7 G/DL (ref 3.5–5)
ALP SERPL-CCNC: 125 U/L (ref 46–116)
ALT SERPL W P-5'-P-CCNC: 27 U/L (ref 12–78)
ANION GAP SERPL CALCULATED.3IONS-SCNC: 3 MMOL/L (ref 4–13)
AST SERPL W P-5'-P-CCNC: 20 U/L (ref 5–45)
BASOPHILS # BLD AUTO: 0.03 THOUSANDS/ΜL (ref 0–0.1)
BASOPHILS NFR BLD AUTO: 1 % (ref 0–1)
BILIRUB SERPL-MCNC: 0.24 MG/DL (ref 0.2–1)
BUN SERPL-MCNC: 13 MG/DL (ref 5–25)
CALCIUM SERPL-MCNC: 9.2 MG/DL (ref 8.3–10.1)
CHLORIDE SERPL-SCNC: 108 MMOL/L (ref 96–108)
CHOLEST SERPL-MCNC: 197 MG/DL
CO2 SERPL-SCNC: 27 MMOL/L (ref 21–32)
CREAT SERPL-MCNC: 0.72 MG/DL (ref 0.6–1.3)
CREAT UR-MCNC: 87.1 MG/DL
EOSINOPHIL # BLD AUTO: 0.06 THOUSAND/ΜL (ref 0–0.61)
EOSINOPHIL NFR BLD AUTO: 1 % (ref 0–6)
ERYTHROCYTE [DISTWIDTH] IN BLOOD BY AUTOMATED COUNT: 13.6 % (ref 11.6–15.1)
GFR SERPL CREATININE-BSD FRML MDRD: 90 ML/MIN/1.73SQ M
GLUCOSE P FAST SERPL-MCNC: 143 MG/DL (ref 65–99)
HCT VFR BLD AUTO: 38.5 % (ref 34.8–46.1)
HDLC SERPL-MCNC: 62 MG/DL
HGB BLD-MCNC: 11.7 G/DL (ref 11.5–15.4)
IMM GRANULOCYTES # BLD AUTO: 0.01 THOUSAND/UL (ref 0–0.2)
IMM GRANULOCYTES NFR BLD AUTO: 0 % (ref 0–2)
LDLC SERPL CALC-MCNC: 99 MG/DL (ref 0–100)
LYMPHOCYTES # BLD AUTO: 1.53 THOUSANDS/ΜL (ref 0.6–4.47)
LYMPHOCYTES NFR BLD AUTO: 33 % (ref 14–44)
MCH RBC QN AUTO: 28.6 PG (ref 26.8–34.3)
MCHC RBC AUTO-ENTMCNC: 30.4 G/DL (ref 31.4–37.4)
MCV RBC AUTO: 94 FL (ref 82–98)
MICROALBUMIN UR-MCNC: 6 MG/L (ref 0–20)
MICROALBUMIN/CREAT 24H UR: 7 MG/G CREATININE (ref 0–30)
MONOCYTES # BLD AUTO: 0.44 THOUSAND/ΜL (ref 0.17–1.22)
MONOCYTES NFR BLD AUTO: 9 % (ref 4–12)
NEUTROPHILS # BLD AUTO: 2.64 THOUSANDS/ΜL (ref 1.85–7.62)
NEUTS SEG NFR BLD AUTO: 56 % (ref 43–75)
NONHDLC SERPL-MCNC: 135 MG/DL
NRBC BLD AUTO-RTO: 0 /100 WBCS
PLATELET # BLD AUTO: 218 THOUSANDS/UL (ref 149–390)
PMV BLD AUTO: 11.5 FL (ref 8.9–12.7)
POTASSIUM SERPL-SCNC: 4.3 MMOL/L (ref 3.5–5.3)
PROT SERPL-MCNC: 7.5 G/DL (ref 6.4–8.4)
RBC # BLD AUTO: 4.09 MILLION/UL (ref 3.81–5.12)
SODIUM SERPL-SCNC: 138 MMOL/L (ref 135–147)
TRIGL SERPL-MCNC: 182 MG/DL
TSH SERPL DL<=0.05 MIU/L-ACNC: 2.49 UIU/ML (ref 0.45–4.5)
WBC # BLD AUTO: 4.71 THOUSAND/UL (ref 4.31–10.16)

## 2022-10-11 PROCEDURE — 82043 UR ALBUMIN QUANTITATIVE: CPT

## 2022-10-11 PROCEDURE — 85025 COMPLETE CBC W/AUTO DIFF WBC: CPT

## 2022-10-11 PROCEDURE — 84443 ASSAY THYROID STIM HORMONE: CPT

## 2022-10-11 PROCEDURE — 80053 COMPREHEN METABOLIC PANEL: CPT

## 2022-10-11 PROCEDURE — 36415 COLL VENOUS BLD VENIPUNCTURE: CPT

## 2022-10-11 PROCEDURE — 80061 LIPID PANEL: CPT

## 2022-10-11 PROCEDURE — 82570 ASSAY OF URINE CREATININE: CPT

## 2022-10-11 PROCEDURE — 82306 VITAMIN D 25 HYDROXY: CPT

## 2022-10-12 ENCOUNTER — OFFICE VISIT (OUTPATIENT)
Dept: FAMILY MEDICINE CLINIC | Facility: CLINIC | Age: 62
End: 2022-10-12

## 2022-10-12 VITALS
HEART RATE: 73 BPM | SYSTOLIC BLOOD PRESSURE: 116 MMHG | RESPIRATION RATE: 18 BRPM | OXYGEN SATURATION: 99 % | HEIGHT: 62 IN | BODY MASS INDEX: 32.97 KG/M2 | WEIGHT: 179.2 LBS | TEMPERATURE: 97.8 F | DIASTOLIC BLOOD PRESSURE: 72 MMHG

## 2022-10-12 DIAGNOSIS — Z23 ENCOUNTER FOR IMMUNIZATION: ICD-10-CM

## 2022-10-12 DIAGNOSIS — E11.8 CONTROLLED DIABETES MELLITUS TYPE 2 WITH COMPLICATIONS, UNSPECIFIED WHETHER LONG TERM INSULIN USE (HCC): Primary | ICD-10-CM

## 2022-10-12 DIAGNOSIS — E11.9 DIABETES MELLITUS TYPE 2, CONTROLLED (HCC): ICD-10-CM

## 2022-10-12 DIAGNOSIS — Z12.31 ENCOUNTER FOR SCREENING MAMMOGRAM FOR MALIGNANT NEOPLASM OF BREAST: ICD-10-CM

## 2022-10-12 DIAGNOSIS — I10 HYPERTENSION, BENIGN: ICD-10-CM

## 2022-10-12 DIAGNOSIS — Z23 FLU VACCINE NEED: ICD-10-CM

## 2022-10-12 LAB — SL AMB POCT HEMOGLOBIN AIC: 7.2 (ref ?–6.5)

## 2022-10-12 PROCEDURE — 99214 OFFICE O/P EST MOD 30 MIN: CPT | Performed by: FAMILY MEDICINE

## 2022-10-12 PROCEDURE — G0009 ADMIN PNEUMOCOCCAL VACCINE: HCPCS | Performed by: FAMILY MEDICINE

## 2022-10-12 PROCEDURE — 90677 PCV20 VACCINE IM: CPT | Performed by: FAMILY MEDICINE

## 2022-10-12 PROCEDURE — 83036 HEMOGLOBIN GLYCOSYLATED A1C: CPT | Performed by: FAMILY MEDICINE

## 2022-10-12 PROCEDURE — G0008 ADMIN INFLUENZA VIRUS VAC: HCPCS | Performed by: FAMILY MEDICINE

## 2022-10-12 PROCEDURE — 90682 RIV4 VACC RECOMBINANT DNA IM: CPT | Performed by: FAMILY MEDICINE

## 2022-10-12 RX ORDER — LOSARTAN POTASSIUM 50 MG/1
50 TABLET ORAL DAILY
Qty: 90 TABLET | Refills: 1 | Status: SHIPPED | OUTPATIENT
Start: 2022-10-12

## 2022-10-12 RX ORDER — SIMVASTATIN 20 MG
20 TABLET ORAL
Qty: 90 TABLET | Refills: 1 | Status: SHIPPED | OUTPATIENT
Start: 2022-10-12

## 2022-10-12 NOTE — ASSESSMENT & PLAN NOTE
Lab Results   Component Value Date    HGBA1C 7 2 (A) 10/12/2022     Hemoglobin A1c stable  Reviewed low carb diet with patient  Continue metformin 1000 mg twice a day

## 2022-10-12 NOTE — PROGRESS NOTES
Name: Judith Soares      : 1960      MRN: [id number]  Encounter Provider: Mario Hoffmann MD  Encounter Date: 10/12/2022   Encounter department: [address] [place] [address]     1  Controlled diabetes mellitus type 2 with complications, unspecified whether long term insulin use (HCC)  -     simvastatin (ZOCOR) 20 mg tablet; Take 1 tablet (20 mg total) by mouth daily at bedtime    2  Encounter for immunization  -     Pneumococcal Conjugate Vaccine 20-valent (PCV20)    3  Hypertension, benign  -     losartan (COZAAR) 50 mg tablet; Take 1 tablet (50 mg total) by mouth daily    4  Diabetes mellitus type 2, controlled ([address] )  Assessment & Plan:    Lab Results   Component Value Date    HGBA1C 7 2 (A) 10/12/2022     Hemoglobin A1c stable  Reviewed low carb diet with patient  Continue metformin 1000 mg twice a day  Orders:  -     POCT hemoglobin A1c  -     losartan (COZAAR) 50 mg tablet; Take 1 tablet (50 mg total) by mouth daily    5  Flu vaccine need  -     influenza vaccine, quadrivalent, recombinant, PF, 0 5 mL, for patients 18 yr+ (FLUBLOK)    6  Encounter for screening mammogram for malignant neoplasm of breast  -     Mammo screening bilateral w 3d & cad; Future; Expected date: 10/12/2022    7  BMI 32 0-32 9,adult         Patient received age appropriate vaccines, flu vaccine and pneumonia 20 at today's visit  Subjective      66 yo  female here today for follow up  Currently without complains  Denies symptoms of hypo or hyperglycemia  Checks feet daily  Has appointment with Ophthalmology Dr Logan Reed 2022  Patient is about 3 months status post hip replacement, and states she is doing very well  States her quality of life has increased considerably  She has been trying to walk at least 20 minutes 3 to 4 times a week  States she feels she no longer needs the duloxetine for pain      Review of Systems   All other systems reviewed and are negative  Current Outpatient Medications on File Prior to Visit   Medication Sig   • acetaminophen (TYLENOL) 500 mg tablet Take 1 tablet (500 mg total) by mouth every 6 (six) hours as needed for mild pain (Patient not taking: No sig reported)   • acetaminophen (TYLENOL) 650 mg CR tablet Take 1 tablet (650 mg total) by mouth every 8 (eight) hours as needed for mild pain   • albuterol (PROVENTIL HFA,VENTOLIN HFA) 90 mcg/act inhaler TAKE 2 PUFFS BY MOUTH EVERY 6 HOURS AS NEEDED FOR WHEEZE OR FOR SHORTNESS OF BREATH   • ALPRAZolam (XANAX) 0 5 mg tablet Take 1 tablet (0 5 mg total) by mouth once as needed for anxiety for up to 1 dose   • apixaban (Eliquis) 5 mg Take 1 tablet (5 mg total) by mouth 2 (two) times a day for 28 days Do not start taking medication until after total joint arthroplasty   • Blood Glucose Monitoring Suppl (Contour Next Monitor) w/Device KIT Use 1 each daily Check blood sugar 2 times day   • cholestyramine (QUESTRAN) 4 g packet TAKE 1 PACKET BY MOUTH 2 TIMES A DAY WITH MEALS     • diclofenac (VOLTAREN) 75 mg EC tablet Take 1 tablet (75 mg total) by mouth 2 (two) times a day   • Diclofenac Sodium (VOLTAREN) 1 % Apply 2 g topically 4 (four) times a day   • folic acid (FOLVITE) 1 mg tablet TAKE 1 TABLET BY MOUTH EVERY DAY   • glucose blood (Contour Next Test) test strip Check blood sugar 2 times day   • glucose blood (FREESTYLE LITE) test strip Use once a day   • metFORMIN (GLUCOPHAGE) 1000 MG tablet Take 1 tablet (1,000 mg total) by mouth 2 (two) times a day with meals   • Microlet Lancets MISC Use 2 (two) times a day   • Multiple Vitamin (multivitamin) capsule Take 1 capsule by mouth daily   • omeprazole (PriLOSEC) 40 MG capsule TAKE 1 CAPSULE BY MOUTH EVERY DAY BEFORE BREAKFAST   • triamcinolone (KENALOG) 0 025 % ointment Apply topically 2 (two) times a day   • [DISCONTINUED] ascorbic acid (VITAMIN C) 500 MG tablet Take 1 tablet (500 mg total) by mouth daily   • [DISCONTINUED] docusate sodium (COLACE) 100 mg capsule Take 1 capsule (100 mg total) by mouth 3 (three) times a day as needed for constipation (Patient not taking: Reported on 9/9/2022)   • [DISCONTINUED] DULoxetine (CYMBALTA) 60 mg delayed release capsule Take 1 capsule (60 mg total) by mouth daily   • [DISCONTINUED] ferrous sulfate 324 (65 Fe) mg TAKE 1 TABLET BY MOUTH 2 TIMES A DAY BEFORE MEALS  • [DISCONTINUED] losartan (COZAAR) 50 mg tablet Take 1 tablet (50 mg total) by mouth daily   • [DISCONTINUED] oxyCODONE (ROXICODONE) 5 immediate release tablet Take 1 tablet (5 mg total) by mouth every 6 (six) hours as needed for moderate pain Max Daily Amount: 20 mg (Patient not taking: Reported on 9/9/2022)   • [DISCONTINUED] simvastatin (ZOCOR) 20 mg tablet Take 1 tablet (20 mg total) by mouth daily at bedtime       Objective     /72 (BP Location: Left arm, Patient Position: Sitting, Cuff Size: Adult)   Pulse 73   Temp 97 8 °F (36 6 °C) (Temporal)   Resp 18   Ht 5' 2" (1 575 m)   Wt 81 3 kg (179 lb 3 2 oz)   SpO2 99%   BMI 32 78 kg/m²     Physical Exam  Vitals and nursing note reviewed  Constitutional:       Appearance: She is well-developed  HENT:      Head: Normocephalic  Right Ear: External ear normal       Left Ear: External ear normal       Nose: Nose normal    Eyes:      Conjunctiva/sclera: Conjunctivae normal       Pupils: Pupils are equal, round, and reactive to light  Neck:      Thyroid: No thyromegaly  Cardiovascular:      Rate and Rhythm: Normal rate and regular rhythm  Heart sounds: Normal heart sounds  Pulmonary:      Effort: Pulmonary effort is normal       Breath sounds: Normal breath sounds  Abdominal:      Palpations: Abdomen is soft  Tenderness: There is no abdominal tenderness  There is no guarding or rebound  Musculoskeletal:         General: Normal range of motion  Cervical back: Normal range of motion and neck supple  Skin:     General: Skin is dry     Neurological: Mental Status: She is alert and oriented to person, place, and time  Deep Tendon Reflexes: Reflexes are normal and symmetric  Irina Regalado MD BMI Counseling: Body mass index is 32 78 kg/m²  The BMI is above normal  Nutrition recommendations include reducing portion sizes, 3-5 servings of fruits/vegetables daily, reducing fast food intake, consuming healthier snacks, decreasing soda and/or juice intake, moderation in carbohydrate intake, increasing intake of lean protein, reducing intake of saturated fat and trans fat and reducing intake of cholesterol  Exercise recommendations include moderate aerobic physical activity for 150 minutes/week, exercising 3-5 times per week and strength training exercises

## 2022-11-02 ENCOUNTER — CONSULT (OUTPATIENT)
Dept: FAMILY MEDICINE CLINIC | Facility: CLINIC | Age: 62
End: 2022-11-02

## 2022-11-02 VITALS
DIASTOLIC BLOOD PRESSURE: 78 MMHG | TEMPERATURE: 97.7 F | SYSTOLIC BLOOD PRESSURE: 126 MMHG | WEIGHT: 175.9 LBS | HEIGHT: 62 IN | OXYGEN SATURATION: 98 % | RESPIRATION RATE: 16 BRPM | BODY MASS INDEX: 32.37 KG/M2 | HEART RATE: 74 BPM

## 2022-11-02 DIAGNOSIS — E11.8 CONTROLLED DIABETES MELLITUS TYPE 2 WITH COMPLICATIONS, UNSPECIFIED WHETHER LONG TERM INSULIN USE (HCC): ICD-10-CM

## 2022-11-02 DIAGNOSIS — Z01.818 PREOP EXAMINATION: Primary | ICD-10-CM

## 2022-11-02 NOTE — LETTER
Re:  Robert Valerio  1960    To whom it may concern,       Robert Valerio was seen in our office today for preop clearance for dental cleaning and dental procedure  Patient has well controlled DM and is at very low cardiac risk for dental procedure  She is cleared medically for dental procedure        Please let me know if I can be of any further assistance,       Sincerely,       Ortiz Santiago MD

## 2022-11-02 NOTE — PROGRESS NOTES
Presurgical Evaluation    Subjective:      Patient ID: Pita Caballero is a 65 y o  female  Chief Complaint   Patient presents with   • Pre-op Exam     Dental procedure (pt didn't bring a form per pt she doesn't have an appt for procedure yet)      Letter for [facility] stating she is cleared for dental cleaning provided to patient   64 yo  female with controlled DM, here today as per request from [facility] for a preop for dental cleaning  Patient has no physical complains today       The following portions of the patient's history were reviewed and updated as appropriate:   She  has a past medical history of Acquired hammer toe of right foot (03/15/2018), Arthritis, Asthma, Chronic pain disorder, Diabetes mellitus ([address] ), Eczema, Enlarged liver, GERD (gastroesophageal reflux disease), History of panic attacks, Hyperlipidemia, Hypertension, Labral tear of shoulder (09/24/2014), Right foot pain, Rotator cuff syndrome, right (08/16/2017), and Wears glasses  She   Patient Active Problem List    Diagnosis Date Noted   • Status post total hip replacement, left 06/20/2022   • Asthma    • Situational anxiety 08/16/2021   • Controlled diabetes mellitus type 2 with complications ([address] ) 15/27/0775   • Class 1 obesity due to excess calories with serious comorbidity and body mass index (BMI) of 30 0 to 30 9 in adult 08/05/2020   • Chronic left-sided low back pain 12/12/2018   • Sacroiliitis ([address] ) 06/06/2018   • Hypertension, benign 12/14/2017   • Osteoarthritis of left hip 08/09/2017   • Dermatofibroma 06/26/2017   • Diabetes mellitus type 2, controlled ([address] ) 09/20/2016   • Hyperlipidemia 09/20/2016   • Allergic rhinitis 03/31/2015   • Eczema 03/31/2015   • Glenohumeral arthritis 09/24/2014   • GERD (gastroesophageal reflux disease) 05/18/2012     She  has a past surgical history that includes Hernia repair; Ovary surgery; pr colonoscopy flx dx w/collj spec when pfrmd (N/A, 12/22/2016);  Cholecystectomy; pr corrj hallux valgus w/sesmdc w/2 osteot (Right, 3/1/2019); Foot surgery (Right); Skin biopsy; and pr total hip arthroplasty (Left, 6/20/2022)  Her family history includes Bone cancer in her maternal grandfather; Breast cancer in her maternal aunt; Cancer in her brother and maternal grandfather; Hypertension in her father and mother; Stomach cancer in her brother  She  reports that she has never smoked  She has never used smokeless tobacco  She reports that she does not drink alcohol and does not use drugs    Current Outpatient Medications   Medication Sig Dispense Refill   • acetaminophen (TYLENOL) 500 mg tablet Take 1 tablet (500 mg total) by mouth every 6 (six) hours as needed for mild pain (Patient not taking: No sig reported) 30 tablet 0   • acetaminophen (TYLENOL) 650 mg CR tablet Take 1 tablet (650 mg total) by mouth every 8 (eight) hours as needed for mild pain 30 tablet 0   • albuterol (PROVENTIL HFA,VENTOLIN HFA) 90 mcg/act inhaler TAKE 2 PUFFS BY MOUTH EVERY 6 HOURS AS NEEDED FOR WHEEZE OR FOR SHORTNESS OF BREATH 18 g 1   • ALPRAZolam (XANAX) 0 5 mg tablet Take 1 tablet (0 5 mg total) by mouth once as needed for anxiety for up to 1 dose 4 tablet 0   • apixaban (Eliquis) 5 mg Take 1 tablet (5 mg total) by mouth 2 (two) times a day for 28 days Do not start taking medication until after total joint arthroplasty 56 tablet 0   • Blood Glucose Monitoring Suppl (Contour Next Monitor) w/Device KIT Use 1 each daily Check blood sugar 2 times day 1 kit 0   • cholestyramine (QUESTRAN) 4 g packet TAKE 1 PACKET BY MOUTH 2 TIMES A DAY WITH MEALS  180 packet 1   • diclofenac (VOLTAREN) 75 mg EC tablet Take 1 tablet (75 mg total) by mouth 2 (two) times a day 60 tablet 2   • Diclofenac Sodium (VOLTAREN) 1 % Apply 2 g topically 4 (four) times a day 599 g 5   • folic acid (FOLVITE) 1 mg tablet TAKE 1 TABLET BY MOUTH EVERY DAY 90 tablet 1   • glucose blood (Contour Next Test) test strip Check blood sugar 2 times day 100 each 5   • glucose blood (FREESTYLE LITE) test strip Use once a day 100 each 5   • losartan (COZAAR) 50 mg tablet Take 1 tablet (50 mg total) by mouth daily 90 tablet 1   • metFORMIN (GLUCOPHAGE) 1000 MG tablet Take 1 tablet (1,000 mg total) by mouth 2 (two) times a day with meals 180 tablet 3   • Microlet Lancets MISC Use 2 (two) times a day 100 each 5   • Multiple Vitamin (multivitamin) capsule Take 1 capsule by mouth daily     • omeprazole (PriLOSEC) 40 MG capsule TAKE 1 CAPSULE BY MOUTH EVERY DAY BEFORE BREAKFAST 90 capsule 1   • simvastatin (ZOCOR) 20 mg tablet Take 1 tablet (20 mg total) by mouth daily at bedtime 90 tablet 1   • triamcinolone (KENALOG) 0 025 % ointment Apply topically 2 (two) times a day 30 g 1     No current facility-administered medications for this visit  Current Outpatient Medications on File Prior to Visit   Medication Sig   • acetaminophen (TYLENOL) 500 mg tablet Take 1 tablet (500 mg total) by mouth every 6 (six) hours as needed for mild pain (Patient not taking: No sig reported)   • acetaminophen (TYLENOL) 650 mg CR tablet Take 1 tablet (650 mg total) by mouth every 8 (eight) hours as needed for mild pain   • albuterol (PROVENTIL HFA,VENTOLIN HFA) 90 mcg/act inhaler TAKE 2 PUFFS BY MOUTH EVERY 6 HOURS AS NEEDED FOR WHEEZE OR FOR SHORTNESS OF BREATH   • ALPRAZolam (XANAX) 0 5 mg tablet Take 1 tablet (0 5 mg total) by mouth once as needed for anxiety for up to 1 dose   • apixaban (Eliquis) 5 mg Take 1 tablet (5 mg total) by mouth 2 (two) times a day for 28 days Do not start taking medication until after total joint arthroplasty   • Blood Glucose Monitoring Suppl (Contour Next Monitor) w/Device KIT Use 1 each daily Check blood sugar 2 times day   • cholestyramine (QUESTRAN) 4 g packet TAKE 1 PACKET BY MOUTH 2 TIMES A DAY WITH MEALS     • diclofenac (VOLTAREN) 75 mg EC tablet Take 1 tablet (75 mg total) by mouth 2 (two) times a day   • Diclofenac Sodium (VOLTAREN) 1 % Apply 2 g topically 4 (four) times a day   • folic acid (FOLVITE) 1 mg tablet TAKE 1 TABLET BY MOUTH EVERY DAY   • glucose blood (Contour Next Test) test strip Check blood sugar 2 times day   • glucose blood (FREESTYLE LITE) test strip Use once a day   • losartan (COZAAR) 50 mg tablet Take 1 tablet (50 mg total) by mouth daily   • metFORMIN (GLUCOPHAGE) 1000 MG tablet Take 1 tablet (1,000 mg total) by mouth 2 (two) times a day with meals   • Microlet Lancets MISC Use 2 (two) times a day   • Multiple Vitamin (multivitamin) capsule Take 1 capsule by mouth daily   • omeprazole (PriLOSEC) 40 MG capsule TAKE 1 CAPSULE BY MOUTH EVERY DAY BEFORE BREAKFAST   • simvastatin (ZOCOR) 20 mg tablet Take 1 tablet (20 mg total) by mouth daily at bedtime   • triamcinolone (KENALOG) 0 025 % ointment Apply topically 2 (two) times a day     No current facility-administered medications on file prior to visit       Procedure date: unknown date    Surgeon:  Does not know dentist name at [facility]  Planned procedure:  Deep cleaning  Diagnosis for procedure:  Need for deep cleaning in diabetic patient    Prior anesthesia: No    CAD History: CAD   NOTE: Patient should see Cardiology if time available before surgery, and if appropriate  Pulmonary History: None    Renal history: None    Diabetes History:  Type 2  Controlled     Neurological History: None     On Immunosuppressant meds/biologics: No      Review of Systems   All other systems reviewed and are negative          Current Outpatient Medications   Medication Sig Dispense Refill   • acetaminophen (TYLENOL) 500 mg tablet Take 1 tablet (500 mg total) by mouth every 6 (six) hours as needed for mild pain (Patient not taking: No sig reported) 30 tablet 0   • acetaminophen (TYLENOL) 650 mg CR tablet Take 1 tablet (650 mg total) by mouth every 8 (eight) hours as needed for mild pain 30 tablet 0   • albuterol (PROVENTIL HFA,VENTOLIN HFA) 90 mcg/act inhaler TAKE 2 PUFFS BY MOUTH EVERY 6 HOURS AS NEEDED FOR WHEEZE OR FOR SHORTNESS OF BREATH 18 g 1   • ALPRAZolam (XANAX) 0 5 mg tablet Take 1 tablet (0 5 mg total) by mouth once as needed for anxiety for up to 1 dose 4 tablet 0   • apixaban (Eliquis) 5 mg Take 1 tablet (5 mg total) by mouth 2 (two) times a day for 28 days Do not start taking medication until after total joint arthroplasty 56 tablet 0   • Blood Glucose Monitoring Suppl (Contour Next Monitor) w/Device KIT Use 1 each daily Check blood sugar 2 times day 1 kit 0   • cholestyramine (QUESTRAN) 4 g packet TAKE 1 PACKET BY MOUTH 2 TIMES A DAY WITH MEALS  180 packet 1   • diclofenac (VOLTAREN) 75 mg EC tablet Take 1 tablet (75 mg total) by mouth 2 (two) times a day 60 tablet 2   • Diclofenac Sodium (VOLTAREN) 1 % Apply 2 g topically 4 (four) times a day 387 g 5   • folic acid (FOLVITE) 1 mg tablet TAKE 1 TABLET BY MOUTH EVERY DAY 90 tablet 1   • glucose blood (Contour Next Test) test strip Check blood sugar 2 times day 100 each 5   • glucose blood (FREESTYLE LITE) test strip Use once a day 100 each 5   • losartan (COZAAR) 50 mg tablet Take 1 tablet (50 mg total) by mouth daily 90 tablet 1   • metFORMIN (GLUCOPHAGE) 1000 MG tablet Take 1 tablet (1,000 mg total) by mouth 2 (two) times a day with meals 180 tablet 3   • Microlet Lancets MISC Use 2 (two) times a day 100 each 5   • Multiple Vitamin (multivitamin) capsule Take 1 capsule by mouth daily     • omeprazole (PriLOSEC) 40 MG capsule TAKE 1 CAPSULE BY MOUTH EVERY DAY BEFORE BREAKFAST 90 capsule 1   • simvastatin (ZOCOR) 20 mg tablet Take 1 tablet (20 mg total) by mouth daily at bedtime 90 tablet 1   • triamcinolone (KENALOG) 0 025 % ointment Apply topically 2 (two) times a day 30 g 1     No current facility-administered medications for this visit         Allergies on file:   Aspirin and Latex    Patient Active Problem List   Diagnosis   • Allergic rhinitis   • Dermatofibroma   • Diabetes mellitus type 2, controlled ([facility] )   • Eczema   • GERD (gastroesophageal reflux disease)   • Glenohumeral arthritis   • Hyperlipidemia   • Hypertension, benign   • Osteoarthritis of left hip   • Sacroiliitis (HCC)   • Chronic left-sided low back pain   • Class 1 obesity due to excess calories with serious comorbidity and body mass index (BMI) of 30 0 to 30 9 in adult   • Controlled diabetes mellitus type 2 with complications ([facility] )   • Situational anxiety   • Asthma   • Status post total hip replacement, left        Past Medical History:   Diagnosis Date   • Acquired hammer toe of right foot 03/15/2018    S/p surg   • Arthritis    • Asthma     "one time with a cold, and used an inhaler"   • Chronic pain disorder     hip pain left, and back   • [facility] mellitus ([facility] )    • Eczema    • Enlarged liver    • GERD (gastroesophageal reflux disease)    • History of panic attacks    • Hyperlipidemia    • Hypertension    • Labral tear of shoulder 09/24/2014   • Right foot pain     bunionectomy today 3/1/2019   • Rotator cuff syndrome, right 08/16/2017   • Wears glasses        Past Surgical History:   Procedure Laterality Date   • CHOLECYSTECTOMY      "had a panic attack"   • FOOT SURGERY Right    • HERNIA REPAIR     • OVARY SURGERY     • CO COLONOSCOPY FLX DX W/COLLJ SPEC WHEN PFRMD N/A 12/22/2016    Procedure: EGD AND COLONOSCOPY;  Surgeon: Nicole Guevara MD;  Location: [facility];   Service: Gastroenterology   • CO CORRJ HALLUX VALGUS W/SESMDC W/2 OSTEOT Right 3/1/2019    Procedure: DOUBLE OSTEOTOMY, ZINA/AKIN BUNIONECTOMY;  Surgeon: Clyde Tapia DPM;  Location: [facility];  Service: Podiatry   • CO TOTAL HIP ARTHROPLASTY Left 6/20/2022    Procedure: ARTHROPLASTY HIP TOTAL ANTERIOR;  Surgeon: Rachel Wylie MD;  Location: [facility];  Service: Orthopedics   • SKIN BIOPSY         Family History   Problem Relation Age of Onset   • Hypertension Mother    • Hypertension Father    • Bone cancer Maternal Grandfather    • Cancer Maternal Grandfather         bone   • Breast cancer Maternal Aunt    • Stomach cancer Brother    • Cancer Brother         stomach   • Colon cancer Neg Hx    • Ovarian cancer Neg Hx        Social History     Tobacco Use   • Smoking status: Never Smoker   • Smokeless tobacco: Never Used   Vaping Use   • Vaping Use: Never used   Substance Use Topics   • Alcohol use: Never   • Drug use: Never       Objective:    Vitals:    11/02/22 1115   BP: 126/78   BP Location: Left arm   Patient Position: Sitting   Cuff Size: Adult   Pulse: 74   Resp: 16   Temp: 97 7 °F (36 5 °C)   TempSrc: Temporal   SpO2: 98%   Weight: 79 8 kg (175 lb 14 4 oz)   Height: 5' 2" (1 575 m)        Physical Exam  Vitals and nursing note reviewed  Constitutional:       Appearance: She is well-developed  HENT:      Head: Normocephalic  Right Ear: External ear normal       Left Ear: External ear normal       Nose: Nose normal    Eyes:      Conjunctiva/sclera: Conjunctivae normal       Pupils: Pupils are equal, round, and reactive to light  Neck:      Thyroid: No thyromegaly  Cardiovascular:      Rate and Rhythm: Normal rate and regular rhythm  Heart sounds: Normal heart sounds  Pulmonary:      Effort: Pulmonary effort is normal       Breath sounds: Normal breath sounds  Abdominal:      Palpations: Abdomen is soft  Tenderness: There is no abdominal tenderness  There is no guarding or rebound  Musculoskeletal:         General: Normal range of motion  Cervical back: Normal range of motion and neck supple  Skin:     General: Skin is dry  Neurological:      Mental Status: She is alert and oriented to person, place, and time  Deep Tendon Reflexes: Reflexes are normal and symmetric             Preop labs/testing available and reviewed: yes    eGFR   Date Value Ref Range Status   10/11/2022 90 ml/min/1 73sq m Final     WBC   Date Value Ref Range Status   10/11/2022 4 71 4 31 - 10 16 Thousand/uL Final          EKG yes    Echo no    Stress test/cath no    PFT/[place] no    Functional capacity: Climb stairs                        4 Mets   Pick the highest level patient can comfortably perform   4 mets or greater for surgery    RCRI  High Risk surgery? 1 Point  CAD History:         1 Point   MI; Positive Stress Test; CP due to Mi;  Nitrate Usage to control Angina; Pathologic Q wave on EKG  CHF Active:         1 Point   Pulm Edema; Paroxysmal Nocturnal Dyspnea;  Bibasilar Rales (crackles);S3; CHF on CXR  Cerebrovascular Disease (TIA or CVA):     1 Point  DM on Insulin:        1 Point  Serum Creat >2 0 mg/dl:       1 Point          Total Points: 0     Scorin: Class I, Very Low Risk (0 4%)     1: Class II, Low risk (0 9%)     2: Class III Moderate (6 6%)     3: Class IV High (>11%)      IGNACIO Risk:  GFR:   eGFR   Date Value Ref Range Status   10/11/2022 90 ml/min/1 73sq m Final         For PCP:  If GFR>60, Hold ACE/ARB/Diuretic on the day of surgery, and NSAIDS 10 days before  If GFR<45, Consider PRE and POST op Nephrology Consult  If 46 <GFR> 59 : Has Patient had IGNACIO in last 6 Months? no   If YES: Preop Nephrology consult   If No:  [address] Nephrology consult  Assessment/Plan:    Patient is medically optimized (cleared) for the planned procedure  Further testing/evaluation is not required      Postop concerns: no    Problem List Items Addressed This Visit        Endocrine    Controlled diabetes mellitus type 2 with complications ([facility] )      Other Visit Diagnoses     Preop examination    -  Primary           Diagnoses and all orders for this visit:    Preop examination    Controlled diabetes mellitus type 2 with complications, unspecified whether long term insulin use ([facility] )        No outpatient medications have been marked as taking for the 22 encounter (Consult) with Damir Jarquin MD         NOTE: Please use the above to review important meds for your specialty, the remainder "per anesthesia Guidelines "    NOTE: Please place an [place] message for "[facility]" pool for complicated patients

## 2022-11-18 DIAGNOSIS — M16.12 PRIMARY OSTEOARTHRITIS OF LEFT HIP: ICD-10-CM

## 2022-11-18 RX ORDER — DICLOFENAC SODIUM 75 MG/1
TABLET, DELAYED RELEASE ORAL
Qty: 60 TABLET | Refills: 2 | OUTPATIENT
Start: 2022-11-18

## 2022-12-31 DIAGNOSIS — K21.9 GASTROESOPHAGEAL REFLUX DISEASE, UNSPECIFIED WHETHER ESOPHAGITIS PRESENT: ICD-10-CM

## 2022-12-31 DIAGNOSIS — K52.9 CHRONIC DIARRHEA: ICD-10-CM

## 2022-12-31 RX ORDER — CHOLESTYRAMINE 4 G/9G
POWDER, FOR SUSPENSION ORAL
Qty: 180 PACKET | Refills: 1 | Status: SHIPPED | OUTPATIENT
Start: 2022-12-31

## 2023-01-30 ENCOUNTER — HOSPITAL ENCOUNTER (OUTPATIENT)
Dept: MAMMOGRAPHY | Facility: CLINIC | Age: 63
Discharge: HOME | End: 2023-01-30

## 2023-01-30 DIAGNOSIS — Z12.31 ENCOUNTER FOR SCREENING MAMMOGRAM FOR MALIGNANT NEOPLASM OF BREAST: ICD-10-CM

## 2023-03-02 ENCOUNTER — OFFICE VISIT (OUTPATIENT)
Dept: DENTISTRY | Facility: CLINIC | Age: 63
End: 2023-03-02

## 2023-03-02 VITALS — HEART RATE: 91 BPM | TEMPERATURE: 98 F | SYSTOLIC BLOOD PRESSURE: 123 MMHG | DIASTOLIC BLOOD PRESSURE: 78 MMHG

## 2023-03-02 DIAGNOSIS — Z01.20 ENCOUNTER FOR DENTAL EXAMINATION: Primary | ICD-10-CM

## 2023-03-02 PROBLEM — K05.30 PERIODONTITIS: Status: ACTIVE | Noted: 2023-03-02

## 2023-03-02 NOTE — DENTAL PROCEDURE DETAILS
María Elena Michaud presents for a Comprehensive exam  Verbal consent for treatment given in addition to the forms  Reviewed health history - Patient had Arthroplasty of left hip, will need medical consult for pre-med   Patient is ASA II  Consents signed: Yes     Perio: Generalized and Recession  Pain Scale: 0  Caries Assessment: Low  Radiographs: Complete mouth series & Pan  n/c to rule out #1 area      Oral Hygiene instruction reviewed and given  Recommended Hygiene recall visits with the Arthur Preciado   #[id number] ( 20 min)   Stage 2 Grade B      Treatment Plan:  1  Infection control: referred for   2  Periodontal therapy:  SRP all 4 Quad ( pre- auth)   3  Caries control: #16 occlusal need extraction as well as #1   4  Occlusal evaluation:   5  Case Difficulty Type 1    Prognosis is Good    Referrals needed: Extraction #1 & #16   Next Visit:  SrP UL & LL 75 min pre-auth   NV: 2 SRP UR & LR 75 min pre-auth   NV: 3 4-6 week post op   Exam by Dr Katiana Vidal

## 2023-03-13 ENCOUNTER — TELEPHONE (OUTPATIENT)
Dept: FAMILY MEDICINE CLINIC | Facility: CLINIC | Age: 63
End: 2023-03-13

## 2023-03-13 NOTE — TELEPHONE ENCOUNTER
No needs for new labs at this time, as last set was only 5 months ago and we can do HgA1c POC here at the day of the visit

## 2023-03-13 NOTE — TELEPHONE ENCOUNTER
Patient came in and stated that she usually gets labs done prior to appointments to checks blood sugar  Is asking for orders to be placed prior to appointment on 3/20

## 2023-03-20 ENCOUNTER — OFFICE VISIT (OUTPATIENT)
Dept: FAMILY MEDICINE CLINIC | Facility: CLINIC | Age: 63
End: 2023-03-20

## 2023-03-20 VITALS
BODY MASS INDEX: 32.37 KG/M2 | RESPIRATION RATE: 19 BRPM | SYSTOLIC BLOOD PRESSURE: 138 MMHG | HEART RATE: 93 BPM | DIASTOLIC BLOOD PRESSURE: 76 MMHG | OXYGEN SATURATION: 97 % | WEIGHT: 177 LBS | TEMPERATURE: 97.8 F

## 2023-03-20 DIAGNOSIS — E11.8 CONTROLLED DIABETES MELLITUS TYPE 2 WITH COMPLICATIONS, UNSPECIFIED WHETHER LONG TERM INSULIN USE (HCC): Primary | ICD-10-CM

## 2023-03-20 LAB — SL AMB POCT HEMOGLOBIN AIC: 8.8 (ref ?–6.5)

## 2023-03-20 NOTE — ASSESSMENT & PLAN NOTE
No SOB, cough, wheezing reported  Follow current treatment regimen with albuterol inhaler prn SOB, cough, wheezing

## 2023-03-20 NOTE — ASSESSMENT & PLAN NOTE
HgA1C trending up  Discussed importance of regular exercise and eating healthy diabetic diet  Add DPP4 inhibitor medicine to treatment regimen  Ordered follow up HgA1C, CMP, CBC, TSH to be done before next appointment in 4 months  Recommended regular blood sugar monitoring, call the office if experience symptoms of hypo/hyperglycemia       Lab Results   Component Value Date    HGBA1C 8 8 (A) 03/20/2023

## 2023-03-20 NOTE — PROGRESS NOTES
Name: Deborah Velasquez      : 1960      MRN: [id number]  Encounter Provider: Dejan Verdin MD  Encounter Date: 3/20/2023   Encounter department: [address] [place] [address]     1  Controlled diabetes mellitus type 2 with complications, unspecified whether long term insulin use (HCC)  Assessment & Plan:  HgA1C trending up  Discussed importance of regular exercise and eating healthy diabetic diet  Add DPP4 inhibitor medicine to treatment regimen  Ordered follow up HgA1C, CMP, CBC, TSH to be done before next appointment in 4 months  Recommended regular blood sugar monitoring, call the office if experience symptoms of hypo/hyperglycemia  Lab Results   Component Value Date    HGBA1C 8 8 (A) 2023       Orders:  -     POCT hemoglobin A1c  -     Comprehensive metabolic panel; Future  -     CBC and differential; Future  -     Microalbumin / creatinine urine ratio; Future  -     TSH, 3rd generation with Free T4 reflex; Future  -     Lipid panel; Future  -     Empagliflozin (JARDIANCE) 10 MG TABS tablet; Take 1 tablet (10 mg total) by mouth daily           Subjective      MR is a 66 yo female with PMH of hypertension and type 2 diabetes presenting to the office for follow up of diabetes  She admits to being complaint with her medicines without any side effects  She denies any recent infections, fevers, unintentional weight loss, chest pain, SOB, headaches, dizziness, numbness, or tingling  She does have blurry vision sometimes even with her glasses on  Patient reports her last eye exam was done in 2022  She mentions eating healthy diet including vegetables and protein  No hypo/hyperglycemia symptoms reported  She denies being physically active currently  She denies any tobacco use  She notes checking her blood sugars weekly or biweekly and readings are reported as between        Diabetes  Pertinent negatives for hypoglycemia include no dizziness, headaches or pallor  Pertinent negatives for diabetes include no chest pain, no polydipsia and no polyuria  Review of Systems   Constitutional: Negative for chills, fever and unexpected weight change  HENT: Negative for congestion and rhinorrhea  Eyes: Positive for visual disturbance (blurry vision sometimes)  Respiratory: Negative for apnea, cough, chest tightness and shortness of breath  Cardiovascular: Negative for chest pain, palpitations and leg swelling  Gastrointestinal: Negative for constipation and diarrhea  Endocrine: Negative for polydipsia and polyuria  Genitourinary: Negative for difficulty urinating and pelvic pain  Musculoskeletal: Negative for arthralgias, back pain and myalgias  Skin: Negative for pallor and rash  Neurological: Negative for dizziness and headaches  Current Outpatient Medications on File Prior to Visit   Medication Sig   • acetaminophen (TYLENOL) 500 mg tablet Take 1 tablet (500 mg total) by mouth every 6 (six) hours as needed for mild pain   • acetaminophen (TYLENOL) 650 mg CR tablet Take 1 tablet (650 mg total) by mouth every 8 (eight) hours as needed for mild pain   • albuterol (PROVENTIL HFA,VENTOLIN HFA) 90 mcg/act inhaler TAKE 2 PUFFS BY MOUTH EVERY 6 HOURS AS NEEDED FOR WHEEZE OR FOR SHORTNESS OF BREATH (Patient not taking: Reported on 3/2/2023)   • ALPRAZolam (XANAX) 0 5 mg tablet Take 1 tablet (0 5 mg total) by mouth once as needed for anxiety for up to 1 dose   • apixaban (Eliquis) 5 mg Take 1 tablet (5 mg total) by mouth 2 (two) times a day for 28 days Do not start taking medication until after total joint arthroplasty   • Blood Glucose Monitoring Suppl (Contour Next Monitor) w/Device KIT Use 1 each daily Check blood sugar 2 times day   • cholestyramine (QUESTRAN) 4 g packet TAKE 1 PACKET BY MOUTH 2 TIMES A DAY WITH MEALS     • diclofenac (VOLTAREN) 75 mg EC tablet Take 1 tablet (75 mg total) by mouth 2 (two) times a day (Patient not taking: Reported on 3/2/2023)   • Diclofenac Sodium (VOLTAREN) 1 % Apply 2 g topically 4 (four) times a day (Patient not taking: Reported on 5/7/9760)   • folic acid (FOLVITE) 1 mg tablet TAKE 1 TABLET BY MOUTH EVERY DAY   • glucose blood (Contour Next Test) test strip Check blood sugar 2 times day   • glucose blood (FREESTYLE LITE) test strip Use once a day   • losartan (COZAAR) 50 mg tablet Take 1 tablet (50 mg total) by mouth daily   • metFORMIN (GLUCOPHAGE) 1000 MG tablet Take 1 tablet (1,000 mg total) by mouth 2 (two) times a day with meals   • Microlet Lancets MISC Use 2 (two) times a day   • Multiple Vitamin (multivitamin) capsule Take 1 capsule by mouth daily   • omeprazole (PriLOSEC) 40 MG capsule TAKE 1 CAPSULE BY MOUTH EVERY DAY BEFORE BREAKFAST   • simvastatin (ZOCOR) 20 mg tablet Take 1 tablet (20 mg total) by mouth daily at bedtime   • triamcinolone (KENALOG) 0 025 % ointment Apply topically 2 (two) times a day       Objective     /76 (BP Location: Left arm, Patient Position: Sitting, Cuff Size: Standard)   Pulse 93   Temp 97 8 °F (36 6 °C) (Temporal)   Resp 19   Wt 80 3 kg (177 lb)   SpO2 97%   BMI 32 37 kg/m²     Physical Exam  Vitals reviewed  Constitutional:       General: She is not in acute distress  Appearance: Normal appearance  She is normal weight  HENT:      Head: Normocephalic  Right Ear: Tympanic membrane, ear canal and external ear normal  There is no impacted cerumen  Left Ear: Tympanic membrane, ear canal and external ear normal  There is no impacted cerumen  Nose: Nose normal  No congestion  Mouth/Throat:      Mouth: Mucous membranes are moist       Pharynx: Oropharynx is clear  No oropharyngeal exudate or posterior oropharyngeal erythema  Eyes:      General:         Right eye: No discharge  Left eye: No discharge  Pupils: Pupils are equal, round, and reactive to light     Cardiovascular:      Rate and Rhythm: Normal rate and regular rhythm  Pulses: Normal pulses  Dorsalis pedis pulses are 2+ on the right side and 2+ on the left side  Posterior tibial pulses are 2+ on the right side and 2+ on the left side  Heart sounds: Normal heart sounds  No murmur heard  No friction rub  No gallop  Pulmonary:      Effort: Pulmonary effort is normal  No respiratory distress  Breath sounds: Normal breath sounds  No wheezing, rhonchi or rales  Abdominal:      Palpations: Abdomen is soft  Musculoskeletal:      Cervical back: Neck supple  Right foot: No deformity, bunion or Charcot foot  Left foot: No deformity, bunion or Charcot foot  Feet:      Right foot:      Protective Sensation: 4 sites tested  4 sites sensed  Skin integrity: Skin integrity normal  No ulcer, blister, skin breakdown, erythema or callus  Toenail Condition: Right toenails are normal       Left foot:      Protective Sensation: 4 sites tested  4 sites sensed  Skin integrity: Skin integrity normal  No ulcer, blister, skin breakdown, erythema or callus  Toenail Condition: Left toenails are normal    Lymphadenopathy:      Cervical: No cervical adenopathy  Skin:     General: Skin is warm  Capillary Refill: Capillary refill takes less than 2 seconds  Neurological:      General: No focal deficit present  Mental Status: She is alert and oriented to person, place, and time         Marizol Wallace MD

## 2023-04-03 ENCOUNTER — TELEPHONE (OUTPATIENT)
Dept: FAMILY MEDICINE CLINIC | Facility: CLINIC | Age: 63
End: 2023-04-03

## 2023-04-03 DIAGNOSIS — N89.8 VAGINAL ITCHING: Primary | ICD-10-CM

## 2023-04-03 RX ORDER — CLOTRIMAZOLE 1 %
1 CREAM WITH APPLICATOR VAGINAL
Qty: 45 G | Refills: 0 | Status: SHIPPED | OUTPATIENT
Start: 2023-04-03

## 2023-04-03 NOTE — TELEPHONE ENCOUNTER
Pt lvm stating the medication Jardiance 10 mg gave reaction, vaginal itchiness, if the PCP can send medication for the itchiness

## 2023-04-03 NOTE — TELEPHONE ENCOUNTER
Vaginal itching is a known side effect of Jardiance  First thing she needs to do is wash her vaginal area with water or use flusable wipes every time after she urinates  Will send in a cream, however if she continues with the problem despite the cream and using wipes will need to come in to discuss medication change

## 2023-05-02 DIAGNOSIS — E11.8 CONTROLLED DIABETES MELLITUS TYPE 2 WITH COMPLICATIONS, UNSPECIFIED WHETHER LONG TERM INSULIN USE (HCC): ICD-10-CM

## 2023-05-02 RX ORDER — SIMVASTATIN 20 MG
TABLET ORAL
Qty: 90 TABLET | Refills: 1 | Status: SHIPPED | OUTPATIENT
Start: 2023-05-02

## 2023-05-20 DIAGNOSIS — I10 HYPERTENSION, BENIGN: ICD-10-CM

## 2023-05-20 DIAGNOSIS — E11.9 DIABETES MELLITUS TYPE 2, CONTROLLED (HCC): ICD-10-CM

## 2023-05-20 RX ORDER — LOSARTAN POTASSIUM 50 MG/1
TABLET ORAL
Qty: 90 TABLET | Refills: 1 | Status: SHIPPED | OUTPATIENT
Start: 2023-05-20

## 2023-06-23 ENCOUNTER — APPOINTMENT (OUTPATIENT)
Dept: RADIOLOGY | Facility: AMBULARY SURGERY CENTER | Age: 63
End: 2023-06-23
Attending: ORTHOPAEDIC SURGERY
Payer: MEDICARE

## 2023-06-23 ENCOUNTER — OFFICE VISIT (OUTPATIENT)
Dept: OBGYN CLINIC | Facility: CLINIC | Age: 63
End: 2023-06-23
Payer: MEDICARE

## 2023-06-23 VITALS
WEIGHT: 179 LBS | SYSTOLIC BLOOD PRESSURE: 143 MMHG | HEART RATE: 67 BPM | DIASTOLIC BLOOD PRESSURE: 84 MMHG | HEIGHT: 62 IN | BODY MASS INDEX: 32.94 KG/M2

## 2023-06-23 DIAGNOSIS — M46.1 SACROILIITIS (HCC): ICD-10-CM

## 2023-06-23 DIAGNOSIS — Z96.642 S/P TOTAL LEFT HIP ARTHROPLASTY: ICD-10-CM

## 2023-06-23 DIAGNOSIS — Z96.642 S/P TOTAL LEFT HIP ARTHROPLASTY: Primary | ICD-10-CM

## 2023-06-23 PROCEDURE — 99213 OFFICE O/P EST LOW 20 MIN: CPT | Performed by: ORTHOPAEDIC SURGERY

## 2023-06-23 PROCEDURE — 73502 X-RAY EXAM HIP UNI 2-3 VIEWS: CPT

## 2023-06-23 RX ORDER — AMOXICILLIN 500 MG/1
2000 TABLET, FILM COATED ORAL
Qty: 4 TABLET | Refills: 3 | Status: SHIPPED | OUTPATIENT
Start: 2023-06-23 | End: 2023-06-24

## 2023-06-23 NOTE — PROGRESS NOTES
"Orthopaedic Surgery - Office Note  Mukesh Meza (71 y o  female)   : 1960   MRN: [id number]  Encounter Date: 2023    Chief Complaint   Patient presents with   • Left Hip - Follow-up     Past Surgical History:   Procedure Laterality Date   • CHOLECYSTECTOMY      \"had a panic attack\"   • FOOT SURGERY Right    • HERNIA REPAIR     • OVARY SURGERY     • [place] ARTHRP ACETBLR/PROX FEM PROSTC AGRFT/ALGRFT Left 2022    Procedure: ARTHROPLASTY HIP TOTAL ANTERIOR;  Surgeon: Abel Osman MD;  Location: [facility];  Service: Orthopedics   • [place] COLONOSCOPY FLX DX W/COLLJ SPEC WHEN PFRMD N/A 2016    Procedure: EGD AND COLONOSCOPY;  Surgeon: Eleni Diop MD;  Location: [facility]; Service: Gastroenterology   • [place] CORRJ HALLUX VALGUS W/SESMDC W/2 OSTEOT Right 3/1/2019    Procedure: DOUBLE OSTEOTOMY, ZINA/AKIN BUNIONECTOMY;  Surgeon: Deidra Kulkarni DPM;  Location: [facility];  Service: Podiatry   • SKIN BIOPSY       Assessment / Plan  59year old female one year status post left total hip arthroplasty performed 2022  · Patient is doing well one year post-operatively and is very pleased with her outcome at this time  · She may continue all work and recreational activities without restriction  · Patient understands that she requires prophylactic antibiotics for any dental visits, she does have upcoming cleaning scheduled and prescription was sent to her pharmacy today  · Follow up in one year for re-evaluation with x-ray of the left hip    History of Present Illness  Mukesh Meza is a 59 y o  female who presents one year status post left total hip arthroplasty  She presents today with no complaints and is very pleased with her outcome  She denies pain, limitation with ADLs and recreational activities  She states she has returned to walking activities without limitation to speed or distance, and without pain and soreness afterwards   Notes anterolateral thigh numbness that " "has persisted since her surgery  Review of Systems  Pertinent items are noted in HPI  All other systems were reviewed and are negative  Physical Exam  /84 (BP Location: Left arm, Patient Position: Sitting, Cuff Size: Standard)   Pulse 67   Ht 5' 2\" (1 575 m)   Wt 81 2 kg (179 lb)   BMI 32 74 kg/m²   Cons: Appears well  No apparent distress  Psych: Alert  Oriented x3  Mood and affect normal   Eyes: PERRLA, EOMI  Resp: Normal effort  No audible wheezing or stridor  CV: Palpable pulse  No discernable arrhythmia  Lymph:  No palpable cervical, axillary, or inguinal lymphadenopathy  Skin: Warm  No palpable masses  No visible lesions  Neuro: Normal muscle tone  Normal and symmetric DTR's  Left Hip Exam  No observable deformity, edema, ecchymosis  Well healed anterior surgical incision  Able to achieve full, pain free range of motion in all planes  5/5 strength hip flexion, abduction, IR, ER  Sensation intact in DP/SP/[place]/Sa/T nerve distributions  2+ DP & PT pulses  Brisk capillary refill in all toes      Studies Reviewed  X-rays of left hip obtained today reviewed and demonstrate: Orthopedic hardware in excellent positional alignment with no signs of loosening or hardware failure    Procedures  None performed today    Medical, Surgical, Family, and Social History  The patient's medical history, family history, and social history, were reviewed and updated as appropriate      Past Medical History:   Diagnosis Date   • Acquired hammer toe of right foot 03/15/2018    S/p surg   • Arthritis    • Asthma     \"one time with a cold, and used an inhaler\"   • Chronic pain disorder     hip pain left, and back   • Diabetes mellitus ([address] )    • Eczema    • Enlarged liver    • GERD (gastroesophageal reflux disease)    • History of panic attacks    • Hyperlipidemia    • Hypertension    • Labral tear of shoulder 09/24/2014   • Right foot pain     bunionectomy today 3/1/2019   • Rotator cuff syndrome, right " 08/16/2017   • Wears glasses            Family History   Problem Relation Age of Onset   • Hypertension Mother    • Hypertension Father    • Bone cancer Maternal Grandfather    • Cancer Maternal Grandfather         bone   • Breast cancer Maternal Aunt    • Stomach cancer Brother    • Cancer Brother         stomach   • Colon cancer Neg Hx    • Ovarian cancer Neg Hx        Social History     Occupational History   • 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   Tobacco Use   • Smoking status: Never   • Smokeless tobacco: Never   Vaping Use   • Vaping Use: Never used   Substance and Sexual Activity   • Alcohol use: Never   • Drug use: Never   • Sexual activity: Yes     Partners: Male     Birth control/protection: Post-menopausal       Allergies   Allergen Reactions   • Aspirin Shortness Of Breath and Itching     swelling   • Latex Itching and Rash         Current Outpatient Medications:   •  acetaminophen (TYLENOL) 500 mg tablet, Take 1 tablet (500 mg total) by mouth every 6 (six) hours as needed for mild pain, Disp: 30 tablet, Rfl: 0  •  acetaminophen (TYLENOL) 650 mg CR tablet, Take 1 tablet (650 mg total) by mouth every 8 (eight) hours as needed for mild pain, Disp: 30 tablet, Rfl: 0  •  ALPRAZolam (XANAX) 0 5 mg tablet, Take 1 tablet (0 5 mg total) by mouth once as needed for anxiety for up to 1 dose, Disp: 4 tablet, Rfl: 0  •  Blood Glucose Monitoring Suppl (Contour Next Monitor) w/Device KIT, Use 1 each daily Check blood sugar 2 times day, Disp: 1 kit, Rfl: 0  •  cholestyramine (QUESTRAN) 4 g packet, TAKE 1 PACKET BY MOUTH 2 TIMES A DAY WITH MEALS , Disp: 180 packet, Rfl: 1  •  clotrimazole (GYNE-LOTRIMIN) 1 % vaginal cream, Insert 1 applicator into the vagina daily at bedtime, Disp: 45 g, Rfl: 0  •  Empagliflozin (JARDIANCE) 10 MG TABS tablet, Take 1 tablet (10 mg total) by mouth daily, Disp: 30 tablet, Rfl: 5  •  folic acid (FOLVITE) 1 mg tablet, TAKE 1 TABLET BY MOUTH EVERY DAY, Disp: 90 tablet, Rfl: 1  • glucose blood (Contour Next Test) test strip, Check blood sugar 2 times day, Disp: 100 each, Rfl: 5  •  glucose blood (FREESTYLE LITE) test strip, Use once a day, Disp: 100 each, Rfl: 5  •  losartan (COZAAR) 50 mg tablet, TAKE 1 TABLET BY MOUTH EVERY DAY, Disp: 90 tablet, Rfl: 1  •  metFORMIN (GLUCOPHAGE) 1000 MG tablet, Take 1 tablet (1,000 mg total) by mouth 2 (two) times a day with meals, Disp: 180 tablet, Rfl: 3  •  Microlet Lancets MISC, Use 2 (two) times a day, Disp: 100 each, Rfl: 5  •  Multiple Vitamin (multivitamin) capsule, Take 1 capsule by mouth daily, Disp: , Rfl:   •  omeprazole (PriLOSEC) 40 MG capsule, TAKE 1 CAPSULE BY MOUTH EVERY DAY BEFORE BREAKFAST, Disp: 90 capsule, Rfl: 1  •  simvastatin (ZOCOR) 20 mg tablet, TAKE 1 TABLET BY MOUTH DAILY AT BEDTIME, Disp: 90 tablet, Rfl: 1  •  triamcinolone (KENALOG) 0 025 % ointment, Apply topically 2 (two) times a day, Disp: 30 g, Rfl: 1  •  albuterol (PROVENTIL HFA,VENTOLIN HFA) 90 mcg/act inhaler, TAKE 2 PUFFS BY MOUTH EVERY 6 HOURS AS NEEDED FOR WHEEZE OR FOR SHORTNESS OF BREATH (Patient not taking: Reported on 6/23/2023), Disp: 18 g, Rfl: 1  •  apixaban (Eliquis) 5 mg, Take 1 tablet (5 mg total) by mouth 2 (two) times a day for 28 days Do not start taking medication until after total joint arthroplasty, Disp: 56 tablet, Rfl: 0  •  diclofenac (VOLTAREN) 75 mg EC tablet, Take 1 tablet (75 mg total) by mouth 2 (two) times a day (Patient not taking: Reported on 3/2/2023), Disp: 60 tablet, Rfl: 2  •  Diclofenac Sodium (VOLTAREN) 1 %, Apply 2 g topically 4 (four) times a day (Patient not taking: Reported on 3/2/2023), Disp: 100 g, Rfl: 5      Anoop DUNAWAY,:  Lamont Mattson am acting as a scribe while in the presence of the attending physician :       I,:  Eli Ramirez MD personally performed the services described in this documentation    as scribed in my presence :

## 2023-06-28 ENCOUNTER — TELEPHONE (OUTPATIENT)
Dept: FAMILY MEDICINE CLINIC | Facility: CLINIC | Age: 63
End: 2023-06-28

## 2023-06-28 ENCOUNTER — OFFICE VISIT (OUTPATIENT)
Dept: DENTISTRY | Facility: CLINIC | Age: 63
End: 2023-06-28

## 2023-06-28 VITALS — TEMPERATURE: 98.6 F | DIASTOLIC BLOOD PRESSURE: 86 MMHG | HEART RATE: 84 BPM | SYSTOLIC BLOOD PRESSURE: 146 MMHG

## 2023-06-28 DIAGNOSIS — F40.243 ANXIETY WITH FLYING: ICD-10-CM

## 2023-06-28 DIAGNOSIS — Z01.20 ENCOUNTER FOR DENTAL EXAMINATION: Primary | ICD-10-CM

## 2023-06-28 PROCEDURE — D4341 PERIODONTAL SCALING AND ROOT PLANING - 4 OR MORE TEETH PER QUADRANT: HCPCS

## 2023-06-28 NOTE — DENTAL PROCEDURE DETAILS
SCALING AND ROOT PLANING     ASA: II  Pain: 0  Reviewed M/H    SC/RP:   UR Quad scaling and root planning  Applied Topical Anesthetic,   Administered 2 carpule , Short Needle,  Septocaine (articaine HCI ) and Epi 4 % and  1:100,000 1 7 mL, Infiltration, IANB   Type of Treatment:  Used Ultrasonic and Hand Scaling  Subgingival Irrigation - post tx - Chlorhexidine  Reviewed OHI  - Brush 2X/day and Floss 1X/day    Oral Hygiene: Good   Plaque:  Light   Calculus: Moderate sub   Bleeding: Moderate   Stain:Light      Treatment Plan:  no changes to tx plan       Dr Anabell Hays   gave anesthesia today                 Referral:  No referral given  Gave follow-up directions    NV1:  SRP LR - 60min  NV2:  SRP UL 60 min   NV: 3 SRP LL 60 min   NV: 4 post SRP eval - 45 min  NV: 5 3 month perio maintenance - 60 min  No Exam today

## 2023-06-28 NOTE — TELEPHONE ENCOUNTER
06/28/23    Pt came into the office today requesting refill on the following med:   ALPRAZolam (XANAX) 0 5 mg tablet    Pt states that she needs med before her Dental Appt 07/05/23, if is possible  Any questions, please contact Pt         Pt next appt with PCP 07/20/23

## 2023-06-30 RX ORDER — ALPRAZOLAM 0.5 MG/1
0.5 TABLET ORAL ONCE AS NEEDED
Qty: 4 TABLET | Refills: 0 | Status: SHIPPED | OUTPATIENT
Start: 2023-06-30

## 2023-07-05 ENCOUNTER — OFFICE VISIT (OUTPATIENT)
Dept: DENTISTRY | Facility: CLINIC | Age: 63
End: 2023-07-05

## 2023-07-05 VITALS — SYSTOLIC BLOOD PRESSURE: 142 MMHG | DIASTOLIC BLOOD PRESSURE: 85 MMHG | HEART RATE: 96 BPM | TEMPERATURE: 97.8 F

## 2023-07-05 DIAGNOSIS — Z01.20 ENCOUNTER FOR DENTAL EXAMINATION: Primary | ICD-10-CM

## 2023-07-05 PROCEDURE — D4341 PERIODONTAL SCALING AND ROOT PLANING - 4 OR MORE TEETH PER QUADRANT: HCPCS

## 2023-07-05 RX ORDER — AMOXICILLIN 500 MG/1
500 CAPSULE ORAL SEE ADMIN INSTRUCTIONS
COMMUNITY
Start: 2023-06-23

## 2023-07-05 NOTE — PROGRESS NOTES
SCALING AND ROOT PLANING     ASA:  Pain:  Reviewed M/H    SC/RP:   UR/LR     UL/LL   Quad scaling and root planning  Applied Topical Anesthetic,   Administered  ___ carpules,  Long Needle, Lidocaine HCL 2 % and Epi 1.7 mL 1:100,000,   _____ carpule , Short Needle,  Septocaine (articaine HCI ) and Epi 4 % and  1:100,000 1.7 mL, Infiltration, IANB   Type of Treatment:  Used Ultrasonic and Hand Scaling, Flossed, Polished, Subgingival Irrigation - post tx - Chlorhexidine. Reviewed OHI  - Brush 2X/day and Floss 1X/day    Oral Hygiene:  Poor   Plaque: Moderate  / Heavy  Calculus: Moderate /  Heavy  Bleeding: Moderate /  Heavy    Stain:  None  / Light  /  Moderate  /  Heavy    Treatment Plan:  no changes to tx plan        Exam:  Resident    ____        gave anesthesia today                 Referral:  No referral given  Gave follow-up directions.   NV1:  SC/RP - UL/LL - 75 min  NV2:  post SRP eval - 45 min  NV3:  3 month perio maintenance - 60 min

## 2023-07-05 NOTE — DENTAL PROCEDURE DETAILS
SCALING AND ROOT PLANING     ASA: II  Pain:0  Reviewed M/H    SC/RP:LR    Quad scaling and root planning  Applied Topical Anesthetic,   Administered    1 carpule , Short Needle,  Septocaine (articaine HCI ) and Epi 4 % and  1:100,000 1.7 mL, Infiltration, IANB   Type of Treatment:  Used Ultrasonic and Hand Scaling,  Subgingival Irrigation - post tx - Chlorhexidine. Reviewed OHI  - Brush 2X/day and Floss 1X/day    Oral Hygiene:  Fair   Plaque:  Light   Calculus: Moderate sub   Bleeding: Heavy    Stain:  None     Treatment Plan:  no changes to tx plan       Hussein García gave anesthesia today                 Referral:  No referral given  Gave follow-up directions.     NV: 1  SRP - UL 60 min  NV: 2 SRP LL 60 min   NV: 3   post SRP eval - 45 min  NV:  3 month perio maintenance - 60 min  Then restorative

## 2023-07-14 DIAGNOSIS — K21.9 GASTROESOPHAGEAL REFLUX DISEASE, UNSPECIFIED WHETHER ESOPHAGITIS PRESENT: ICD-10-CM

## 2023-07-14 RX ORDER — OMEPRAZOLE 40 MG/1
CAPSULE, DELAYED RELEASE ORAL
Qty: 30 CAPSULE | Refills: 0 | Status: SHIPPED | OUTPATIENT
Start: 2023-07-14 | End: 2023-08-08

## 2023-07-17 ENCOUNTER — APPOINTMENT (OUTPATIENT)
Dept: LAB | Facility: CLINIC | Age: 63
End: 2023-07-17
Payer: MEDICARE

## 2023-07-17 DIAGNOSIS — E11.8 CONTROLLED DIABETES MELLITUS TYPE 2 WITH COMPLICATIONS, UNSPECIFIED WHETHER LONG TERM INSULIN USE (HCC): ICD-10-CM

## 2023-07-17 LAB
ALBUMIN SERPL BCP-MCNC: 3.9 G/DL (ref 3.5–5)
ALP SERPL-CCNC: 118 U/L (ref 46–116)
ALT SERPL W P-5'-P-CCNC: 40 U/L (ref 12–78)
ANION GAP SERPL CALCULATED.3IONS-SCNC: 3 MMOL/L
AST SERPL W P-5'-P-CCNC: 32 U/L (ref 5–45)
BASOPHILS # BLD AUTO: 0.03 THOUSANDS/ÂΜL (ref 0–0.1)
BASOPHILS NFR BLD AUTO: 1 % (ref 0–1)
BILIRUB SERPL-MCNC: 0.28 MG/DL (ref 0.2–1)
BUN SERPL-MCNC: 12 MG/DL (ref 5–25)
CALCIUM SERPL-MCNC: 9.1 MG/DL (ref 8.3–10.1)
CHLORIDE SERPL-SCNC: 110 MMOL/L (ref 96–108)
CHOLEST SERPL-MCNC: 265 MG/DL
CO2 SERPL-SCNC: 26 MMOL/L (ref 21–32)
CREAT SERPL-MCNC: 0.88 MG/DL (ref 0.6–1.3)
CREAT UR-MCNC: 74 MG/DL
EOSINOPHIL # BLD AUTO: 0.06 THOUSAND/ÂΜL (ref 0–0.61)
EOSINOPHIL NFR BLD AUTO: 1 % (ref 0–6)
ERYTHROCYTE [DISTWIDTH] IN BLOOD BY AUTOMATED COUNT: 13.6 % (ref 11.6–15.1)
GFR SERPL CREATININE-BSD FRML MDRD: 70 ML/MIN/1.73SQ M
GLUCOSE P FAST SERPL-MCNC: 174 MG/DL (ref 65–99)
HCT VFR BLD AUTO: 42.7 % (ref 34.8–46.1)
HDLC SERPL-MCNC: 57 MG/DL
HGB BLD-MCNC: 13.1 G/DL (ref 11.5–15.4)
IMM GRANULOCYTES # BLD AUTO: 0.01 THOUSAND/UL (ref 0–0.2)
IMM GRANULOCYTES NFR BLD AUTO: 0 % (ref 0–2)
LDLC SERPL CALC-MCNC: 165 MG/DL (ref 0–100)
LYMPHOCYTES # BLD AUTO: 1.68 THOUSANDS/ÂΜL (ref 0.6–4.47)
LYMPHOCYTES NFR BLD AUTO: 34 % (ref 14–44)
MCH RBC QN AUTO: 29.2 PG (ref 26.8–34.3)
MCHC RBC AUTO-ENTMCNC: 30.7 G/DL (ref 31.4–37.4)
MCV RBC AUTO: 95 FL (ref 82–98)
MICROALBUMIN UR-MCNC: 11.3 MG/L (ref 0–20)
MICROALBUMIN/CREAT 24H UR: 15 MG/G CREATININE (ref 0–30)
MONOCYTES # BLD AUTO: 0.48 THOUSAND/ÂΜL (ref 0.17–1.22)
MONOCYTES NFR BLD AUTO: 10 % (ref 4–12)
NEUTROPHILS # BLD AUTO: 2.76 THOUSANDS/ÂΜL (ref 1.85–7.62)
NEUTS SEG NFR BLD AUTO: 54 % (ref 43–75)
NONHDLC SERPL-MCNC: 208 MG/DL
NRBC BLD AUTO-RTO: 0 /100 WBCS
PLATELET # BLD AUTO: 242 THOUSANDS/UL (ref 149–390)
PMV BLD AUTO: 11.5 FL (ref 8.9–12.7)
POTASSIUM SERPL-SCNC: 4.6 MMOL/L (ref 3.5–5.3)
PROT SERPL-MCNC: 8.3 G/DL (ref 6.4–8.4)
RBC # BLD AUTO: 4.49 MILLION/UL (ref 3.81–5.12)
SODIUM SERPL-SCNC: 139 MMOL/L (ref 135–147)
TRIGL SERPL-MCNC: 213 MG/DL
TSH SERPL DL<=0.05 MIU/L-ACNC: 1.58 UIU/ML (ref 0.45–4.5)
WBC # BLD AUTO: 5.02 THOUSAND/UL (ref 4.31–10.16)

## 2023-07-17 PROCEDURE — 85025 COMPLETE CBC W/AUTO DIFF WBC: CPT

## 2023-07-17 PROCEDURE — 36415 COLL VENOUS BLD VENIPUNCTURE: CPT

## 2023-07-17 PROCEDURE — 82043 UR ALBUMIN QUANTITATIVE: CPT

## 2023-07-17 PROCEDURE — 80053 COMPREHEN METABOLIC PANEL: CPT

## 2023-07-17 PROCEDURE — 80061 LIPID PANEL: CPT

## 2023-07-17 PROCEDURE — 82570 ASSAY OF URINE CREATININE: CPT

## 2023-07-17 PROCEDURE — 84443 ASSAY THYROID STIM HORMONE: CPT

## 2023-07-20 ENCOUNTER — OFFICE VISIT (OUTPATIENT)
Dept: FAMILY MEDICINE CLINIC | Facility: CLINIC | Age: 63
End: 2023-07-20

## 2023-07-20 VITALS
TEMPERATURE: 96.5 F | SYSTOLIC BLOOD PRESSURE: 130 MMHG | RESPIRATION RATE: 16 BRPM | OXYGEN SATURATION: 97 % | DIASTOLIC BLOOD PRESSURE: 80 MMHG | WEIGHT: 177 LBS | HEART RATE: 98 BPM | BODY MASS INDEX: 32.57 KG/M2 | HEIGHT: 62 IN

## 2023-07-20 DIAGNOSIS — E11.8 CONTROLLED DIABETES MELLITUS TYPE 2 WITH COMPLICATIONS, UNSPECIFIED WHETHER LONG TERM INSULIN USE (HCC): ICD-10-CM

## 2023-07-20 DIAGNOSIS — E11.9 CONTROLLED TYPE 2 DIABETES MELLITUS WITHOUT COMPLICATION, UNSPECIFIED WHETHER LONG TERM INSULIN USE (HCC): Primary | ICD-10-CM

## 2023-07-20 LAB — SL AMB POCT HEMOGLOBIN AIC: 8 (ref ?–6.5)

## 2023-07-20 PROCEDURE — 83036 HEMOGLOBIN GLYCOSYLATED A1C: CPT | Performed by: FAMILY MEDICINE

## 2023-07-20 PROCEDURE — 99214 OFFICE O/P EST MOD 30 MIN: CPT | Performed by: FAMILY MEDICINE

## 2023-07-20 RX ORDER — SIMVASTATIN 20 MG
20 TABLET ORAL
Qty: 90 TABLET | Refills: 1 | Status: SHIPPED | OUTPATIENT
Start: 2023-07-20

## 2023-07-20 NOTE — PROGRESS NOTES
Diabetic Foot Exam    Diabetic Foot ExamName: Ara Paniagua      : 1960      MRN: [id number]  Encounter Provider: Tawanda Breaux MD  Encounter Date: 2023   Encounter department: [address] [facility]     1. Controlled type 2 diabetes mellitus without complication, unspecified whether long term insulin use ([facility])  -     POCT hemoglobin A1c  -     dapagliflozin (Farxiga) 10 MG tablet; Take 1 tablet (10 mg total) by mouth daily    2. Controlled diabetes mellitus type 2 with complications, unspecified whether long term insulin use [facility])  Assessment & Plan:    Lab Results   Component Value Date    HGBA1C 8.0 (A) 2023   Jardiance switched to Arlena Snellen given vaginal discomfort/itching     Orders:  -     simvastatin (ZOCOR) 20 mg tablet; Take 1 tablet (20 mg total) by mouth daily at bedtime  -     metFORMIN (GLUCOPHAGE) 1000 MG tablet; Take 1 tablet (1,000 mg total) by mouth 2 (two) times a day with meals           Subjective      Stopped taking Jardiance because it was causing vaginal irritation  FBG at home 90 to 160   Checks feet  Last saw ophthalmology 2022    Review of Systems   All other systems reviewed and are negative.       Current Outpatient Medications on File Prior to Visit   Medication Sig   • acetaminophen (TYLENOL) 500 mg tablet Take 1 tablet (500 mg total) by mouth every 6 (six) hours as needed for mild pain   • acetaminophen (TYLENOL) 650 mg CR tablet Take 1 tablet (650 mg total) by mouth every 8 (eight) hours as needed for mild pain   • albuterol (PROVENTIL HFA,VENTOLIN HFA) 90 mcg/act inhaler TAKE 2 PUFFS BY MOUTH EVERY 6 HOURS AS NEEDED FOR WHEEZE OR FOR SHORTNESS OF BREATH (Patient not taking: Reported on 2023)   • ALPRAZolam (XANAX) 0.5 mg tablet Take 1 tablet (0.5 mg total) by mouth once as needed for anxiety for up to 1 dose   • amoxicillin (AMOXIL) 500 mg capsule Take 500 mg by mouth see administration instructions   • apixaban (Eliquis) 5 mg Take 1 tablet (5 mg total) by mouth 2 (two) times a day for 28 days Do not start taking medication until after total joint arthroplasty   • Blood Glucose Monitoring Suppl (Contour Next Monitor) w/Device KIT Use 1 each daily Check blood sugar 2 times day   • cholestyramine (QUESTRAN) 4 g packet TAKE 1 PACKET BY MOUTH 2 TIMES A DAY WITH MEALS.  (Patient not taking: Reported on 7/5/2023)   • clotrimazole (GYNE-LOTRIMIN) 1 % vaginal cream Insert 1 applicator into the vagina daily at bedtime   • diclofenac (VOLTAREN) 75 mg EC tablet Take 1 tablet (75 mg total) by mouth 2 (two) times a day (Patient not taking: Reported on 3/2/2023)   • Diclofenac Sodium (VOLTAREN) 1 % Apply 2 g topically 4 (four) times a day (Patient not taking: Reported on 4/4/8362)   • folic acid (FOLVITE) 1 mg tablet TAKE 1 TABLET BY MOUTH EVERY DAY (Patient not taking: Reported on 7/5/2023)   • glucose blood (Contour Next Test) test strip Check blood sugar 2 times day   • glucose blood (FREESTYLE LITE) test strip Use once a day   • losartan (COZAAR) 50 mg tablet TAKE 1 TABLET BY MOUTH EVERY DAY   • Microlet Lancets MISC Use 2 (two) times a day   • Multiple Vitamin (multivitamin) capsule Take 1 capsule by mouth daily   • omeprazole (PriLOSEC) 40 MG capsule TAKE 1 CAPSULE BY MOUTH EVERY DAY BEFORE BREAKFAST   • triamcinolone (KENALOG) 0.025 % ointment Apply topically 2 (two) times a day   • [DISCONTINUED] Empagliflozin (JARDIANCE) 10 MG TABS tablet Take 1 tablet (10 mg total) by mouth daily   • [DISCONTINUED] metFORMIN (GLUCOPHAGE) 1000 MG tablet Take 1 tablet (1,000 mg total) by mouth 2 (two) times a day with meals   • [DISCONTINUED] simvastatin (ZOCOR) 20 mg tablet TAKE 1 TABLET BY MOUTH DAILY AT BEDTIME       Objective     /80 (BP Location: Right arm, Patient Position: Sitting, Cuff Size: Large)   Pulse 98   Temp (!) 96.5 °F (35.8 °C) (Temporal)   Resp 16   Ht 5' 2" (1.575 m)   Wt 80.3 kg (177 lb)   SpO2 97%   BMI 32.37 kg/m²     Physical Exam  Vitals and nursing note reviewed. Constitutional:       Appearance: She is well-developed. HENT:      Head: Normocephalic. Right Ear: External ear normal.      Left Ear: External ear normal.      Nose: Nose normal.   Eyes:      Conjunctiva/sclera: Conjunctivae normal.      Pupils: Pupils are equal, round, and reactive to light. Neck:      Thyroid: No thyromegaly. Cardiovascular:      Rate and Rhythm: Normal rate and regular rhythm. Heart sounds: Normal heart sounds. Pulmonary:      Effort: Pulmonary effort is normal.      Breath sounds: Normal breath sounds. Abdominal:      Palpations: Abdomen is soft. Tenderness: There is no abdominal tenderness. There is no guarding or rebound. Musculoskeletal:         General: Normal range of motion. Cervical back: Normal range of motion and neck supple. Skin:     General: Skin is dry. Neurological:      Mental Status: She is alert and oriented to person, place, and time. Deep Tendon Reflexes: Reflexes are normal and symmetric.        Bernarda Lowery MD

## 2023-07-20 NOTE — ASSESSMENT & PLAN NOTE
Lab Results   Component Value Date    HGBA1C 8.0 (A) 07/20/2023   Jardiance switched to Falconer Gong given vaginal discomfort/itching

## 2023-08-02 ENCOUNTER — ANNUAL EXAM (OUTPATIENT)
Dept: OBGYN CLINIC | Facility: CLINIC | Age: 63
End: 2023-08-02

## 2023-08-02 VITALS
WEIGHT: 173.4 LBS | BODY MASS INDEX: 31.72 KG/M2 | DIASTOLIC BLOOD PRESSURE: 75 MMHG | SYSTOLIC BLOOD PRESSURE: 144 MMHG | HEART RATE: 85 BPM

## 2023-08-02 DIAGNOSIS — Z01.419 ENCOUNTER FOR GYNECOLOGICAL EXAMINATION WITHOUT ABNORMAL FINDING: Primary | ICD-10-CM

## 2023-08-02 DIAGNOSIS — Z12.4 SCREENING FOR CERVICAL CANCER: ICD-10-CM

## 2023-08-02 DIAGNOSIS — Z12.31 ENCOUNTER FOR SCREENING MAMMOGRAM FOR MALIGNANT NEOPLASM OF BREAST: ICD-10-CM

## 2023-08-02 DIAGNOSIS — Z11.51 SCREENING FOR HPV (HUMAN PAPILLOMAVIRUS): ICD-10-CM

## 2023-08-02 DIAGNOSIS — Z12.39 ENCOUNTER FOR BREAST CANCER SCREENING USING NON-MAMMOGRAM MODALITY: ICD-10-CM

## 2023-08-02 PROCEDURE — G0476 HPV COMBO ASSAY CA SCREEN: HCPCS | Performed by: NURSE PRACTITIONER

## 2023-08-02 PROCEDURE — 99396 PREV VISIT EST AGE 40-64: CPT | Performed by: NURSE PRACTITIONER

## 2023-08-02 PROCEDURE — G0145 SCR C/V CYTO,THINLAYER,RESCR: HCPCS | Performed by: NURSE PRACTITIONER

## 2023-08-02 NOTE — PATIENT INSTRUCTIONS
Ludwin por garcia confianza en nuestro equipo. Gradie Roly y agradecemos jeff comentarios. Si recibe aptti encuesta nuestra, tómese unos momentos para informarnos cómo estamos. Sinceramente,  Aiken Idol, CRNP       OBESIDAD     Obesidad es cuando garcia índice de masa corporal [facility]) es superior a 30. Agrcia Body mass index is 31.72 kg/m². Verlena Minder Los riesgos de la obesidad incluyen  muchos problemas de [place], incluidas las lesiones y la discapacidad física. Es posible que deba realizarse exámenes para detectar lo siguiente:  Diabetes     Hipertensión o colesterol altoEnfermedades del corazón     Enfermedad cardíaca     Enfermedades del hígado o de la vesícula biliar     Cáncer de colon, de pecho, de próstata, de hígado o de riñón     El apnea del sueño     Artritis o gota    Busque atención médica de inmediato si:   Usted tiene un intenso dolor de jack, confusión o dificultad para hablar. Usted tiene debilidad en un lado del cuerpo. Usted tiene [place] [employer], sudoración o falta de aire. Pregúntele a garica Marisabel Clayton vitaminas y minerales son adecuados para usted. Usted tiene síntomas de enfermedad de la vesícula biliar o el hígado, nia dolor en la parte superior del abdomen. Usted tiene dolor e incomodidad de rodillas o caderas al caminar. Usted presenta síntomas de diabetes, nia exceso de apetito y sed y micción frecuente. Usted presenta síntomas de apnea de sueño, nia roncar o tener sueño delbert el día. Usted tiene preguntas o inquietudes acerca de garcia condición o cuidado. El tratamiento para la obesidad  se enfoca en ayudarle a bajar de peso para mejorar garcia taylor. Incluso patti reducción mínima del índice de [employer] corporal puede reducir el riesgo de muchos problemas de [place]. Garcia médico lo ayudará a fijarse patti meta para bajar de [place]. Cambios en el estilo de paige  son los primeros pasos para tratar la obesidad.  Estos cambios incluyen celina decisiones para consumir alimentos saludables y realizar patti actividad física con regularidad. El médico puede recomendarle un programa para bajar de peso que consta de capacitación, educación y [place]. Medicamento  le pueden ayudar a bajar de peso cuando los [place] en conjunto con [place] y [place]. Cirugía  le puede ayudar a bajar de peso si usted es muy hussein y presenta otros problemas de taylor. Existen varios tipos de [place] para adelgazar. Pídale a garcia médico más información. Cómo perder peso de forma exitosa:   Propóngase metas accesibles y realistas. Un ejemplo de patti meta accesible es caminar 20 minutos los 5 raeann de la [place]. Otro objetivo puede ser perder 5% de garcia peso corporal.    Coméntele a jeff amigos, familiares y compañeros de trabajo sobre jeff metas  y solicite que lo apoyen. Convide a un amigo para hacer ejercicio juntos o acuda a un deisy de motivación para bajar de [place]. Identifique los alimentos o situaciones que le pueden provocar que coma en exceso y busque formas para evitarlos. Deshágase de alimentos altos en calorías en garcia hogar o en el trabajo. En la cocina tenga patti canasta con frutas frescas. Si el estrés es la causa para que usted coma más encuentre formas para sobrellevar el estrés. Lleve un diario en el que registre lo que usted come y shital. También escriba la cantidad de tiempo que pasa realizando patti actividad física delbert el día. Pésese patti vez a la semana y anótelo en garcia diario. Cambios en la alimentación:  Usted necesitará consumir menos de 500 a 1.000 calorías al día de las que usted actualmente consume para perder entre 1 a 2 libras a la semana. Los siguientes cambios le ayudarán a disminuir la cantidad de las calorías que normalmente consume:  Reduzca el tamaño de las porciones. Utilice platos pequeños que no midan más de 9 pulgadas de diámetro. Llene la mitad del plato con frutas y verduras.  Utilice las tazas medidoras para racionar los alimentos hasta que usted sepa nia se ve el tamaño de patti porción. Consuma 3 comidas y 1 o 2 meriendas al día. Planee jeff comidas con anterioridad. Cocine y coma en la casa todo el tiempo que le sea posible. Coma lentamente. Consuma frutas y verduras con todas las comidas. Alexia Salaam y verduras son bajas en calorías y altas en fibra lo cual lo llena. No adicione mantequilla, ni margarina, ni salsas a base de crema a las verduras. Utilice las hierbas para sazonar las verduras al vapor. Consuma menos grasas y alimentos fritos. Consuma yosi o pescado al horno o la haylie. Estas proteínas son más bajas en calorías y grasas que la carne Jana. Limite las comidas rápidas. Es mejor que utilice aderezos para la ensalada a base de aceite de [facility] y vinagre en vez de aderezos en botella. Limite la cantidad de azúcar que consume. No consuma bebidas azucaradas. Limite el consumo de alcohol. Cambios de actividad:  La actividad física es buena para garcia cuerpo por muchas razones. Le ayuda a quemar calorías y fortalecer jeff músculos. Burlene Stella y la depresión y mejora garcia estado de ánimo. Además puede ayudarle a dormir mejor. Consulte con garcia médico antes de empezar un régimen de ejercicios. Realice patti actividad física por lo menos por 30 minutos 5 días a la semana. Empiece despacio. Aparte tiempo cada día para patti actividad física que usted [facility] y [facility] sea [place]. Es mejor realizar tanto un programa de pesas nia [place] R[place] para aumentar garcia ritmo cardíaco, nia caminar, montar en bicicleta o nadar. Busque formas para ser [employer]. Realice patti actividad de jardinería y limpiar la casa. [address] escaleras en vez de utilizar el elevador. En garcia tiempo lilian concurra a eventos que le exijan caminar, nia festivales y ferias al [facility]. Adicionar esta actividad física puede ayudarle a baB[facility] y [facility]. Acuda a jeff consultas de control con garcia médico según le indicaron.   Puede que necesite consultar con un dietista. Anote jeff preguntas para que se acuerde de hacerlas delbert jeff visitas.

## 2023-08-02 NOTE — PROGRESS NOTES
Cas Nguyen is a 59 y.o. female who presents today for annual GYN exam.  Her last pap smear was performed 2019 and result was NILM with negative HPV. She reports no history of abnormal pap smears in her past.  Her last mammogram was performed 2023 and result was WNL. She had colon cancer screening performed 2022. She reports menses as absent since . Her general medical history has been reviewed and she reports it as follows:    Past Medical History:   Diagnosis Date   • Anxiety    • Arthritis    • Chronic pain disorder    • Diabetes mellitus ([address])    • Enlarged liver    • GERD (gastroesophageal reflux disease)    • Hyperlipidemia    • Hypertension      Past Surgical History:   Procedure Laterality Date   • CHOLECYSTECTOMY     • FOOT SURGERY Right    • HERNIA REPAIR     • OVARY SURGERY     • HI ARTHRP ACETBLR/PROX FEM PROSTC AGRFT/ALGRFT Left 2022    Procedure: ARTHROPLASTY HIP TOTAL ANTERIOR;  Surgeon: Karla Thrasher MD;  Location: [facility];  Service: Orthopedics   • HI COLONOSCOPY FLX DX W/COLLJ SPEC WHEN PFRMD N/A 2016    Procedure: EGD AND COLONOSCOPY;  Surgeon: Erica Sanches MD;  Location: [facility] LAB;   Service: Gastroenterology   • HI CORRJ HALLUX VALGUS W/SESMDC W/2 OSTEOT Right 2019    Procedure: DOUBLE OSTEOTOMY, ZINA/AKIN BUNIONECTOMY;  Surgeon: Miguel Mae DPM;  Location: [facility] OR;  Service: Podiatry     OB History        2    Para   2    Term   2       0    AB   0    Living   2       SAB   0    IAB   0    Ectopic   0    Multiple   0    Live Births   2               Social History     Tobacco Use   • Smoking status: Never   • Smokeless tobacco: Never   Vaping Use   • Vaping Use: Never used   Substance Use Topics   • Alcohol use: Never   • Drug use: Never     Social History     Substance and Sexual Activity   Sexual Activity Yes   • Partners: Male   • Birth control/protection: Post-menopausal     Cancer-related family history includes Breast cancer in her maternal aunt; Cancer in her brother and maternal grandfather. There is no history of Colon cancer or Ovarian cancer. Current Outpatient Medications   Medication Instructions   • acetaminophen (TYLENOL) 500 mg, Oral, Every 6 hours PRN   • acetaminophen (TYLENOL) 650 mg, Oral, Every 8 hours PRN   • albuterol (PROVENTIL HFA,VENTOLIN HFA) 90 mcg/act inhaler TAKE 2 PUFFS BY MOUTH EVERY 6 HOURS AS NEEDED FOR WHEEZE OR FOR SHORTNESS OF BREATH   • ALPRAZolam (XANAX) 0.5 mg, Oral, Once as needed   • amoxicillin (AMOXIL) 500 mg, Oral, See admin instructions   • apixaban (ELIQUIS) 5 mg, Oral, 2 times daily, Do not start taking medication until after total joint arthroplasty   • Blood Glucose Monitoring Suppl (Contour Next Monitor) w/Device KIT 1 each, Does not apply, Daily, Check blood sugar 2 times day   • cholestyramine (QUESTRAN) 4 g packet TAKE 1 PACKET BY MOUTH 2 TIMES A DAY WITH MEALS.    • clotrimazole (GYNE-LOTRIMIN) 1 % vaginal cream 1 applicator, Vaginal, Daily at bedtime   • dapagliflozin (FARXIGA) 10 mg, Oral, Daily   • diclofenac (VOLTAREN) 75 mg, Oral, 2 times daily   • Diclofenac Sodium (VOLTAREN) 2 g, Topical, 4 times daily   • folic acid (FOLVITE) 1 mg tablet TAKE 1 TABLET BY MOUTH EVERY DAY   • glucose blood (Contour Next Test) test strip Check blood sugar 2 times day   • glucose blood (FREESTYLE LITE) test strip Use once a day   • losartan (COZAAR) 50 mg tablet TAKE 1 TABLET BY MOUTH EVERY DAY   • metFORMIN (GLUCOPHAGE) 1,000 mg, Oral, 2 times daily with meals   • Microlet Lancets MISC Does not apply, 2 times daily   • Multiple Vitamin (multivitamin) capsule 1 capsule, Oral, Daily   • omeprazole (PriLOSEC) 40 MG capsule TAKE 1 CAPSULE BY MOUTH EVERY DAY BEFORE BREAKFAST   • simvastatin (ZOCOR) 20 mg, Oral, Daily at bedtime   • triamcinolone (KENALOG) 0.025 % ointment Topical, 2 times daily       Review of Systems:  Review of Systems   Constitutional: Negative. Gastrointestinal: Negative. Genitourinary: Negative for difficulty urinating, pelvic pain, vaginal bleeding and vaginal discharge. Skin: Negative. Physical Exam:  /75   Pulse 85   Wt 78.7 kg (173 lb 6.4 oz)   BMI 31.72 kg/m²   Physical Exam  Constitutional:       General: She is not in acute distress. Appearance: She is well-developed. Genitourinary:      Vulva normal.      No lesions in the vagina. Anterior vaginal prolapse present. Moderate vaginal atrophy present. Right Adnexa: not tender and no mass present. Left Adnexa: not tender and no mass present. No cervical motion tenderness or lesion. Uterus is not tender. Breasts:     Right: No mass, nipple discharge, skin change or tenderness. Left: No mass, nipple discharge, skin change or tenderness. Neck:      Thyroid: No thyromegaly. Cardiovascular:      Rate and Rhythm: Normal rate and regular rhythm. Pulmonary:      Effort: Pulmonary effort is normal.   Abdominal:      Palpations: Abdomen is soft. Tenderness: There is no abdominal tenderness. Musculoskeletal:      Cervical back: Neck supple. Neurological:      Mental Status: She is alert and oriented to person, place, and time. Skin:     General: Skin is warm and dry. Vitals reviewed. Assessment/Plan:   1. Normal well-woman GYN exam.  2. Cervical cancer screening:  Normal cervical exam.  Pap smear done with HPV co-testing. Has not received HPV vaccine in the past.   3. STD screening:  Patient declines. 4. Breast cancer screening:  Normal breast exam.  Order placed for bilateral screening mammogram.  Reviewed breast self-awareness. 5. Colon cancer screening:  Up to date. 6. Depression Screening: Patient's depression screening was assessed with a PHQ-2 score of 1. Their PHQ-9 score was 1. Clinically patient does not have depression. No treatment is required. 7. BMI Counseling:  Body mass index is 31.72 kg/m². Discussed the patient's BMI with her. The BMI is above normal. Nutrition recommendations include reducing portion sizes and decreasing overall calorie intake. 8. Return to office in 1 year for annual GYN exam.    Reviewed with patient that test results are available in [address] [place] [address] immediately, but that they will not necessarily be reviewed by me immediately. Explained that I will review results at my earliest opportunity and contact patient appropriately.

## 2023-08-02 NOTE — LETTER
8/10/2023    To Maddy Deleon  : 1960      Esta carta es para informarle que jeff resultados recientes de la prueba de Papanicolaou fueron revisados por mí y son Sergio Anand.   Nos vemos en 1 año para garcia  [address] [place]

## 2023-08-08 DIAGNOSIS — K21.9 GASTROESOPHAGEAL REFLUX DISEASE, UNSPECIFIED WHETHER ESOPHAGITIS PRESENT: ICD-10-CM

## 2023-08-08 RX ORDER — OMEPRAZOLE 40 MG/1
CAPSULE, DELAYED RELEASE ORAL
Qty: 90 CAPSULE | Refills: 1 | Status: SHIPPED | OUTPATIENT
Start: 2023-08-08

## 2023-08-08 NOTE — TELEPHONE ENCOUNTER
I called the patient and lvm in regards to scheduling follow up appointment with Vanessa Osman for continual medication renewal requests

## 2023-08-10 LAB
LAB AP GYN PRIMARY INTERPRETATION: NORMAL
Lab: NORMAL

## 2023-09-20 ENCOUNTER — OFFICE VISIT (OUTPATIENT)
Dept: FAMILY MEDICINE CLINIC | Facility: CLINIC | Age: 63
End: 2023-09-20

## 2023-09-20 VITALS
HEIGHT: 62 IN | DIASTOLIC BLOOD PRESSURE: 84 MMHG | RESPIRATION RATE: 16 BRPM | WEIGHT: 175 LBS | HEART RATE: 76 BPM | SYSTOLIC BLOOD PRESSURE: 128 MMHG | BODY MASS INDEX: 32.2 KG/M2 | OXYGEN SATURATION: 96 % | TEMPERATURE: 98.1 F

## 2023-09-20 DIAGNOSIS — I10 HYPERTENSION, BENIGN: ICD-10-CM

## 2023-09-20 DIAGNOSIS — Z23 ENCOUNTER FOR IMMUNIZATION: ICD-10-CM

## 2023-09-20 DIAGNOSIS — E11.9 DIABETES MELLITUS TYPE 2, CONTROLLED (HCC): Primary | ICD-10-CM

## 2023-09-20 DIAGNOSIS — E11.9 ENCOUNTER FOR DIABETIC FOOT EXAM (HCC): ICD-10-CM

## 2023-09-20 PROCEDURE — 90471 IMMUNIZATION ADMIN: CPT | Performed by: FAMILY MEDICINE

## 2023-09-20 PROCEDURE — G0008 ADMIN INFLUENZA VIRUS VAC: HCPCS | Performed by: FAMILY MEDICINE

## 2023-09-20 PROCEDURE — 99214 OFFICE O/P EST MOD 30 MIN: CPT | Performed by: FAMILY MEDICINE

## 2023-09-20 PROCEDURE — 90686 IIV4 VACC NO PRSV 0.5 ML IM: CPT | Performed by: FAMILY MEDICINE

## 2023-09-20 PROCEDURE — 90750 HZV VACC RECOMBINANT IM: CPT | Performed by: FAMILY MEDICINE

## 2023-09-20 RX ORDER — LOSARTAN POTASSIUM 50 MG/1
50 TABLET ORAL DAILY
Qty: 90 TABLET | Refills: 1 | Status: SHIPPED | OUTPATIENT
Start: 2023-09-20

## 2023-09-20 NOTE — PROGRESS NOTES
Diabetic Foot Exam    Patient's shoes and socks removed. Right Foot/Ankle   Right Foot Inspection  Skin Exam: skin normal and skin intact. No dry skin, no warmth, no callus, no erythema, no maceration, no abnormal color, no pre-ulcer, no ulcer and no callus. Toe Exam: ROM and strength within normal limits. Sensory   Proprioception: intact  Monofilament testing: intact    Vascular  Capillary refills: < 3 seconds  The right DP pulse is 1+. The right PT pulse is 1+. Left Foot/Ankle  Left Foot Inspection  Skin Exam: skin normal and skin intact. No dry skin, no warmth, no erythema, no maceration, normal color, no pre-ulcer, no ulcer and no callus. Toe Exam: ROM and strength within normal limits. Sensory   Proprioception: intact  Monofilament testing: intact    Vascular  Capillary refills: < 3 seconds  The left DP pulse is 1+. The left PT pulse is 1+. Assign Risk Category  No deformity present  No loss of protective sensation  No weak pulses  Risk: 0  Name: Tricia Bello      : 1960      MRN: [id number]  Encounter Provider: Malika Glasgow MD  Encounter Date: 2023   Encounter department: [facility]     1. Diabetes mellitus type 2, controlled ([address])  -     losartan (COZAAR) 50 mg tablet; Take 1 tablet (50 mg total) by mouth daily  -     influenza vaccine, quadrivalent, 0.5 mL, preservative-free, for adult and pediatric patients 6 mos+ (AFLURIA, FLUARIX, FLULAVAL, FLUZONE)    2. Hypertension, benign  -     losartan (COZAAR) 50 mg tablet; Take 1 tablet (50 mg total) by mouth daily    3. Encounter for immunization  -     Zoster Vaccine Recombinant IM    4. Encounter for diabetic foot exam ([address])  -     Diabetic foot exam; Future           Subjective      60 yo female with PMH of hypertension and type 2 diabetes presenting to the office for follow up of diabetes.  She admits to being complaint with her medicines without any side effects. She denies any recent infections, fevers, unintentional weight loss, chest pain, SOB, headaches, dizziness, numbness, or tingling. She mentions eating healthy diet including vegetables and protein. No hypo/hyperglycemia symptoms reported. She denies being physically active currently. She denies any tobacco use. She notes checking her blood sugars weekly or biweekly and readings are reported as between .      Diabetes  Pertinent negatives for hypoglycemia include no dizziness, headaches or pallor. Pertinent negatives for diabetes include no chest pain, no polydipsia and no polyuria. Review of Systems   All other systems reviewed and are negative. Current Outpatient Medications on File Prior to Visit   Medication Sig   • acetaminophen (TYLENOL) 500 mg tablet Take 1 tablet (500 mg total) by mouth every 6 (six) hours as needed for mild pain   • acetaminophen (TYLENOL) 650 mg CR tablet Take 1 tablet (650 mg total) by mouth every 8 (eight) hours as needed for mild pain   • albuterol (PROVENTIL HFA,VENTOLIN HFA) 90 mcg/act inhaler TAKE 2 PUFFS BY MOUTH EVERY 6 HOURS AS NEEDED FOR WHEEZE OR FOR SHORTNESS OF BREATH (Patient not taking: Reported on 6/23/2023)   • ALPRAZolam (XANAX) 0.5 mg tablet Take 1 tablet (0.5 mg total) by mouth once as needed for anxiety for up to 1 dose   • amoxicillin (AMOXIL) 500 mg capsule Take 500 mg by mouth see administration instructions   • apixaban (Eliquis) 5 mg Take 1 tablet (5 mg total) by mouth 2 (two) times a day for 28 days Do not start taking medication until after total joint arthroplasty   • Blood Glucose Monitoring Suppl (Contour Next Monitor) w/Device KIT Use 1 each daily Check blood sugar 2 times day   • cholestyramine (QUESTRAN) 4 g packet TAKE 1 PACKET BY MOUTH 2 TIMES A DAY WITH MEALS.  (Patient not taking: Reported on 7/5/2023)   • clotrimazole (GYNE-LOTRIMIN) 1 % vaginal cream Insert 1 applicator into the vagina daily at bedtime   • dapagliflozin (Farxiga) 10 MG tablet Take 1 tablet (10 mg total) by mouth daily   • diclofenac (VOLTAREN) 75 mg EC tablet Take 1 tablet (75 mg total) by mouth 2 (two) times a day (Patient not taking: Reported on 3/2/2023)   • Diclofenac Sodium (VOLTAREN) 1 % Apply 2 g topically 4 (four) times a day (Patient not taking: Reported on 5/3/9937)   • folic acid (FOLVITE) 1 mg tablet TAKE 1 TABLET BY MOUTH EVERY DAY (Patient not taking: Reported on 7/5/2023)   • glucose blood (Contour Next Test) test strip Check blood sugar 2 times day   • glucose blood (FREESTYLE LITE) test strip Use once a day   • metFORMIN (GLUCOPHAGE) 1000 MG tablet Take 1 tablet (1,000 mg total) by mouth 2 (two) times a day with meals   • Microlet Lancets MISC Use 2 (two) times a day   • Multiple Vitamin (multivitamin) capsule Take 1 capsule by mouth daily   • omeprazole (PriLOSEC) 40 MG capsule TAKE 1 CAPSULE BY MOUTH EVERY DAY BEFORE BREAKFAST   • simvastatin (ZOCOR) 20 mg tablet Take 1 tablet (20 mg total) by mouth daily at bedtime   • triamcinolone (KENALOG) 0.025 % ointment Apply topically 2 (two) times a day   • [DISCONTINUED] losartan (COZAAR) 50 mg tablet TAKE 1 TABLET BY MOUTH EVERY DAY       Objective     /84 (BP Location: Right arm, Patient Position: Sitting, Cuff Size: Standard)   Pulse 76   Temp 98.1 °F (36.7 °C) (Temporal)   Resp 16   Ht 5' 2" (1.575 m)   Wt 79.4 kg (175 lb)   SpO2 96%   BMI 32.01 kg/m²     Physical Exam  Vitals and nursing note reviewed. Constitutional:       Appearance: She is well-developed. HENT:      Head: Normocephalic. Right Ear: External ear normal.      Left Ear: External ear normal.      Nose: Nose normal.   Eyes:      Conjunctiva/sclera: Conjunctivae normal.      Pupils: Pupils are equal, round, and reactive to light. Neck:      Thyroid: No thyromegaly. Cardiovascular:      Rate and Rhythm: Normal rate and regular rhythm.       Pulses: no weak pulses          Dorsalis pedis pulses are 1+ on the right side and 1+ on the left side. Posterior tibial pulses are 1+ on the right side and 1+ on the left side. Heart sounds: Normal heart sounds. Pulmonary:      Effort: Pulmonary effort is normal.      Breath sounds: Normal breath sounds. Abdominal:      Palpations: Abdomen is soft. Tenderness: There is no abdominal tenderness. There is no guarding or rebound. Musculoskeletal:         General: Normal range of motion. Cervical back: Normal range of motion and neck supple. Feet:      Right foot:      Skin integrity: No ulcer, skin breakdown, erythema, warmth, callus or dry skin. Left foot:      Skin integrity: No ulcer, skin breakdown, erythema, warmth, callus or dry skin. Skin:     General: Skin is dry. Neurological:      Mental Status: She is alert and oriented to person, place, and time. Deep Tendon Reflexes: Reflexes are normal and symmetric.        Michelle Valdez MD

## 2023-09-27 ENCOUNTER — PREP FOR PROCEDURE (OUTPATIENT)
Dept: INTERVENTIONAL RADIOLOGY/VASCULAR | Facility: CLINIC | Age: 63
End: 2023-09-27

## 2023-09-27 ENCOUNTER — OFFICE VISIT (OUTPATIENT)
Dept: GASTROENTEROLOGY | Facility: CLINIC | Age: 63
End: 2023-09-27
Payer: MEDICARE

## 2023-09-27 ENCOUNTER — TELEPHONE (OUTPATIENT)
Dept: GASTROENTEROLOGY | Facility: CLINIC | Age: 63
End: 2023-09-27

## 2023-09-27 VITALS
TEMPERATURE: 98.5 F | WEIGHT: 173.4 LBS | SYSTOLIC BLOOD PRESSURE: 112 MMHG | BODY MASS INDEX: 31.91 KG/M2 | DIASTOLIC BLOOD PRESSURE: 64 MMHG | HEIGHT: 62 IN

## 2023-09-27 DIAGNOSIS — R79.89 ELEVATED LFTS: ICD-10-CM

## 2023-09-27 DIAGNOSIS — R11.0 NAUSEA: ICD-10-CM

## 2023-09-27 DIAGNOSIS — K31.7 GASTRIC POLYP: Primary | ICD-10-CM

## 2023-09-27 DIAGNOSIS — Z12.11 COLON CANCER SCREENING: ICD-10-CM

## 2023-09-27 DIAGNOSIS — R79.89 ELEVATED LFTS: Primary | ICD-10-CM

## 2023-09-27 PROCEDURE — 99214 OFFICE O/P EST MOD 30 MIN: CPT | Performed by: INTERNAL MEDICINE

## 2023-09-27 NOTE — PROGRESS NOTES
Assessment/Plan:  Chronic disease stable  Asked patient to decrease pantoprazole to every other day and till script runs out - she is using for reflux symptoms/patient reports normal EGD/colonoscopy  in the past   Discussed low acid diet/discussed side effects of this medication if used chronically on a daily basis  For seasonal allergies asked patient to discontinue Flonase intake Claritin daily and to add humidifier to her bedroom  Return to office after routine labs in 6 mos  No problem-specific Assessment & Plan notes found for this encounter  Diagnoses and all orders for this visit:    Controlled diabetes mellitus type 2 with complications, unspecified whether long term insulin use ([facility] )  -     Comprehensive metabolic panel; Future  -     Lipid panel; Future  -     Hemoglobin A1C; Future    Gastroesophageal reflux disease, esophagitis presence not specified  -     pantoprazole (PROTONIX) 40 mg tablet; Take 1 tablet (40 mg total) by mouth daily at bedtime Take every other day    Allergic rhinitis, unspecified seasonality, unspecified trigger  -     loratadine (CLARITIN) 10 mg tablet; Take 1 tablet (10 mg total) by mouth daily    Hypertension, benign  -     Comprehensive metabolic panel; Future  -     Lipid panel; Future  -     Hemoglobin A1C; Future    Need for pneumococcal vaccination  -     PNEUMOCOCCAL POLYSACCHARIDE VACCINE 23-VALENT =>1YO SQ IM    Eczema, unspecified type  -     triamcinolone (KENALOG) 0 5 % ointment; Apply topically 2 (two) times a day    Other hyperlipidemia  -     Comprehensive metabolic panel; Future  -     Lipid panel; Future  -     Hemoglobin A1C; Future    Encounter for hepatitis C screening test for low risk patient  -     Hepatitis C antibody; Future          Subjective: 3 month follow up with bloodwork     Also stuffy nose x 2 months       Health Maintenance Due   Topic Date Due    Hepatitis C Screening  1960   [place] Medicare Annual Wellness Visit (AWV)  1960  DM Eye Exam  03/30/1971    BMI: Followup Plan  03/30/1979    DTaP,Tdap,and Td Vaccines (1 - Tdap) 03/30/1982    PAP SMEAR  03/30/1982        Patient ID: Joanne Courtney is a 63 y o  female  HPI  Chronic disease follow-up  Lab review done-A1c 7 6, CBC within normal limits; BMP normal kidney function with fasting blood sugar 105  No recent lipid profile  Only complaint is continued congestion not responding to Flonase - states have been using it 2-3 weeks  Reoccurring eczema patches on legs - responds well to as needed triamcinolone    The following portions of the patient's history were reviewed and updated as appropriate: allergies, past social history, past surgical history and problem list     Review of Systems   Constitutional: Negative for activity change and appetite change  HENT: Positive for congestion, postnasal drip and rhinorrhea  Negative for sore throat  Eyes: Negative  Respiratory: Negative  Cardiovascular: Negative  Negative for chest pain  Gastrointestinal: Negative  Endocrine: Negative  Negative for cold intolerance  Genitourinary: Negative  Musculoskeletal: Negative  Skin: Negative  Allergic/Immunologic: Negative  Neurological: Negative  Hematological: Negative  Psychiatric/Behavioral: Negative  All other systems reviewed and are negative  Objective:      /90 (BP Location: Left arm, Patient Position: Sitting, Cuff Size: Adult)   Pulse 88   Temp 98 6 °F (37 °C) (Tympanic)   Resp 16   Ht 5' 1" (1 549 m)   Wt 83 6 kg (184 lb 3 2 oz)   SpO2 98%   BMI 34 80 kg/m²          Physical Exam   Constitutional: She is oriented to person, place, and time  She appears well-developed and well-nourished  No distress  HENT:   Head: Normocephalic  Right Ear: Tympanic membrane and external ear normal  No decreased hearing is noted  Left Ear: Tympanic membrane and external ear normal  No decreased hearing is noted     Nose: Mucosal edema and rhinorrhea present  Right sinus exhibits no maxillary sinus tenderness and no frontal sinus tenderness  Left sinus exhibits no maxillary sinus tenderness and no frontal sinus tenderness  Mouth/Throat: Oropharynx is clear and moist    Eyes: Pupils are equal, round, and reactive to light  Conjunctivae are normal    Neck: Normal range of motion  Neck supple  No thyromegaly present  Cardiovascular: Normal rate, regular rhythm, normal heart sounds and intact distal pulses  No murmur heard  Pulmonary/Chest: Effort normal and breath sounds normal  No respiratory distress  Abdominal: Soft  Bowel sounds are normal    Musculoskeletal: Normal range of motion  Lymphadenopathy:     She has no cervical adenopathy  Neurological: She is alert and oriented to person, place, and time  She has normal reflexes  No cranial nerve deficit  Coordination normal    Skin: Skin is warm and dry  Psychiatric: She has a normal mood and affect  Her behavior is normal  Judgment and thought content normal      BMI Counseling: Body mass index is 34 8 kg/m²  Discussed the patient's BMI with her  The BMI is above average  No BMI follow-up plan is appropriate  Patient is in an urgent or emergent medical situation   foot pain Taltz Pregnancy And Lactation Text: The risk during pregnancy and breastfeeding is uncertain with this medication.

## 2023-09-27 NOTE — PROGRESS NOTES
[facility] - Outpatient Follow-up Note  June Gallegos 59 y.o. female MRN: [id number]  Encounter: [id number]          ASSESSMENT AND PLAN:      1.  Nausea  2. Gastric polyp  3. Elevated LFTs  4. Colon cancer screening    Intermittent symptoms. Differentials include uncontrolled acid reflux, peptic ulcer disease. She also needs follow-up for fundic gland polyps. Patient agreeable to get EGD which was ordered today. She continues to have elevated liver enzymes. Unrevealing work-up in the past.  She is agreeable to get liver biopsy which we will order today. Repeat colonoscopy will be due in 2031. RTC 4 months. Tesha Dasilva MD  Gastroenterology  [facility]  Date: September 27, 2023      - EGD; Future  - Ambulatory Referral to Interventional Radiology; Future    ______________________________________________________________________    SUBJECTIVE: 81-year-old with diabetes, GERD, abnormal liver enzymes, status post cholecystectomy presents for follow-up. During last visit patient reported uncontrolled heartburn along with chronic diarrhea, nausea. She was started on cholestyramine. EGD was performed which was showing gastritis with biopsy negative for H. pylori and biopsy from polyp showed it to be fundic gland polyp. Celiac antibody was negative. Colonoscopy showed diverticulosis. Repeat was recommended in 10 years. Patient was also having liver enzymes elevation which was investigated and broad liver work-up was unrevealing. Elastography was performed the report of which was reviewed by me today and found to be normal.  Plan was to repeat liver enzymes and if continue to be elevated then discussed about liver biopsy. Patient had labs done in July 2023 which were reviewed by me today and showed elevated alkaline phosphatase bilateral LFTs were normal, renal function was normal and blood counts were normal as well.     Currently patient reports nausea intermittently. Heartburn is controlled with omeprazole intake. Her diarrhea has resolved. She is no longer taking cholestyramine. No blood in stools. Normal consistency bowel movements. REVIEW OF SYSTEMS IS OTHERWISE NEGATIVE. Historical Information   Past Medical History:   Diagnosis Date   • Anxiety    • Arthritis    • Chronic pain disorder    • Diabetes mellitus ([address])    • Enlarged liver    • GERD (gastroesophageal reflux disease)    • Hyperlipidemia    • Hypertension      Past Surgical History:   Procedure Laterality Date   • CHOLECYSTECTOMY     • FOOT SURGERY Right    • HERNIA REPAIR     • OVARY SURGERY     • [place] ARTHRP ACETBLR/PROX FEM PROSTC AGRFT/ALGRFT Left 06/20/2022    Procedure: ARTHROPLASTY HIP TOTAL ANTERIOR;  Surgeon: Giselle Suárez MD;  [facility];  Service: Orthopedics   • [place] COLONOSCOPY FLX DX W/COLLJ SPEC WHEN PFRMD N/A 12/22/2016    Procedure: EGD AND COLONOSCOPY;  Surgeon: Kevin Do MD;  Location: [facility];   Service: Gastroenterology   • NY CORRJ HALLUX VALGUS W/SESMDC W/2 OSTEOT Right 03/01/2019    Procedure: DOUBLE OSTEOTOMY, ZINA/AKIN BUNIONECTOMY;  Surgeon: Maritza Haynes DPM;  Location: [facility];  Service: Podiatry     Social History   Social History     Substance and Sexual Activity   Alcohol Use Never     Social History     Substance and Sexual Activity   Drug Use Never     Social History     Tobacco Use   Smoking Status Never   Smokeless Tobacco Never     Family History   Problem Relation Age of Onset   • Hypertension Mother    • Hypertension Father    • Cancer Brother         stomach   • Cancer Maternal Grandfather         bone   • Breast cancer Maternal Aunt    • Colon cancer Neg Hx    • Ovarian cancer Neg Hx        Meds/Allergies       Current Outpatient Medications:   •  acetaminophen (TYLENOL) 500 mg tablet  •  acetaminophen (TYLENOL) 650 mg CR tablet  •  albuterol (PROVENTIL HFA,VENTOLIN HFA) 90 mcg/act inhaler  •  ALPRAZolam (XANAX) 0.5 mg tablet  •  amoxicillin (AMOXIL) 500 mg capsule  •  Blood Glucose Monitoring Suppl (Contour Next Monitor) w/Device KIT  •  dapagliflozin (Farxiga) 10 MG tablet  •  glucose blood (Contour Next Test) test strip  •  glucose blood (FREESTYLE LITE) test strip  •  losartan (COZAAR) 50 mg tablet  •  metFORMIN (GLUCOPHAGE) 1000 MG tablet  •  Microlet Lancets MISC  •  Multiple Vitamin (multivitamin) capsule  •  omeprazole (PriLOSEC) 40 MG capsule  •  simvastatin (ZOCOR) 20 mg tablet  •  triamcinolone (KENALOG) 0.025 % ointment  •  apixaban (Eliquis) 5 mg  •  cholestyramine (QUESTRAN) 4 g packet  •  clotrimazole (GYNE-LOTRIMIN) 1 % vaginal cream  •  diclofenac (VOLTAREN) 75 mg EC tablet  •  Diclofenac Sodium (VOLTAREN) 1 %  •  folic acid (FOLVITE) 1 mg tablet    Allergies   Allergen Reactions   • Aspirin Shortness Of Breath and Itching     swelling   • Latex Itching and Rash           Objective     Blood pressure 112/64, temperature 98.5 °F (36.9 °C), height 5' 2" (1.575 m), weight 78.7 kg (173 lb 6.4 oz), not currently breastfeeding. Body mass index is 31.72 kg/m². PHYSICAL EXAM:      General Appearance:   Alert, cooperative, no distress   HEENT:   Normocephalic, atraumatic, anicteric.     Neck:  Supple, symmetrical, trachea midline   Lungs:   Clear to auscultation bilaterally; no rales, rhonchi or wheezing; respirations unlabored    Heart[de-identified]   Regular rate and rhythm; no murmur, rub, or gallop. Abdomen:   Soft, non-tender, non-distended; normal bowel sounds; no masses, no organomegaly    Genitalia:   Deferred    Rectal:   Deferred    Extremities:  No cyanosis, clubbing or edema    Pulses:  2+ and symmetric    Skin:  No jaundice, rashes, or lesions    Lymph nodes:  No palpable cervical lymphadenopathy        Lab Results:   No visits with results within 1 Day(s) from this visit.    Latest known visit with results is:   Annual Exam on 08/02/2023   Component Date Value   • Case Report 08/02/2023                      Value:Gynecologic Cytology Report                       Case: [id number]                                  Authorizing Provider:  JOSLYN Azul   Collected:           08/02/2023 [id number]              Ordering Location:     [facility] [address]      Received:            08/02/2023 [address]                                                          First Screen:          Mireya Arizmendi                                                                  Specimen:    LIQUID-BASED PAP, SCREENING, Cervix                                                       • Primary Interpretation 08/02/2023 Negative for intraepithelial lesion or malignancy    • Specimen Adequacy 08/02/2023 Satisfactory for evaluation. Endocervical/transformation zone component present. • Additional Information 08/02/2023                      Value: This result contains rich text formatting which cannot be displayed here. • HPV Other HR 08/02/2023 Negative    • HPV16 08/02/2023 Negative    • HPV18 08/02/2023 Negative          Radiology Results:   No results found.

## 2023-09-27 NOTE — PATIENT INSTRUCTIONS
Scheduled date of EGD(as of today):10.30.23  Physician performing EGD:DR POSADAS  [facility] of [facility]GD[facility]  Instructions reviewed with patient by:DARREN  Clearances:  N/A, PT NOT TAKING ELIQUIS

## 2023-09-28 ENCOUNTER — TELEPHONE (OUTPATIENT)
Dept: FAMILY MEDICINE CLINIC | Facility: CLINIC | Age: 63
End: 2023-09-28

## 2023-09-28 DIAGNOSIS — F40.243 ANXIETY WITH FLYING: ICD-10-CM

## 2023-09-28 RX ORDER — ALPRAZOLAM 0.5 MG/1
0.5 TABLET ORAL
Qty: 15 TABLET | Refills: 0 | Status: SHIPPED | OUTPATIENT
Start: 2023-09-28

## 2023-10-12 ENCOUNTER — ANESTHESIA (OUTPATIENT)
Dept: ANESTHESIOLOGY | Facility: HOSPITAL | Age: 63
End: 2023-10-12

## 2023-10-12 ENCOUNTER — ANESTHESIA EVENT (OUTPATIENT)
Dept: ANESTHESIOLOGY | Facility: HOSPITAL | Age: 63
End: 2023-10-12

## 2023-10-26 RX ORDER — SODIUM CHLORIDE 9 MG/ML
125 INJECTION, SOLUTION INTRAVENOUS CONTINUOUS
Status: CANCELLED | OUTPATIENT
Start: 2023-10-26

## 2023-10-30 ENCOUNTER — HOSPITAL ENCOUNTER (OUTPATIENT)
Dept: GASTROENTEROLOGY | Facility: MEDICAL CENTER | Age: 63
Setting detail: OUTPATIENT SURGERY
Discharge: HOME | End: 2023-10-30
Payer: MEDICARE

## 2023-10-30 ENCOUNTER — ANESTHESIA (OUTPATIENT)
Dept: GASTROENTEROLOGY | Facility: MEDICAL CENTER | Age: 63
End: 2023-10-30

## 2023-10-30 ENCOUNTER — ANESTHESIA EVENT (OUTPATIENT)
Dept: GASTROENTEROLOGY | Facility: MEDICAL CENTER | Age: 63
End: 2023-10-30

## 2023-10-30 VITALS
HEIGHT: 62 IN | WEIGHT: 173 LBS | SYSTOLIC BLOOD PRESSURE: 131 MMHG | TEMPERATURE: 98.3 F | DIASTOLIC BLOOD PRESSURE: 75 MMHG | HEART RATE: 78 BPM | RESPIRATION RATE: 15 BRPM | BODY MASS INDEX: 31.83 KG/M2 | OXYGEN SATURATION: 99 %

## 2023-10-30 DIAGNOSIS — K21.9 GASTROESOPHAGEAL REFLUX DISEASE, UNSPECIFIED WHETHER ESOPHAGITIS PRESENT: ICD-10-CM

## 2023-10-30 DIAGNOSIS — K31.7 GASTRIC POLYP: ICD-10-CM

## 2023-10-30 DIAGNOSIS — R11.0 NAUSEA: ICD-10-CM

## 2023-10-30 LAB — GLUCOSE SERPL-MCNC: 131 MG/DL (ref 65–140)

## 2023-10-30 PROCEDURE — 43251 EGD REMOVE LESION SNARE: CPT | Performed by: INTERNAL MEDICINE

## 2023-10-30 PROCEDURE — 82948 REAGENT STRIP/BLOOD GLUCOSE: CPT

## 2023-10-30 PROCEDURE — 43239 EGD BIOPSY SINGLE/MULTIPLE: CPT | Performed by: INTERNAL MEDICINE

## 2023-10-30 PROCEDURE — 88342 IMHCHEM/IMCYTCHM 1ST ANTB: CPT | Performed by: PATHOLOGY

## 2023-10-30 PROCEDURE — 88305 TISSUE EXAM BY PATHOLOGIST: CPT | Performed by: PATHOLOGY

## 2023-10-30 RX ORDER — PROPOFOL 10 MG/ML
INJECTION, EMULSION INTRAVENOUS AS NEEDED
Status: DISCONTINUED | OUTPATIENT
Start: 2023-10-30 | End: 2023-10-30

## 2023-10-30 RX ORDER — OMEPRAZOLE 40 MG/1
40 CAPSULE, DELAYED RELEASE ORAL
Qty: 60 CAPSULE | Refills: 5 | Status: SHIPPED | OUTPATIENT
Start: 2023-10-30 | End: 2024-04-27

## 2023-10-30 RX ORDER — SODIUM CHLORIDE 9 MG/ML
125 INJECTION, SOLUTION INTRAVENOUS CONTINUOUS
Status: DISCONTINUED | OUTPATIENT
Start: 2023-10-30 | End: 2023-11-03 | Stop reason: HOSPADM

## 2023-10-30 RX ORDER — LIDOCAINE HYDROCHLORIDE 20 MG/ML
INJECTION, SOLUTION EPIDURAL; INFILTRATION; INTRACAUDAL; PERINEURAL AS NEEDED
Status: DISCONTINUED | OUTPATIENT
Start: 2023-10-30 | End: 2023-10-30

## 2023-10-30 RX ADMIN — LIDOCAINE HYDROCHLORIDE 60 MG: 20 INJECTION, SOLUTION EPIDURAL; INFILTRATION; INTRACAUDAL at 08:55

## 2023-10-30 RX ADMIN — SODIUM CHLORIDE 125 ML/HR: 0.9 INJECTION, SOLUTION INTRAVENOUS at 08:48

## 2023-10-30 RX ADMIN — PROPOFOL 50 MG: 10 INJECTION, EMULSION INTRAVENOUS at 08:57

## 2023-10-30 RX ADMIN — PROPOFOL 150 MG: 10 INJECTION, EMULSION INTRAVENOUS at 08:55

## 2023-10-30 NOTE — H&P
History and Physical - [facility]  Valarie Greenberg 59 y.o. female MRN: [id number]                  HPI: Valarie Greenberg is a 59y.o. year old female who presents for EGD to investigate nausea, gastric polyps. REVIEW OF SYSTEMS: Per the HPI, and otherwise unremarkable. Historical Information   Past Medical History:   Diagnosis Date    Anxiety     Arthritis     Chronic pain disorder     Diabetes mellitus ([address])     Enlarged liver     GERD (gastroesophageal reflux disease)     Hyperlipidemia     Hypertension      Past Surgical History:   Procedure Laterality Date    CHOLECYSTECTOMY      FOOT SURGERY Right     HERNIA REPAIR      OVARY SURGERY      RI ARTHRP ACETBLR/PROX FEM PROSTC AGRFT/ALGRFT Left 06/20/2022    Procedure: ARTHROPLASTY HIP TOTAL ANTERIOR;  Surgeon: Danika Gil MD;  [facility];  Service: Orthopedics    RI COLONOSCOPY FLX DX W/COLLJ SPEC WHEN PFRMD N/A 12/22/2016    Procedure: EGD AND COLONOSCOPY;  Surgeon: Maria Esther Coello MD;  Location: [facility] LAB;   Service: Gastroenterology    RI CORRJ HALLUX VALGUS W/SESMDC W/2 OSTEOT Right 03/01/2019    Procedure: DOUBLE OSTEOTOMY, ZINA/AKIN BUNIONECTOMY;  Surgeon: Lucas Sanchez DPM;  Location: [facility];  Service: Podiatry     Social History   Social History     Substance and Sexual Activity   Alcohol Use Never     Social History     Substance and Sexual Activity   Drug Use Never     Social History     Tobacco Use   Smoking Status Never   Smokeless Tobacco Never     Family History   Problem Relation Age of Onset    Hypertension Mother     Hypertension Father     Cancer Brother         stomach    Cancer Maternal Grandfather         bone    Breast cancer Maternal Aunt     Colon cancer Neg Hx     Ovarian cancer Neg Hx        Meds/Allergies     (Not in a hospital admission)      Allergies   Allergen Reactions    Aspirin Shortness Of Breath and Itching     swelling    Latex Itching and Rash Objective     not currently breastfeeding. PHYSICAL EXAM    Gen: NAD  CV: RRR  CHEST: Clear  ABD: soft, NT/ND  EXT: no edema      ASSESSMENT/PLAN:  Boubacar Wiggins is a 59y.o. year old female who presents for EGD to investigate nausea, gastric polyps. The patient is stable and optimized for the procedure, we reviewed risk and benefits. Risk include but not limited to infection, bleeding, perforation and missing a lesion.

## 2023-10-30 NOTE — ANESTHESIA PREPROCEDURE EVALUATION
Procedure:  EGD    Relevant Problems   ANESTHESIA (within normal limits)      CARDIO   (+) Hyperlipidemia   (+) Hypertension, benign      ENDO   (+) Controlled diabetes mellitus type 2 with complications (HCC)   (+) Diabetes mellitus type 2, controlled (HCC)      GI/HEPATIC   (+) GERD (gastroesophageal reflux disease)      MUSCULOSKELETAL   (+) Chronic left-sided low back pain   (+) Glenohumeral arthritis   (+) Osteoarthritis of left hip   (+) Sacroiliitis (HCC)      NEURO/PSYCH   (+) Chronic left-sided low back pain   (+) Situational anxiety      PULMONARY   (+) Asthma      Other   (+) Class 1 obesity due to excess calories with serious comorbidity and body mass index (BMI) of 30.0 to 30.9 in adult        Physical Exam    Airway    Mallampati score: II  TM Distance: >3 FB  Neck ROM: full     Dental       Cardiovascular  Rhythm: regular, Rate: normal    Pulmonary   Breath sounds clear to auscultation    Other Findings        Anesthesia Plan  ASA Score- 2     Anesthesia Type- IV sedation with anesthesia with ASA Monitors. Additional Monitors:     Airway Plan:            Plan Factors-Exercise tolerance (METS): >4 METS. Chart reviewed. Existing labs reviewed. Patient summary reviewed. Patient is not a current smoker. Induction- intravenous. Postoperative Plan-     Informed Consent- Anesthetic plan and risks discussed with patient.

## 2023-10-30 NOTE — ANESTHESIA POSTPROCEDURE EVALUATION
Post-Op Assessment Note    CV Status:  Stable    Pain management: adequate     Mental Status:  Alert and awake   Hydration Status:  Euvolemic   PONV Controlled:  Controlled   Airway Patency:  Patent      Post Op Vitals Reviewed: Yes      Staff: Anesthesiologist         No notable events documented.     /82 (10/30/23 0905)    Temp      Pulse 93 (10/30/23 0905)   Resp 16 (10/30/23 0905)    SpO2 95 % (10/30/23 0905)

## 2023-10-31 ENCOUNTER — TELEPHONE (OUTPATIENT)
Dept: RADIOLOGY | Facility: HOSPITAL | Age: 63
End: 2023-10-31

## 2023-11-03 ENCOUNTER — TELEPHONE (OUTPATIENT)
Dept: RADIOLOGY | Facility: HOSPITAL | Age: 63
End: 2023-11-03

## 2023-11-06 PROCEDURE — 88305 TISSUE EXAM BY PATHOLOGIST: CPT | Performed by: PATHOLOGY

## 2023-11-06 PROCEDURE — 88342 IMHCHEM/IMCYTCHM 1ST ANTB: CPT | Performed by: PATHOLOGY

## 2023-11-07 ENCOUNTER — HOSPITAL ENCOUNTER (OUTPATIENT)
Dept: RADIOLOGY | Facility: HOSPITAL | Age: 63
Discharge: HOME | End: 2023-11-07
Attending: RADIOLOGY
Payer: MEDICARE

## 2023-11-07 VITALS
HEIGHT: 62 IN | RESPIRATION RATE: 16 BRPM | TEMPERATURE: 97.8 F | SYSTOLIC BLOOD PRESSURE: 128 MMHG | OXYGEN SATURATION: 95 % | BODY MASS INDEX: 32.33 KG/M2 | WEIGHT: 175.71 LBS | HEART RATE: 79 BPM | DIASTOLIC BLOOD PRESSURE: 86 MMHG

## 2023-11-07 DIAGNOSIS — R79.89 ELEVATED LFTS: ICD-10-CM

## 2023-11-07 LAB
ERYTHROCYTE [DISTWIDTH] IN BLOOD BY AUTOMATED COUNT: 13.6 % (ref 11.6–15.1)
HCT VFR BLD AUTO: 40.7 % (ref 34.8–46.1)
HGB BLD-MCNC: 13 G/DL (ref 11.5–15.4)
INR PPP: 0.85 (ref 0.84–1.19)
MCH RBC QN AUTO: 29.7 PG (ref 26.8–34.3)
MCHC RBC AUTO-ENTMCNC: 31.9 G/DL (ref 31.4–37.4)
MCV RBC AUTO: 93 FL (ref 82–98)
PLATELET # BLD AUTO: 196 THOUSANDS/UL (ref 149–390)
PMV BLD AUTO: 10.4 FL (ref 8.9–12.7)
PROTHROMBIN TIME: 11.9 SECONDS (ref 11.6–14.5)
RBC # BLD AUTO: 4.37 MILLION/UL (ref 3.81–5.12)
WBC # BLD AUTO: 4.38 THOUSAND/UL (ref 4.31–10.16)

## 2023-11-07 PROCEDURE — 85027 COMPLETE CBC AUTOMATED: CPT | Performed by: RADIOLOGY

## 2023-11-07 PROCEDURE — 76942 ECHO GUIDE FOR BIOPSY: CPT | Performed by: RADIOLOGY

## 2023-11-07 PROCEDURE — 76942 ECHO GUIDE FOR BIOPSY: CPT

## 2023-11-07 PROCEDURE — 47000 NEEDLE BIOPSY OF LIVER PERQ: CPT | Performed by: RADIOLOGY

## 2023-11-07 PROCEDURE — 99152 MOD SED SAME PHYS/QHP 5/>YRS: CPT

## 2023-11-07 PROCEDURE — 88313 SPECIAL STAINS GROUP 2: CPT | Performed by: PATHOLOGY

## 2023-11-07 PROCEDURE — 99152 MOD SED SAME PHYS/QHP 5/>YRS: CPT | Performed by: RADIOLOGY

## 2023-11-07 PROCEDURE — 47000 NEEDLE BIOPSY OF LIVER PERQ: CPT

## 2023-11-07 PROCEDURE — 85610 PROTHROMBIN TIME: CPT | Performed by: RADIOLOGY

## 2023-11-07 PROCEDURE — 88307 TISSUE EXAM BY PATHOLOGIST: CPT | Performed by: PATHOLOGY

## 2023-11-07 RX ORDER — FENTANYL CITRATE 50 UG/ML
INJECTION, SOLUTION INTRAMUSCULAR; INTRAVENOUS AS NEEDED
Status: COMPLETED | OUTPATIENT
Start: 2023-11-07 | End: 2023-11-07

## 2023-11-07 RX ORDER — LIDOCAINE WITH 8.4% SOD BICARB 0.9%(10ML)
SYRINGE (ML) INJECTION AS NEEDED
Status: COMPLETED | OUTPATIENT
Start: 2023-11-07 | End: 2023-11-07

## 2023-11-07 RX ORDER — MIDAZOLAM HYDROCHLORIDE 2 MG/2ML
INJECTION, SOLUTION INTRAMUSCULAR; INTRAVENOUS AS NEEDED
Status: COMPLETED | OUTPATIENT
Start: 2023-11-07 | End: 2023-11-07

## 2023-11-07 RX ORDER — SODIUM CHLORIDE 9 MG/ML
125 INJECTION, SOLUTION INTRAVENOUS CONTINUOUS
Status: DISCONTINUED | OUTPATIENT
Start: 2023-11-07 | End: 2023-11-08 | Stop reason: HOSPADM

## 2023-11-07 RX ADMIN — FENTANYL CITRATE 50 MCG: 50 INJECTION INTRAMUSCULAR; INTRAVENOUS at 10:08

## 2023-11-07 RX ADMIN — MIDAZOLAM 1 MG: 1 INJECTION INTRAMUSCULAR; INTRAVENOUS at 10:08

## 2023-11-07 RX ADMIN — Medication 10 ML: at 10:14

## 2023-11-07 RX ADMIN — SODIUM CHLORIDE 125 ML/HR: 0.9 INJECTION, SOLUTION INTRAVENOUS at 08:48

## 2023-11-07 NOTE — BRIEF OP NOTE (RAD/CATH)
INTERVENTIONAL RADIOLOGY PROCEDURE NOTE    Date: 11/7/2023    Procedure:   Procedure Summary       Date: 11/07/23 Room / Location: [address] [place] [address] Interventional Radiology    Anesthesia Start:  Anesthesia Stop:     Procedure: IR BIOPSY LIVER RANDOM Diagnosis:       Elevated LFTs      (elevated LFTs)    Scheduled Providers:  Responsible Provider:     Anesthesia Type: Not recorded ASA Status: Not recorded            Preoperative diagnosis:   1. Elevated LFTs         Postoperative diagnosis: Same. Surgeon: Lito Mcmullen MD     Assistant: None. No qualified resident was available. Blood loss: minimal    Specimens: Liver     Findings: U/S guided random liver core biopsy with gelfoam    Complications: None immediate.     Anesthesia: conscious sedation and local

## 2023-11-07 NOTE — H&P
Interventional Radiology  History and Physical 11/7/2023     He Diggs   1960   [id number]    Assessment/Plan:  Elevated LFTs for image guide random liver core biopsy    Problem List Items Addressed This Visit    None  Visit Diagnoses       Elevated LFTs        Relevant Orders    IR biopsy liver random               Subjective:     Patient ID: He Diggs is a 59 y.o. female. History of Present Illness  58 yo female found to have elevated LFTs during work up for GI symptoms which remain elevated prior to referral for random liver core biopsy. Review of Systems   Constitutional: Negative. HENT: Negative. Eyes: Negative. Respiratory: Negative. Cardiovascular: Negative. Gastrointestinal: Negative. Endocrine: Negative. Genitourinary: Negative. Musculoskeletal: Negative. Skin: Negative. Allergic/Immunologic: Negative. Neurological: Negative. Hematological: Negative. Psychiatric/Behavioral: Negative. Past Medical History:   Diagnosis Date   • Anxiety    • Arthritis    • Chronic pain disorder    • Diabetes mellitus ([address])    • Enlarged liver    • GERD (gastroesophageal reflux disease)    • Hyperlipidemia    • Hypertension         Past Surgical History:   Procedure Laterality Date   • CHOLECYSTECTOMY     • FOOT SURGERY Right    • HERNIA REPAIR     • OVARY SURGERY     • HI ARTHRP ACETBLR/PROX FEM PROSTC AGRFT/ALGRFT Left 06/20/2022    Procedure: ARTHROPLASTY HIP TOTAL ANTERIOR;  Surgeon: Alma Sanchez MD;  Location:  [facility] OR;  Service: Orthopedics   • HI COLONOSCOPY FLX DX W/COLLJ SPEC WHEN PFRMD N/A 12/22/2016    Procedure: EGD AND COLONOSCOPY;  Surgeon: Regina Zhang MD;  Location: [facility] GI LAB;   Service: Gastroenterology   • HI CORRJ [address] W/SESMDC W/2 OSTEOT Right 03/01/2019    Procedure: DOUBLE OSTEOTOMY, ZINA/AKIN BUNIONECTOMY;  Surgeon: Warren Zambrano DPM;  Location: [facility] OR;  Service: Podiatry        Social History     Tobacco Use   Smoking Status Never   Smokeless Tobacco Never        Social History     Substance and Sexual Activity   Alcohol Use Never        Social History     Substance and Sexual Activity   Drug Use Never        Allergies   Allergen Reactions   • Aspirin Shortness Of Breath and Itching     swelling   • Latex Itching and Rash       Current Outpatient Medications   Medication Sig Dispense Refill   • dapagliflozin (Farxiga) 10 MG tablet Take 1 tablet (10 mg total) by mouth daily 90 tablet 3   • losartan (COZAAR) 50 mg tablet Take 1 tablet (50 mg total) by mouth daily 90 tablet 1   • metFORMIN (GLUCOPHAGE) 1000 MG tablet Take 1 tablet (1,000 mg total) by mouth 2 (two) times a day with meals 180 tablet 3   • omeprazole (PriLOSEC) 40 MG capsule Take 1 capsule (40 mg total) by mouth 2 (two) times a day before meals 60 capsule 5   • acetaminophen (TYLENOL) 500 mg tablet Take 1 tablet (500 mg total) by mouth every 6 (six) hours as needed for mild pain 30 tablet 0   • acetaminophen (TYLENOL) 650 mg CR tablet Take 1 tablet (650 mg total) by mouth every 8 (eight) hours as needed for mild pain 30 tablet 0   • albuterol (PROVENTIL HFA,VENTOLIN HFA) 90 mcg/act inhaler TAKE 2 PUFFS BY MOUTH EVERY 6 HOURS AS NEEDED FOR WHEEZE OR FOR SHORTNESS OF BREATH 18 g 1   • ALPRAZolam (XANAX) 0.5 mg tablet Take 1 tablet (0.5 mg total) by mouth daily at bedtime as needed for anxiety 15 tablet 0   • amoxicillin (AMOXIL) 500 mg capsule Take 500 mg by mouth see administration instructions     • Blood Glucose Monitoring Suppl (Contour Next Monitor) w/Device KIT Use 1 each daily Check blood sugar 2 times day 1 kit 0   • cholestyramine (QUESTRAN) 4 g packet TAKE 1 PACKET BY MOUTH 2 TIMES A DAY WITH MEALS.  (Patient not taking: Reported on 7/5/2023) 180 packet 1   • clotrimazole (GYNE-LOTRIMIN) 1 % vaginal cream Insert 1 applicator into the vagina daily at bedtime 45 g 0   • diclofenac (VOLTAREN) 75 mg EC tablet Take 1 tablet (75 mg total) by mouth 2 (two) times a day (Patient not taking: Reported on 3/2/2023) 60 tablet 2   • Diclofenac Sodium (VOLTAREN) 1 % Apply 2 g topically 4 (four) times a day (Patient not taking: Reported on 7/1/2326) 334 g 5   • folic acid (FOLVITE) 1 mg tablet TAKE 1 TABLET BY MOUTH EVERY DAY (Patient not taking: Reported on 7/5/2023) 90 tablet 1   • glucose blood (Contour Next Test) test strip Check blood sugar 2 times day 100 each 5   • glucose blood (FREESTYLE LITE) test strip Use once a day 100 each 5   • Microlet Lancets MISC Use 2 (two) times a day 100 each 5   • Multiple Vitamin (multivitamin) capsule Take 1 capsule by mouth daily     • simvastatin (ZOCOR) 20 mg tablet Take 1 tablet (20 mg total) by mouth daily at bedtime 90 tablet 1   • triamcinolone (KENALOG) 0.025 % ointment Apply topically 2 (two) times a day 30 g 1     Current Facility-Administered Medications   Medication Dose Route Frequency Provider Last Rate Last Admin   • sodium chloride 0.9 % infusion  125 mL/hr Intravenous Continuous Danny López  mL/hr at 11/07/23 0848 125 mL/hr at 11/07/23 0848          Objective:    Vitals:    11/07/23 0851   BP: 126/72   Pulse: 73   Resp: 18   Temp: 98.1 °F (36.7 °C)   TempSrc: Temporal   SpO2: 96%   Weight: 79.7 kg (175 lb 11.3 oz)   Height: 5' 2" (1.575 m)        Physical Exam  Vitals reviewed. Constitutional:       Appearance: Normal appearance. HENT:      Head: Normocephalic and atraumatic. Mouth/Throat:      Mouth: Mucous membranes are moist.   Eyes:      Extraocular Movements: Extraocular movements intact. Conjunctiva/sclera: Conjunctivae normal.   Cardiovascular:      Rate and Rhythm: Normal rate and regular rhythm. Heart sounds: Normal heart sounds. Pulmonary:      Effort: Pulmonary effort is normal.      Breath sounds: Normal breath sounds. Abdominal:      General: Abdomen is flat. Bowel sounds are normal.      Palpations: Abdomen is soft.    Musculoskeletal: General: Normal range of motion. Cervical back: Normal range of motion. Skin:     General: Skin is warm and dry. Neurological:      Mental Status: She is alert and oriented to person, place, and time. Psychiatric:         Mood and Affect: Mood normal.         Behavior: Behavior normal.         No results found for: "BNP"   Lab Results   Component Value Date    WBC 4.38 11/07/2023    HGB 13.0 11/07/2023    HCT 40.7 11/07/2023    MCV 93 11/07/2023     11/07/2023     Lab Results   Component Value Date    INR 0.85 11/07/2023    INR 0.89 06/06/2022    PROTIME 11.9 11/07/2023    PROTIME 11.7 06/06/2022     Lab Results   Component Value Date    PTT 26 06/06/2022         I have personally reviewed pertinent imaging and laboratory results. Code Status: No Order  Advance Directive and Living Will:      Power of :    POLST:      This text is generated with voice recognition software. There may be translation, syntax,  or grammatical errors. If you have any questions, please contact the dictating provider.

## 2023-11-07 NOTE — DISCHARGE INSTRUCTIONS
Percutaneous Liver Biopsy   WHAT YOU NEED TO KNOW:   A PLB is a procedure to remove a sample of tissue from your liver. The sample can be sent to a lab and tested for liver disease, cancer, or infection. After the procedure you may have pain and bruising at the biopsy site. You may also have pain in your right shoulder. These symptoms should get better in 48 to 72 hours. DISCHARGE INSTRUCTIONS:     Armando Jacobs patients,  Contact Interventional Radiology at [phone] PATIENTS: Contact Interventional Radiology at [phone]   [facility] PATIENTS: Contact Interventional Radiology at [phone] if:    Fever greater than 101 or chills  You have severe pain in your abdomen. Your abdomen is larger than usual and feels hard. Your neck is more swollen and you have trouble swallowing. You feel weak or dizzy. Your heart is beating faster than usual.   Your pain does not get better after you take pain medicine. Your wound is red, swollen, or draining pus. You have nausea or are vomiting. Your skin is itchy, swollen, or you have a rash. You have questions or concerns about your condition or care. Medicines:   Acetaminophen decreases pain and fever. It is available without a doctor's order. Acetaminophen can cause liver damage if not taken correctly. Take your home medicine as directed. Resume your normal diet. Small sips of flat soda will help with mild nausea. Self-care:   Rest as directed. Do not play sports, exercise, or lift anything heavier than 5 pounds for up to 1 week. Apply firm, steady pressure if bleeding occurs. A small amount of bleeding from your wound is possible. Apply pressure with a clean gauze or towel for 5 to 10 minutes. Call 911 if bleeding becomes heavy or does not stop. Ask your healthcare provider when to take your blood thinner or antiplatelet medicine. You may need to wait 24 to 72 hours to take your medicine.  This will prevent bleeding. Follow up with your healthcare provider as directed: Write down your questions so you remember to ask them during your visits. Procedural Sedation   WHAT YOU NEED TO KNOW:   Procedural sedation is medicine used during procedures to help you feel relaxed and calm. You will remember little to none of the procedure. After sedation you may feel tired, weak, or unsteady on your feet. You may also have trouble concentrating or short-term memory loss. These symptoms should go away in 24 hours or less. DISCHARGE INSTRUCTIONS:     Call 911 or have someone else call for any of the following: You have sudden trouble breathing. You cannot be woken. Contact Interventional Radiology at [phone]   [facility] PATIENTS: Contact Interventional Radiology at [phone]) Katerina Vizcaino PATIENTS: Contact Interventional Radiology at [phone]) if any of the following occur: You have a severe headache or dizziness. Your heart is beating faster than usual.    You have a fever or chills. Your skin is itchy, swollen, or you have a rash. You have nausea or are vomiting for more than 8 hours after the procedure. You have questions or concerns about your condition or care. Self-care:   Have someone stay with you for 24 hours. This person can drive you to errands and help you do things around the house. This person can also watch for problems. Rest and do quiet activities for 24 hours. Do not exercise, ride a bike, or play sports. Stand up slowly to prevent dizziness and falls. Take short walks around the house with another person. Slowly return to your usual activities the next day. Do not drive or use dangerous machines or tools for 24 hours. You may injure yourself or others. Examples include a lawnmower, saw, or drill. Do not return to work for 24 hours if you use dangerous machines or tools for work. Do not make important decisions for 24 hours.  For example, do not sign important papers or invest money. Drink liquids as directed. Liquids help flush the sedation medicine out of your body. Ask how much liquid to drink each day and which liquids are best for you. Eat small, frequent meals to prevent nausea and vomiting. Start with clear liquids such as juice or broth. If you do not vomit after clear liquids, you can eat your usual foods. Do not drink alcohol or take medicines that make you drowsy. This includes medicines that help you sleep and anxiety medicines. Ask your healthcare provider if it is safe for you to take pain medicine. Follow up with your healthcare provider as directed: Write down your questions so you remember to ask them during your visits.

## 2023-11-08 PROCEDURE — 88307 TISSUE EXAM BY PATHOLOGIST: CPT | Performed by: PATHOLOGY

## 2023-11-08 PROCEDURE — 88313 SPECIAL STAINS GROUP 2: CPT | Performed by: PATHOLOGY

## 2023-11-09 NOTE — RESULT ENCOUNTER NOTE
Staff: Patient is Urdu-speaking. Please kindly discuss the following with the patient:    "Hi. Liver biopsy shows inflammation secondary to nonalcoholic fatty liver disease. The therapy for this would be to lose weight. I would highly recommend that we refer you to weight management center to assist with weight loss. If inflammation continues then it can lead to chronic liver disease. With weight loss the inflammation and injury can be completely reversed. Goal is to have a normal BMI. Please let me know how to proceed. "

## 2023-11-10 DIAGNOSIS — E11.8 CONTROLLED DIABETES MELLITUS TYPE 2 WITH COMPLICATIONS, UNSPECIFIED WHETHER LONG TERM INSULIN USE (HCC): ICD-10-CM

## 2023-11-10 DIAGNOSIS — K75.81 NASH (NONALCOHOLIC STEATOHEPATITIS): Primary | ICD-10-CM

## 2023-11-10 DIAGNOSIS — E66.09 CLASS 1 OBESITY DUE TO EXCESS CALORIES WITH SERIOUS COMORBIDITY AND BODY MASS INDEX (BMI) OF 30.0 TO 30.9 IN ADULT: ICD-10-CM

## 2023-11-13 ENCOUNTER — OFFICE VISIT (OUTPATIENT)
Dept: DENTISTRY | Facility: CLINIC | Age: 63
End: 2023-11-13

## 2023-11-13 VITALS — SYSTOLIC BLOOD PRESSURE: 128 MMHG | TEMPERATURE: 98 F | HEART RATE: 80 BPM | DIASTOLIC BLOOD PRESSURE: 81 MMHG

## 2023-11-13 DIAGNOSIS — K05.6 PERIODONTAL DISEASE: Primary | ICD-10-CM

## 2023-11-13 PROCEDURE — D4341 PERIODONTAL SCALING AND ROOT PLANING - 4 OR MORE TEETH PER QUADRANT: HCPCS

## 2023-11-13 RX ORDER — AMOXICILLIN 500 MG/1
CAPSULE ORAL
Qty: 20 CAPSULE | Refills: 2 | Status: SHIPPED | OUTPATIENT
Start: 2023-11-13 | End: 2023-11-13

## 2023-11-13 RX ORDER — AMOXICILLIN 500 MG/1
500 CAPSULE ORAL SEE ADMIN INSTRUCTIONS
Refills: 0 | Status: CANCELLED | OUTPATIENT
Start: 2023-11-13

## 2023-11-13 NOTE — DENTAL PROCEDURE DETAILS
SCALING AND ROOT PLANING     PATIENT TOOK PRE MED PRIOR TO VISIT   SHE ASKED FOR A REFILL FOR HER NEXT VISIT ( NEXT WEEK)  Dr Alves Flatness prescribed antibiotic    ASA: 2  Pain: 0  Reviewed M/H    SC/RP:     UL/   Quad scaling and root planing  Applied ORAQIX Topical Anesthetic,      Type of Treatment:  Used Ultrasonic and Hand Scaling, Flossed, Polished, Subgingival Irrigation - post tx - LISTERINE. Reviewed [facility]  - Brush 2X/day and Floss 1X/day    Oral Hygiene:  GOOD   Plaque:  LIGHT  Calculus: Moderate -LIGHT  Bleeding:LIGHT  Stain:   / Light       Treatment Plan:  no changes to tx plan                  PATIENT DID WELL WITH ORAQIX  HAS NOT HAD # 16 EXTR YET  Referral:  No referral given  Gave follow-up directions.   NV1:  SC/RP - LL     NV3:  3 month perio maintenance - 60 min

## 2023-11-20 ENCOUNTER — TELEPHONE (OUTPATIENT)
Age: 63
End: 2023-11-20

## 2023-11-20 ENCOUNTER — OFFICE VISIT (OUTPATIENT)
Dept: DENTISTRY | Facility: CLINIC | Age: 63
End: 2023-11-20

## 2023-11-20 VITALS — DIASTOLIC BLOOD PRESSURE: 82 MMHG | HEART RATE: 81 BPM | SYSTOLIC BLOOD PRESSURE: 131 MMHG

## 2023-11-20 DIAGNOSIS — K05.6 PERIODONTAL DISEASE: Primary | ICD-10-CM

## 2023-11-20 NOTE — PROGRESS NOTES
Pt came in for her final SRP appt today - when asked if she took her premedication as directed, she states she took 2 pills (amoxicillin). Dr. Raven Martin explained to pt the need to take the correct dosage of premedication to prevent infection. Pt was very understanding of the infection risk associated with treatment without the correct dose. Dr. Raven Martin is also sending out medical clearance so we have record on file. Dr. Raven Martin spoke to her provider and medical clearance should be faxed back to us today. [address] [facility] [address] [facility] desk staff state they will also remind her going forward that she needs to take 4 capsules before she comes in. Reschedule pt per Dr. Raven Martin.     Carla Elizalde, [address]

## 2023-11-20 NOTE — TELEPHONE ENCOUNTER
Caller: [facility]    Doctor: Da Manuel    Reason for call: Dr Meek Cason with [facility] calling to request fax for patient's orthopedic doctor. She requested I send a message to the office to alert them, and request that paperwork be completed as soon as reasonably possible.     Call back#: [phone]

## 2023-11-22 ENCOUNTER — OFFICE VISIT (OUTPATIENT)
Dept: DENTISTRY | Facility: CLINIC | Age: 63
End: 2023-11-22

## 2023-11-22 VITALS — TEMPERATURE: 97.2 F | DIASTOLIC BLOOD PRESSURE: 82 MMHG | SYSTOLIC BLOOD PRESSURE: 133 MMHG | HEART RATE: 81 BPM

## 2023-11-22 DIAGNOSIS — K05.6 PERIODONTAL DISEASE: Primary | ICD-10-CM

## 2023-11-22 DIAGNOSIS — K21.9 GASTROESOPHAGEAL REFLUX DISEASE, UNSPECIFIED WHETHER ESOPHAGITIS PRESENT: ICD-10-CM

## 2023-11-22 PROCEDURE — D4341 PERIODONTAL SCALING AND ROOT PLANING - 4 OR MORE TEETH PER QUADRANT: HCPCS | Performed by: DENTAL HYGIENIST

## 2023-11-22 RX ORDER — OMEPRAZOLE 40 MG/1
CAPSULE, DELAYED RELEASE ORAL
Qty: 180 CAPSULE | Refills: 1 | Status: SHIPPED | OUTPATIENT
Start: 2023-11-22

## 2023-11-22 NOTE — DENTAL PROCEDURE DETAILS
SCALING AND ROOT PLANING     ASA:  II  Pain:  0  Reviewed M/H;  Pt had hip replacement 6/20/22 - took PREMED per  4 capsules - 500 mg prior to appt    SC/RP:   UR/LR     UL/LL   Quad scaling and root planning  Applied Topical Anesthetic,   Administered  _1__ carpules,  Long Needle, Lidocaine HCL 2 % and Epi 1.7 mL 1:100,000,   __1___ carpule , Long Needle,  Septocaine (articaine HCI ) and Epi 4 % and  1:100,000 1.7 mL, Infiltration, IANB   Type of Treatment:  Used Ultrasonic and Hand Scaling, Flossed, Polished, Subgingival Irrigation - post tx - Chlorhexidine. Reviewed [facility]  - Brush 2X/day and Floss 1X/day    Oral Hygiene:  Poor   Plaque:  Light / Moderate    Calculus: Moderate   Bleeding: Moderate     Stain:   Light     Treatment Plan:  no changes to tx plan        Exam:   Dr. Ciara Lopez gave anesthesia today                 Referral:  No referral given  Gave follow-up directions.     NV1:  Rest 18 - O - 60 min w/ Memar  NV2:  Perio main - 60 min - 3 months from 11/22/23

## 2024-01-09 ENCOUNTER — OFFICE VISIT (OUTPATIENT)
Dept: FAMILY MEDICINE CLINIC | Facility: CLINIC | Age: 64
End: 2024-01-09

## 2024-01-09 VITALS
WEIGHT: 179 LBS | SYSTOLIC BLOOD PRESSURE: 132 MMHG | TEMPERATURE: 97.5 F | DIASTOLIC BLOOD PRESSURE: 78 MMHG | BODY MASS INDEX: 32.94 KG/M2 | OXYGEN SATURATION: 97 % | HEART RATE: 85 BPM | HEIGHT: 62 IN | RESPIRATION RATE: 16 BRPM

## 2024-01-09 DIAGNOSIS — E11.9 DIABETES MELLITUS TYPE 2, CONTROLLED (HCC): ICD-10-CM

## 2024-01-09 DIAGNOSIS — E55.9 VITAMIN D DEFICIENCY: Primary | ICD-10-CM

## 2024-01-09 DIAGNOSIS — E11.8 CONTROLLED DIABETES MELLITUS TYPE 2 WITH COMPLICATIONS, UNSPECIFIED WHETHER LONG TERM INSULIN USE (HCC): ICD-10-CM

## 2024-01-09 DIAGNOSIS — K21.9 GASTROESOPHAGEAL REFLUX DISEASE, UNSPECIFIED WHETHER ESOPHAGITIS PRESENT: ICD-10-CM

## 2024-01-09 DIAGNOSIS — E11.9 CONTROLLED TYPE 2 DIABETES MELLITUS WITHOUT COMPLICATION, UNSPECIFIED WHETHER LONG TERM INSULIN USE (HCC): ICD-10-CM

## 2024-01-09 DIAGNOSIS — I10 HYPERTENSION, BENIGN: ICD-10-CM

## 2024-01-09 DIAGNOSIS — M16.12 PRIMARY OSTEOARTHRITIS OF LEFT HIP: ICD-10-CM

## 2024-01-09 LAB — SL AMB POCT HEMOGLOBIN AIC: 8.5 (ref ?–6.5)

## 2024-01-09 PROCEDURE — 83036 HEMOGLOBIN GLYCOSYLATED A1C: CPT | Performed by: FAMILY MEDICINE

## 2024-01-09 PROCEDURE — 99214 OFFICE O/P EST MOD 30 MIN: CPT | Performed by: FAMILY MEDICINE

## 2024-01-09 RX ORDER — DICLOFENAC SODIUM 75 MG/1
75 TABLET, DELAYED RELEASE ORAL 2 TIMES DAILY
Qty: 60 TABLET | Refills: 2 | Status: SHIPPED | OUTPATIENT
Start: 2024-01-09

## 2024-01-09 RX ORDER — LOSARTAN POTASSIUM 50 MG/1
50 TABLET ORAL DAILY
Qty: 90 TABLET | Refills: 1 | Status: SHIPPED | OUTPATIENT
Start: 2024-01-09

## 2024-01-09 NOTE — ASSESSMENT & PLAN NOTE
Lab Results   Component Value Date    HGBA1C 8.5 (A) 01/09/2024   Increased compared to prior  Patient continues to refuse injectables  Discussed at length risks of elevated BG  Counseled patient on decreasing carbohydrates in diet. Counseled on what are carbohydrates, and the difference between simple and complex carbohydrates  Continue Metformin 1000 mg BID and Farxiga 10 mg daily

## 2024-01-09 NOTE — PROGRESS NOTES
Assessment/Plan:    Diabetes mellitus type 2, controlled ([facility])    Lab Results   Component Value Date    HGBA1C 8.5 (A) 01/09/2024   Increased compared to prior  Patient continues to refuse injectables  Discussed at length risks of elevated BG  Counseled patient on decreasing carbohydrates in diet. Counseled on what are carbohydrates, and the difference between simple and complex carbohydrates  Continue Metformin 1000 mg BID and Farxiga 10 mg daily        Diagnoses and all orders for this visit:    Vitamin D deficiency  -     Vitamin D 25 hydroxy; Future    Hypertension, benign  -     losartan (COZAAR) 50 mg tablet; Take 1 tablet (50 mg total) by mouth daily  -     CBC and differential; Future  -     Comprehensive metabolic panel; Future  -     Lipid panel; Future  -     Vitamin D 25 hydroxy; Future  -     TSH, 3rd generation with Free T4 reflex; Future  -     Albumin / creatinine urine ratio; Future    Diabetes mellitus type 2, controlled ([facility])  -     losartan (COZAAR) 50 mg tablet; Take 1 tablet (50 mg total) by mouth daily  -     CBC and differential; Future  -     Comprehensive metabolic panel; Future  -     Lipid panel; Future  -     Vitamin D 25 hydroxy; Future  -     TSH, 3rd generation with Free T4 reflex; Future  -     Albumin / creatinine urine ratio; Future  -     POCT hemoglobin A1c    Primary osteoarthritis of left hip  -     diclofenac (VOLTAREN) 75 mg EC tablet; Take 1 tablet (75 mg total) by mouth 2 (two) times a day    Gastroesophageal reflux disease, unspecified whether esophagitis present    Controlled type 2 diabetes mellitus without complication, unspecified whether long term insulin use ([facility])  -     dapagliflozin (Farxiga) 10 MG tablet; Take 1 tablet (10 mg total) by mouth daily  -     CBC and differential; Future  -     Comprehensive metabolic panel; Future  -     Lipid panel; Future  -     Vitamin D 25 hydroxy; Future  -     TSH, 3rd generation with Free T4 reflex; Future  -     Albumin /  creatinine urine ratio; Future    Controlled diabetes mellitus type 2 with complications, unspecified whether long term insulin use (HCC)  -     CBC and differential; Future  -     Comprehensive metabolic panel; Future  -     Lipid panel; Future  -     Vitamin D 25 hydroxy; Future  -     TSH, 3rd generation with Free T4 reflex; Future  -     Albumin / creatinine urine ratio; Future          Subjective:      Patient ID: Alessia Pena is a 63 y.o. female.    HPI    The following portions of the patient's history were reviewed and updated as appropriate: She  has a past medical history of Anxiety, Arthritis, Chronic pain disorder, Diabetes mellitus (HCC), Enlarged liver, GERD (gastroesophageal reflux disease), Hyperlipidemia, and Hypertension.  She   Patient Active Problem List    Diagnosis Date Noted   • Periodontitis 03/02/2023   • Status post total hip replacement, left 06/20/2022   • Asthma    • Situational anxiety 08/16/2021   • Controlled diabetes mellitus type 2 with complications (HCC) 03/05/2021   • Class 1 obesity due to excess calories with serious comorbidity and body mass index (BMI) of 30.0 to 30.9 in adult 08/05/2020   • Chronic left-sided low back pain 12/12/2018   • Sacroiliitis (HCC) 06/06/2018   • Hypertension, benign 12/14/2017   • Osteoarthritis of left hip 08/09/2017   • Dermatofibroma 06/26/2017   • Diabetes mellitus type 2, controlled (HCC) 09/20/2016   • Hyperlipidemia 09/20/2016   • Allergic rhinitis 03/31/2015   • Eczema 03/31/2015   • Glenohumeral arthritis 09/24/2014   • GERD (gastroesophageal reflux disease) 05/18/2012     She  has a past surgical history that includes Hernia repair; Ovary surgery; pr colonoscopy flx dx w/collj spec when pfrmd (N/A, 12/22/2016); Cholecystectomy; pr corrj hlx vlgs bncty sesmdc w/double osteotomy (Right, 03/01/2019); Foot surgery (Right); pr arthrp acetblr/prox fem prostc agrft/algrft (Left, 06/20/2022); and IR biopsy liver random (11/7/2023).  Her  family history includes Breast cancer in her maternal aunt; Cancer in her brother and maternal grandfather; Hypertension in her father and mother.  She  reports that she has never smoked. She has never used smokeless tobacco. She reports that she does not drink alcohol and does not use drugs.  Current Outpatient Medications   Medication Sig Dispense Refill   • dapagliflozin (Farxiga) 10 MG tablet Take 1 tablet (10 mg total) by mouth daily 90 tablet 3   • diclofenac (VOLTAREN) 75 mg EC tablet Take 1 tablet (75 mg total) by mouth 2 (two) times a day 60 tablet 2   • losartan (COZAAR) 50 mg tablet Take 1 tablet (50 mg total) by mouth daily 90 tablet 1   • acetaminophen (TYLENOL) 500 mg tablet Take 1 tablet (500 mg total) by mouth every 6 (six) hours as needed for mild pain 30 tablet 0   • acetaminophen (TYLENOL) 650 mg CR tablet Take 1 tablet (650 mg total) by mouth every 8 (eight) hours as needed for mild pain 30 tablet 0   • albuterol (PROVENTIL HFA,VENTOLIN HFA) 90 mcg/act inhaler TAKE 2 PUFFS BY MOUTH EVERY 6 HOURS AS NEEDED FOR WHEEZE OR FOR SHORTNESS OF BREATH 18 g 1   • ALPRAZolam (XANAX) 0.5 mg tablet Take 1 tablet (0.5 mg total) by mouth daily at bedtime as needed for anxiety 15 tablet 0   • amoxicillin (AMOXIL) 500 mg capsule Take 500 mg by mouth see administration instructions     • Blood Glucose Monitoring Suppl (Contour Next Monitor) w/Device KIT Use 1 each daily Check blood sugar 2 times day 1 kit 0   • clotrimazole (GYNE-LOTRIMIN) 1 % vaginal cream Insert 1 applicator into the vagina daily at bedtime 45 g 0   • Diclofenac Sodium (VOLTAREN) 1 % Apply 2 g topically 4 (four) times a day (Patient not taking: Reported on 3/2/2023) 100 g 5   • folic acid (FOLVITE) 1 mg tablet TAKE 1 TABLET BY MOUTH EVERY DAY (Patient not taking: Reported on 7/5/2023) 90 tablet 1   • glucose blood (Contour Next Test) test strip Check blood sugar 2 times day 100 each 5   • glucose blood (FREESTYLE LITE) test strip Use once a day  100 each 5   • metFORMIN (GLUCOPHAGE) 1000 MG tablet Take 1 tablet (1,000 mg total) by mouth 2 (two) times a day with meals 180 tablet 3   • Microlet Lancets MISC Use 2 (two) times a day 100 each 5   • Multiple Vitamin (multivitamin) capsule Take 1 capsule by mouth daily     • omeprazole (PriLOSEC) 40 MG capsule TAKE 1 CAPSULE BY MOUTH 2 TIMES A DAY BEFORE MEALS. 180 capsule 1   • simvastatin (ZOCOR) 20 mg tablet Take 1 tablet (20 mg total) by mouth daily at bedtime 90 tablet 1   • triamcinolone (KENALOG) 0.025 % ointment Apply topically 2 (two) times a day 30 g 1     No current facility-administered medications for this visit.     Current Outpatient Medications on File Prior to Visit   Medication Sig   • acetaminophen (TYLENOL) 500 mg tablet Take 1 tablet (500 mg total) by mouth every 6 (six) hours as needed for mild pain   • acetaminophen (TYLENOL) 650 mg CR tablet Take 1 tablet (650 mg total) by mouth every 8 (eight) hours as needed for mild pain   • albuterol (PROVENTIL HFA,VENTOLIN HFA) 90 mcg/act inhaler TAKE 2 PUFFS BY MOUTH EVERY 6 HOURS AS NEEDED FOR WHEEZE OR FOR SHORTNESS OF BREATH   • ALPRAZolam (XANAX) 0.5 mg tablet Take 1 tablet (0.5 mg total) by mouth daily at bedtime as needed for anxiety   • amoxicillin (AMOXIL) 500 mg capsule Take 500 mg by mouth see administration instructions   • Blood Glucose Monitoring Suppl (Contour Next Monitor) w/Device KIT Use 1 each daily Check blood sugar 2 times day   • clotrimazole (GYNE-LOTRIMIN) 1 % vaginal cream Insert 1 applicator into the vagina daily at bedtime   • Diclofenac Sodium (VOLTAREN) 1 % Apply 2 g topically 4 (four) times a day (Patient not taking: Reported on 3/2/2023)   • folic acid (FOLVITE) 1 mg tablet TAKE 1 TABLET BY MOUTH EVERY DAY (Patient not taking: Reported on 7/5/2023)   • glucose blood (Contour Next Test) test strip Check blood sugar 2 times day   • glucose blood (FREESTYLE LITE) test strip Use once a day   • metFORMIN (GLUCOPHAGE) 1000 MG  "tablet Take 1 tablet (1,000 mg total) by mouth 2 (two) times a day with meals   • Microlet Lancets MISC Use 2 (two) times a day   • Multiple Vitamin (multivitamin) capsule Take 1 capsule by mouth daily   • omeprazole (PriLOSEC) 40 MG capsule TAKE 1 CAPSULE BY MOUTH 2 TIMES A DAY BEFORE MEALS.   • simvastatin (ZOCOR) 20 mg tablet Take 1 tablet (20 mg total) by mouth daily at bedtime   • triamcinolone (KENALOG) 0.025 % ointment Apply topically 2 (two) times a day   • [DISCONTINUED] cholestyramine (QUESTRAN) 4 g packet TAKE 1 PACKET BY MOUTH 2 TIMES A DAY WITH MEALS. (Patient not taking: Reported on 7/5/2023)   • [DISCONTINUED] dapagliflozin (Farxiga) 10 MG tablet Take 1 tablet (10 mg total) by mouth daily   • [DISCONTINUED] diclofenac (VOLTAREN) 75 mg EC tablet Take 1 tablet (75 mg total) by mouth 2 (two) times a day (Patient not taking: Reported on 3/2/2023)   • [DISCONTINUED] losartan (COZAAR) 50 mg tablet Take 1 tablet (50 mg total) by mouth daily     No current facility-administered medications on file prior to visit.     She is allergic to aspirin and latex..    Review of Systems   All other systems reviewed and are negative.        Objective:      /78 (BP Location: Right arm, Patient Position: Sitting, Cuff Size: Standard)   Pulse 85   Temp 97.5 °F (36.4 °C) (Temporal)   Resp 16   Ht 5' 2\" (1.575 m)   Wt 81.2 kg (179 lb)   SpO2 97%   BMI 32.74 kg/m²          Physical Exam  Vitals and nursing note reviewed.   Constitutional:       Appearance: She is well-developed.   HENT:      Head: Normocephalic.      Right Ear: External ear normal.      Left Ear: External ear normal.      Nose: Nose normal.   Eyes:      Conjunctiva/sclera: Conjunctivae normal.      Pupils: Pupils are equal, round, and reactive to light.   Neck:      Thyroid: No thyromegaly.   Cardiovascular:      Rate and Rhythm: Normal rate and regular rhythm.      Heart sounds: Normal heart sounds.   Pulmonary:      Effort: Pulmonary effort is " normal.      Breath sounds: Normal breath sounds.   Abdominal:      Palpations: Abdomen is soft.      Tenderness: There is no abdominal tenderness. There is no guarding or rebound.   Musculoskeletal:         General: Normal range of motion.      Cervical back: Normal range of motion and neck supple.   Skin:     General: Skin is dry.   Neurological:      Mental Status: She is alert and oriented to person, place, and time.      Deep Tendon Reflexes: Reflexes are normal and symmetric.

## 2024-01-18 ENCOUNTER — OFFICE VISIT (OUTPATIENT)
Dept: DENTISTRY | Facility: CLINIC | Age: 64
End: 2024-01-18

## 2024-01-18 VITALS — TEMPERATURE: 98 F | SYSTOLIC BLOOD PRESSURE: 153 MMHG | DIASTOLIC BLOOD PRESSURE: 91 MMHG | HEART RATE: 105 BPM

## 2024-01-18 DIAGNOSIS — K02.52 CARIES LESION, OCCLUSAL SURFACE, MODERATE, ACTIVE: Primary | ICD-10-CM

## 2024-01-18 PROCEDURE — D2391 RESIN-BASED COMPOSITE - 1 SURFACE, POSTERIOR: HCPCS | Performed by: DENTIST

## 2024-01-18 NOTE — DENTAL PROCEDURE DETAILS
Composite Filling #18 Occlusal    Alessia Pena presents for composite filling.   Patient consent treatment.  PMH reviewed, no changes.  ASA: II  DX: Tooth #18 occlusal caries.   Discussed with patient need for RCT if pulp exposure occurs or in future if pulp is inflamed. Pt understands and consents.  Applied topical benzocaine, administered half one carps 2% lido 1:100k epi via mandibualr infiltration.   Prepped tooth #18 occlusal with 330 carbide on high speed. Caries removed with round carbide on slow speed. Isolation with cotton rolls and dri-angles  Etch with 37% H2PO4, rinse, dry. Applied Adhese with 20 second scrub once, gentle air dry and light cured for 10s. Restored with Tetric flowable and bulk syd shade A3 and light cured.  Refined with finishing burs, polished with enhance point. Verified occlusion and contacts. POI is given. Pt left satisfied and ambulatory.  NV: Restoration tooth #20.  NV2: 3 Months Recall Perio Maintenance.

## 2024-01-30 ENCOUNTER — OFFICE VISIT (OUTPATIENT)
Dept: DENTISTRY | Facility: CLINIC | Age: 64
End: 2024-01-30

## 2024-01-30 VITALS — SYSTOLIC BLOOD PRESSURE: 135 MMHG | TEMPERATURE: 98 F | HEART RATE: 94 BPM | DIASTOLIC BLOOD PRESSURE: 81 MMHG

## 2024-01-30 DIAGNOSIS — K02.52 CARIES LESION, OCCLUSAL SURFACE, MODERATE: Primary | ICD-10-CM

## 2024-01-30 PROCEDURE — D2391 RESIN-BASED COMPOSITE - 1 SURFACE, POSTERIOR: HCPCS | Performed by: DENTIST

## 2024-01-30 NOTE — DENTAL PROCEDURE DETAILS
Composite Filling #20 Occlusal     Alessia Pena presents for composite filling.   Patient consent treatment.  PMH reviewed, no changes.  ASA: II  DX: Tooth #20 occlusal caries.   Discussed with patient need for RCT if pulp exposure occurs or in future if pulp is inflamed. Pt understands and consents.  Applied topical benzocaine, administered half one carps 2% lido 1:100k epi via mandibualr infiltration.   Prepped tooth #20 occlusal with 330 carbide on high speed. Caries removed with round carbide on slow speed. Isolation with cotton rolls and dri-angles  Etch with 37% H2PO4, rinse, dry. Applied Adhese with 20 second scrub once, gentle air dry and light cured for 10s. Restored with Tetric flowable and bulk syd shade A3 and light cured.  Refined with finishing burs, polished with enhance point. Verified occlusion and contacts. POI is given. Pt left satisfied and ambulatory.  NV: Restoration tooth #13.  NV2: 3 Months Recall Perio Maintenance.

## 2024-01-31 ENCOUNTER — OFFICE VISIT (OUTPATIENT)
Dept: GASTROENTEROLOGY | Facility: CLINIC | Age: 64
End: 2024-01-31
Payer: COMMERCIAL

## 2024-01-31 VITALS
WEIGHT: 176 LBS | HEIGHT: 62 IN | SYSTOLIC BLOOD PRESSURE: 138 MMHG | BODY MASS INDEX: 32.39 KG/M2 | TEMPERATURE: 97.9 F | DIASTOLIC BLOOD PRESSURE: 70 MMHG

## 2024-01-31 DIAGNOSIS — K31.7 GASTRIC POLYP: ICD-10-CM

## 2024-01-31 DIAGNOSIS — R11.0 NAUSEA: ICD-10-CM

## 2024-01-31 DIAGNOSIS — R79.89 ELEVATED LFTS: ICD-10-CM

## 2024-01-31 DIAGNOSIS — E66.09 CLASS 1 OBESITY DUE TO EXCESS CALORIES WITH SERIOUS COMORBIDITY AND BODY MASS INDEX (BMI) OF 30.0 TO 30.9 IN ADULT: ICD-10-CM

## 2024-01-31 DIAGNOSIS — K21.9 GASTROESOPHAGEAL REFLUX DISEASE, UNSPECIFIED WHETHER ESOPHAGITIS PRESENT: ICD-10-CM

## 2024-01-31 DIAGNOSIS — K44.9 HIATAL HERNIA: ICD-10-CM

## 2024-01-31 DIAGNOSIS — K75.81 NASH (NONALCOHOLIC STEATOHEPATITIS): Primary | ICD-10-CM

## 2024-01-31 PROCEDURE — 99214 OFFICE O/P EST MOD 30 MIN: CPT | Performed by: INTERNAL MEDICINE

## 2024-01-31 RX ORDER — VITAMIN E 268 MG
400 CAPSULE ORAL 2 TIMES DAILY
Qty: 60 CAPSULE | Refills: 5 | Status: SHIPPED | OUTPATIENT
Start: 2024-01-31 | End: 2024-02-05

## 2024-01-31 NOTE — PROGRESS NOTES
[facility] - Outpatient Follow-up Note  Alessia Pena 63 y.o. female MRN: [id number]  Encounter: [id number]          ASSESSMENT AND PLAN:      1.  Nonalcoholic steatohepatitis  2.  Abnormal liver enzymes  3.  Nausea  4.  Hiatal hernia  5.  GERD  6.  Obesity    Discussed with patient about making lifestyle changes including decreasing caloric intake and exercising to lose weight.  Will give patient educational handout for lifestyle changes to help lose weight.  Patient has already been referred to bariatrics for weight management.  Encourage patient to follow-up with them.  Also discussed with patient about starting vitamin D for management of steatohepatitis.  Patient agreeable.  Ordered the medication.  We will continue to monitor with liver enzymes.  I will get repeat liver enzymes in 3 months.  Her nausea and heartburn are mostly controlled.  Sometimes she forgets to take twice a day omeprazole.  I encouraged her to take omeprazole 40 mg twice daily consistently.    RTC 6 months.    Ross Gonsalez MD  Gastroenterology  [facility]  Date: January 31, 2024    ______________________________________________________________________    SUBJECTIVE: 63-year-old with diabetes, Bowden biopsy-proven, abnormal liver enzymes, status postcholecystectomy presents for follow-up.    During last office visit patient reported intermittent nausea along with abnormal liver enzymes and had unrevealing liver workup at that point.  She also had gastric polyps removed in the past.  EGD was performed where irregular Z-line was seen along with 2 cm hiatal hernia.  1 polyp from the stomach was removed which was fundic gland polyp.  Increased omeprazole to 40 mg twice daily.  Patient had by a liver biopsy the report of which was reviewed by me today and pathology showed nonalcoholic steatohepatitis without any fibrosis.  The liver biopsy was performed in November 2023 by radiology  department.    Currently, patient reports that her nausea has resolved.  No abdominal pain.  Normal bowel movements.  No swallowing problems.  Occasional heartburn.  She takes omeprazole twice daily on some days and then once daily on other days.  She has not tried to lose weight yet.  She has not seen bariatrics yet.      REVIEW OF SYSTEMS IS OTHERWISE NEGATIVE.      Historical Information   Past Medical History:   Diagnosis Date    Anxiety     Arthritis     Chronic pain disorder     Diabetes mellitus (HCC)     Enlarged liver     GERD (gastroesophageal reflux disease)     Hyperlipidemia     Hypertension      Past Surgical History:   Procedure Laterality Date    CHOLECYSTECTOMY      FOOT SURGERY Right     HERNIA REPAIR      IR BIOPSY LIVER RANDOM  11/7/2023    OVARY SURGERY      NM ARTHRP ACETBLR/PROX FEM PROSTC AGRFT/ALGRFT Left 06/20/2022    Procedure: ARTHROPLASTY HIP TOTAL ANTERIOR;  Surgeon: Stephany Thurston MD;  Location: [facility];  Service: Orthopedics    NM COLONOSCOPY FLX DX W/COLLJ SPEC WHEN PFRMD N/A 12/22/2016    Procedure: EGD AND COLONOSCOPY;  Surgeon: Travon Olmstead MD;  Location: [facility];  Service: Gastroenterology    NM CORRJ HLX VLGS BNCTY SESMDC W/DOUBLE OSTEOTOMY Right 03/01/2019    Procedure: DOUBLE OSTEOTOMY, ZINA/AKIN BUNIONECTOMY;  Surgeon: Hardy Holloway DPM;  Location: [facility];  Service: Podiatry     Social History   Social History     Substance and Sexual Activity   Alcohol Use Never     Social History     Substance and Sexual Activity   Drug Use Never     Social History     Tobacco Use   Smoking Status Never   Smokeless Tobacco Never     Family History   Problem Relation Age of Onset    Hypertension Mother     Hypertension Father     Cancer Brother         stomach    Cancer Maternal Grandfather         bone    Breast cancer Maternal Aunt     Colon cancer Neg Hx     Ovarian cancer Neg Hx        Meds/Allergies       Current Outpatient Medications:     acetaminophen  "(TYLENOL) 500 mg tablet    acetaminophen (TYLENOL) 650 mg CR tablet    albuterol (PROVENTIL HFA,VENTOLIN HFA) 90 mcg/act inhaler    ALPRAZolam (XANAX) 0.5 mg tablet    amoxicillin (AMOXIL) 500 mg capsule    Blood Glucose Monitoring Suppl (Contour Next Monitor) w/Device KIT    clotrimazole (GYNE-LOTRIMIN) 1 % vaginal cream    dapagliflozin (Farxiga) 10 MG tablet    diclofenac (VOLTAREN) 75 mg EC tablet    glucose blood (Contour Next Test) test strip    glucose blood (FREESTYLE LITE) test strip    losartan (COZAAR) 50 mg tablet    metFORMIN (GLUCOPHAGE) 1000 MG tablet    Microlet Lancets MISC    Multiple Vitamin (multivitamin) capsule    omeprazole (PriLOSEC) 40 MG capsule    simvastatin (ZOCOR) 20 mg tablet    triamcinolone (KENALOG) 0.025 % ointment    Diclofenac Sodium (VOLTAREN) 1 %    folic acid (FOLVITE) 1 mg tablet    Allergies   Allergen Reactions    Aspirin Shortness Of Breath and Itching     swelling    Latex Itching and Rash           Objective     Blood pressure 138/70, temperature 97.9 °F (36.6 °C), height 5' 2\" (1.575 m), weight 79.8 kg (176 lb), not currently breastfeeding. Body mass index is 32.19 kg/m².      PHYSICAL EXAM:      General Appearance:   Alert, cooperative, no distress   HEENT:   Normocephalic, atraumatic, anicteric.     Neck:  Supple, symmetrical, trachea midline   Lungs:   Clear to auscultation bilaterally; no rales, rhonchi or wheezing; respirations unlabored    Heart::   Regular rate and rhythm; no murmur, rub, or gallop.   Abdomen:   Soft, non-tender, non-distended; normal bowel sounds; no masses, no organomegaly    Genitalia:   Deferred    Rectal:   Deferred    Extremities:  No cyanosis, clubbing or edema    Pulses:  2+ and symmetric    Skin:  No jaundice, rashes, or lesions    Lymph nodes:  No palpable cervical lymphadenopathy        Lab Results:   No visits with results within 1 Day(s) from this visit.   Latest known visit with results is:   Office Visit on 01/09/2024   Component " Date Value    Hemoglobin A1C 01/09/2024 8.5 (A)          Radiology Results:   No results found.

## 2024-02-03 DIAGNOSIS — K75.81 NASH (NONALCOHOLIC STEATOHEPATITIS): ICD-10-CM

## 2024-02-05 RX ORDER — VITAMIN E 268 MG
400 CAPSULE ORAL 2 TIMES DAILY
Qty: 60 CAPSULE | Refills: 5 | Status: SHIPPED | OUTPATIENT
Start: 2024-02-05 | End: 2024-08-03

## 2024-02-09 ENCOUNTER — OFFICE VISIT (OUTPATIENT)
Dept: DENTISTRY | Facility: CLINIC | Age: 64
End: 2024-02-09

## 2024-02-09 VITALS — DIASTOLIC BLOOD PRESSURE: 81 MMHG | HEART RATE: 101 BPM | TEMPERATURE: 98 F | SYSTOLIC BLOOD PRESSURE: 139 MMHG

## 2024-02-09 DIAGNOSIS — K02.9 CARIES INVOLVING MULTIPLE SURFACES OF TOOTH: ICD-10-CM

## 2024-02-09 DIAGNOSIS — K04.7 DENTAL INFECTION: Primary | ICD-10-CM

## 2024-02-09 PROCEDURE — D2392 RESIN-BASED COMPOSITE - 2 SURFACES, POSTERIOR: HCPCS | Performed by: DENTIST

## 2024-02-09 RX ORDER — AMOXICILLIN 500 MG/1
CAPSULE ORAL
Qty: 20 CAPSULE | Refills: 0 | Status: SHIPPED | OUTPATIENT
Start: 2024-02-09 | End: 2024-02-09

## 2024-02-09 NOTE — DENTAL PROCEDURE DETAILS
Composite Filling #13 DO     Alessia Pena presents for composite filling.   Patient took her antibiotic premedication Amoxicillin 500 mg X4 one hour prior dental procedure. Patient had hip replacement in 6/20/2022.   Patient consent treatment.  PMH reviewed, no changes.  ASA: II  DX: Tooth #13 DO caries.   Discussed with patient need for RCT if pulp exposure occurs or in future if pulp is inflamed. Pt understands and consents.  Applied topical benzocaine, administered half one carps 2% lido 1:100k epi via maxillary infiltration.   Prepped tooth #13 DO with 245 leatha on high speed. Caries removed with round carbide on slow speed. Placed [place] matrix. Isolation with cotton rolls and dri-angles  Etch with 37% H2PO4, rinse, dry. Applied Adhese with 20 second scrub once, gentle air dry and light cured for 10s. Restored with Tetric flowable and bulk syd shade A3 and light cured.  Refined with finishing burs, polished with enhance point. Verified occlusion and contacts. POI is given. Pt left satisfied and ambulatory.  RX: Premedication: Amoxicillin 500 mg, 20 caps, take 4 caps one hour prior dental procedure. Per patient request. Patient said she always took it prior dental procedure.   NV2: 3 Months Recall Perio Maintenance.

## 2024-02-22 DIAGNOSIS — E11.8 CONTROLLED DIABETES MELLITUS TYPE 2 WITH COMPLICATIONS, UNSPECIFIED WHETHER LONG TERM INSULIN USE (HCC): ICD-10-CM

## 2024-02-22 RX ORDER — SIMVASTATIN 20 MG
20 TABLET ORAL
Qty: 90 TABLET | Refills: 1 | Status: SHIPPED | OUTPATIENT
Start: 2024-02-22

## 2024-03-18 ENCOUNTER — APPOINTMENT (OUTPATIENT)
Dept: LAB | Facility: CLINIC | Age: 64
End: 2024-03-18
Payer: COMMERCIAL

## 2024-03-18 DIAGNOSIS — I10 HYPERTENSION, BENIGN: ICD-10-CM

## 2024-03-18 DIAGNOSIS — E11.9 DIABETES MELLITUS TYPE 2, CONTROLLED (HCC): ICD-10-CM

## 2024-03-18 DIAGNOSIS — E11.9 CONTROLLED TYPE 2 DIABETES MELLITUS WITHOUT COMPLICATION, UNSPECIFIED WHETHER LONG TERM INSULIN USE (HCC): ICD-10-CM

## 2024-03-18 DIAGNOSIS — E55.9 VITAMIN D DEFICIENCY: ICD-10-CM

## 2024-03-18 DIAGNOSIS — E11.8 CONTROLLED DIABETES MELLITUS TYPE 2 WITH COMPLICATIONS, UNSPECIFIED WHETHER LONG TERM INSULIN USE (HCC): ICD-10-CM

## 2024-03-18 LAB
25(OH)D3 SERPL-MCNC: 32.3 NG/ML (ref 30–100)
ALBUMIN SERPL BCP-MCNC: 4.4 G/DL (ref 3.5–5)
ALP SERPL-CCNC: 101 U/L (ref 34–104)
ALT SERPL W P-5'-P-CCNC: 17 U/L (ref 7–52)
ANION GAP SERPL CALCULATED.3IONS-SCNC: 10 MMOL/L (ref 4–13)
AST SERPL W P-5'-P-CCNC: 20 U/L (ref 13–39)
BASOPHILS # BLD AUTO: 0.02 THOUSANDS/ÂΜL (ref 0–0.1)
BASOPHILS NFR BLD AUTO: 0 % (ref 0–1)
BILIRUB SERPL-MCNC: 0.33 MG/DL (ref 0.2–1)
BUN SERPL-MCNC: 11 MG/DL (ref 5–25)
CALCIUM SERPL-MCNC: 9.3 MG/DL (ref 8.4–10.2)
CHLORIDE SERPL-SCNC: 103 MMOL/L (ref 96–108)
CHOLEST SERPL-MCNC: 197 MG/DL
CO2 SERPL-SCNC: 29 MMOL/L (ref 21–32)
CREAT SERPL-MCNC: 0.67 MG/DL (ref 0.6–1.3)
CREAT UR-MCNC: 68.1 MG/DL
EOSINOPHIL # BLD AUTO: 0.06 THOUSAND/ÂΜL (ref 0–0.61)
EOSINOPHIL NFR BLD AUTO: 1 % (ref 0–6)
ERYTHROCYTE [DISTWIDTH] IN BLOOD BY AUTOMATED COUNT: 13.2 % (ref 11.6–15.1)
GFR SERPL CREATININE-BSD FRML MDRD: 94 ML/MIN/1.73SQ M
GLUCOSE P FAST SERPL-MCNC: 144 MG/DL (ref 65–99)
HCT VFR BLD AUTO: 43.1 % (ref 34.8–46.1)
HDLC SERPL-MCNC: 65 MG/DL
HGB BLD-MCNC: 13.5 G/DL (ref 11.5–15.4)
IMM GRANULOCYTES # BLD AUTO: 0.02 THOUSAND/UL (ref 0–0.2)
IMM GRANULOCYTES NFR BLD AUTO: 0 % (ref 0–2)
LDLC SERPL CALC-MCNC: 101 MG/DL (ref 0–100)
LYMPHOCYTES # BLD AUTO: 1.33 THOUSANDS/ÂΜL (ref 0.6–4.47)
LYMPHOCYTES NFR BLD AUTO: 25 % (ref 14–44)
MCH RBC QN AUTO: 29.9 PG (ref 26.8–34.3)
MCHC RBC AUTO-ENTMCNC: 31.3 G/DL (ref 31.4–37.4)
MCV RBC AUTO: 95 FL (ref 82–98)
MICROALBUMIN UR-MCNC: <7 MG/L
MICROALBUMIN/CREAT 24H UR: <10 MG/G CREATININE (ref 0–30)
MONOCYTES # BLD AUTO: 0.56 THOUSAND/ÂΜL (ref 0.17–1.22)
MONOCYTES NFR BLD AUTO: 10 % (ref 4–12)
NEUTROPHILS # BLD AUTO: 3.43 THOUSANDS/ÂΜL (ref 1.85–7.62)
NEUTS SEG NFR BLD AUTO: 64 % (ref 43–75)
NONHDLC SERPL-MCNC: 132 MG/DL
NRBC BLD AUTO-RTO: 0 /100 WBCS
PLATELET # BLD AUTO: 217 THOUSANDS/UL (ref 149–390)
PMV BLD AUTO: 11.1 FL (ref 8.9–12.7)
POTASSIUM SERPL-SCNC: 4.3 MMOL/L (ref 3.5–5.3)
PROT SERPL-MCNC: 7.4 G/DL (ref 6.4–8.4)
RBC # BLD AUTO: 4.52 MILLION/UL (ref 3.81–5.12)
SODIUM SERPL-SCNC: 142 MMOL/L (ref 135–147)
TRIGL SERPL-MCNC: 156 MG/DL
TSH SERPL DL<=0.05 MIU/L-ACNC: 1.66 UIU/ML (ref 0.45–4.5)
WBC # BLD AUTO: 5.42 THOUSAND/UL (ref 4.31–10.16)

## 2024-03-18 PROCEDURE — 82043 UR ALBUMIN QUANTITATIVE: CPT

## 2024-03-18 PROCEDURE — 84443 ASSAY THYROID STIM HORMONE: CPT

## 2024-03-18 PROCEDURE — 80053 COMPREHEN METABOLIC PANEL: CPT

## 2024-03-18 PROCEDURE — 85025 COMPLETE CBC W/AUTO DIFF WBC: CPT

## 2024-03-18 PROCEDURE — 36415 COLL VENOUS BLD VENIPUNCTURE: CPT

## 2024-03-18 PROCEDURE — 82306 VITAMIN D 25 HYDROXY: CPT

## 2024-03-18 PROCEDURE — 80061 LIPID PANEL: CPT

## 2024-03-18 PROCEDURE — 82570 ASSAY OF URINE CREATININE: CPT

## 2024-03-20 ENCOUNTER — OFFICE VISIT (OUTPATIENT)
Dept: FAMILY MEDICINE CLINIC | Facility: CLINIC | Age: 64
End: 2024-03-20

## 2024-03-20 VITALS
HEART RATE: 103 BPM | BODY MASS INDEX: 32.02 KG/M2 | HEIGHT: 62 IN | WEIGHT: 174 LBS | TEMPERATURE: 97.9 F | OXYGEN SATURATION: 96 % | SYSTOLIC BLOOD PRESSURE: 112 MMHG | DIASTOLIC BLOOD PRESSURE: 84 MMHG | RESPIRATION RATE: 18 BRPM

## 2024-03-20 DIAGNOSIS — D22.9 ATYPICAL MOLE: ICD-10-CM

## 2024-03-20 DIAGNOSIS — Z23 ENCOUNTER FOR IMMUNIZATION: Primary | ICD-10-CM

## 2024-03-20 PROBLEM — M46.1 SACROILIITIS (HCC): Status: RESOLVED | Noted: 2018-06-06 | Resolved: 2024-03-20

## 2024-03-20 PROCEDURE — 90471 IMMUNIZATION ADMIN: CPT

## 2024-03-20 PROCEDURE — 99214 OFFICE O/P EST MOD 30 MIN: CPT | Performed by: FAMILY MEDICINE

## 2024-03-20 PROCEDURE — 90750 HZV VACC RECOMBINANT IM: CPT

## 2024-03-20 NOTE — PROGRESS NOTES
Assessment/Plan:    No problem-specific Assessment & Plan notes found for this encounter.       Diagnoses and all orders for this visit:    Encounter for immunization  -     Zoster Vaccine Recombinant IM    Atypical mole  -     Ambulatory Referral to Dermatology; Future        BW done 3/18/2024 reviewed and discussed with patient  CMP:  Kidney function: Stable  FBG mildly elevated and improved compared to prior  LFTs: wnl  FLP:  LDL: minimally elevated, improved compared to prior  Triglycerides still elevated but considerably improved compared to prior.  Continue low fat diet, routine exercise    Subjective:      Patient ID: Alessia Pena is a 63 y.o. female.    62 yo female with DM here today for follow up  Doing well  Denies s/s of hypo or hyperglycemia  Saw ophthalmology 11/2023, due for follow up May 2024  Checks feet daily  Complains of mole on her face growing and changing shape over the las month.         The following portions of the patient's history were reviewed and updated as appropriate: She  has a past medical history of Anxiety, Arthritis, Chronic pain disorder, Diabetes mellitus (HCC), Enlarged liver, GERD (gastroesophageal reflux disease), Hyperlipidemia, and Hypertension.  She   Patient Active Problem List    Diagnosis Date Noted   • Periodontitis 03/02/2023   • Status post total hip replacement, left 06/20/2022   • Asthma    • Situational anxiety 08/16/2021   • Controlled diabetes mellitus type 2 with complications (HCC) 03/05/2021   • Class 1 obesity due to excess calories with serious comorbidity and body mass index (BMI) of 30.0 to 30.9 in adult 08/05/2020   • Chronic left-sided low back pain 12/12/2018   • Hypertension, benign 12/14/2017   • Osteoarthritis of left hip 08/09/2017   • Dermatofibroma 06/26/2017   • Diabetes mellitus type 2, controlled (HCC) 09/20/2016   • Hyperlipidemia 09/20/2016   • Allergic rhinitis 03/31/2015   • Eczema 03/31/2015   • Glenohumeral arthritis 09/24/2014    • GERD (gastroesophageal reflux disease) 05/18/2012     She  has a past surgical history that includes Hernia repair; Ovary surgery; pr colonoscopy flx dx w/collj spec when pfrmd (N/A, 12/22/2016); Cholecystectomy; pr corrj hlx vlgs bncty sesmdc w/double osteotomy (Right, 03/01/2019); Foot surgery (Right); pr arthrp acetblr/prox fem prostc agrft/algrft (Left, 06/20/2022); and IR biopsy liver random (11/7/2023).  Her family history includes Breast cancer in her maternal aunt; Cancer in her brother and maternal grandfather; Hypertension in her father and mother.  She  reports that she has never smoked. She has never used smokeless tobacco. She reports that she does not drink alcohol and does not use drugs.  Current Outpatient Medications   Medication Sig Dispense Refill   • acetaminophen (TYLENOL) 500 mg tablet Take 1 tablet (500 mg total) by mouth every 6 (six) hours as needed for mild pain 30 tablet 0   • acetaminophen (TYLENOL) 650 mg CR tablet Take 1 tablet (650 mg total) by mouth every 8 (eight) hours as needed for mild pain 30 tablet 0   • albuterol (PROVENTIL HFA,VENTOLIN HFA) 90 mcg/act inhaler TAKE 2 PUFFS BY MOUTH EVERY 6 HOURS AS NEEDED FOR WHEEZE OR FOR SHORTNESS OF BREATH 18 g 1   • ALPRAZolam (XANAX) 0.5 mg tablet Take 1 tablet (0.5 mg total) by mouth daily at bedtime as needed for anxiety 15 tablet 0   • amoxicillin (AMOXIL) 500 mg capsule Take 500 mg by mouth see administration instructions     • Blood Glucose Monitoring Suppl (Contour Next Monitor) w/Device KIT Use 1 each daily Check blood sugar 2 times day 1 kit 0   • clotrimazole (GYNE-LOTRIMIN) 1 % vaginal cream Insert 1 applicator into the vagina daily at bedtime 45 g 0   • dapagliflozin (Farxiga) 10 MG tablet Take 1 tablet (10 mg total) by mouth daily 90 tablet 3   • diclofenac (VOLTAREN) 75 mg EC tablet Take 1 tablet (75 mg total) by mouth 2 (two) times a day 60 tablet 2   • Diclofenac Sodium (VOLTAREN) 1 % Apply 2 g topically 4 (four) times a  day (Patient not taking: Reported on 3/2/2023) 100 g 5   • folic acid (FOLVITE) 1 mg tablet TAKE 1 TABLET BY MOUTH EVERY DAY (Patient not taking: Reported on 7/5/2023) 90 tablet 1   • glucose blood (Contour Next Test) test strip Check blood sugar 2 times day 100 each 5   • glucose blood (FREESTYLE LITE) test strip Use once a day 100 each 5   • losartan (COZAAR) 50 mg tablet Take 1 tablet (50 mg total) by mouth daily 90 tablet 1   • metFORMIN (GLUCOPHAGE) 1000 MG tablet Take 1 tablet (1,000 mg total) by mouth 2 (two) times a day with meals 180 tablet 3   • Microlet Lancets MISC Use 2 (two) times a day 100 each 5   • Multiple Vitamin (multivitamin) capsule Take 1 capsule by mouth daily     • omeprazole (PriLOSEC) 40 MG capsule TAKE 1 CAPSULE BY MOUTH 2 TIMES A DAY BEFORE MEALS. 180 capsule 1   • simvastatin (ZOCOR) 20 mg tablet TAKE 1 TABLET BY MOUTH DAILY AT BEDTIME 90 tablet 1   • triamcinolone (KENALOG) 0.025 % ointment Apply topically 2 (two) times a day 30 g 1   • vitamin E, tocopherol, 400 units capsule TAKE 1 CAPSULE (400 UNITS TOTAL) BY MOUTH 2 (TWO) TIMES A DAY 60 capsule 5     No current facility-administered medications for this visit.     Current Outpatient Medications on File Prior to Visit   Medication Sig   • acetaminophen (TYLENOL) 500 mg tablet Take 1 tablet (500 mg total) by mouth every 6 (six) hours as needed for mild pain   • acetaminophen (TYLENOL) 650 mg CR tablet Take 1 tablet (650 mg total) by mouth every 8 (eight) hours as needed for mild pain   • albuterol (PROVENTIL HFA,VENTOLIN HFA) 90 mcg/act inhaler TAKE 2 PUFFS BY MOUTH EVERY 6 HOURS AS NEEDED FOR WHEEZE OR FOR SHORTNESS OF BREATH   • ALPRAZolam (XANAX) 0.5 mg tablet Take 1 tablet (0.5 mg total) by mouth daily at bedtime as needed for anxiety   • amoxicillin (AMOXIL) 500 mg capsule Take 500 mg by mouth see administration instructions   • Blood Glucose Monitoring Suppl (Contour Next Monitor) w/Device KIT Use 1 each daily Check blood sugar  "2 times day   • clotrimazole (GYNE-LOTRIMIN) 1 % vaginal cream Insert 1 applicator into the vagina daily at bedtime   • dapagliflozin (Farxiga) 10 MG tablet Take 1 tablet (10 mg total) by mouth daily   • diclofenac (VOLTAREN) 75 mg EC tablet Take 1 tablet (75 mg total) by mouth 2 (two) times a day   • Diclofenac Sodium (VOLTAREN) 1 % Apply 2 g topically 4 (four) times a day (Patient not taking: Reported on 3/2/2023)   • folic acid (FOLVITE) 1 mg tablet TAKE 1 TABLET BY MOUTH EVERY DAY (Patient not taking: Reported on 7/5/2023)   • glucose blood (Contour Next Test) test strip Check blood sugar 2 times day   • glucose blood (FREESTYLE LITE) test strip Use once a day   • losartan (COZAAR) 50 mg tablet Take 1 tablet (50 mg total) by mouth daily   • metFORMIN (GLUCOPHAGE) 1000 MG tablet Take 1 tablet (1,000 mg total) by mouth 2 (two) times a day with meals   • Microlet Lancets MISC Use 2 (two) times a day   • Multiple Vitamin (multivitamin) capsule Take 1 capsule by mouth daily   • omeprazole (PriLOSEC) 40 MG capsule TAKE 1 CAPSULE BY MOUTH 2 TIMES A DAY BEFORE MEALS.   • simvastatin (ZOCOR) 20 mg tablet TAKE 1 TABLET BY MOUTH DAILY AT BEDTIME   • triamcinolone (KENALOG) 0.025 % ointment Apply topically 2 (two) times a day   • vitamin E, tocopherol, 400 units capsule TAKE 1 CAPSULE (400 UNITS TOTAL) BY MOUTH 2 (TWO) TIMES A DAY     No current facility-administered medications on file prior to visit.     She is allergic to aspirin and latex..    Review of Systems   Skin:         As per HPI   All other systems reviewed and are negative.        Objective:      /84 (BP Location: Left arm, Patient Position: Sitting, Cuff Size: Standard)   Pulse 103   Temp 97.9 °F (36.6 °C) (Temporal)   Resp 18   Ht 5' 2\" (1.575 m)   Wt 78.9 kg (174 lb)   SpO2 96%   BMI 31.83 kg/m²          Physical Exam  Vitals and nursing note reviewed.   Constitutional:       Appearance: She is well-developed.   HENT:      Head: Normocephalic. "      Right Ear: External ear normal.      Left Ear: External ear normal.      Nose: Nose normal.   Eyes:      Conjunctiva/sclera: Conjunctivae normal.      Pupils: Pupils are equal, round, and reactive to light.   Neck:      Thyroid: No thyromegaly.   Cardiovascular:      Rate and Rhythm: Normal rate and regular rhythm.      Heart sounds: Normal heart sounds.   Pulmonary:      Effort: Pulmonary effort is normal.      Breath sounds: Normal breath sounds.   Abdominal:      Palpations: Abdomen is soft.      Tenderness: There is no abdominal tenderness. There is no guarding or rebound.   Musculoskeletal:         General: Normal range of motion.      Cervical back: Normal range of motion and neck supple.   Skin:     General: Skin is dry.             Comments: 0.2 cm hyperchromic plaque on bridge of nose    Neurological:      Mental Status: She is alert and oriented to person, place, and time.      Deep Tendon Reflexes: Reflexes are normal and symmetric.

## 2024-03-21 ENCOUNTER — OFFICE VISIT (OUTPATIENT)
Dept: DENTISTRY | Facility: CLINIC | Age: 64
End: 2024-03-21

## 2024-03-21 VITALS — TEMPERATURE: 98.6 F | SYSTOLIC BLOOD PRESSURE: 123 MMHG | HEART RATE: 94 BPM | DIASTOLIC BLOOD PRESSURE: 77 MMHG

## 2024-03-21 DIAGNOSIS — Z01.20 ENCOUNTER FOR DENTAL EXAMINATION: Primary | ICD-10-CM

## 2024-03-21 PROCEDURE — D4910 PERIODONTAL MAINTENANCE: HCPCS | Performed by: DENTAL HYGIENIST

## 2024-03-21 PROCEDURE — D0120 PERIODIC ORAL EVALUATION - ESTABLISHED PATIENT: HCPCS

## 2024-03-21 PROCEDURE — D0274 BITEWINGS - 4 RADIOGRAPHIC IMAGES: HCPCS | Performed by: DENTAL HYGIENIST

## 2024-03-21 NOTE — DENTAL PROCEDURE DETAILS
Alessia Pena presents for a Periodic exam. Verbal consent for treatment given in addition to the forms.     Reviewed health history - Patient is ASA II - Hip replacement 2022 - took PRE-MED today 1 hr prior to procedures  Consents signed: Yes  I-PAD Sinhala translation -  # [id number] - 15 min     Perio: Generalized, Moderate bleeding, and Recession  Pain Scale: 0  Caries Assessment: Low  Radiographs: Bitewings x4  EO/IO/OCS:   WNL;  Bilateral mandibular mee     Oral Hygiene instruction reviewed and given.  ---Stressed longer brush especially in molar areas 2 X/day and floss 1 X/day  OHI:  Fair  ---Mod sub and supra calc on molars/ premolars,  lt plaque  ---Handscaled,polish, floss  Recommended Hygiene recall visits with  Alessia.     Treatment Plan:  1.  Periodontal therapy: 3 month perio maintenance  2.  Caries control: Watch areas as charted  ---Extractions of 1, 16 recommended - referred to OS  3.  Occlusal evaluation:   Missing teeth    Prognosis is Good.  Referrals needed: No  Exam:  Dr. Yo    NV1:  Perio main - 60 min - 3 months from 3/22/24

## 2024-04-02 DIAGNOSIS — M16.12 PRIMARY OSTEOARTHRITIS OF LEFT HIP: ICD-10-CM

## 2024-04-02 RX ORDER — DICLOFENAC SODIUM 75 MG/1
75 TABLET, DELAYED RELEASE ORAL 2 TIMES DAILY
Qty: 60 TABLET | Refills: 2 | Status: SHIPPED | OUTPATIENT
Start: 2024-04-02

## 2024-04-10 ENCOUNTER — HOSPITAL ENCOUNTER (OUTPATIENT)
Dept: MAMMOGRAPHY | Facility: CLINIC | Age: 64
Discharge: HOME | End: 2024-04-10
Payer: COMMERCIAL

## 2024-04-10 VITALS — BODY MASS INDEX: 32.02 KG/M2 | WEIGHT: 174 LBS | HEIGHT: 62 IN

## 2024-04-10 DIAGNOSIS — Z12.31 ENCOUNTER FOR SCREENING MAMMOGRAM FOR MALIGNANT NEOPLASM OF BREAST: ICD-10-CM

## 2024-04-10 PROCEDURE — 77063 BREAST TOMOSYNTHESIS BI: CPT

## 2024-04-10 PROCEDURE — 77067 SCR MAMMO BI INCL CAD: CPT

## 2024-06-05 ENCOUNTER — TELEPHONE (OUTPATIENT)
Dept: FAMILY MEDICINE CLINIC | Facility: CLINIC | Age: 64
End: 2024-06-05

## 2024-06-05 NOTE — TELEPHONE ENCOUNTER
first attempt to contact patient. left message to return my call on answering machine, appointment was rescheduled from 7/10 to 7/11 @ 9am if that's ok no need for a call back if not please give us a call so we can reschedule.

## 2024-07-03 DIAGNOSIS — E11.9 DIABETES MELLITUS TYPE 2, CONTROLLED (HCC): ICD-10-CM

## 2024-07-03 DIAGNOSIS — I10 HYPERTENSION, BENIGN: ICD-10-CM

## 2024-07-03 RX ORDER — LOSARTAN POTASSIUM 50 MG/1
50 TABLET ORAL DAILY
Qty: 90 TABLET | Refills: 1 | Status: SHIPPED | OUTPATIENT
Start: 2024-07-03

## 2024-07-08 ENCOUNTER — APPOINTMENT (OUTPATIENT)
Dept: LAB | Facility: CLINIC | Age: 64
End: 2024-07-08
Payer: COMMERCIAL

## 2024-07-08 DIAGNOSIS — K75.81 NASH (NONALCOHOLIC STEATOHEPATITIS): ICD-10-CM

## 2024-07-08 LAB
ALBUMIN SERPL BCG-MCNC: 4.1 G/DL (ref 3.5–5)
ALP SERPL-CCNC: 84 U/L (ref 34–104)
ALT SERPL W P-5'-P-CCNC: 13 U/L (ref 7–52)
AST SERPL W P-5'-P-CCNC: 18 U/L (ref 13–39)
BILIRUB DIRECT SERPL-MCNC: 0.1 MG/DL (ref 0–0.2)
BILIRUB SERPL-MCNC: 0.54 MG/DL (ref 0.2–1)
PROT SERPL-MCNC: 7.3 G/DL (ref 6.4–8.4)

## 2024-07-08 PROCEDURE — 80076 HEPATIC FUNCTION PANEL: CPT

## 2024-07-08 PROCEDURE — 36415 COLL VENOUS BLD VENIPUNCTURE: CPT

## 2024-07-11 ENCOUNTER — APPOINTMENT (OUTPATIENT)
Dept: LAB | Facility: CLINIC | Age: 64
End: 2024-07-11
Payer: COMMERCIAL

## 2024-07-11 ENCOUNTER — OFFICE VISIT (OUTPATIENT)
Dept: FAMILY MEDICINE CLINIC | Facility: CLINIC | Age: 64
End: 2024-07-11

## 2024-07-11 VITALS
OXYGEN SATURATION: 97 % | HEART RATE: 89 BPM | BODY MASS INDEX: 31.83 KG/M2 | DIASTOLIC BLOOD PRESSURE: 83 MMHG | WEIGHT: 173 LBS | TEMPERATURE: 98.7 F | HEIGHT: 62 IN | RESPIRATION RATE: 18 BRPM | SYSTOLIC BLOOD PRESSURE: 157 MMHG

## 2024-07-11 DIAGNOSIS — E11.9 TYPE 2 DIABETES MELLITUS WITHOUT COMPLICATION, WITHOUT LONG-TERM CURRENT USE OF INSULIN (HCC): ICD-10-CM

## 2024-07-11 DIAGNOSIS — D22.39 MELANOCYTIC NEVUS OF FACE, OTHER LOCATION: ICD-10-CM

## 2024-07-11 DIAGNOSIS — L30.9 ECZEMA, UNSPECIFIED TYPE: ICD-10-CM

## 2024-07-11 DIAGNOSIS — M16.12 PRIMARY OSTEOARTHRITIS OF LEFT HIP: ICD-10-CM

## 2024-07-11 DIAGNOSIS — I10 HYPERTENSION, BENIGN: Primary | ICD-10-CM

## 2024-07-11 DIAGNOSIS — J45.20 MILD INTERMITTENT ASTHMA, UNSPECIFIED WHETHER COMPLICATED: ICD-10-CM

## 2024-07-11 PROBLEM — Z96.642 STATUS POST TOTAL HIP REPLACEMENT, LEFT: Status: RESOLVED | Noted: 2022-06-20 | Resolved: 2024-07-11

## 2024-07-11 LAB
EST. AVERAGE GLUCOSE BLD GHB EST-MCNC: 186 MG/DL
HBA1C MFR BLD: 8.1 %

## 2024-07-11 PROCEDURE — 36415 COLL VENOUS BLD VENIPUNCTURE: CPT

## 2024-07-11 PROCEDURE — 83036 HEMOGLOBIN GLYCOSYLATED A1C: CPT

## 2024-07-11 PROCEDURE — G2211 COMPLEX E/M VISIT ADD ON: HCPCS | Performed by: FAMILY MEDICINE

## 2024-07-11 PROCEDURE — 99214 OFFICE O/P EST MOD 30 MIN: CPT | Performed by: FAMILY MEDICINE

## 2024-07-11 RX ORDER — ALBUTEROL SULFATE 90 UG/1
2 AEROSOL, METERED RESPIRATORY (INHALATION) EVERY 4 HOURS PRN
Qty: 18 G | Refills: 1 | Status: SHIPPED | OUTPATIENT
Start: 2024-07-11

## 2024-07-11 RX ORDER — TRIAMCINOLONE ACETONIDE 0.25 MG/G
OINTMENT TOPICAL 2 TIMES DAILY
Qty: 30 G | Refills: 1 | Status: SHIPPED | OUTPATIENT
Start: 2024-07-11

## 2024-07-11 RX ORDER — SIMVASTATIN 20 MG
20 TABLET ORAL
Qty: 90 TABLET | Refills: 1 | Status: SHIPPED | OUTPATIENT
Start: 2024-07-11

## 2024-07-11 NOTE — PATIENT INSTRUCTIONS
Patient Education     Conteo de carbohidratos para adultos con diabetes   Conceptos Básicos   Redactado por los médicos y editores de [employer]   ¿Qué es el conteo de carbohidratos? -- El conteo de carbohidratos es un tipo de planificación de comidas que usan muchas personas con diabetes. Es patti forma de calcular cuántos carbohidratos consumen.  Nuestro organismo transforma los alimentos que comemos en medardo tipos principales de nutrientes: carbohidratos, proteínas y grasas. Los carbohidratos son azúcares y almidones que provienen de los alimentos. El cuerpo usa los carbohidratos nia fausto de energía.  ¿Por qué hayder contar los carbohidratos? -- Las personas que tienen diabetes deben prestar atención a la cantidad de carbohidratos que consumen, ya que los carbohidratos elevan el nivel de azúcar en eugene.  El conteo de carbohidratos le ayuda para lo siguiente:   Elegir la cantidad de insulina que usará antes de las comidas y meriendas - Si usa insulina antes de las comidas, la dosis depende de varias cosas, y patti de ellas es la cantidad de carbohidratos que planea consumir. (También depende de cuánto ejercicio tenga pensado hacer y de garcia nivel de azúcar en eugene).   Planificar las comidas y meriendas del día - Puede utilizar el conteo de carbohidratos para calcular cuántos carbohidratos debe consumir en cada comida y merienda. [place] le ayudará a asegurarse de consumir la cantidad correcta todo el día.   Mantener controlado el nivel de azúcar en eugene - Distribuir los carbohidratos que consume a lo amita del día puede ayudar a que garcia nivel de azúcar en eugene no suba demasiado. Si usa insulina u otra medicina para la diabetes que puede causar un nivel bajo de azúcar en eugene, consumir aproximadamente la misma cantidad de carbohidratos en cada comida todos los raeann también ayuda a impedir que el nivel de azúcar en eugene baje demasiado. Reducir la cantidad de carbohidratos que consume puede ayudarle a controlar mejor  la diabetes y evitar problemas médicos que esta enfermedad puede producir.  Garcia médico, enfermero o nutricionista (experto en alimentos) puede ayudarle a determinar cuántos carbohidratos debe tratar de consumir cada día. La cantidad dependerá de jeff hábitos de alimentación, garcia peso, garcia nivel de actividad y las medicinas que use para la diabetes.  Las personas que usan insulina antes de las comidas deben poner mucho cuidado al contar los carbohidratos de cada comida y merienda, ya que esto les permite usar la cantidad correcta de insulina. Si la dosis de insulina no es adecuada para la cantidad de carbohidratos, garcia nivel de azúcar en eugene podría bajar demasiado. Otras personas podrían ser un poco más flexibles, siempre y cuando consuman aproximadamente la misma cantidad de carbohidratos por día.  ¿Qué alimentos tienen carbohidratos? -- Los alimentos con muchos carbohidratos incluyen:   Granos - Entre ellos se cuentan el pan, las pastas, el arroz y los cereales.   Frutas y vegetales con almidón - Algunos vegetales con almidón son la papa, el maíz y la calabaza.   Leche y otros productos lácteos - Entre los productos lácteos se encuentran el queso y el yogur.   Alimentos con azúcar agregada - Entre ellos se cuentan los dulces y los alimentos horneados (nia las galletas y las tortas), y también las bebidas azucaradas nia los jugos y las gaseosas.  Lo mejor es que la mayor parte de los carbohidratos provengan de frutas, verduras, granos integrales (nia el pan, los cereales y el arroz integrales), y de leche y productos lácteos bajos en grasa.  ¿Cómo se cuentan los carbohidratos? -- Para contar los carbohidratos de los alimentos envasados, debe revisar los datos nutricionales en las etiquetas (si tienen etiqueta).  En la etiqueta (figura 1), revise la siguiente información:   Cantidad de “carbohidratos totales” - Indica cuántos carbohidratos contiene patti porción del alimento. Si come patti porción, entonces la cantidad  "de carbohidratos que come es la misma cantidad que los carbohidratos totales.   “Tamaño de la porción” - Indica la cantidad de alimento en patti porción. Si come 2 porciones, la cantidad de carbohidratos será dos veces la cantidad de carbohidratos mencionada.   “Fibra dietaria” - La fibra es un carbohidrato que no se digiere, esto significa que no eleva el azúcar en eugene. Los alimentos con mucha fibra pueden ayudar a controlar el azúcar en eugene. Si un alimento tiene más de 5 gramos (g) de fibra, necesita menos insulina si consume janice alimento. Por eso, para calcular patti dosis de insulina, solo debe contar los carbohidratos que no provengan de la fibra (figura 1).  ¿Qué es el reemplazo de carbohidratos? -- Reemplazar los carbohidratos o \"sistema de reemplazos\", es patti manera de planificar las comidas sin leer las etiquetas. [place] puede ser útil, ya que muchos alimentos no vienen con patti etiqueta de datos nutricionales.  El sistema de reemplazos consiste en saber qué cantidad de distintos alimentos contiene aproximadamente 15 gramos de carbohidratos (tabla 1 y tabla 2 y tabla 3). Garcia médico, enfermero o nutricionista le da patti cierta cantidad de opciones de carbohidratos para consumir en cada comida y merienda (tabla 4). Cada opción es patti porción de comida que contiene alrededor de 15 gramos de carbohidratos. [place] jeff opciones hará que le resulte más fácil reemplazar patti opción de carbohidrato por otra a la hora de planear jeff comidas y meriendas. Por ejemplo, podría reemplazar patti manzana magdalene por 1/3 de taza de pasta.  ¿Cómo puedo planificar mis comidas? -- Homar, asegúrese de saber cuántos carbohidratos debe consumir por día. Si no está seguro, pregunte a garcia médico, enfermero o nutricionista.  Estas sugerencias podrían ser de ayuda:   Distribuya jeff carbohidratos en 4 a 6 comidas pequeñas todos los días, en lugar de 3 grandes   Coma patti cantidad similar de carbohidratos en cada comida, por ejemplo, en cada " "ronald   Coma jeff comidas a patti hora similar todos los días   Planifique jeff comidas con tiempo   Use el \"método del plato\", patti manera más simple de asegurarse de tener un buen equilibrio de carbohidratos y otros nutrientes en cada comida. Por lo general no es tan exacto nia contar todos los carbohidratos, puede ser útil para quienes tienen dificultades con el conteo. Si usa insulina antes de las comidas, lo mejor es ajustar la dosis de insulina contando cuántos carbohidratos planea consumir en lugar de usar el método del plato.  En el método del plato, comienza con un plato de alrededor de 9 pulgadas (23 cm) de diámetro. Luego, llena (figura 2):   1/2 plato con verduras sin almidón   1/4 de plato con proteína   1/4 de plato con carbohidratos   Siga las instrucciones de garcia médico acerca de cómo y cuándo revisarse el nivel de azúcar en eugene. [place] puede ayudarle a conocer la manera en que ciertos alimentos afectan garcia azúcar en eugene.   Lleve un registro de jeff comidas y los niveles de azúcar en eugene. Muéstreselo al médico o enfermero para que pueda ajustarle el plan de tratamiento, si es necesario. Si usa insulina, también tendrá que llevar un registro de jeff rutinas de ejercicio y cuánta insulina recibe con cada dosis.   Si usa insulina, asegúrese de saber cómo utilizarla; por ejemplo, debe saber cómo ajustar la dosis según garcia nivel de azúcar en eugene y la cantidad de carbohidratos que planea consumir.   Recuerde que otras cosas, además de los carbohidratos, pueden elevar o disminuir el nivel de azúcar en eugene, por ejemplo realizar ejercicio físico, enfermarse, beber alcohol, viajar y tener estrés. Si usa insulina, asegúrese de saber cuándo y cómo ajustar la dosis en esas situaciones.  Si tiene dificultad para contar los carbohidratos o mantener garcia nivel de azúcar en eugene bajo control, hable con garcia médico o enfermero, Puede brindarle ayuda. El nutricionista también puede ayudarle a planificar menús " específicos para consumir la cantidad correcta de carbohidratos por día.  Para obtener más información, también puede conseguir un libro sobre el conteo de carbohidratos o visitar el sitio web de la Asociación [place] para la Diabetes (American Diabetes Association) (www.diabetes.org).  Todos los artículos se actualizan a medida que se descubre nueva evidencia y culmina nuestro proceso de evaluación por homólogos   Arabella artículo se recuperó de UpToDate el: Mar 27, 2024.  Artículo 86418 Versión [id number].es-419.1  Release: 32.2.4 - C32.85  © 2024 [employer] Todos los derechos reservados.  figura 1: Contar los carbohidratos     Para determinar el conteo de carbohidratos netos en 1 porción, comience con el número de gramos de carbohidratos totales (46 gramos) y luego reste el número de gramos de fibra dietaria (7 gramos). También es importante observar el tamaño de la porción. En arabella ejemplo, el conteo de carbohidratos es de 39 gramos. Puede usar arabella número a la hora de contar los carbohidratos para determinar garcia dosis de insulina.  Gráfico [id number] Versión 8.0  tabla 1: Panes y cereales con 15 gramos de carbohidratos*  Pan    Alimento  Tamaño de la porción    Bagel 1/4 de bagel brad (1 oz)   Galleta 1 galleta (2.5 pulgadas de diámetro)   Pan, reducido en calorías, dietético 2 rebanadas (1.5 oz)   Gallego de harina maíz 1 cubo de 1.75 pulgadas (1.5 oz)   Panecillo inglés 1/2 panecillo   Pan para hot dog o hamburguesa 1/2 pan (3/4 de oz)   Naan, chapati o roti 1 oz   Panqueque 1 panqueque (4 pulgadas de diámetro, 1/4 de pulgada de grosor)   Huang (6 pulgadas de diámetro) 1/2 huang   Tortilla de harina de maíz 1 tortilla pequeña (6 pulgadas de diámetro)   Tortilla de harina de flako (latrell o integral) 1 tortilla pequeña (6 pulgadas de diámetro) o 1/3 de tortilla brad (10 pulgadas de diámetro)   Gofre 1 gofre (terri de 4 pulgadas o 4 pulgadas de diámetro)   Cereales y granos (incluidas las pastas y el arroz)   "  Alimento  Tamaño de la porción (alimento cocido)    Cebada, cuscús, mijo, pasta (harina latrell o integral, todas las formas y tamaños), polenta, quinoa (todos los colores) o arroz (morris, integral y otros colores y variedades) 1/3 de taza   Cereal de salvado (ramitas, bolitas o copos), almohadillas de flako (sabor natural) o cereal azucarado 1/2 taza   Bulgur, kasha, tabule o arroz heather 1/2 taza   Granola 1/4 de taza   Cereal caliente (caryn, cereal de caryn, sémola) 1/2 taza   Cereal listo para consumir, sin endulzar 3/4 de taza   * En el nel de los panes y cereales, 15 gramos de carbohidratos se consideran 1 porción o patti opción para las personas que deben contar los carbohidratos.  [facility] [id number] Versión 1.0  tabla 2: Frutas con 15 gramos de carbohidratos*  Alimento  Tamaño de la porción    Puré de manzana, sin endulzar 1/2 taza   Plátano 1 plátano (banana) muy pequeño, de alrededor de 4 pulgadas de amita (4 oz)   Arándanos azules 3/4 de taza   Frutas deshidratadas (arándanos azules, cerezas, arándanos rojos, frutas mixtas, uvas pasas) 2 cdas.   Fruta, enlatada 1/2 taza   Fruta, entera, pequeña (manzana) 1 fruta pequeña (4 oz)   Fruta, entera, mediana (nectarina, naranja, gustavo, mandarina) 1 fruta mediana (6 oz)   Jugo de fruta, sin endulzar 1/2 taza   Uvas 17 uvas pequeñas (3 oz)   Melón, en cubos 1 taza   Fresas, enteras 1 taza y 1/4   Donde se indica, el peso (onzas) incluye la cáscara o la piel y las semillas. Si tiene dudas acerca de si la fruta es del tamaño correcto para 1 porción, puede usar patti balanza de cocina para vilma el peso de la fruta.  * En el nel de las frutas, 15 gramos de carbohidratos se consideran 1 porción o patti \"opción\" para las personas que deben contar los carbohidratos.  Gráfico [id number] Versión 1.0  tabla 3: Verduras con almidón con 15 gramos de carbohidratos*  Alimento  Tamaño de la porción (alimento cocido)    Yuca, pituca o plátano (macho) 1/3 de taza   Maíz, arvejas, verduras " "mixtas o chirivías 1/2 taza   Salsa marinara, salsa para pasta o martinez para espagueti 1/2 taza   Verduras mixtas (con maíz o arvejas) 1 taza   Jud, al horno sin pelar 1/4 brad (3 onzas)   Jud, a la francesa (horneadas) 1 taza (2 onzas)   Jud, en puré con leche y grasa 1/2 taza   Zapallo (anco, calabaza) 1 taza   Ñame o batata, común 1/2 taza (3 1/2 onzas)   Si tiene dudas acerca de si la verdura es del tamaño correcto para 1 porción, puede usar patti balanza de cocina para vilma el peso de la verdura.  * En el nel de las verduras con almidón, 15 gramos de carbohidratos se consideran 1 porción o patti \"opción\" para las personas que deben contar los carbohidratos.  Gráfico [id number] Versión 1.0  tabla 4: Ejemplo de plan de comidas del sistema de reemplazos  Hora  Patrón de reemplazos  Ejemplo de menú  Cantidad de carbohidratos (g)    8 am 3 grupos de carbohidratos    2 almidones 1 panecillo inglés 30    1 fruta 1 1/4 tazas de fresas 15    1 deisy de proteínas 1/4 de taza de requesón -    1 deisy de grasas 1 cdta. de margarina -      Total: 45    Mediodía 4 grupos de carbohidratos    2 almidones 2 rebanadas de pan 30    1 fruta 1 naranja 15    1 verdura 1 taza de ensalada -    1 lácteo 8 oz de leche descremada 12    3 grupos de proteínas 3 oz de yosi -    1 deisy de grasas 1 cda. de mayonesa baja en grasa -      Total: 57    3 pm 1 deisy de carbohidratos    1 fruta o 1 almidón 1 manzana o 6 galletas de sal 15      Total: 15    6 pm 4 grupos de carbohidratos    2 almidones 1 taza de jud 30    1 fruta 1/2 taza de ensalada de frutas 15    1 verdura 1 taza de ensalada -    1 lácteo 8 oz de leche descremada 12    6 grupos de proteínas 6 oz de pescado -    1 deisy de grasas 2 cdas. de aderezo para ensalada bajo en grasa -      Total: 57    9 pm 1 deisy de carbohidratos    1 almidón 6 galletas de sal 15    1 proteína 2 cdas. de mantequilla de maní -      Total: 15    [facility] [id number] Versión 3.0  figura 2: El \"método del " "plato\"     En el método del plato, comienza con un plato de alrededor de 9 pulgadas (23 cm) de diámetro. Luego, llena 1/2 plato con verduras sin almidón, 1/4 de plato con proteína y 1/4 de plato con carbohidratos.  Gráfico [id number] Versión 2.0  Exención de responsabilidad y uso de la información del consumidor   Descargo de responsabilidad: esta información generalizada es un resumen limitado de información sobre el diagnóstico, el tratamiento y/o los medicamentos. No pretende ser exhaustiva y se debe utilizar nia herramienta para ayudar al usuario a comprender y/o evaluar las posibles opciones de diagnóstico y tratamiento. No incluye toda la información sobre afecciones, tratamientos, medicamentos, efectos secundarios o riesgos puedan ser aplicables a un paciente específico. No tiene el propósito de servir nia recomendación médica ni de sustituir la recomendación médica, el diagnóstico o el tratamiento de un profesional de atención médica que se base en el examen y la evaluación de guicho profesional de la taylor respecto a las circunstancias específicas y únicas del paciente. Los pacientes deben hablar con un profesional de atención médica para obtener información completa sobre garcia taylor, cuestiones médicas y opciones de tratamiento, incluidos los riesgos o los beneficios relacionados con el uso de medicamentos. Esta información no certifica que los tratamientos o medicamentos shamir seguros, eficaces o estén aprobados para tratar a un paciente específico. [employer] y jeff afiliados renuncian a cualquier garantía o responsabilidad relacionada con esta información o el uso de la misma.El uso de esta información está sujeto a las Condiciones de uso, disponibles en https://www.[employer].com/en/know/clinical-effectiveness-terms. 2024© [employer] y jeff afiliados y/o licenciantes. Todos los derechos reservados.  Copyright   © 2024 [employer] Todos los derechos reservados.    "

## 2024-07-11 NOTE — PROGRESS NOTES
Ambulatory Visit  Name: Alessia Pena      : 1960      MRN: [id number]  Encounter Provider: Julissa Marie MD  Encounter Date: 2024   Encounter department: [facility]    Assessment & Plan   1. Hypertension, benign  Assessment & Plan:  BP Readings from Last 3 Encounters:   24 157/83   24 123/77   24 112/84     BP recheked: 138/72 mmHg  Current medication: Losartan 50 mg daily    Plan:  Continue losartan 50 mg daily  Low-salt diet  Regular exercise, lose weight  2. Type 2 diabetes mellitus without complication, without long-term current use of insulin (HCC)  Assessment & Plan:    Lab Results   Component Value Date    HGBA1C 8.5 (A) 2024     Not well controlled  Current medication: Metformin 1000 mg bid    Plan:  Need to recheck Hemoglobin A1c  Continue Metformin for now, but may need to add another oral med  Discussed carb control and regular exercise  Orders:  -     metFORMIN (GLUCOPHAGE) 1000 MG tablet; Take 1 tablet (1,000 mg total) by mouth 2 (two) times a day with meals  -     simvastatin (ZOCOR) 20 mg tablet; Take 1 tablet (20 mg total) by mouth daily at bedtime  -     Hemoglobin A1C; Future; Expected date: 2024  3. Primary osteoarthritis of left hip  -     Diclofenac Sodium (VOLTAREN) 1 %; Apply 2 g topically 4 (four) times a day  4. Eczema, unspecified type  -     triamcinolone (KENALOG) 0.025 % ointment; Apply topically 2 (two) times a day  5. Melanocytic nevus of face, other location  -     Ambulatory Referral to Dermatology; Future  6. Mild intermittent asthma, unspecified whether complicated  -     albuterol (PROVENTIL HFA,VENTOLIN HFA) 90 mcg/act inhaler; Inhale 2 puffs every 4 (four) hours as needed for shortness of breath       History of Present Illness     HPI  Alessia Pena is a 64 y.o. female  has a past medical history of Allergic rhinitis, Anxiety, Arthritis, Asthma, Chronic pain disorder, Diabetes  "mellitus (HCC), Enlarged liver, GERD (gastroesophageal reflux disease), Glenohumeral arthritis, Hyperlipidemia, Hypertension, and Status post total hip replacement, left. who presented to the office today to establish care with new PCP.  Is taking all medication as prescribed, uses Albuterol as needed, last use was a year ago.  Recently returned from trip to [place]. She states her ankles were swollen there, but since has returned that has resolved.  Concerned about a mole on her right inner eye.nasal area.  She had a mole removed rpevioulsy on her right cheek in the past.      Patient's history was reviewed and updated as appropriate: allergies, current medications, past family history, past medical history, past social history, past surgical history and problem list.    Review of Systems   Constitutional:  Negative for chills and fever.   HENT:  Negative for congestion, rhinorrhea and sore throat.    Respiratory:  Negative for cough and shortness of breath.    Cardiovascular:  Negative for chest pain.   Gastrointestinal:  Negative for diarrhea, nausea and vomiting.   Skin:  Negative for rash.   Neurological:  Negative for dizziness and headaches.     Past Medical History:   Diagnosis Date   • Allergic rhinitis 03/31/2015   • Anxiety    • Arthritis    • Asthma     \"one time with a cold, and used an inhaler\"     • Chronic pain disorder    • Diabetes mellitus (HCC)    • Enlarged liver    • GERD (gastroesophageal reflux disease)    • Glenohumeral arthritis 09/24/2014   • Hyperlipidemia    • Hypertension    • Status post total hip replacement, left 06/20/2022     Past Surgical History:   Procedure Laterality Date   • CHOLECYSTECTOMY     • FOOT SURGERY Right    • HERNIA REPAIR     • IR BIOPSY LIVER RANDOM  11/7/2023   • OVARY SURGERY     • ID ARTHRP ACETBLR/PROX FEM PROSTC AGRFT/ALGRFT Left 06/20/2022    Procedure: ARTHROPLASTY HIP TOTAL ANTERIOR;  Surgeon: Stephany Thurston MD;  Location[facility];  Service: " Orthopedics   • TX COLONOSCOPY FLX DX W/COLLJ SPEC WHEN PFRMD N/A 12/22/2016    Procedure: EGD AND COLONOSCOPY;  Surgeon: Travon Olmstead MD;  Location: [facility] GI LAB;  Service: Gastroenterology   • TX CORRJ HLX VLGS BNCTY SESMDC W/DOUBLE OSTEOTOMY Right 03/01/2019    Procedure: DOUBLE OSTEOTOMY, ZINA/AKIN BUNIONECTOMY;  Surgeon: Hardy Holloway DPM;  Location: [facility];  Service: Podiatry     Family History   Problem Relation Age of Onset   • Hypertension Mother    • Hypertension Father    • No Known Problems Sister    • No Known Problems Daughter    • No Known Problems Maternal Grandmother    • Cancer Maternal Grandfather         bone   • No Known Problems Paternal Grandmother    • No Known Problems Paternal Grandfather    • Cancer Brother         stomach   • Breast cancer Maternal Aunt    • Colon cancer Neg Hx    • Ovarian cancer Neg Hx      Social History     Tobacco Use   • Smoking status: Never     Passive exposure: Never   • Smokeless tobacco: Never   Vaping Use   • Vaping status: Never Used   Substance and Sexual Activity   • Alcohol use: Never   • Drug use: Never   • Sexual activity: Yes     Partners: Male     Birth control/protection: Post-menopausal     Current Outpatient Medications on File Prior to Visit   Medication Sig   • [DISCONTINUED] Diclofenac Sodium (VOLTAREN) 1 % Apply 2 g topically 4 (four) times a day   • acetaminophen (TYLENOL) 500 mg tablet Take 1 tablet (500 mg total) by mouth every 6 (six) hours as needed for mild pain   • acetaminophen (TYLENOL) 650 mg CR tablet Take 1 tablet (650 mg total) by mouth every 8 (eight) hours as needed for mild pain   • ALPRAZolam (XANAX) 0.5 mg tablet Take 1 tablet (0.5 mg total) by mouth daily at bedtime as needed for anxiety   • Blood Glucose Monitoring Suppl (Contour Next Monitor) w/Device KIT Use 1 each daily Check blood sugar 2 times day   • dapagliflozin (Farxiga) 10 MG tablet Take 1 tablet (10 mg total) by mouth daily   • diclofenac (VOLTAREN)  75 mg EC tablet TAKE 1 TABLET BY MOUTH TWICE A DAY   • glucose blood (Contour Next Test) test strip Check blood sugar 2 times day   • glucose blood (FREESTYLE LITE) test strip Use once a day   • losartan (COZAAR) 50 mg tablet Take 1 tablet (50 mg total) by mouth daily   • Microlet Lancets MISC Use 2 (two) times a day   • Multiple Vitamin (multivitamin) capsule Take 1 capsule by mouth daily   • omeprazole (PriLOSEC) 40 MG capsule TAKE 1 CAPSULE BY MOUTH 2 TIMES A DAY BEFORE MEALS.   • vitamin E, tocopherol, 400 units capsule TAKE 1 CAPSULE (400 UNITS TOTAL) BY MOUTH 2 (TWO) TIMES A DAY   • [DISCONTINUED] albuterol (PROVENTIL HFA,VENTOLIN HFA) 90 mcg/act inhaler TAKE 2 PUFFS BY MOUTH EVERY 6 HOURS AS NEEDED FOR WHEEZE OR FOR SHORTNESS OF BREATH   • [DISCONTINUED] amoxicillin (AMOXIL) 500 mg capsule Take 500 mg by mouth see administration instructions   • [DISCONTINUED] clotrimazole (GYNE-LOTRIMIN) 1 % vaginal cream Insert 1 applicator into the vagina daily at bedtime   • [DISCONTINUED] folic acid (FOLVITE) 1 mg tablet TAKE 1 TABLET BY MOUTH EVERY DAY (Patient not taking: Reported on 7/5/2023)   • [DISCONTINUED] metFORMIN (GLUCOPHAGE) 1000 MG tablet Take 1 tablet (1,000 mg total) by mouth 2 (two) times a day with meals   • [DISCONTINUED] simvastatin (ZOCOR) 20 mg tablet TAKE 1 TABLET BY MOUTH DAILY AT BEDTIME   • [DISCONTINUED] triamcinolone (KENALOG) 0.025 % ointment Apply topically 2 (two) times a day     Allergies   Allergen Reactions   • Aspirin Shortness Of Breath and Itching     swelling   • Latex Itching and Rash     Immunization History   Administered Date(s) Administered   • COVID-19 PFIZER VACCINE 0.3 ML IM 04/29/2021, 05/21/2021, 12/06/2021   • COVID-19 Pfizer vac (Henrik-sucrose, gray cap) 12 yr+ IM 08/31/2022   • Influenza Quadrivalent Preservative Free 3 years and older IM 11/02/2015, 11/06/2017   • Influenza, injectable, quadrivalent, preservative free 0.5 mL 09/20/2023   • Influenza, recombinant,  "quadrivalent,injectable, preservative free 11/14/2018, 09/09/2019, 11/05/2020, 09/28/2021, 10/12/2022   • Influenza, seasonal, injectable 09/06/2016   • Pneumococcal Conjugate Vaccine 20-valent (Pcv20), Polysace 10/12/2022   • Pneumococcal Polysaccharide PPV23 02/14/2019   • Tdap 09/05/2021   • Zoster Vaccine Recombinant 09/20/2023, 03/20/2024     Objective     /83 (BP Location: Right arm, Patient Position: Sitting, Cuff Size: Standard)   Pulse 89   Temp 98.7 °F (37.1 °C) (Temporal)   Resp 18   Ht 5' 2\" (1.575 m)   Wt 78.5 kg (173 lb)   SpO2 97%   BMI 31.64 kg/m²     Physical Exam  Vitals and nursing note reviewed.   Constitutional:       General: She is not in acute distress.     Appearance: She is well-developed. She is obese.   HENT:      Head: Normocephalic and atraumatic.      Right Ear: External ear normal.      Left Ear: External ear normal.      Nose: Nose normal.      Mouth/Throat:      Mouth: Mucous membranes are moist.   Eyes:      Conjunctiva/sclera: Conjunctivae normal.   Cardiovascular:      Rate and Rhythm: Normal rate and regular rhythm.      Heart sounds: No murmur heard.  Pulmonary:      Effort: Pulmonary effort is normal. No respiratory distress.      Breath sounds: Normal breath sounds.   Abdominal:      Palpations: Abdomen is soft.      Tenderness: There is no abdominal tenderness.   Musculoskeletal:         General: No swelling.      Cervical back: Neck supple.      Right lower leg: No edema.      Left lower leg: No edema.   Skin:     General: Skin is warm and dry.      Capillary Refill: Capillary refill takes less than 2 seconds.      Comments: + hyperpigmented 3 mm oval nevi on the right superior nasal/ inner canthus area.   Neurological:      Mental Status: She is alert and oriented to person, place, and time.   Psychiatric:         Mood and Affect: Mood normal.         Behavior: Behavior normal.         "

## 2024-07-11 NOTE — ASSESSMENT & PLAN NOTE
Lab Results   Component Value Date    HGBA1C 8.5 (A) 01/09/2024     Not well controlled  Current medication: Metformin 1000 mg bid    Plan:  Need to recheck Hemoglobin A1c  Continue Metformin for now, but may need to add another oral med  Discussed carb control and regular exercise

## 2024-07-11 NOTE — ASSESSMENT & PLAN NOTE
BP Readings from Last 3 Encounters:   07/11/24 157/83   03/21/24 123/77   03/20/24 112/84     BP recheked: 138/72 mmHg  Current medication: Losartan 50 mg daily    Plan:  Continue losartan 50 mg daily  Low-salt diet  Regular exercise, lose weight

## 2024-07-25 ENCOUNTER — OFFICE VISIT (OUTPATIENT)
Dept: DENTISTRY | Facility: CLINIC | Age: 64
End: 2024-07-25

## 2024-07-25 VITALS — SYSTOLIC BLOOD PRESSURE: 144 MMHG | TEMPERATURE: 97.3 F | DIASTOLIC BLOOD PRESSURE: 85 MMHG | HEART RATE: 90 BPM

## 2024-07-25 DIAGNOSIS — Z01.20 ENCOUNTER FOR DENTAL EXAMINATION: Primary | ICD-10-CM

## 2024-07-25 PROCEDURE — D4910 PERIODONTAL MAINTENANCE: HCPCS | Performed by: DENTAL HYGIENIST

## 2024-07-25 NOTE — DENTAL PROCEDURE DETAILS
3 Month Periodontal Maintenance      Pt was late 15 min    REVIEWED MED HX: meds, allergies, health changes reviewed in EPIC  ---Pt took PRE-MED Amoxicillin 1 hr prior to  appt    CHIEF CONCERN:  none   PAIN SCALE: 0  ASA CLASS:  ASA 2 - Patient with mild systemic disease with no functional limitations  PLAQUE:  mild  CALCULUS:  Light  BLEEDING:  none  STAIN: None  PERIO:  mild bone loss and recession    Hygiene Procedures: Scaled, Polished, Flossed    Oral Hygiene Instruction: Brushing minimum 2x daily for 2 minutes, daily flossing, Listerine, and Recommended soft toothbrush only    Visual and Tactile Intraoral/ Extraoral evaluation: Oral and Oropharyngeal cancer evaluation. No findings     REFERRALS: None    EXAM: none    CARIES FINDINGS: none    NV1:  Ex, perio main - 50 min - 3 months from 7/ 25/24    Last BWX:   3/21/24  Last Panorex - 3/2/23  /FMX :  3/2/23

## 2024-08-02 ENCOUNTER — OFFICE VISIT (OUTPATIENT)
Dept: GASTROENTEROLOGY | Facility: CLINIC | Age: 64
End: 2024-08-02
Payer: COMMERCIAL

## 2024-08-02 VITALS
WEIGHT: 170.6 LBS | TEMPERATURE: 97.4 F | BODY MASS INDEX: 31.39 KG/M2 | SYSTOLIC BLOOD PRESSURE: 124 MMHG | HEIGHT: 62 IN | DIASTOLIC BLOOD PRESSURE: 70 MMHG

## 2024-08-02 DIAGNOSIS — R11.2 NAUSEA AND VOMITING, UNSPECIFIED VOMITING TYPE: Primary | ICD-10-CM

## 2024-08-02 DIAGNOSIS — K75.81 NASH (NONALCOHOLIC STEATOHEPATITIS): ICD-10-CM

## 2024-08-02 DIAGNOSIS — R19.7 ACUTE DIARRHEA: ICD-10-CM

## 2024-08-02 DIAGNOSIS — K21.9 CHRONIC GERD: ICD-10-CM

## 2024-08-02 PROCEDURE — G2211 COMPLEX E/M VISIT ADD ON: HCPCS | Performed by: INTERNAL MEDICINE

## 2024-08-02 PROCEDURE — 99214 OFFICE O/P EST MOD 30 MIN: CPT | Performed by: INTERNAL MEDICINE

## 2024-08-02 RX ORDER — ONDANSETRON 4 MG/1
4 TABLET, ORALLY DISINTEGRATING ORAL EVERY 8 HOURS SCHEDULED
Qty: 30 TABLET | Refills: 0 | Status: SHIPPED | OUTPATIENT
Start: 2024-08-02 | End: 2024-08-12

## 2024-08-02 NOTE — PROGRESS NOTES
[facility] - Outpatient Follow-up Note  Alessia Pena 64 y.o. female MRN: [id number]  Encounter: [id number]          ASSESSMENT AND PLAN:      1. Nausea and vomiting, unspecified vomiting type  2.  Acute diarrhea  3.  PLEITEZ  4.  Chronic GERD    Patient has acute GI symptoms.  May have viral or bacterial gastroenteritis.  Told patient to go trial without cholestyramine and if loose stools recur then get stool infectious workup.  Will give her Zofran ODT which she can take 30 minutes prior to taking meals to help with nausea and vomiting.  Continue with lifestyle modification to lose weight.  Continue vitamin  E.  Her liver tests have returned to normal which indicates decreasing inflammation in the liver.  Repeat LFTs in 6 months.  Her heartburn is under control.  Continue omeprazole 40 mg twice daily.  Will discuss about hiatal hernia repair at next office visit.    RTC 6 months.    Ross Gonsalez MD  Gastroenterology  [facility]  Date: August 2, 2024    - Stool culture; Future  - Clostridium difficile Toxin/GDH W/Refl To PCR; Future  - Giardia lamblia, EIA and Ova and Parasites Examination; Future  - ondansetron (Zofran ODT) 4 mg disintegrating tablet; Take 1 tablet (4 mg total) by mouth every 8 (eight) hours for 10 days  Dispense: 30 tablet; Refill: 0    ______________________________________________________________________    SUBJECTIVE: 64-year-old with biopsy-proven PLEITEZ, esophagitis, hiatal hernia, uncontrolled diabetes, metabolic syndrome, status post cholecystectomy presents for follow-up.    Patient had EGD in October 2023 which showed irregular Z-line along with hiatal hernia and omeprazole was increased to 40 mg twice a day.  Colonoscopy in 2022 where internal hemorrhoids and diverticulosis was seen and repeat was recommended in 10 years.  She was diagnosed with biopsy-proven PLEITEZ and started on vitamin E afterwards.    Patient reports that she  "has had nausea and vomiting started few days ago along with loose stools.  She has been taking cholestyramine which has helped improve bowel movements.  No fevers.  She has had weight loss of 5 pounds in the last 1 year with lifestyle changes.  No sick contacts.    Labs done last month were reviewed by me today and showed A1c at 8.1, high LDL levels, normal liver tests while labs done in March 2024 were reviewed by me today and showed normal blood counts.      REVIEW OF SYSTEMS IS OTHERWISE NEGATIVE.      Historical Information   Past Medical History:   Diagnosis Date    Allergic rhinitis 03/31/2015    Anxiety     Arthritis     Asthma     \"one time with a cold, and used an inhaler\"      Chronic pain disorder     Diabetes mellitus (HCC)     Enlarged liver     GERD (gastroesophageal reflux disease)     Glenohumeral arthritis 09/24/2014    Hyperlipidemia     Hypertension     Status post total hip replacement, left 06/20/2022     Past Surgical History:   Procedure Laterality Date    CHOLECYSTECTOMY      FOOT SURGERY Right     HERNIA REPAIR      IR BIOPSY LIVER RANDOM  11/7/2023    OVARY SURGERY      DC ARTHRP ACETBLR/PROX FEM PROSTC AGRFT/ALGRFT Left 06/20/2022    Procedure: ARTHROPLASTY HIP TOTAL ANTERIOR;  Surgeon: Stephany Thurston MD;  Location:  Main OR;  Service: Orthopedics    DC COLONOSCOPY FLX DX W/COLLJ SPEC WHEN PFRMD N/A 12/22/2016    Procedure: EGD AND COLONOSCOPY;  Surgeon: Travon Olmstead MD;  Location: [facility] GI LAB;  Service: Gastroenterology    DC CORRJ HLX VLGS BNCTY SESMDC W/DOUBLE OSTEOTOMY Right 03/01/2019    Procedure: DOUBLE OSTEOTOMY, ZINA/AKIN BUNIONECTOMY;  Surgeon: Hardy Holloway DPM;  Location: [facility] OR;  Service: Podiatry     Social History   Social History     Substance and Sexual Activity   Alcohol Use Never     Social History     Substance and Sexual Activity   Drug Use Never     Social History     Tobacco Use   Smoking Status Never    Passive exposure: Never   Smokeless " "Tobacco Never     Family History   Problem Relation Age of Onset    Hypertension Mother     Hypertension Father     Colon cancer Sister     Cancer Brother         stomach    No Known Problems Maternal Grandmother     Cancer Maternal Grandfather         bone    No Known Problems Paternal Grandmother     No Known Problems Paternal Grandfather     Breast cancer Maternal Aunt     No Known Problems Daughter     Ovarian cancer Neg Hx        Meds/Allergies       Current Outpatient Medications:     acetaminophen (TYLENOL) 500 mg tablet    acetaminophen (TYLENOL) 650 mg CR tablet    albuterol (PROVENTIL HFA,VENTOLIN HFA) 90 mcg/act inhaler    ALPRAZolam (XANAX) 0.5 mg tablet    Blood Glucose Monitoring Suppl (Contour Next Monitor) w/Device KIT    dapagliflozin (Farxiga) 10 MG tablet    diclofenac (VOLTAREN) 75 mg EC tablet    Diclofenac Sodium (VOLTAREN) 1 %    glucose blood (Contour Next Test) test strip    glucose blood (FREESTYLE LITE) test strip    losartan (COZAAR) 50 mg tablet    metFORMIN (GLUCOPHAGE) 1000 MG tablet    Microlet Lancets MISC    Multiple Vitamin (multivitamin) capsule    omeprazole (PriLOSEC) 40 MG capsule    ondansetron (Zofran ODT) 4 mg disintegrating tablet    simvastatin (ZOCOR) 20 mg tablet    triamcinolone (KENALOG) 0.025 % ointment    vitamin E, tocopherol, 400 units capsule    Allergies   Allergen Reactions    Aspirin Shortness Of Breath and Itching     swelling    Latex Itching and Rash           Objective     Blood pressure 124/70, temperature (!) 97.4 °F (36.3 °C), height 5' 2\" (1.575 m), weight 77.4 kg (170 lb 9.6 oz), not currently breastfeeding. Body mass index is 31.2 kg/m².      PHYSICAL EXAM:      General Appearance:   Alert, cooperative, no distress   HEENT:   Normocephalic, atraumatic, anicteric.     Neck:  Supple, symmetrical, trachea midline   Lungs:   Clear to auscultation bilaterally; no rales, rhonchi or wheezing; respirations unlabored    Heart::   Regular rate and rhythm; no " murmur, rub, or gallop.   Abdomen:   Soft, non-tender, non-distended; normal bowel sounds; no masses, no organomegaly    Genitalia:   Deferred    Rectal:   Deferred    Extremities:  No cyanosis, clubbing or edema    Pulses:  2+ and symmetric    Skin:  No jaundice, rashes, or lesions    Lymph nodes:  No palpable cervical lymphadenopathy        Lab Results:   No visits with results within 1 Day(s) from this visit.   Latest known visit with results is:   Appointment on 07/11/2024   Component Date Value    Hemoglobin A1C 07/11/2024 8.1 (H)     EAG 07/11/2024 186          Radiology Results:   No results found.

## 2024-08-08 ENCOUNTER — ANNUAL EXAM (OUTPATIENT)
Dept: OBGYN CLINIC | Facility: CLINIC | Age: 64
End: 2024-08-08

## 2024-08-08 ENCOUNTER — APPOINTMENT (OUTPATIENT)
Dept: LAB | Facility: CLINIC | Age: 64
End: 2024-08-08

## 2024-08-08 VITALS
HEART RATE: 85 BPM | WEIGHT: 171.8 LBS | BODY MASS INDEX: 31.42 KG/M2 | SYSTOLIC BLOOD PRESSURE: 128 MMHG | DIASTOLIC BLOOD PRESSURE: 78 MMHG

## 2024-08-08 DIAGNOSIS — Z12.4 SCREENING FOR CERVICAL CANCER: ICD-10-CM

## 2024-08-08 DIAGNOSIS — Z12.39 ENCOUNTER FOR BREAST CANCER SCREENING USING NON-MAMMOGRAM MODALITY: ICD-10-CM

## 2024-08-08 DIAGNOSIS — Z12.31 ENCOUNTER FOR SCREENING MAMMOGRAM FOR MALIGNANT NEOPLASM OF BREAST: ICD-10-CM

## 2024-08-08 DIAGNOSIS — K75.81 NASH (NONALCOHOLIC STEATOHEPATITIS): ICD-10-CM

## 2024-08-08 DIAGNOSIS — Z01.419 ENCOUNTER FOR GYNECOLOGICAL EXAMINATION WITHOUT ABNORMAL FINDING: Primary | ICD-10-CM

## 2024-08-08 PROCEDURE — 99396 PREV VISIT EST AGE 40-64: CPT | Performed by: NURSE PRACTITIONER

## 2024-08-08 NOTE — PATIENT INSTRUCTIONS
Ludwin por garcia confianza en nuestro equipo.   Le agradecemos y agradecemos jeff comentarios.   Si recibe patti encuesta nuestra, tómese unos momentos para informarnos cómo estamos.   Sinceramente,  SHAKEEL MenonNP       OBESIDAD     Obesidad es cuando garcia índice de masa corporal (IMC) es superior a 30. Garcia Body mass index is 31.42 kg/m²..    Los riesgos de la obesidad incluyen  muchos problemas de taylor, incluidas las lesiones y la discapacidad física. Es posible que deba realizarse exámenes para detectar lo siguiente:  Diabetes     Hipertensión o colesterol altoEnfermedades del corazón     Enfermedad cardíaca     Enfermedades del hígado o de la vesícula biliar     Cáncer de colon, de pecho, de próstata, de hígado o de riñón     El apnea del sueño     Artritis o gota    Busque atención médica de inmediato si:   Usted tiene un intenso dolor de jack, confusión o dificultad para hablar.     Usted tiene debilidad en un lado del cuerpo.     Usted tiene dolor en el pecho, sudoración o falta de aire.    Pregúntele a garcia médico qué vitaminas y minerales son adecuados para usted.   Usted tiene síntomas de enfermedad de la vesícula biliar o el hígado, nia dolor en la parte superior del abdomen.    Usted tiene dolor e incomodidad de rodillas o caderas al caminar.     Usted presenta síntomas de diabetes, nia exceso de apetito y sed y micción frecuente.     Usted presenta síntomas de apnea de sueño, nia roncar o tener sueño delbert el día.     Usted tiene preguntas o inquietudes acerca de garcia condición o cuidado.    El tratamiento para la obesidad  se enfoca en ayudarle a bajar de peso para mejorar garcia taylor. Incluso patti reducción mínima del índice de masa corporal puede reducir el riesgo de muchos problemas de taylor. Garcia médico lo ayudará a fijarse patti meta para bajar de peso.  Cambios en el estilo de paige  son los primeros pasos para tratar la obesidad. Estos cambios incluyen celina decisiones para consumir alimentos  saludables y realizar patti actividad física con regularidad. El médico puede recomendarle un programa para bajar de peso que consta de capacitación, educación y terapia.     Medicamento  le pueden ayudar a bajar de peso cuando los utiliza en conjunto con patti dieta y actividad física.     Cirugía  le puede ayudar a bajar de peso si usted es muy hussein y presenta otros problemas de taylor. Existen varios tipos de cirugía para adelgazar. Pídale a garcia médico más información.    Cómo perder peso de forma exitosa:   Propóngase metas accesibles y realistas.  Un ejemplo de patti meta accesible es caminar 20 minutos los 5 días de la semana. Otro objetivo puede ser perder 5% de garcia peso corporal.    Coméntele a jeff amigos, familiares y compañeros de trabajo sobre jeff metas  y solicite que lo apoyen. Convide a un amigo para hacer ejercicio juntos o acuda a un deisy de motivación para bajar de peso.    Identifique los alimentos o situaciones que le pueden provocar que coma en exceso y busque formas para evitarlos. Deshágase de alimentos altos en calorías en garcia hogar o en el trabajo. En la cocina tenga patti canasta con frutas frescas. Si el estrés es la causa para que usted coma más encuentre formas para sobrellevar el estrés.     Lleve un diario en el que registre lo que usted come y shital.  También escriba la cantidad de tiempo que pasa realizando patti actividad física delbert el día. Pésese patti vez a la semana y anótelo en garcia diario.    Cambios en la alimentación:  Usted necesitará consumir menos de 500 a 1.000 calorías al día de las que usted actualmente consume para perder entre 1 a 2 libras a la semana. Los siguientes cambios le ayudarán a disminuir la cantidad de las calorías que normalmente consume:  Reduzca el tamaño de las porciones.  Utilice platos pequeños que no midan más de 9 pulgadas de diámetro. Llene la mitad del plato con frutas y verduras. Utilice las tazas medidoras para racionar los alimentos hasta que usted sepa nia  se ve el tamaño de patti porción.     Consuma 3 comidas y 1 o 2 meriendas al día.  Planee jeff comidas con anterioridad. Cocine y coma en la casa todo el tiempo que le sea posible. Coma lentamente.     Consuma frutas y verduras con todas las comidas.  Las frutas y verduras son bajas en calorías y altas en fibra lo cual lo llena. No adicione mantequilla, ni margarina, ni salsas a base de crema a las verduras. Utilice las hierbas para sazonar las verduras al vapor.     Consuma menos grasas y alimentos fritos.  Consuma yosi o pescado al horno o la haylie. Estas proteínas son más bajas en calorías y grasas que la carne magra. Limite las comidas rápidas. Es mejor que utilice aderezos para la ensalada a base de aceite de nolasco y vinagre en vez de aderezos en botella.     Limite la cantidad de azúcar que consume.  No consuma bebidas azucaradas. Limite el consumo de alcohol.    Cambios de actividad:  La actividad física es buena para garcia cuerpo por muchas razones. Le ayuda a quemar calorías y fortalecer jeff músculos. Disminuye el estrés y la depresión y mejora garcia estado de ánimo. Además puede ayudarle a dormir mejor. Consulte con garcia médico antes de empezar un régimen de ejercicios.  Realice patti actividad física por lo menos por 30 minutos 5 días a la semana.  Empiece despacio. Aparte tiempo cada día para patti actividad física que usted disfrute y que le sea conveniente. Es mejor realizar tanto un programa de pesas nia patti actividad para aumentar garcia ritmo cardíaco, nia caminar, montar en bicicleta o nadar.     Busque formas para ser más activo.  Realice patti actividad de jardinería y limpiar la casa. Suba las escaleras en vez de utilizar el elevador. En garcia tiempo lilian concurra a eventos que le exijan caminar, nia festivales y ferias al aire lilian. Adicionar esta actividad física puede ayudarle a bajar y mantener el peso.    Acuda a jeff consultas de control con garcia médico según le indicaron.  Puede que necesite consultar con  un dietista. Anote jeff preguntas para que se acuerde de hacerlas delbert jeff visitas.

## 2024-08-08 NOTE — PROGRESS NOTES
ANNUAL GYNECOLOGICAL EXAMINATION    Alessia Pena is a 64 y.o. female who presents today for annual GYN exam.  Her last pap smear was performed 2023 and result was NILM with negative HPV.  She reports no history of abnormal pap smears in her past.  Her last mammogram was performed 4/10/2024 and result was WNL.  She had colon cancer screening performed 2022 via colonoscopy and repeat was recommended in 10 years.  She had HIV screening performed 2020 and it was negative.  She reports menses as absent since .  Her general medical history has been reviewed and she reports it as follows:    Past Medical History:   Diagnosis Date    Anxiety     Arthritis     Asthma     Chronic pain disorder     Diabetes mellitus (HCC)     Enlarged liver     GERD (gastroesophageal reflux disease)     Hyperlipidemia     Hypertension      Past Surgical History:   Procedure Laterality Date    CHOLECYSTECTOMY      FOOT SURGERY Right     HERNIA REPAIR      IR BIOPSY LIVER RANDOM  2023    OVARY SURGERY      OR ARTHRP ACETBLR/PROX FEM PROSTC AGRFT/ALGRFT Left 2022    Procedure: ARTHROPLASTY HIP TOTAL ANTERIOR;  Surgeon: Stephany Thurston MD;  [facility];  Service: Orthopedics    OR COLONOSCOPY FLX DX W/COLLJ SPEC WHEN PFRMD N/A 2016    Procedure: EGD AND COLONOSCOPY;  Surgeon: Travon Olmstead MD;  Location: [facility];  Service: Gastroenterology    OR CORRJ HLX VLGS BNCTY SESMDC W/DOUBLE OSTEOTOMY Right 2019    Procedure: DOUBLE OSTEOTOMY, ZINA/AKIN BUNIONECTOMY;  Surgeon: Hardy Holloway DPM;  Location: [facility];  Service: Podiatry     OB History          2    Para   2    Term   2       0    AB   0    Living   2         SAB   0    IAB   0    Ectopic   0    Multiple   0    Live Births   2               Social History     Tobacco Use    Smoking status: Never     Passive exposure: Never    Smokeless tobacco: Never   Vaping Use    Vaping status: Never Used    Substance Use Topics    Alcohol use: Never    Drug use: Never     Social History     Substance and Sexual Activity   Sexual Activity Yes    Partners: Male    Birth control/protection: Post-menopausal     Cancer-related family history includes Breast cancer in her maternal aunt; Cancer in her brother and maternal grandfather; Colon cancer in her sister. There is no history of Ovarian cancer.    Current Outpatient Medications   Medication Instructions    acetaminophen (TYLENOL) 650 mg, Oral, Every 8 hours PRN    albuterol (PROVENTIL HFA,VENTOLIN HFA) 90 mcg/act inhaler 2 puffs, Inhalation, Every 4 hours PRN    ALPRAZolam (XANAX) 0.5 mg, Oral, Daily at bedtime PRN    Blood Glucose Monitoring Suppl (Contour Next Monitor) w/Device KIT 1 each, Does not apply, Daily, Check blood sugar 2 times day    dapagliflozin (FARXIGA) 10 mg, Oral, Daily    diclofenac (VOLTAREN) 75 mg, Oral, 2 times daily    Diclofenac Sodium (VOLTAREN) 2 g, Topical, 4 times daily    glucose blood (Contour Next Test) test strip Check blood sugar 2 times day    glucose blood (FREESTYLE LITE) test strip Use once a day    losartan (COZAAR) 50 mg, Oral, Daily    metFORMIN (GLUCOPHAGE) 1,000 mg, Oral, 2 times daily with meals    Microlet Lancets MISC Does not apply, 2 times daily    Multiple Vitamin (multivitamin) capsule 1 capsule, Oral, Daily    omeprazole (PriLOSEC) 40 MG capsule TAKE 1 CAPSULE BY MOUTH 2 TIMES A DAY BEFORE MEALS.    ondansetron (ZOFRAN ODT) 4 mg, Oral, Every 8 hours scheduled    simvastatin (ZOCOR) 20 mg, Oral, Daily at bedtime    triamcinolone (KENALOG) 0.025 % ointment Topical, 2 times daily    vitamin E (tocopherol) 400 Units, Oral, 2 times daily       Review of Systems:  Review of Systems   Constitutional: Negative.    Gastrointestinal: Negative.    Genitourinary:  Negative for difficulty urinating, pelvic pain, vaginal bleeding and vaginal discharge.   Skin: Negative.        Physical Exam:  /78 (BP Location: Right arm,  Patient Position: Sitting, Cuff Size: Standard)   Pulse 85   Wt 77.9 kg (171 lb 12.8 oz)   BMI 31.42 kg/m²   Physical Exam  Constitutional:       General: She is not in acute distress.     Appearance: She is well-developed.   Genitourinary:      Vulva normal.      No lesions in the vagina.      Anterior vaginal prolapse present.     Moderate vaginal atrophy present.       Right Adnexa: not tender and no mass present.     Left Adnexa: not tender and no mass present.     No cervical motion tenderness or lesion.      Uterus is not tender.   Breasts:     Right: No mass, nipple discharge, skin change or tenderness.      Left: No mass, nipple discharge, skin change or tenderness.   Neck:      Thyroid: No thyromegaly.   Cardiovascular:      Rate and Rhythm: Normal rate and regular rhythm.   Pulmonary:      Effort: Pulmonary effort is normal.   Abdominal:      Palpations: Abdomen is soft.      Tenderness: There is no abdominal tenderness.   Musculoskeletal:      Cervical back: Neck supple.   Neurological:      Mental Status: She is alert and oriented to person, place, and time.   Skin:     General: Skin is warm and dry.   Vitals reviewed.       Assessment/Plan:   1. Normal well-woman GYN exam.  2. Cervical cancer screening:  Normal cervical exam.  Pap smear not indicated at this time.  Has not received HPV vaccine in the past and she is beyond the age of recommended administration.   3. STD screening:  Patient declines.   4. Breast cancer screening:  Normal breast exam.  Order placed for bilateral screening mammogram.  Reviewed breast self-awareness.   5. Colon cancer screening:  Up to date.   6. Depression Screening: Patient's depression screening was assessed with a PHQ-2 score of 0. Their PHQ-9 score was 0. Clinically patient does not have depression. No treatment is required.   7. BMI Counseling: Body mass index is 31.42 kg/m². Discussed the patient's BMI with her. The BMI is above normal. Nutrition recommendations  include reducing portion sizes and decreasing overall calorie intake.   8. Return to office in 1 year for annual GYN exam.

## 2024-09-17 ENCOUNTER — OFFICE VISIT (OUTPATIENT)
Dept: OBGYN CLINIC | Facility: CLINIC | Age: 64
End: 2024-09-17
Payer: COMMERCIAL

## 2024-09-17 ENCOUNTER — HOSPITAL ENCOUNTER (OUTPATIENT)
Dept: RADIOLOGY | Facility: HOSPITAL | Age: 64
Discharge: HOME | End: 2024-09-17
Attending: ORTHOPAEDIC SURGERY
Payer: COMMERCIAL

## 2024-09-17 VITALS — HEIGHT: 62 IN | WEIGHT: 171 LBS | BODY MASS INDEX: 31.47 KG/M2

## 2024-09-17 DIAGNOSIS — Z96.642 S/P TOTAL LEFT HIP ARTHROPLASTY: Primary | ICD-10-CM

## 2024-09-17 DIAGNOSIS — Z96.642 S/P TOTAL LEFT HIP ARTHROPLASTY: ICD-10-CM

## 2024-09-17 PROCEDURE — 99213 OFFICE O/P EST LOW 20 MIN: CPT | Performed by: ORTHOPAEDIC SURGERY

## 2024-09-17 PROCEDURE — 73502 X-RAY EXAM HIP UNI 2-3 VIEWS: CPT

## 2024-09-17 NOTE — PROGRESS NOTES
Orthopedics          Alessia Pena 64 y.o. female MRN: [id number]      Chief Complaint:   Status post greater than 2 years left total hip arthroplasty    HPI:   64 y.o.female status post greater than 2 years left total hip arthroplasty.  Patient states she doing very well she denies any issues regarding left hip she is not having problems she does have some numbness on the lateral aspect of her left thigh which is improving she denies any issues or problems.  Patient is very happy regarding her left total hip arthroplasty.                Review Of Systems:   Skin: Normal  Neuro: See HPI  Musculoskeletal: See HPI  All other systems reviewed and are negative    Past Medical History:   Past Medical History:   Diagnosis Date    Anxiety     Arthritis     Asthma     Chronic pain disorder     Diabetes mellitus (HCC)     Enlarged liver     GERD (gastroesophageal reflux disease)     Hyperlipidemia     Hypertension        Past Surgical History:   Past Surgical History:   Procedure Laterality Date    CHOLECYSTECTOMY      FOOT SURGERY Right     HERNIA REPAIR      IR BIOPSY LIVER RANDOM  11/7/2023    OVARY SURGERY      [place] ARTHRP ACETBLR/PROX FEM PROSTC AGRFT/ALGRFT Left 06/20/2022    Procedure: ARTHROPLASTY HIP TOTAL ANTERIOR;  Surgeon: Stephany Thurston MD;  [facility];  Service: Orthopedics    NV COLONOSCOPY FLX DX W/COLLJ SPEC WHEN PFRMD N/A 12/22/2016    Procedure: EGD AND COLONOSCOPY;  Surgeon: Travon Olmstead MD;  Location: [facility] LAB;  Service: Gastroenterology    NV CORRJ HLX VLGS BNCTY SESMDC W/DOUBLE OSTEOTOMY Right 03/01/2019    Procedure: DOUBLE OSTEOTOMY, ZINA/AKIN BUNIONECTOMY;  Surgeon: Hardy Holloway DPM;  [facility];  Service: Podiatry       Family History:  Family history reviewed and non-contributory  Family History   Problem Relation Age of Onset    Hypertension Mother     Hypertension Father     Colon cancer Sister     Cancer Brother         stomach    No Known Problems  Maternal Grandmother     Cancer Maternal Grandfather         bone    No Known Problems Paternal Grandmother     No Known Problems Paternal Grandfather     Breast cancer Maternal Aunt     No Known Problems Daughter     Ovarian cancer Neg Hx          Social History:  Social History     Socioeconomic History    Marital status: /Civil Union     Spouse name: None    Number of children: 2    Years of education: None    Highest education level: None   Occupational History    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   Tobacco Use    Smoking status: Never     Passive exposure: Never    Smokeless tobacco: Never   Vaping Use    Vaping status: Never Used   Substance and Sexual Activity    Alcohol use: Never    Drug use: Never    Sexual activity: Yes     Partners: Male     Birth control/protection: Post-menopausal   Other Topics Concern    None   Social History Narrative    Resides with her son Thong    Occupation: on disability due to hip pain    Hobbies/Activities: go to [employer], walking, shoppping     Social Determinants of Health     Financial Resource Strain: Low Risk  (8/8/2024)    Overall Financial Resource Strain (CARDIA)     Difficulty of Paying Living Expenses: Not hard at all   Food Insecurity: No Food Insecurity (8/8/2024)    Hunger Vital Sign     Worried About Running Out of Food in the Last Year: Never true     Ran Out of Food in the Last Year: Never true   Transportation Needs: No Transportation Needs (8/8/2024)    PRAPARE - Transportation     Lack of Transportation (Medical): No     Lack of Transportation (Non-Medical): No   Physical Activity: Inactive (12/3/2019)    Exercise Vital Sign     Days of Exercise per Week: 0 days     Minutes of Exercise per Session: 0 min   Stress: No Stress Concern Present (7/22/2021)    [employer] [place] [employer] - Occupational Stress Questionnaire     Feeling of Stress : Only a little   Social Connections: Unknown (12/3/2019)    Social Connection and  Isolation Panel [NHANES]     Frequency of Communication with Friends and Family: Patient declined     Frequency of Social Gatherings with Friends and Family: Patient declined     Attends Synagogue Services: Patient declined     Active Member of Clubs or Organizations: Patient declined     Attends Club or Organization Meetings: Patient declined     Marital Status: Patient declined   Intimate Partner Violence: Unknown (12/3/2019)    Humiliation, Afraid, Rape, and Kick questionnaire     Fear of Current or Ex-Partner: Patient declined     Emotionally Abused: Patient declined     Physically Abused: Patient declined     Sexually Abused: Patient declined   Housing Stability: Low Risk  (8/8/2024)    Housing Stability Vital Sign     Unable to Pay for Housing in the Last Year: No     Number of Times Moved in the Last Year: 0     Homeless in the Last Year: No       Allergies:   Allergies   Allergen Reactions    Aspirin Shortness Of Breath and Itching     swelling    Latex Itching and Rash       Labs:  0   Lab Value Date/Time    HCT 43.1 03/18/2024 0918    HCT 40.7 11/07/2023 0836    HCT 42.7 07/17/2023 1029    HCT 37.8 09/12/2016 1155    HGB 13.5 03/18/2024 0918    HGB 13.0 11/07/2023 0836    HGB 13.1 07/17/2023 1029    HGB 12.4 09/12/2016 1155    INR 0.85 11/07/2023 0836    WBC 5.42 03/18/2024 0918    WBC 4.38 11/07/2023 0836    WBC 5.02 07/17/2023 1029    WBC 9.5 09/12/2016 1155       Meds:    Current Outpatient Medications:     acetaminophen (TYLENOL) 650 mg CR tablet, Take 1 tablet (650 mg total) by mouth every 8 (eight) hours as needed for mild pain, Disp: 30 tablet, Rfl: 0    albuterol (PROVENTIL HFA,VENTOLIN HFA) 90 mcg/act inhaler, Inhale 2 puffs every 4 (four) hours as needed for shortness of breath, Disp: 18 g, Rfl: 1    ALPRAZolam (XANAX) 0.5 mg tablet, Take 1 tablet (0.5 mg total) by mouth daily at bedtime as needed for anxiety, Disp: 15 tablet, Rfl: 0    Blood Glucose Monitoring Suppl (Contour Next Monitor)  w/Device KIT, Use 1 each daily Check blood sugar 2 times day, Disp: 1 kit, Rfl: 0    dapagliflozin (Farxiga) 10 MG tablet, Take 1 tablet (10 mg total) by mouth daily, Disp: 90 tablet, Rfl: 3    diclofenac (VOLTAREN) 75 mg EC tablet, TAKE 1 TABLET BY MOUTH TWICE A DAY, Disp: 60 tablet, Rfl: 2    Diclofenac Sodium (VOLTAREN) 1 %, Apply 2 g topically 4 (four) times a day, Disp: 100 g, Rfl: 5    glucose blood (Contour Next Test) test strip, Check blood sugar 2 times day, Disp: 100 each, Rfl: 5    glucose blood (FREESTYLE LITE) test strip, Use once a day, Disp: 100 each, Rfl: 5    losartan (COZAAR) 50 mg tablet, Take 1 tablet (50 mg total) by mouth daily, Disp: 90 tablet, Rfl: 1    metFORMIN (GLUCOPHAGE) 1000 MG tablet, Take 1 tablet (1,000 mg total) by mouth 2 (two) times a day with meals, Disp: 180 tablet, Rfl: 3    Microlet Lancets MISC, Use 2 (two) times a day, Disp: 100 each, Rfl: 5    Multiple Vitamin (multivitamin) capsule, Take 1 capsule by mouth daily, Disp: , Rfl:     omeprazole (PriLOSEC) 40 MG capsule, TAKE 1 CAPSULE BY MOUTH 2 TIMES A DAY BEFORE MEALS., Disp: 180 capsule, Rfl: 1    ondansetron (Zofran ODT) 4 mg disintegrating tablet, Take 1 tablet (4 mg total) by mouth every 8 (eight) hours for 10 days, Disp: 30 tablet, Rfl: 0    simvastatin (ZOCOR) 20 mg tablet, Take 1 tablet (20 mg total) by mouth daily at bedtime, Disp: 90 tablet, Rfl: 1    triamcinolone (KENALOG) 0.025 % ointment, Apply topically 2 (two) times a day, Disp: 30 g, Rfl: 1    vitamin E, tocopherol, 400 units capsule, TAKE 1 CAPSULE (400 UNITS TOTAL) BY MOUTH 2 (TWO) TIMES A DAY, Disp: 60 capsule, Rfl: 5    Body mass index is 31.28 kg/m².  Wt Readings from Last 3 Encounters:   09/17/24 77.6 kg (171 lb)   08/08/24 77.9 kg (171 lb 12.8 oz)   08/02/24 77.4 kg (170 lb 9.6 oz)     Physical Exam:   Vitals:       General Appearance:    Alert, cooperative, no distress, appears stated age   Head:    Normocephalic, without obvious abnormality,  atraumatic   Eyes:    conjunctiva/corneas clear, both eyes        Nose:   Nares normal, septum midline, no drainage    Throat:   Lips normal; teeth and gums normal   Neck:    symmetrical, trachea midline, ;     thyroid:  no enlargement/   Back:     Symmetric, no curvature, ROM normal   Lungs:   No audible wheezing or labored breathing   Chest Wall:    No tenderness or deformity    Heart:    Regular rate and rhythm               Pulses:   2+ and symmetric all extremities   Skin:   Skin color, texture, turgor normal, no rashes or lesions   Neurologic:   normal strength, sensation and reflexes     throughout       Musculoskeletal: left lower extremity  Examination patient's left hip is flexed with send flexion 90 degrees no pain with internal/external rotation hip flexion adduction abduction strength 5 out of 5 distal pulses present.  Mild acute decrease sensation over the proximal aspect of the lower from continued anterior distribution otherwise sensation intact    Radiology:   I personally reviewed the films.  X-rays left hip reveals excellent aligned, well located left hip arthritis without loosening or osseous abnormality    _*_*_*_*_*_*_*_*_*_*_*_*_*_*_*_*_*_*_*_*_*_*_*_*_*_*_*_*_*_*_*_*_*_*_*_*_*_*_*_*_*    Assessment:  64 y.o.female status post 2 years left total hip arthroplasty doing well    Plan:   Weight bearing as tolerated  left lower extremity  Lifetime dental block prophylaxis recommended  Activities as tolerated  Advised patient continue home range of motion stretching strengthening exercises  Follow up on an as-needed basis      Sam Dorado PA-C

## 2024-10-02 ENCOUNTER — TELEPHONE (OUTPATIENT)
Dept: GASTROENTEROLOGY | Facility: CLINIC | Age: 64
End: 2024-10-02

## 2024-10-02 NOTE — TELEPHONE ENCOUNTER
I called the patient via  to reschedule her office appointment on 2/3/25 with Dr. Gonsalez since he is no longer available

## 2024-10-30 ENCOUNTER — OFFICE VISIT (OUTPATIENT)
Dept: FAMILY MEDICINE CLINIC | Facility: CLINIC | Age: 64
End: 2024-10-30

## 2024-10-30 VITALS
BODY MASS INDEX: 32.39 KG/M2 | RESPIRATION RATE: 17 BRPM | TEMPERATURE: 97.9 F | OXYGEN SATURATION: 97 % | HEIGHT: 62 IN | DIASTOLIC BLOOD PRESSURE: 82 MMHG | HEART RATE: 86 BPM | WEIGHT: 176 LBS | SYSTOLIC BLOOD PRESSURE: 126 MMHG

## 2024-10-30 DIAGNOSIS — Z78.0 ASYMPTOMATIC POSTMENOPAUSAL STATE: ICD-10-CM

## 2024-10-30 DIAGNOSIS — Z23 ENCOUNTER FOR IMMUNIZATION: ICD-10-CM

## 2024-10-30 DIAGNOSIS — E66.811 CLASS 1 OBESITY DUE TO EXCESS CALORIES WITHOUT SERIOUS COMORBIDITY WITH BODY MASS INDEX (BMI) OF 32.0 TO 32.9 IN ADULT: ICD-10-CM

## 2024-10-30 DIAGNOSIS — Z00.00 MEDICARE ANNUAL WELLNESS VISIT, SUBSEQUENT: Primary | ICD-10-CM

## 2024-10-30 DIAGNOSIS — E11.9 TYPE 2 DIABETES MELLITUS WITHOUT COMPLICATION, WITHOUT LONG-TERM CURRENT USE OF INSULIN (HCC): ICD-10-CM

## 2024-10-30 DIAGNOSIS — F40.243 ANXIETY WITH FLYING: ICD-10-CM

## 2024-10-30 DIAGNOSIS — E66.09 CLASS 1 OBESITY DUE TO EXCESS CALORIES WITHOUT SERIOUS COMORBIDITY WITH BODY MASS INDEX (BMI) OF 32.0 TO 32.9 IN ADULT: ICD-10-CM

## 2024-10-30 LAB — SL AMB POCT HEMOGLOBIN AIC: 7.5 (ref ?–6.5)

## 2024-10-30 PROCEDURE — 99214 OFFICE O/P EST MOD 30 MIN: CPT | Performed by: FAMILY MEDICINE

## 2024-10-30 PROCEDURE — G0439 PPPS, SUBSEQ VISIT: HCPCS | Performed by: FAMILY MEDICINE

## 2024-10-30 PROCEDURE — 90673 RIV3 VACCINE NO PRESERV IM: CPT | Performed by: FAMILY MEDICINE

## 2024-10-30 PROCEDURE — G0008 ADMIN INFLUENZA VIRUS VAC: HCPCS | Performed by: FAMILY MEDICINE

## 2024-10-30 PROCEDURE — 3051F HG A1C>EQUAL 7.0%<8.0%: CPT | Performed by: ORTHOPAEDIC SURGERY

## 2024-10-30 PROCEDURE — 83036 HEMOGLOBIN GLYCOSYLATED A1C: CPT | Performed by: FAMILY MEDICINE

## 2024-10-30 RX ORDER — ALPRAZOLAM 0.5 MG
0.5 TABLET ORAL
Qty: 10 TABLET | Refills: 0 | Status: SHIPPED | OUTPATIENT
Start: 2024-10-30

## 2024-10-30 NOTE — PROGRESS NOTES
Ambulatory Visit  Name: Alessia Pena      : 1960      MRN: [id number]  Encounter Provider: Julissa Marie MD  Encounter Date: 10/30/2024   Encounter department: [facility]    Assessment & Plan  Medicare annual wellness visit, subsequent  Immunizations and preventive screenings discussed  Mammogram completed 2024  DEXA scan referral placed       Type 2 diabetes mellitus without complication, without long-term current use of insulin (HCC)    Lab Results   Component Value Date    HGBA1C 7.5 (A) 10/30/2024     Improved  Current medication: Metformin 1000 mg bid, (pt not taking Farxiga due to itching)    Plan:  Start Ozempic 0.25 mg weekly  Continue Metformin for now, but may need to add another oral med  Discussed carb control and regular exercise    Orders:    POCT hemoglobin A1c    semaglutide, 0.25 or 0.5 mg/dose, (Ozempic, 0.25 or 0.5 MG/DOSE,) 2 mg/3 mL injection pen; 0.25 mg under the skin every 7 days for 4 doses (28 days), THEN 0.5 mg under the skin every 7 days    Class 1 obesity due to excess calories without serious comorbidity with body mass index (BMI) of 32.0 to 32.9 in adult  Prior Authorization Clinical Questions for Weight Management Pharmacotherapy    1. Does the patient have a contrainidcation to medication prescribed for weight management?: No  2. Does the patient have a diagnosis of obesity, confirmed by a BMI greater than or equal to 30 kg/m^2?: Yes  3. Does the patient have a BMI of greater than or equal to 27 kg/m^2 with at least one weight-related comorbidity/risk factor/complication (e.g. diabetes, dyslipidemia, coronary artery disease)?: Yes  4. Weight-related co-morbidities/risk factors: type 2 diabetes, dyslipidemia, hypertension, GERD  5. Has the patient been on a weight loss regimen of low-calorie diet, increased physical activity, and lifestyle modifications for a minimum of 6 months?: Yes  6. Has the patient completed a  comprehensive weight loss program (ie, Weight Watchers, Noom, Bariatrics, other lcifford on phone)? If so, what?: No  7. Does the patient have a history of type 2 diabetes?: Yes  8. Has the member tried and failed other weight loss medication within the past 12 months?: No  9. Will the member use requested medication in combination with another GLP agonist or weight loss drug?: No  10. Is the medication a controlled substance?: No  Additional comments: Body Mass Index 32.19       Baseline weight (in pounds): 176 lbs         Orders:    semaglutide, 0.25 or 0.5 mg/dose, (Ozempic, 0.25 or 0.5 MG/DOSE,) 2 mg/3 mL injection pen; 0.25 mg under the skin every 7 days for 4 doses (28 days), THEN 0.5 mg under the skin every 7 days    Anxiety with flying    Orders:    ALPRAZolam (XANAX) 0.5 mg tablet; Take 1 tablet (0.5 mg total) by mouth daily at bedtime as needed for anxiety    Asymptomatic postmenopausal state    Orders:    DXA bone density spine hip and pelvis; Future    Encounter for immunization    Orders:    influenza vaccine, recombinant, PF, 0.5 mL IM (Flublok)       Preventive health issues were discussed with patient, and age appropriate screening tests were ordered as noted in patient's After Visit Summary. Personalized health advice and appropriate referrals for health education or preventive services given if needed, as noted in patient's After Visit Summary.    History of Present Illness     HPI   Alessia Pena is a 64 y.o. female  who presented to the office today for her annual Medicare visit.        The following portions of the patient's history were reviewed and updated as appropriate: allergies, current medications, past family history, past medical history, past social history, past surgical history and problem list.        Patient Care Team:  Julissa Marie MD as PCP - General (Family Medicine)  MD Travon Renner MD as Endoscopist    Review of Systems   Constitutional:  Negative for  chills and fever.   HENT:  Negative for congestion, rhinorrhea and sore throat.    Respiratory:  Negative for cough and shortness of breath.    Cardiovascular:  Negative for chest pain.   Gastrointestinal:  Negative for diarrhea, nausea and vomiting.   Skin:  Negative for rash.   Neurological:  Negative for dizziness and headaches.     Medical History Reviewed by provider this encounter:       Annual Wellness Visit Questionnaire   Alessia is here for her Subsequent Wellness visit.     Health Risk Assessment:   Patient rates overall health as good. Patient feels that their physical health rating is same. Patient is very satisfied with their life. Eyesight was rated as same. Hearing was rated as same. Patient feels that their emotional and mental health rating is same. Patients states they are never, rarely angry. Patient states they are never, rarely unusually tired/fatigued. Pain experienced in the last 7 days has been none. Patient states that she has experienced weight loss or gain in last 6 months.     Depression Screening:   PHQ-2 Score: 0      Fall Risk Screening:   In the past year, patient has experienced: no history of falling in past year      Urinary Incontinence Screening:   Patient has leaked urine accidently in the last six months.     Home Safety:  Patient does not have trouble with stairs inside or outside of their home. Patient has working smoke alarms and has working carbon monoxide detector. Home safety hazards include: none.     Nutrition:   Current diet is Diabetic.     Medications:   Patient is currently taking over-the-counter supplements. OTC medications include: see medication list. Patient is able to manage medications.     Activities of Daily Living (ADLs)/Instrumental Activities of Daily Living (IADLs):   Walk and transfer into and out of bed and chair?: Yes  Dress and groom yourself?: Yes    Bathe or shower yourself?: Yes    Feed yourself? Yes  Do your laundry/housekeeping?: Yes  Manage  your money, pay your bills and track your expenses?: Yes  Make your own meals?: Yes    Do your own shopping?: Yes    Previous Hospitalizations:   Any hospitalizations or ED visits within the last 12 months?: No      Advance Care Planning:   Living will: No    Advanced directive counseling given: Yes    ACP document given: Yes      Cognitive Screening:   Provider or family/friend/caregiver concerned regarding cognition?: No    PREVENTIVE SCREENINGS      Cardiovascular Screening:    General: Screening Not Indicated and History Lipid Disorder      Diabetes Screening:     General: Screening Not Indicated and History Diabetes      Colorectal Cancer Screening:     General: Screening Current      Breast Cancer Screening:     General: Screening Current      Cervical Cancer Screening:    General: Screening Current      Osteoporosis Screening:    General: Risks and Benefits Discussed    Due for: DXA Axial      Abdominal Aortic Aneurysm (AAA) Screening:        General: Screening Not Indicated      Lung Cancer Screening:     General: Screening Not Indicated      Hepatitis C Screening:    General: Screening Current    Screening, Brief Intervention, and Referral to Treatment (SBIRT)    Screening  Typical number of drinks in a day: 0  Typical number of drinks in a week: 0  Interpretation: Low risk drinking behavior.    Single Item Drug Screening:  How often have you used an illegal drug (including marijuana) or a prescription medication for non-medical reasons in the past year? never    Single Item Drug Screen Score: 0  Interpretation: Negative screen for possible drug use disorder    Social Determinants of Health     Financial Resource Strain: Low Risk  (10/30/2024)    Overall Financial Resource Strain (CARDIA)     Difficulty of Paying Living Expenses: Not hard at all   Food Insecurity: No Food Insecurity (10/30/2024)    Hunger Vital Sign     Worried About Running Out of Food in the Last Year: Never true     Ran Out of Food in  "the Last Year: Never true   Transportation Needs: No Transportation Needs (10/30/2024)    PRAPARE - Transportation     Lack of Transportation (Medical): No     Lack of Transportation (Non-Medical): No   Housing Stability: Low Risk  (10/30/2024)    Housing Stability Vital Sign     Unable to Pay for Housing in the Last Year: No     Number of Times Moved in the Last Year: 0     Homeless in the Last Year: No   Utilities: Not At Risk (10/30/2024)    [facility] Utilities     Threatened with loss of utilities: No     No results found.    Objective     /82 (BP Location: Left arm, Patient Position: Sitting, Cuff Size: Standard)   Pulse 86   Temp 97.9 °F (36.6 °C) (Temporal)   Resp 17   Ht 5' 2\" (1.575 m)   Wt 79.8 kg (176 lb)   SpO2 97%   BMI 32.19 kg/m²     Physical Exam  Vitals and nursing note reviewed.   Constitutional:       General: She is not in acute distress.     Appearance: Normal appearance. She is well-developed. She is obese.   HENT:      Head: Normocephalic and atraumatic.      Mouth/Throat:      Mouth: Mucous membranes are moist.   Eyes:      Conjunctiva/sclera: Conjunctivae normal.   Cardiovascular:      Rate and Rhythm: Normal rate and regular rhythm.      Heart sounds: No murmur heard.  Pulmonary:      Effort: Pulmonary effort is normal. No respiratory distress.      Breath sounds: Normal breath sounds.   Abdominal:      Palpations: Abdomen is soft.      Tenderness: There is no abdominal tenderness.   Musculoskeletal:         General: No swelling.      Cervical back: Neck supple.      Right lower leg: No edema.      Left lower leg: No edema.   Skin:     General: Skin is warm and dry.      Capillary Refill: Capillary refill takes less than 2 seconds.   Neurological:      Mental Status: She is alert and oriented to person, place, and time.   Psychiatric:         Mood and Affect: Mood normal.         Behavior: Behavior normal.         "

## 2024-10-30 NOTE — ASSESSMENT & PLAN NOTE
Lab Results   Component Value Date    HGBA1C 7.5 (A) 10/30/2024     Improved  Current medication: Metformin 1000 mg bid, (pt not taking Farxiga due to itching)    Plan:  Start Ozempic 0.25 mg weekly  Continue Metformin for now, but may need to add another oral med  Discussed carb control and regular exercise    Orders:    POCT hemoglobin A1c    semaglutide, 0.25 or 0.5 mg/dose, (Ozempic, 0.25 or 0.5 MG/DOSE,) 2 mg/3 mL injection pen; 0.25 mg under the skin every 7 days for 4 doses (28 days), THEN 0.5 mg under the skin every 7 days

## 2024-10-30 NOTE — PATIENT INSTRUCTIONS
Medicare Preventive Visit Patient Instructions  Thank you for completing your Welcome to Medicare Visit or Medicare Annual Wellness Visit today. Your next wellness visit will be due in one year (10/31/2025).  The screening/preventive services that you may require over the next 5-10 years are detailed below. Some tests may not apply to you based off risk factors and/or age. Screening tests ordered at today's visit but not completed yet may show as past due. Also, please note that scanned in results may not display below.  Preventive Screenings:  Service Recommendations Previous Testing/Comments   Colorectal Cancer Screening  * Colonoscopy    * Fecal Occult Blood Test (FOBT)/Fecal Immunochemical Test (FIT)  * Fecal DNA/Cologuard Test  * Flexible Sigmoidoscopy Age: 45-75 years old   Colonoscopy: every 10 years (may be performed more frequently if at higher risk)  OR  FOBT/FIT: every 1 year  OR  Cologuard: every 3 years  OR  Sigmoidoscopy: every 5 years  Screening may be recommended earlier than age 45 if at higher risk for colorectal cancer. Also, an individualized decision between you and your healthcare provider will decide whether screening between the ages of 76-85 would be appropriate. Colonoscopy: 01/27/2022  FOBT/FIT: Not on file  Cologuard: Not on file  Sigmoidoscopy: Not on file    Screening Current     Breast Cancer Screening Age: 40+ years old  Frequency: every 1-2 years  Not required if history of left and right mastectomy Mammogram: 04/10/2024    Screening Current   Cervical Cancer Screening Between the ages of 21-29, pap smear recommended once every 3 years.   Between the ages of 30-65, can perform pap smear with HPV co-testing every 5 years.   Recommendations may differ for women with a history of total hysterectomy, cervical cancer, or abnormal pap smears in past. Pap Smear: 08/02/2023    Screening Current   Hepatitis C Screening Once for adults born between 1945 and 1965  More frequently in patients at  high risk for Hepatitis C Hep C Antibody: 07/27/2020    Screening Current   Diabetes Screening 1-2 times per year if you're at risk for diabetes or have pre-diabetes Fasting glucose: 144 mg/dL (3/18/2024)  A1C: 7.5 (10/30/2024)  Screening Not Indicated  History Diabetes   Cholesterol Screening Once every 5 years if you don't have a lipid disorder. May order more often based on risk factors. Lipid panel: 03/18/2024    Screening Not Indicated  History Lipid Disorder     Other Preventive Screenings Covered by Medicare:  Abdominal Aortic Aneurysm (AAA) Screening: covered once if your at risk. You're considered to be at risk if you have a family history of AAA.  Lung Cancer Screening: covers low dose CT scan once per year if you meet all of the following conditions: (1) Age 55-77; (2) No signs or symptoms of lung cancer; (3) Current smoker or have quit smoking within the last 15 years; (4) You have a tobacco smoking history of at least 20 pack years (packs per day multiplied by number of years you smoked); (5) You get a written order from a healthcare provider.  Glaucoma Screening: covered annually if you're considered high risk: (1) You have diabetes OR (2) Family history of glaucoma OR (3)  aged 50 and older OR (4)  American aged 65 and older  Osteoporosis Screening: covered every 2 years if you meet one of the following conditions: (1) You're estrogen deficient and at risk for osteoporosis based off medical history and other findings; (2) Have a vertebral abnormality; (3) On glucocorticoid therapy for more than 3 months; (4) Have primary hyperparathyroidism; (5) On osteoporosis medications and need to assess response to drug therapy.   Last bone density test (DXA Scan): 09/14/2021.  HIV Screening: covered annually if you're between the age of 15-65. Also covered annually if you are younger than 15 and older than 65 with risk factors for HIV infection. For pregnant patients, it is covered up to  3 times per pregnancy.    Immunizations:  Immunization Recommendations   Influenza Vaccine Annual influenza vaccination during flu season is recommended for all persons aged >= 6 months who do not have contraindications   Pneumococcal Vaccine   * Pneumococcal conjugate vaccine = PCV13 (Prevnar 13), PCV15 (Vaxneuvance), PCV20 (Prevnar 20)  * Pneumococcal polysaccharide vaccine = PPSV23 (Pneumovax) Adults 19-63 yo with certain risk factors or if 65+ yo  If never received any pneumonia vaccine: recommend Prevnar 20 (PCV20)  Give PCV20 if previously received 1 dose of PCV13 or PPSV23   Hepatitis B Vaccine 3 dose series if at intermediate or high risk (ex: diabetes, end stage renal disease, liver disease)   Respiratory syncytial virus (RSV) Vaccine - COVERED BY MEDICARE PART D  * RSVPreF3 (Arexvy) CDC recommends that adults 60 years of age and older may receive a single dose of RSV vaccine using shared clinical decision-making (SCDM)   Tetanus (Td) Vaccine - COST NOT COVERED BY MEDICARE PART B Following completion of primary series, a booster dose should be given every 10 years to maintain immunity against tetanus. Td may also be given as tetanus wound prophylaxis.   Tdap Vaccine - COST NOT COVERED BY MEDICARE PART B Recommended at least once for all adults. For pregnant patients, recommended with each pregnancy.   Shingles Vaccine (Shingrix) - COST NOT COVERED BY MEDICARE PART B  2 shot series recommended in those 19 years and older who have or will have weakened immune systems or those 50 years and older     Health Maintenance Due:      Topic Date Due   • Breast Cancer Screening: Mammogram  04/10/2025   • Cervical Cancer Screening  08/02/2028   • Colorectal Cancer Screening  01/25/2032   • HIV Screening  Completed   • Hepatitis C Screening  Completed     Immunizations Due:      Topic Date Due   • COVID-19 Vaccine (5 - 2023-24 season) 09/01/2024     Advance Directives   What are advance directives?  Advance directives  are legal documents that state your wishes and plans for medical care. These plans are made ahead of time in case you lose your ability to make decisions for yourself. Advance directives can apply to any medical decision, such as the treatments you want, and if you want to donate organs.   What are the types of advance directives?  There are many types of advance directives, and each state has rules about how to use them. You may choose a combination of any of the following:  Living will:  This is a written record of the treatment you want. You can also choose which treatments you do not want, which to limit, and which to stop at a certain time. This includes surgery, medicine, IV fluid, and tube feedings.   Durable power of  for healthcare (DPAHC):  This is a written record that states who you want to make healthcare choices for you when you are unable to make them for yourself. This person, called a proxy, is usually a family member or a friend. You may choose more than 1 proxy.  Do not resuscitate (DNR) order:  A DNR order is used in case your heart stops beating or you stop breathing. It is a request not to have certain forms of treatment, such as CPR. A DNR order may be included in other types of advance directives.  Medical directive:  This covers the care that you want if you are in a coma, near death, or unable to make decisions for yourself. You can list the treatments you want for each condition. Treatment may include pain medicine, surgery, blood transfusions, dialysis, IV or tube feedings, and a ventilator (breathing machine).  Values history:  This document has questions about your views, beliefs, and how you feel and think about life. This information can help others choose the care that you would choose.  Why are advance directives important?  An advance directive helps you control your care. Although spoken wishes may be used, it is better to have your wishes written down. Spoken wishes can  be misunderstood, or not followed. Treatments may be given even if you do not want them. An advance directive may make it easier for your family to make difficult choices about your care.   Urinary Incontinence   Urinary incontinence (UI)  is when you lose control of your bladder. UI develops because your bladder cannot store or empty urine properly. The 3 most common types of UI are stress incontinence, urge incontinence, or both.  Medicines:   May be given to help strengthen your bladder control. Report any side effects of medication to your healthcare provider.  Do pelvic muscle exercises often:  Your pelvic muscles help you stop urinating. Squeeze these muscles tight for 5 seconds, then relax for 5 seconds. Gradually work up to squeezing for 10 seconds. Do 3 sets of 15 repetitions a day, or as directed. This will help strengthen your pelvic muscles and improve bladder control.  Train your bladder:  Go to the bathroom at set times, such as every 2 hours, even if you do not feel the urge to go. You can also try to hold your urine when you feel the urge to go. For example, hold your urine for 5 minutes when you feel the urge to go. As that becomes easier, hold your urine for 10 minutes.   Self-care:   Keep a UI record.  Write down how often you leak urine and how much you leak. Make a note of what you were doing when you leaked urine.  Drink liquids as directed. You may need to limit the amount of liquid you drink to help control your urine leakage. Do not drink any liquid right before you go to bed. Limit or do not have drinks that contain caffeine or alcohol.   Prevent constipation.  Eat a variety of high-fiber foods. Good examples are high-fiber cereals, beans, vegetables, and whole-grain breads. Walking is the best way to trigger your intestines to have a bowel movement.  Exercise regularly and maintain a healthy weight.  Weight loss and exercise will decrease pressure on your bladder and help you control your  leakage.   Use a catheter as directed  to help empty your bladder. A catheter is a tiny, plastic tube that is put into your bladder to drain your urine.   Go to behavior therapy as directed.  Behavior therapy may be used to help you learn to control your urge to urinate.    Weight Management   Why it is important to manage your weight:  Being overweight increases your risk of health conditions such as heart disease, high blood pressure, type 2 diabetes, and certain types of cancer. It can also increase your risk for osteoarthritis, sleep apnea, and other respiratory problems. Aim for a slow, steady weight loss. Even a small amount of weight loss can lower your risk of health problems.  How to lose weight safely:  A safe and healthy way to lose weight is to eat fewer calories and get regular exercise. You can lose up about 1 pound a week by decreasing the number of calories you eat by 500 calories each day.   Healthy meal plan for weight management:  A healthy meal plan includes a variety of foods, contains fewer calories, and helps you stay healthy. A healthy meal plan includes the following:  Eat whole-grain foods more often.  A healthy meal plan should contain fiber. Fiber is the part of grains, fruits, and vegetables that is not broken down by your body. Whole-grain foods are healthy and provide extra fiber in your diet. Some examples of whole-grain foods are whole-wheat breads and pastas, oatmeal, brown rice, and bulgur.  Eat a variety of vegetables every day.  Include dark, leafy greens such as spinach, kale, ellie greens, and mustard greens. Eat yellow and orange vegetables such as carrots, sweet potatoes, and winter squash.   Eat a variety of fruits every day.  Choose fresh or canned fruit (canned in its own juice or light syrup) instead of juice. Fruit juice has very little or no fiber.  Eat low-fat dairy foods.  Drink fat-free (skim) milk or 1% milk. Eat fat-free yogurt and low-fat cottage cheese. Try  low-fat cheeses such as mozzarella and other reduced-fat cheeses.  Choose meat and other protein foods that are low in fat.  Choose beans or other legumes such as split peas or lentils. Choose fish, skinless poultry (chicken or turkey), or lean cuts of red meat (beef or pork). Before you cook meat or poultry, cut off any visible fat.   Use less fat and oil.  Try baking foods instead of frying them. Add less fat, such as margarine, sour cream, regular salad dressing and mayonnaise to foods. Eat fewer high-fat foods. Some examples of high-fat foods include french fries, doughnuts, ice cream, and cakes.  Eat fewer sweets.  Limit foods and drinks that are high in sugar. This includes candy, cookies, regular soda, and sweetened drinks.  Exercise:  Exercise at least 30 minutes per day on most days of the week. Some examples of exercise include walking, biking, dancing, and swimming. You can also fit in more physical activity by taking the stairs instead of the elevator or parking farther away from stores. Ask your healthcare provider about the best exercise plan for you.      © Copyright [employer] 2018 Information is for End User's use only and may not be sold, redistributed or otherwise used for commercial purposes. All illustrations and images included in CareNotes® are the copyrighted property of [employer] or [employer]      Medicare Preventive Visit Patient Instructions  Thank you for completing your Welcome to Medicare Visit or Medicare Annual Wellness Visit today. Your next wellness visit will be due in one year (10/31/2025).  The screening/preventive services that you may require over the next 5-10 years are detailed below. Some tests may not apply to you based off risk factors and/or age. Screening tests ordered at today's visit but not completed yet may show as past due. Also, please note that scanned in results may not display below.  Preventive Screenings:  Service Recommendations Previous  Testing/Comments   Colorectal Cancer Screening  * Colonoscopy    * Fecal Occult Blood Test (FOBT)/Fecal Immunochemical Test (FIT)  * Fecal DNA/Cologuard Test  * Flexible Sigmoidoscopy Age: 45-75 years old   Colonoscopy: every 10 years (may be performed more frequently if at higher risk)  OR  FOBT/FIT: every 1 year  OR  Cologuard: every 3 years  OR  Sigmoidoscopy: every 5 years  Screening may be recommended earlier than age 45 if at higher risk for colorectal cancer. Also, an individualized decision between you and your healthcare provider will decide whether screening between the ages of 76-85 would be appropriate. Colonoscopy: 01/27/2022  FOBT/FIT: Not on file  Cologuard: Not on file  Sigmoidoscopy: Not on file    Screening Current     Breast Cancer Screening Age: 40+ years old  Frequency: every 1-2 years  Not required if history of left and right mastectomy Mammogram: 04/10/2024    Screening Current   Cervical Cancer Screening Between the ages of 21-29, pap smear recommended once every 3 years.   Between the ages of 30-65, can perform pap smear with HPV co-testing every 5 years.   Recommendations may differ for women with a history of total hysterectomy, cervical cancer, or abnormal pap smears in past. Pap Smear: 08/02/2023    Screening Current   Hepatitis C Screening Once for adults born between 1945 and 1965  More frequently in patients at high risk for Hepatitis C Hep C Antibody: 07/27/2020    Screening Current   Diabetes Screening 1-2 times per year if you're at risk for diabetes or have pre-diabetes Fasting glucose: 144 mg/dL (3/18/2024)  A1C: 7.5 (10/30/2024)  Screening Not Indicated  History Diabetes   Cholesterol Screening Once every 5 years if you don't have a lipid disorder. May order more often based on risk factors. Lipid panel: 03/18/2024    Screening Not Indicated  History Lipid Disorder     Other Preventive Screenings Covered by Medicare:  Abdominal Aortic Aneurysm (AAA) Screening: covered once if  your at risk. You're considered to be at risk if you have a family history of AAA.  Lung Cancer Screening: covers low dose CT scan once per year if you meet all of the following conditions: (1) Age 55-77; (2) No signs or symptoms of lung cancer; (3) Current smoker or have quit smoking within the last 15 years; (4) You have a tobacco smoking history of at least 20 pack years (packs per day multiplied by number of years you smoked); (5) You get a written order from a healthcare provider.  Glaucoma Screening: covered annually if you're considered high risk: (1) You have diabetes OR (2) Family history of glaucoma OR (3)  aged 50 and older OR (4)  American aged 65 and older  Osteoporosis Screening: covered every 2 years if you meet one of the following conditions: (1) You're estrogen deficient and at risk for osteoporosis based off medical history and other findings; (2) Have a vertebral abnormality; (3) On glucocorticoid therapy for more than 3 months; (4) Have primary hyperparathyroidism; (5) On osteoporosis medications and need to assess response to drug therapy.   Last bone density test (DXA Scan): 09/14/2021.  HIV Screening: covered annually if you're between the age of 15-65. Also covered annually if you are younger than 15 and older than 65 with risk factors for HIV infection. For pregnant patients, it is covered up to 3 times per pregnancy.    Immunizations:  Immunization Recommendations   Influenza Vaccine Annual influenza vaccination during flu season is recommended for all persons aged >= 6 months who do not have contraindications   Pneumococcal Vaccine   * Pneumococcal conjugate vaccine = PCV13 (Prevnar 13), PCV15 (Vaxneuvance), PCV20 (Prevnar 20)  * Pneumococcal polysaccharide vaccine = PPSV23 (Pneumovax) Adults 19-65 yo with certain risk factors or if 65+ yo  If never received any pneumonia vaccine: recommend Prevnar 20 (PCV20)  Give PCV20 if previously received 1 dose of PCV13 or  PPSV23   Hepatitis B Vaccine 3 dose series if at intermediate or high risk (ex: diabetes, end stage renal disease, liver disease)   Respiratory syncytial virus (RSV) Vaccine - COVERED BY MEDICARE PART D  * RSVPreF3 (Arexvy) CDC recommends that adults 60 years of age and older may receive a single dose of RSV vaccine using shared clinical decision-making (SCDM)   Tetanus (Td) Vaccine - COST NOT COVERED BY MEDICARE PART B Following completion of primary series, a booster dose should be given every 10 years to maintain immunity against tetanus. Td may also be given as tetanus wound prophylaxis.   Tdap Vaccine - COST NOT COVERED BY MEDICARE PART B Recommended at least once for all adults. For pregnant patients, recommended with each pregnancy.   Shingles Vaccine (Shingrix) - COST NOT COVERED BY MEDICARE PART B  2 shot series recommended in those 19 years and older who have or will have weakened immune systems or those 50 years and older     Health Maintenance Due:      Topic Date Due   • Breast Cancer Screening: Mammogram  04/10/2025   • Cervical Cancer Screening  08/02/2028   • Colorectal Cancer Screening  01/25/2032   • HIV Screening  Completed   • Hepatitis C Screening  Completed     Immunizations Due:      Topic Date Due   • COVID-19 Vaccine (5 - 2023-24 season) 09/01/2024     Advance Directives   What are advance directives?  Advance directives are legal documents that state your wishes and plans for medical care. These plans are made ahead of time in case you lose your ability to make decisions for yourself. Advance directives can apply to any medical decision, such as the treatments you want, and if you want to donate organs.   What are the types of advance directives?  There are many types of advance directives, and each state has rules about how to use them. You may choose a combination of any of the following:  Living will:  This is a written record of the treatment you want. You can also choose which  treatments you do not want, which to limit, and which to stop at a certain time. This includes surgery, medicine, IV fluid, and tube feedings.   Durable power of  for healthcare (DPAHC):  This is a written record that states who you want to make healthcare choices for you when you are unable to make them for yourself. This person, called a proxy, is usually a family member or a friend. You may choose more than 1 proxy.  Do not resuscitate (DNR) order:  A DNR order is used in case your heart stops beating or you stop breathing. It is a request not to have certain forms of treatment, such as CPR. A DNR order may be included in other types of advance directives.  Medical directive:  This covers the care that you want if you are in a coma, near death, or unable to make decisions for yourself. You can list the treatments you want for each condition. Treatment may include pain medicine, surgery, blood transfusions, dialysis, IV or tube feedings, and a ventilator (breathing machine).  Values history:  This document has questions about your views, beliefs, and how you feel and think about life. This information can help others choose the care that you would choose.  Why are advance directives important?  An advance directive helps you control your care. Although spoken wishes may be used, it is better to have your wishes written down. Spoken wishes can be misunderstood, or not followed. Treatments may be given even if you do not want them. An advance directive may make it easier for your family to make difficult choices about your care.   Urinary Incontinence   Urinary incontinence (UI)  is when you lose control of your bladder. UI develops because your bladder cannot store or empty urine properly. The 3 most common types of UI are stress incontinence, urge incontinence, or both.  Medicines:   May be given to help strengthen your bladder control. Report any side effects of medication to your healthcare provider.  Do  pelvic muscle exercises often:  Your pelvic muscles help you stop urinating. Squeeze these muscles tight for 5 seconds, then relax for 5 seconds. Gradually work up to squeezing for 10 seconds. Do 3 sets of 15 repetitions a day, or as directed. This will help strengthen your pelvic muscles and improve bladder control.  Train your bladder:  Go to the bathroom at set times, such as every 2 hours, even if you do not feel the urge to go. You can also try to hold your urine when you feel the urge to go. For example, hold your urine for 5 minutes when you feel the urge to go. As that becomes easier, hold your urine for 10 minutes.   Self-care:   Keep a UI record.  Write down how often you leak urine and how much you leak. Make a note of what you were doing when you leaked urine.  Drink liquids as directed. You may need to limit the amount of liquid you drink to help control your urine leakage. Do not drink any liquid right before you go to bed. Limit or do not have drinks that contain caffeine or alcohol.   Prevent constipation.  Eat a variety of high-fiber foods. Good examples are high-fiber cereals, beans, vegetables, and whole-grain breads. Walking is the best way to trigger your intestines to have a bowel movement.  Exercise regularly and maintain a healthy weight.  Weight loss and exercise will decrease pressure on your bladder and help you control your leakage.   Use a catheter as directed  to help empty your bladder. A catheter is a tiny, plastic tube that is put into your bladder to drain your urine.   Go to behavior therapy as directed.  Behavior therapy may be used to help you learn to control your urge to urinate.    Weight Management   Why it is important to manage your weight:  Being overweight increases your risk of health conditions such as heart disease, high blood pressure, type 2 diabetes, and certain types of cancer. It can also increase your risk for osteoarthritis, sleep apnea, and other respiratory  problems. Aim for a slow, steady weight loss. Even a small amount of weight loss can lower your risk of health problems.  How to lose weight safely:  A safe and healthy way to lose weight is to eat fewer calories and get regular exercise. You can lose up about 1 pound a week by decreasing the number of calories you eat by 500 calories each day.   Healthy meal plan for weight management:  A healthy meal plan includes a variety of foods, contains fewer calories, and helps you stay healthy. A healthy meal plan includes the following:  Eat whole-grain foods more often.  A healthy meal plan should contain fiber. Fiber is the part of grains, fruits, and vegetables that is not broken down by your body. Whole-grain foods are healthy and provide extra fiber in your diet. Some examples of whole-grain foods are whole-wheat breads and pastas, oatmeal, brown rice, and bulgur.  Eat a variety of vegetables every day.  Include dark, leafy greens such as spinach, kale, ellie greens, and mustard greens. Eat yellow and orange vegetables such as carrots, sweet potatoes, and winter squash.   Eat a variety of fruits every day.  Choose fresh or canned fruit (canned in its own juice or light syrup) instead of juice. Fruit juice has very little or no fiber.  Eat low-fat dairy foods.  Drink fat-free (skim) milk or 1% milk. Eat fat-free yogurt and low-fat cottage cheese. Try low-fat cheeses such as mozzarella and other reduced-fat cheeses.  Choose meat and other protein foods that are low in fat.  Choose beans or other legumes such as split peas or lentils. Choose fish, skinless poultry (chicken or turkey), or lean cuts of red meat (beef or pork). Before you cook meat or poultry, cut off any visible fat.   Use less fat and oil.  Try baking foods instead of frying them. Add less fat, such as margarine, sour cream, regular salad dressing and mayonnaise to foods. Eat fewer high-fat foods. Some examples of high-fat foods include french fries,  doughnuts, ice cream, and cakes.  Eat fewer sweets.  Limit foods and drinks that are high in sugar. This includes candy, cookies, regular soda, and sweetened drinks.  Exercise:  Exercise at least 30 minutes per day on most days of the week. Some examples of exercise include walking, biking, dancing, and swimming. You can also fit in more physical activity by taking the stairs instead of the elevator or parking farther away from stores. Ask your healthcare provider about the best exercise plan for you.      © Copyright [employer] 2018 Information is for End User's use only and may not be sold, redistributed or otherwise used for commercial purposes. All illustrations and images included in CareNotes® are the copyrighted property of [employer] or [employer]

## 2024-12-20 DIAGNOSIS — I10 HYPERTENSION, BENIGN: ICD-10-CM

## 2024-12-20 DIAGNOSIS — E11.9 DIABETES MELLITUS TYPE 2, CONTROLLED (HCC): ICD-10-CM

## 2024-12-20 RX ORDER — LOSARTAN POTASSIUM 50 MG/1
50 TABLET ORAL DAILY
Qty: 90 TABLET | Refills: 1 | Status: SHIPPED | OUTPATIENT
Start: 2024-12-20

## 2025-01-03 ENCOUNTER — TELEPHONE (OUTPATIENT)
Dept: FAMILY MEDICINE CLINIC | Facility: CLINIC | Age: 65
End: 2025-01-03

## 2025-01-15 ENCOUNTER — TELEPHONE (OUTPATIENT)
Dept: GASTROENTEROLOGY | Facility: CLINIC | Age: 65
End: 2025-01-15

## 2025-01-15 NOTE — TELEPHONE ENCOUNTER
LMOM in Sinhala for patient to call back to reschedule from 2/7 provider not in office  Note: Patient has been rescheduled from 2/3/25 to 2/7/25 - this is unfortunately a new reschedule.

## 2025-01-17 ENCOUNTER — TELEPHONE (OUTPATIENT)
Dept: GASTROENTEROLOGY | Facility: CLINIC | Age: 65
End: 2025-01-17

## 2025-01-17 NOTE — TELEPHONE ENCOUNTER
Spoke with patient about below changes and she is ok with changes    Lmom In Libyan to r/s 10/13 to 10/15 same time  LMOM to r/s - see notes  follow up r/s'd from 2/3  Pt requested  only for clifford

## 2025-01-23 DIAGNOSIS — E11.9 TYPE 2 DIABETES MELLITUS WITHOUT COMPLICATION, WITHOUT LONG-TERM CURRENT USE OF INSULIN (HCC): ICD-10-CM

## 2025-01-23 DIAGNOSIS — E66.811 CLASS 1 OBESITY DUE TO EXCESS CALORIES WITHOUT SERIOUS COMORBIDITY WITH BODY MASS INDEX (BMI) OF 32.0 TO 32.9 IN ADULT: ICD-10-CM

## 2025-01-23 DIAGNOSIS — E66.09 CLASS 1 OBESITY DUE TO EXCESS CALORIES WITHOUT SERIOUS COMORBIDITY WITH BODY MASS INDEX (BMI) OF 32.0 TO 32.9 IN ADULT: ICD-10-CM

## 2025-01-24 RX ORDER — SEMAGLUTIDE 0.68 MG/ML
INJECTION, SOLUTION SUBCUTANEOUS
Qty: 9 ML | Refills: 0 | Status: SHIPPED | OUTPATIENT
Start: 2025-01-24

## 2025-01-30 ENCOUNTER — OFFICE VISIT (OUTPATIENT)
Dept: FAMILY MEDICINE CLINIC | Facility: CLINIC | Age: 65
End: 2025-01-30

## 2025-01-30 VITALS
RESPIRATION RATE: 16 BRPM | TEMPERATURE: 97.2 F | SYSTOLIC BLOOD PRESSURE: 102 MMHG | WEIGHT: 166 LBS | BODY MASS INDEX: 30.55 KG/M2 | HEIGHT: 62 IN | HEART RATE: 100 BPM | OXYGEN SATURATION: 96 % | DIASTOLIC BLOOD PRESSURE: 70 MMHG

## 2025-01-30 DIAGNOSIS — E11.8 CONTROLLED DIABETES MELLITUS TYPE 2 WITH COMPLICATIONS, UNSPECIFIED WHETHER LONG TERM INSULIN USE (HCC): ICD-10-CM

## 2025-01-30 DIAGNOSIS — I10 HYPERTENSION, BENIGN: ICD-10-CM

## 2025-01-30 DIAGNOSIS — R52 BODY ACHES: ICD-10-CM

## 2025-01-30 DIAGNOSIS — J06.9 UPPER RESPIRATORY TRACT INFECTION, UNSPECIFIED TYPE: ICD-10-CM

## 2025-01-30 DIAGNOSIS — E11.9 TYPE 2 DIABETES MELLITUS WITHOUT COMPLICATION, WITHOUT LONG-TERM CURRENT USE OF INSULIN (HCC): Primary | ICD-10-CM

## 2025-01-30 LAB — SL AMB POCT HEMOGLOBIN AIC: 6.7 (ref ?–6.5)

## 2025-01-30 PROCEDURE — 87636 SARSCOV2 & INF A&B AMP PRB: CPT | Performed by: FAMILY MEDICINE

## 2025-01-30 PROCEDURE — 99214 OFFICE O/P EST MOD 30 MIN: CPT | Performed by: FAMILY MEDICINE

## 2025-01-30 PROCEDURE — 83036 HEMOGLOBIN GLYCOSYLATED A1C: CPT | Performed by: FAMILY MEDICINE

## 2025-01-30 RX ORDER — SIMVASTATIN 20 MG
20 TABLET ORAL
Qty: 90 TABLET | Refills: 1 | Status: SHIPPED | OUTPATIENT
Start: 2025-01-30

## 2025-01-30 RX ORDER — BLOOD-GLUCOSE METER
KIT MISCELLANEOUS
Qty: 100 EACH | Refills: 5 | Status: SHIPPED | OUTPATIENT
Start: 2025-01-30

## 2025-01-30 RX ORDER — LOSARTAN POTASSIUM 50 MG/1
50 TABLET ORAL DAILY
Qty: 90 TABLET | Refills: 1 | Status: SHIPPED | OUTPATIENT
Start: 2025-01-30

## 2025-01-30 NOTE — ASSESSMENT & PLAN NOTE
BP Readings from Last 3 Encounters:   01/30/25 102/70   10/30/24 126/82   08/08/24 128/78     At goal  Current medication: Losartan 50 mg daily     Plan:  Continue losartan 50 mg daily  Low-salt diet  Regular exercise, lose weight    Orders:  •  losartan (COZAAR) 50 mg tablet; Take 1 tablet (50 mg total) by mouth daily  •  Albumin / creatinine urine ratio; Future  •  Comprehensive metabolic panel; Future  •  Hemoglobin A1C; Future  •  Lipid Panel with Direct LDL reflex; Future

## 2025-01-30 NOTE — ASSESSMENT & PLAN NOTE
Lab Results   Component Value Date    HGBA1C 6.7 (A) 01/30/2025     Orders:  •  glucose blood (FREESTYLE LITE) test strip; Use once a day

## 2025-01-30 NOTE — PROGRESS NOTES
Name: Alessia Pena      : 1960      MRN: [id number]  Encounter Provider: Julissa Marie MD  Encounter Date: 2025   Encounter department: [facility]  :  Assessment & Plan  Type 2 diabetes mellitus without complication, without long-term current use of insulin (HCC)    Lab Results   Component Value Date    HGBA1C 6.7 (A) 2025    HGBA1C 7.5 (A) 10/30/2024    HGBA1C 8.1 (H) 2024     Improved  Current medications: Ozempic, metformin 1000 twice daily    Plan:    Continue current medications  DM diet  DM foot exam completed today  DM eye exam pending      Orders:  •  POCT hemoglobin A1c  •  metFORMIN (GLUCOPHAGE) 1000 MG tablet; Take 1 tablet (1,000 mg total) by mouth 2 (two) times a day with meals  •  simvastatin (ZOCOR) 20 mg tablet; Take 1 tablet (20 mg total) by mouth daily at bedtime  •  Albumin / creatinine urine ratio; Future  •  Comprehensive metabolic panel; Future  •  Hemoglobin A1C; Future  •  Lipid Panel with Direct LDL reflex; Future    Body aches    Orders:  •  COVID/FLU; Future    Upper respiratory tract infection, unspecified type  COVID/flu swab today  Conservative management of URI symptoms  Orders:  •  COVID/FLU; Future    Controlled diabetes mellitus type 2 with complications, unspecified whether long term insulin use (HCC)    Lab Results   Component Value Date    HGBA1C 6.7 (A) 2025     Orders:  •  glucose blood (FREESTYLE LITE) test strip; Use once a day    Hypertension, benign  BP Readings from Last 3 Encounters:   25 102/70   10/30/24 126/82   24 128/78     At goal  Current medication: Losartan 50 mg daily     Plan:  Continue losartan 50 mg daily  Low-salt diet  Regular exercise, lose weight    Orders:  •  losartan (COZAAR) 50 mg tablet; Take 1 tablet (50 mg total) by mouth daily  •  Albumin / creatinine urine ratio; Future  •  Comprehensive metabolic panel; Future  •  Hemoglobin A1C; Future  •  Lipid Panel  "with Direct LDL reflex; Future           History of Present Illness   HPI  Alessia Pena is a 64 y.o. female  has a past medical history of Anxiety, Arthritis, Asthma, Chronic pain disorder, Diabetes mellitus (HCC), Enlarged liver, GERD (gastroesophageal reflux disease), Hyperlipidemia, and Hypertension. who presented to the office today for follow up of DM.    Pt state 2 days ago started to have nasal congestion, sore throat, and bodyaches, and had difficulty breathing.  She used Mucinex, and Albuterol with some relief.  She continues to have body aches. She does not have any fever, but did have chills.    Patient has been using Ozempic weekly and taking the metformin twice daily.  She has lost 10 pounds since last office visit and states is feeling happy about her weight loss.    The following portions of the patient's history were reviewed and updated as appropriate: allergies, current medications, past family history, past medical history, past social history, past surgical history and problem list.      Review of Systems   Constitutional:  Negative for chills and fever.   HENT:  Negative for congestion, rhinorrhea and sore throat.    Respiratory:  Negative for cough and shortness of breath.    Cardiovascular:  Negative for chest pain.   Gastrointestinal:  Negative for diarrhea, nausea and vomiting.   Skin:  Negative for rash.   Neurological:  Negative for dizziness and headaches.       Objective   /70 (BP Location: Right arm, Patient Position: Sitting, Cuff Size: Large)   Pulse 100   Temp (!) 97.2 °F (36.2 °C) (Temporal)   Resp 16   Ht 5' 2\" (1.575 m)   Wt 75.3 kg (166 lb)   SpO2 96%   BMI 30.36 kg/m²      Physical Exam  Vitals and nursing note reviewed.   Constitutional:       General: She is not in acute distress.     Appearance: She is well-developed.   HENT:      Head: Normocephalic and atraumatic.      Nose: Congestion present.      Mouth/Throat:      Pharynx: Posterior oropharyngeal " erythema present.   Eyes:      Conjunctiva/sclera: Conjunctivae normal.   Cardiovascular:      Rate and Rhythm: Normal rate and regular rhythm.      Pulses: no weak pulses.           Dorsalis pedis pulses are 2+ on the right side and 2+ on the left side.      Heart sounds: No murmur heard.  Pulmonary:      Effort: Pulmonary effort is normal. No respiratory distress.      Breath sounds: Normal breath sounds.   Abdominal:      Palpations: Abdomen is soft.      Tenderness: There is no abdominal tenderness.   Musculoskeletal:         General: No swelling.      Cervical back: Neck supple.   Feet:      Right foot:      Skin integrity: No ulcer, skin breakdown, erythema, warmth, callus or dry skin.      Left foot:      Skin integrity: No ulcer, skin breakdown, erythema, warmth, callus or dry skin.   Skin:     General: Skin is warm and dry.      Capillary Refill: Capillary refill takes less than 2 seconds.   Neurological:      Mental Status: She is alert.   Psychiatric:         Mood and Affect: Mood normal.         Behavior: Behavior normal.         Patient's shoes and socks removed.    Right Foot/Ankle   Right Foot Inspection  Skin Exam: skin normal and skin intact. No dry skin, no warmth, no callus, no erythema, no maceration, no abnormal color, no pre-ulcer, no ulcer and no callus.     Toe Exam: ROM and strength within normal limits.     Sensory   Proprioception: intact  Monofilament testing: intact    Vascular  The right DP pulse is 2+.     Left Foot/Ankle  Left Foot Inspection  Skin Exam: skin normal and skin intact. No dry skin, no warmth, no erythema, no maceration, normal color, no pre-ulcer, no ulcer and no callus.     Toe Exam: ROM and strength within normal limits.     Sensory   Proprioception: intact  Monofilament testing: intact    Vascular  The left DP pulse is 2+.     Assign Risk Category  No deformity present  No loss of protective sensation  No weak pulses  Risk: 0

## 2025-01-30 NOTE — ASSESSMENT & PLAN NOTE
Lab Results   Component Value Date    HGBA1C 6.7 (A) 01/30/2025     Orders:  •  losartan (COZAAR) 50 mg tablet; Take 1 tablet (50 mg total) by mouth daily

## 2025-01-30 NOTE — ASSESSMENT & PLAN NOTE
Lab Results   Component Value Date    HGBA1C 6.7 (A) 01/30/2025    HGBA1C 7.5 (A) 10/30/2024    HGBA1C 8.1 (H) 07/11/2024     Improved  Current medications: Ozempic, metformin 1000 twice daily    Plan:    Continue current medications  DM diet  DM foot exam completed today  DM eye exam pending      Orders:  •  POCT hemoglobin A1c  •  metFORMIN (GLUCOPHAGE) 1000 MG tablet; Take 1 tablet (1,000 mg total) by mouth 2 (two) times a day with meals  •  simvastatin (ZOCOR) 20 mg tablet; Take 1 tablet (20 mg total) by mouth daily at bedtime  •  Albumin / creatinine urine ratio; Future  •  Comprehensive metabolic panel; Future  •  Hemoglobin A1C; Future  •  Lipid Panel with Direct LDL reflex; Future

## 2025-01-31 ENCOUNTER — RESULTS FOLLOW-UP (OUTPATIENT)
Dept: FAMILY MEDICINE CLINIC | Facility: CLINIC | Age: 65
End: 2025-01-31

## 2025-01-31 DIAGNOSIS — J10.1 INFLUENZA A: Primary | ICD-10-CM

## 2025-01-31 LAB
FLUAV RNA RESP QL NAA+PROBE: POSITIVE
FLUBV RNA RESP QL NAA+PROBE: NEGATIVE
SARS-COV-2 RNA RESP QL NAA+PROBE: NEGATIVE

## 2025-01-31 RX ORDER — OSELTAMIVIR PHOSPHATE 75 MG/1
75 CAPSULE ORAL EVERY 12 HOURS SCHEDULED
Qty: 10 CAPSULE | Refills: 0 | Status: SHIPPED | OUTPATIENT
Start: 2025-01-31 | End: 2025-02-05

## 2025-02-04 NOTE — TELEPHONE ENCOUNTER
Patient called and Influenza results reviewed. Provider recommendations given and patient will  medications at pharmacy.

## 2025-04-08 DIAGNOSIS — M16.12 PRIMARY OSTEOARTHRITIS OF LEFT HIP: ICD-10-CM

## 2025-04-08 NOTE — TELEPHONE ENCOUNTER
Pt left  requesting a refill of diclofenac 75 mg EC tablet.     Last visit 1/30/25    Last sent on 5/26/24.     Please Advise as order shows an allergy/contradiction.     Thank you!

## 2025-04-10 ENCOUNTER — TELEPHONE (OUTPATIENT)
Dept: GASTROENTEROLOGY | Facility: CLINIC | Age: 65
End: 2025-04-10

## 2025-04-10 RX ORDER — DICLOFENAC SODIUM 75 MG/1
75 TABLET, DELAYED RELEASE ORAL 2 TIMES DAILY
Qty: 60 TABLET | Refills: 2 | Status: SHIPPED | OUTPATIENT
Start: 2025-04-10

## 2025-04-10 NOTE — TELEPHONE ENCOUNTER
Lm via  stating appt for 10/15/25 with Dr Gonsalez has been canceled and rescheduled for 10/02/25 at 0900.  Asked pt to cb if she is unable to make this appt

## 2025-04-11 ENCOUNTER — TELEPHONE (OUTPATIENT)
Dept: FAMILY MEDICINE CLINIC | Facility: CLINIC | Age: 65
End: 2025-04-11

## 2025-04-11 ENCOUNTER — HOSPITAL ENCOUNTER (OUTPATIENT)
Dept: MAMMOGRAPHY | Facility: CLINIC | Age: 65
End: 2025-04-11
Payer: COMMERCIAL

## 2025-04-11 VITALS — BODY MASS INDEX: 30.55 KG/M2 | HEIGHT: 62 IN | WEIGHT: 166 LBS

## 2025-04-11 DIAGNOSIS — Z12.31 ENCOUNTER FOR SCREENING MAMMOGRAM FOR MALIGNANT NEOPLASM OF BREAST: ICD-10-CM

## 2025-04-11 DIAGNOSIS — E66.09 CLASS 1 OBESITY DUE TO EXCESS CALORIES WITHOUT SERIOUS COMORBIDITY WITH BODY MASS INDEX (BMI) OF 32.0 TO 32.9 IN ADULT: ICD-10-CM

## 2025-04-11 DIAGNOSIS — E66.811 CLASS 1 OBESITY DUE TO EXCESS CALORIES WITHOUT SERIOUS COMORBIDITY WITH BODY MASS INDEX (BMI) OF 32.0 TO 32.9 IN ADULT: ICD-10-CM

## 2025-04-11 DIAGNOSIS — E11.9 TYPE 2 DIABETES MELLITUS WITHOUT COMPLICATION, WITHOUT LONG-TERM CURRENT USE OF INSULIN (HCC): ICD-10-CM

## 2025-04-11 PROCEDURE — 77063 BREAST TOMOSYNTHESIS BI: CPT

## 2025-04-11 PROCEDURE — 77067 SCR MAMMO BI INCL CAD: CPT

## 2025-04-11 NOTE — TELEPHONE ENCOUNTER
Pt came into the office requesting medication refills on,         semaglutide, 0.25 or 0.5 mg/dose, (Ozempic, 0.25 or 0.5 MG/DOSE,) 2 mg/3 mL injection pen

## 2025-04-17 DIAGNOSIS — K21.9 GASTROESOPHAGEAL REFLUX DISEASE, UNSPECIFIED WHETHER ESOPHAGITIS PRESENT: ICD-10-CM

## 2025-04-17 RX ORDER — OMEPRAZOLE 40 MG/1
40 CAPSULE, DELAYED RELEASE ORAL 2 TIMES DAILY
Qty: 180 CAPSULE | Refills: 1 | Status: SHIPPED | OUTPATIENT
Start: 2025-04-17

## 2025-05-27 ENCOUNTER — TELEPHONE (OUTPATIENT)
Dept: FAMILY MEDICINE CLINIC | Facility: CLINIC | Age: 65
End: 2025-05-27

## 2025-05-30 ENCOUNTER — TELEPHONE (OUTPATIENT)
Dept: OTHER | Facility: OTHER | Age: 65
End: 2025-05-30

## 2025-05-30 ENCOUNTER — TELEPHONE (OUTPATIENT)
Dept: FAMILY MEDICINE CLINIC | Facility: CLINIC | Age: 65
End: 2025-05-30

## 2025-05-30 DIAGNOSIS — E11.9 TYPE 2 DIABETES MELLITUS WITHOUT COMPLICATION, WITHOUT LONG-TERM CURRENT USE OF INSULIN (HCC): Primary | ICD-10-CM

## 2025-05-30 RX ORDER — BLOOD-GLUCOSE METER
KIT MISCELLANEOUS
Qty: 1 KIT | Refills: 0 | Status: SHIPPED | OUTPATIENT
Start: 2025-05-30

## 2025-05-30 RX ORDER — BLOOD SUGAR DIAGNOSTIC
STRIP MISCELLANEOUS
Qty: 200 EACH | Refills: 3 | Status: SHIPPED | OUTPATIENT
Start: 2025-05-30

## 2025-05-30 RX ORDER — LANCETS 33 GAUGE
EACH MISCELLANEOUS
Qty: 200 EACH | Refills: 3 | Status: SHIPPED | OUTPATIENT
Start: 2025-05-30

## 2025-05-30 NOTE — TELEPHONE ENCOUNTER
Roselyn from [facility] calling to confirm appointments for patient with PCP and OBGYN. Patient was on the line as well and appointment details were shared for 7/30/25 at 10 AM with PCP and 8/22/25 at 10:30 AM with OBGYN.

## 2025-05-30 NOTE — TELEPHONE ENCOUNTER
Kwame called and stated the following medication is not covered by her insurance. Representative stated OneTouch test strips is covered. Pt would also need the meter as well. Please send one touch meter and strips.      glucose blood (FREESTYLE LITE) test strip

## 2025-06-04 ENCOUNTER — HOSPITAL ENCOUNTER (OUTPATIENT)
Dept: BONE DENSITY | Facility: MEDICAL CENTER | Age: 65
Discharge: HOME | End: 2025-06-04
Payer: COMMERCIAL

## 2025-06-04 DIAGNOSIS — Z78.0 ASYMPTOMATIC POSTMENOPAUSAL STATE: ICD-10-CM

## 2025-06-04 PROCEDURE — 77080 DXA BONE DENSITY AXIAL: CPT

## 2025-07-01 DIAGNOSIS — M16.12 PRIMARY OSTEOARTHRITIS OF LEFT HIP: ICD-10-CM

## 2025-07-01 RX ORDER — DICLOFENAC SODIUM 75 MG/1
75 TABLET, DELAYED RELEASE ORAL 2 TIMES DAILY
Qty: 60 TABLET | Refills: 2 | Status: SHIPPED | OUTPATIENT
Start: 2025-07-01

## 2025-07-02 DIAGNOSIS — E11.9 TYPE 2 DIABETES MELLITUS WITHOUT COMPLICATION, WITHOUT LONG-TERM CURRENT USE OF INSULIN (HCC): ICD-10-CM

## 2025-07-02 RX ORDER — BLOOD-GLUCOSE METER
EACH MISCELLANEOUS
OUTPATIENT
Start: 2025-07-02

## 2025-07-04 DIAGNOSIS — E11.9 TYPE 2 DIABETES MELLITUS WITHOUT COMPLICATION, WITHOUT LONG-TERM CURRENT USE OF INSULIN (HCC): ICD-10-CM

## 2025-07-04 DIAGNOSIS — E66.09 CLASS 1 OBESITY DUE TO EXCESS CALORIES WITHOUT SERIOUS COMORBIDITY WITH BODY MASS INDEX (BMI) OF 32.0 TO 32.9 IN ADULT: ICD-10-CM

## 2025-07-04 DIAGNOSIS — E66.811 CLASS 1 OBESITY DUE TO EXCESS CALORIES WITHOUT SERIOUS COMORBIDITY WITH BODY MASS INDEX (BMI) OF 32.0 TO 32.9 IN ADULT: ICD-10-CM

## 2025-07-07 RX ORDER — SEMAGLUTIDE 0.68 MG/ML
INJECTION, SOLUTION SUBCUTANEOUS
Qty: 9 ML | Refills: 0 | Status: SHIPPED | OUTPATIENT
Start: 2025-07-07

## 2025-07-19 DIAGNOSIS — E11.9 TYPE 2 DIABETES MELLITUS WITHOUT COMPLICATION, WITHOUT LONG-TERM CURRENT USE OF INSULIN (HCC): ICD-10-CM

## 2025-07-21 RX ORDER — SIMVASTATIN 20 MG
20 TABLET ORAL
Qty: 90 TABLET | Refills: 1 | Status: SHIPPED | OUTPATIENT
Start: 2025-07-21

## 2025-08-01 DIAGNOSIS — E11.9 TYPE 2 DIABETES MELLITUS WITHOUT COMPLICATION, WITHOUT LONG-TERM CURRENT USE OF INSULIN (HCC): ICD-10-CM

## 2025-08-01 RX ORDER — BLOOD-GLUCOSE METER
EACH MISCELLANEOUS
Qty: 1 KIT | Refills: 0 | Status: SHIPPED | OUTPATIENT
Start: 2025-08-01

## (undated) DEVICE — PENCIL ELECTROSURG E-Z CLEAN -0035H

## (undated) DEVICE — GAUZE SPONGES,16 PLY: Brand: CURITY

## (undated) DEVICE — NEEDLE 22 G X 1 1/2 SAFETY

## (undated) DEVICE — STRETCH BANDAGE: Brand: CURITY

## (undated) DEVICE — HEAVY DUTY TABLE COVER: Brand: CONVERTORS

## (undated) DEVICE — CAPIT HIP COP - ACTIS ONLY

## (undated) DEVICE — 40601 PROLONGED POSITIONING SYSTEM: Brand: 40601 PROLONGED POSITIONING SYSTEM

## (undated) DEVICE — SUT VICRYL 4-0 PS-2 27 IN J426H

## (undated) DEVICE — ACE WRAP 4 IN UNSTERILE

## (undated) DEVICE — CURITY STRETCH BANDAGE: Brand: CURITY

## (undated) DEVICE — ADHESIVE SKIN HIGH VISCOSITY EXOFIN 1ML

## (undated) DEVICE — PREP PAD BNS: Brand: CONVERTORS

## (undated) DEVICE — GLOVE SRG BIOGEL 8

## (undated) DEVICE — CAST PADDING 4 IN SYNTHETIC NON-STRL

## (undated) DEVICE — CHLORAPREP HI-LITE 26ML ORANGE

## (undated) DEVICE — NEEDLE 25G X 1 1/2

## (undated) DEVICE — SUT VICRYL 2-0 CT-1 27 IN J259H

## (undated) DEVICE — 3M™ IOBAN™ 2 ANTIMICROBIAL INCISE DRAPE 6650EZ: Brand: IOBAN™ 2

## (undated) DEVICE — COBAN 4 IN UNSTERILE

## (undated) DEVICE — DRAPE EQUIPMENT RF WAND

## (undated) DEVICE — BETHLEHEM UNIVERSAL  MIONR EXT: Brand: CARDINAL HEALTH

## (undated) DEVICE — DRILL BIT 2 MM ANKLE CALIB GRAD ARTHREX

## (undated) DEVICE — SYRINGE 10ML LL

## (undated) DEVICE — IMPERVIOUS STOCKINETTE: Brand: DEROYAL

## (undated) DEVICE — SUT MONOCRYL 3-0 PS-2 18 IN Y497G

## (undated) DEVICE — DRILL BIT 2.7MM

## (undated) DEVICE — K-WIRE TROCAR POINT BOTH ENDS .045                                    X 4
Type: IMPLANTABLE DEVICE | Site: TOE | Status: NON-FUNCTIONAL
Removed: 2019-03-01

## (undated) DEVICE — CURITY NON-ADHERENT STRIPS: Brand: CURITY

## (undated) DEVICE — GLOVE INDICATOR PI UNDERGLOVE SZ 8.5 BLUE

## (undated) DEVICE — NEEDLE 18 G X 1 1/2

## (undated) DEVICE — DRAPE CAMERA/LASER

## (undated) DEVICE — 2000CC GUARDIAN II: Brand: GUARDIAN

## (undated) DEVICE — SUT STRATAFIX SPIRAL PDS PLUS 1 CTX 18 IN SXPP1A400

## (undated) DEVICE — OSCILLATING TIP SAW CARTRIDGE: Brand: PRECISION FALCON

## (undated) DEVICE — GLOVE PI ULTRA TOUCH SZ.7.5

## (undated) DEVICE — GAUZE SPONGES,USP TYPE VII GAUZE, 12 PLY: Brand: CURITY

## (undated) DEVICE — SCD SEQUENTIAL COMPRESSION COMFORT SLEEVE MEDIUM KNEE LENGTH: Brand: KENDALL SCD

## (undated) DEVICE — CAPIT HIP UPCHRG  GRIPTION CUP

## (undated) DEVICE — 10FR FRAZIER SUCTION HANDLE: Brand: CARDINAL HEALTH

## (undated) DEVICE — DRAPE SHEET THREE QUARTER

## (undated) DEVICE — BETHLEHEM UNIV MAJOR ORTHO,KIT: Brand: CARDINAL HEALTH

## (undated) DEVICE — SUT VICRYL 3-0 PS-2 27 IN J427H

## (undated) DEVICE — GLOVE INDICATOR PI UNDERGLOVE SZ 7.5 BLUE

## (undated) DEVICE — DRAPE C-ARM X-RAY

## (undated) DEVICE — THE SIMPULSE SOLO SYSTEM WITH ULTREX RETRACTABLE SPLASH SHIELD TIP: Brand: SIMPULSE SOLO

## (undated) DEVICE — ELECTRODE BLADE E-Z CLEAN 4IN -0014A

## (undated) DEVICE — 3M™ TEGADERM™ TRANSPARENT FILM DRESSING FRAME STYLE, 1626W, 4 IN X 4-3/4 IN (10 CM X 12 CM), 50/CT 4CT/CASE: Brand: 3M™ TEGADERM™

## (undated) DEVICE — PAD GROUNDING ADULT

## (undated) DEVICE — BLADE SAGITTAL 25.6 X 9.5MM

## (undated) DEVICE — COBAN 4 IN STERILE

## (undated) DEVICE — SPONGE PVP SCRUB WING STERILE

## (undated) DEVICE — DISPOSABLE EQUIPMENT COVER: Brand: SMALL TOWEL DRAPE

## (undated) DEVICE — Device

## (undated) DEVICE — CUFF TOURNIQUET 18 X 4 IN QUICK CONNECT DISP 1 BLADDER

## (undated) DEVICE — 3M™ TEGADERM™ TRANSPARENT FILM DRESSING FRAME STYLE, 1628, 6 IN X 8 IN (15 CM X 20 CM), 10/CT 8CT/CASE: Brand: 3M™ TEGADERM™

## (undated) DEVICE — SYRINGE 20ML LL

## (undated) DEVICE — TUBING SUCTION 5MM X 12 FT

## (undated) DEVICE — HOOD: Brand: FLYTE, SURGICOOL

## (undated) DEVICE — INTENDED FOR TISSUE SEPARATION, AND OTHER PROCEDURES THAT REQUIRE A SHARP SURGICAL BLADE TO PUNCTURE OR CUT.: Brand: BARD-PARKER ® CARBON RIB-BACK BLADES

## (undated) DEVICE — SUT ETHILON 4-0 PS-2 18 IN 1667H